# Patient Record
Sex: MALE | Race: WHITE | NOT HISPANIC OR LATINO | Employment: OTHER | URBAN - METROPOLITAN AREA
[De-identification: names, ages, dates, MRNs, and addresses within clinical notes are randomized per-mention and may not be internally consistent; named-entity substitution may affect disease eponyms.]

---

## 2017-01-20 ENCOUNTER — TRANSCRIBE ORDERS (OUTPATIENT)
Dept: SLEEP CENTER | Facility: CLINIC | Age: 27
End: 2017-01-20

## 2017-01-20 DIAGNOSIS — G47.33 OSA (OBSTRUCTIVE SLEEP APNEA): Primary | ICD-10-CM

## 2017-01-25 ENCOUNTER — ALLSCRIPTS OFFICE VISIT (OUTPATIENT)
Dept: OTHER | Facility: OTHER | Age: 27
End: 2017-01-25

## 2017-01-25 ENCOUNTER — TRANSCRIBE ORDERS (OUTPATIENT)
Dept: LAB | Facility: CLINIC | Age: 27
End: 2017-01-25

## 2017-01-25 ENCOUNTER — APPOINTMENT (OUTPATIENT)
Dept: LAB | Facility: CLINIC | Age: 27
End: 2017-01-25
Payer: MEDICARE

## 2017-01-25 DIAGNOSIS — E03.9 HYPOTHYROIDISM: ICD-10-CM

## 2017-01-25 DIAGNOSIS — E78.5 HYPERLIPIDEMIA: ICD-10-CM

## 2017-01-25 DIAGNOSIS — R73.09 OTHER ABNORMAL GLUCOSE: ICD-10-CM

## 2017-01-25 DIAGNOSIS — R32 URINARY INCONTINENCE: ICD-10-CM

## 2017-01-25 LAB
ALBUMIN SERPL BCP-MCNC: 4 G/DL (ref 3.5–5)
ALP SERPL-CCNC: 66 U/L (ref 46–116)
ALT SERPL W P-5'-P-CCNC: 48 U/L (ref 12–78)
ANION GAP SERPL CALCULATED.3IONS-SCNC: 7 MMOL/L (ref 4–13)
AST SERPL W P-5'-P-CCNC: 29 U/L (ref 5–45)
BILIRUB SERPL-MCNC: 0.4 MG/DL (ref 0.2–1)
BILIRUB UR QL STRIP: NEGATIVE
BUN SERPL-MCNC: 11 MG/DL (ref 5–25)
CALCIUM SERPL-MCNC: 9.3 MG/DL (ref 8.3–10.1)
CHLORIDE SERPL-SCNC: 104 MMOL/L (ref 100–108)
CHOLEST SERPL-MCNC: 156 MG/DL (ref 50–200)
CLARITY UR: CLEAR
CO2 SERPL-SCNC: 30 MMOL/L (ref 21–32)
COLOR UR: YELLOW
CREAT SERPL-MCNC: 0.96 MG/DL (ref 0.6–1.3)
EST. AVERAGE GLUCOSE BLD GHB EST-MCNC: 128 MG/DL
GFR SERPL CREATININE-BSD FRML MDRD: >60 ML/MIN/1.73SQ M
GLUCOSE SERPL-MCNC: 109 MG/DL (ref 65–140)
GLUCOSE UR STRIP-MCNC: NEGATIVE MG/DL
HBA1C MFR BLD: 6.1 % (ref 4.2–6.3)
HDLC SERPL-MCNC: 31 MG/DL (ref 40–60)
HGB UR QL STRIP.AUTO: NEGATIVE
KETONES UR STRIP-MCNC: NEGATIVE MG/DL
LEUKOCYTE ESTERASE UR QL STRIP: NEGATIVE
NITRITE UR QL STRIP: NEGATIVE
PH UR STRIP.AUTO: 6 [PH] (ref 4.5–8)
POTASSIUM SERPL-SCNC: 4.1 MMOL/L (ref 3.5–5.3)
PROT SERPL-MCNC: 7.4 G/DL (ref 6.4–8.2)
PROT UR STRIP-MCNC: NEGATIVE MG/DL
SODIUM SERPL-SCNC: 141 MMOL/L (ref 136–145)
SP GR UR STRIP.AUTO: 1.02 (ref 1–1.03)
T4 FREE SERPL-MCNC: 0.96 NG/DL (ref 0.76–1.46)
TRIGL SERPL-MCNC: 452 MG/DL
TSH SERPL DL<=0.05 MIU/L-ACNC: 4.08 UIU/ML (ref 0.36–3.74)
UROBILINOGEN UR QL STRIP.AUTO: 0.2 E.U./DL

## 2017-01-25 PROCEDURE — 80053 COMPREHEN METABOLIC PANEL: CPT

## 2017-01-25 PROCEDURE — 80061 LIPID PANEL: CPT

## 2017-01-25 PROCEDURE — 83036 HEMOGLOBIN GLYCOSYLATED A1C: CPT

## 2017-01-25 PROCEDURE — 36415 COLL VENOUS BLD VENIPUNCTURE: CPT

## 2017-01-25 PROCEDURE — 81003 URINALYSIS AUTO W/O SCOPE: CPT

## 2017-01-25 PROCEDURE — 84443 ASSAY THYROID STIM HORMONE: CPT

## 2017-01-25 PROCEDURE — 84439 ASSAY OF FREE THYROXINE: CPT

## 2017-01-27 ENCOUNTER — HOSPITAL ENCOUNTER (OUTPATIENT)
Dept: SLEEP CENTER | Facility: CLINIC | Age: 27
Discharge: HOME | End: 2017-01-27
Payer: MEDICARE

## 2017-01-27 DIAGNOSIS — G47.33 OSA (OBSTRUCTIVE SLEEP APNEA): ICD-10-CM

## 2017-01-27 PROCEDURE — 95810 POLYSOM 6/> YRS 4/> PARAM: CPT

## 2017-02-22 ENCOUNTER — ALLSCRIPTS OFFICE VISIT (OUTPATIENT)
Dept: OTHER | Facility: OTHER | Age: 27
End: 2017-02-22

## 2017-03-01 ENCOUNTER — GENERIC CONVERSION - ENCOUNTER (OUTPATIENT)
Dept: OTHER | Facility: OTHER | Age: 27
End: 2017-03-01

## 2017-03-01 ENCOUNTER — APPOINTMENT (OUTPATIENT)
Dept: LAB | Facility: CLINIC | Age: 27
End: 2017-03-01
Payer: MEDICARE

## 2017-03-01 DIAGNOSIS — E03.9 HYPOTHYROIDISM: ICD-10-CM

## 2017-03-01 LAB
T4 FREE SERPL-MCNC: 0.93 NG/DL (ref 0.76–1.46)
TSH SERPL DL<=0.05 MIU/L-ACNC: 5.5 UIU/ML (ref 0.36–3.74)

## 2017-03-01 PROCEDURE — 36415 COLL VENOUS BLD VENIPUNCTURE: CPT

## 2017-03-01 PROCEDURE — 84439 ASSAY OF FREE THYROXINE: CPT

## 2017-03-01 PROCEDURE — 84443 ASSAY THYROID STIM HORMONE: CPT

## 2017-03-23 ENCOUNTER — HOSPITAL ENCOUNTER (OUTPATIENT)
Dept: SLEEP CENTER | Facility: CLINIC | Age: 27
Discharge: HOME | End: 2017-03-23
Payer: MEDICARE

## 2017-04-12 DIAGNOSIS — M54.9 DORSALGIA: ICD-10-CM

## 2017-04-12 DIAGNOSIS — M25.559 PAIN IN HIP: ICD-10-CM

## 2017-04-13 ENCOUNTER — TRANSCRIBE ORDERS (OUTPATIENT)
Dept: ADMINISTRATIVE | Facility: HOSPITAL | Age: 27
End: 2017-04-13

## 2017-04-13 DIAGNOSIS — R06.81 APNEA: Primary | ICD-10-CM

## 2017-04-17 ENCOUNTER — HOSPITAL ENCOUNTER (OUTPATIENT)
Dept: RADIOLOGY | Facility: HOSPITAL | Age: 27
Discharge: HOME | End: 2017-04-17
Payer: MEDICARE

## 2017-04-17 ENCOUNTER — GENERIC CONVERSION - ENCOUNTER (OUTPATIENT)
Dept: OTHER | Facility: OTHER | Age: 27
End: 2017-04-17

## 2017-04-17 ENCOUNTER — APPOINTMENT (OUTPATIENT)
Dept: LAB | Facility: CLINIC | Age: 27
End: 2017-04-17
Payer: MEDICARE

## 2017-04-17 DIAGNOSIS — N39.44 NOCTURNAL ENURESIS: ICD-10-CM

## 2017-04-17 DIAGNOSIS — M54.9 DORSALGIA: ICD-10-CM

## 2017-04-17 DIAGNOSIS — M25.559 PAIN IN HIP: ICD-10-CM

## 2017-04-17 DIAGNOSIS — M76.31 ILIOTIBIAL BAND SYNDROME OF RIGHT SIDE: ICD-10-CM

## 2017-04-17 DIAGNOSIS — E03.9 HYPOTHYROIDISM: ICD-10-CM

## 2017-04-17 DIAGNOSIS — M62.838 OTHER MUSCLE SPASM: ICD-10-CM

## 2017-04-17 LAB
T4 FREE SERPL-MCNC: 1.04 NG/DL (ref 0.76–1.46)
TSH SERPL DL<=0.05 MIU/L-ACNC: 2.6 UIU/ML (ref 0.36–3.74)

## 2017-04-17 PROCEDURE — 84439 ASSAY OF FREE THYROXINE: CPT

## 2017-04-17 PROCEDURE — 84443 ASSAY THYROID STIM HORMONE: CPT

## 2017-04-17 PROCEDURE — 72110 X-RAY EXAM L-2 SPINE 4/>VWS: CPT

## 2017-04-17 PROCEDURE — 73521 X-RAY EXAM HIPS BI 2 VIEWS: CPT

## 2017-04-17 PROCEDURE — 36415 COLL VENOUS BLD VENIPUNCTURE: CPT

## 2017-04-19 ENCOUNTER — GENERIC CONVERSION - ENCOUNTER (OUTPATIENT)
Dept: OTHER | Facility: OTHER | Age: 27
End: 2017-04-19

## 2017-05-04 ENCOUNTER — APPOINTMENT (OUTPATIENT)
Dept: PHYSICAL THERAPY | Facility: REHABILITATION | Age: 27
End: 2017-05-04
Payer: MEDICARE

## 2017-05-04 DIAGNOSIS — M54.9 DORSALGIA: ICD-10-CM

## 2017-05-04 DIAGNOSIS — M62.838 OTHER MUSCLE SPASM: ICD-10-CM

## 2017-05-04 DIAGNOSIS — M76.31 ILIOTIBIAL BAND SYNDROME OF RIGHT SIDE: ICD-10-CM

## 2017-05-04 DIAGNOSIS — M25.559 PAIN IN HIP: ICD-10-CM

## 2017-05-04 PROCEDURE — G8979 MOBILITY GOAL STATUS: HCPCS

## 2017-05-04 PROCEDURE — G8978 MOBILITY CURRENT STATUS: HCPCS

## 2017-05-04 PROCEDURE — 97162 PT EVAL MOD COMPLEX 30 MIN: CPT

## 2017-05-05 ENCOUNTER — GENERIC CONVERSION - ENCOUNTER (OUTPATIENT)
Dept: OTHER | Facility: OTHER | Age: 27
End: 2017-05-05

## 2017-05-08 ENCOUNTER — GENERIC CONVERSION - ENCOUNTER (OUTPATIENT)
Dept: OTHER | Facility: OTHER | Age: 27
End: 2017-05-08

## 2017-05-08 ENCOUNTER — TRANSCRIBE ORDERS (OUTPATIENT)
Dept: LAB | Facility: CLINIC | Age: 27
End: 2017-05-08

## 2017-05-08 ENCOUNTER — APPOINTMENT (OUTPATIENT)
Dept: LAB | Facility: CLINIC | Age: 27
End: 2017-05-08
Payer: MEDICARE

## 2017-05-08 DIAGNOSIS — N39.44 NOCTURNAL ENURESIS: ICD-10-CM

## 2017-05-08 LAB
BILIRUB UR QL STRIP: NEGATIVE
CLARITY UR: CLEAR
COLOR UR: YELLOW
GLUCOSE UR STRIP-MCNC: NEGATIVE MG/DL
HGB UR QL STRIP.AUTO: NEGATIVE
KETONES UR STRIP-MCNC: NEGATIVE MG/DL
LEUKOCYTE ESTERASE UR QL STRIP: NEGATIVE
NITRITE UR QL STRIP: NEGATIVE
PH UR STRIP.AUTO: 7 [PH] (ref 4.5–8)
PROT UR STRIP-MCNC: NEGATIVE MG/DL
SP GR UR STRIP.AUTO: 1.01 (ref 1–1.03)
UROBILINOGEN UR QL STRIP.AUTO: 0.2 E.U./DL

## 2017-05-08 PROCEDURE — 81003 URINALYSIS AUTO W/O SCOPE: CPT

## 2017-05-17 ENCOUNTER — HOSPITAL ENCOUNTER (EMERGENCY)
Facility: HOSPITAL | Age: 27
Discharge: HOME | End: 2017-05-17
Attending: EMERGENCY MEDICINE | Admitting: EMERGENCY MEDICINE
Payer: MEDICARE

## 2017-05-17 ENCOUNTER — TRANSCRIBE ORDERS (OUTPATIENT)
Dept: ADMINISTRATIVE | Facility: HOSPITAL | Age: 27
End: 2017-05-17

## 2017-05-17 ENCOUNTER — APPOINTMENT (EMERGENCY)
Dept: CT IMAGING | Facility: HOSPITAL | Age: 27
End: 2017-05-17
Payer: MEDICARE

## 2017-05-17 VITALS
OXYGEN SATURATION: 97 % | RESPIRATION RATE: 16 BRPM | TEMPERATURE: 98.9 F | BODY MASS INDEX: 38.36 KG/M2 | WEIGHT: 315 LBS | SYSTOLIC BLOOD PRESSURE: 157 MMHG | DIASTOLIC BLOOD PRESSURE: 84 MMHG | HEIGHT: 76 IN | HEART RATE: 94 BPM

## 2017-05-17 DIAGNOSIS — N20.0 KIDNEY STONES: Primary | ICD-10-CM

## 2017-05-17 DIAGNOSIS — M54.50 ACUTE BILATERAL LOW BACK PAIN WITHOUT SCIATICA: ICD-10-CM

## 2017-05-17 DIAGNOSIS — M51.26 HERNIATED INTERVERTEBRAL DISC OF LUMBAR SPINE: Primary | ICD-10-CM

## 2017-05-17 LAB
ANION GAP SERPL CALCULATED.3IONS-SCNC: 8 MMOL/L (ref 4–13)
BASOPHILS # BLD AUTO: 0.08 THOUSANDS/ΜL (ref 0–0.1)
BASOPHILS NFR BLD AUTO: 2 % (ref 0–1)
BILIRUB UR QL STRIP: NEGATIVE
BUN SERPL-MCNC: 10 MG/DL (ref 5–25)
CALCIUM SERPL-MCNC: 9.2 MG/DL (ref 8.3–10.1)
CHLORIDE SERPL-SCNC: 105 MMOL/L (ref 100–108)
CLARITY UR: CLEAR
CO2 SERPL-SCNC: 26 MMOL/L (ref 21–32)
COLOR UR: YELLOW
CREAT SERPL-MCNC: 0.87 MG/DL (ref 0.6–1.3)
EOSINOPHIL # BLD AUTO: 0.18 THOUSAND/ΜL (ref 0–0.61)
EOSINOPHIL NFR BLD AUTO: 4 % (ref 0–6)
ERYTHROCYTE [DISTWIDTH] IN BLOOD BY AUTOMATED COUNT: 13.4 % (ref 11.6–15.1)
GFR SERPL CREATININE-BSD FRML MDRD: >60 ML/MIN/1.73SQ M
GLUCOSE SERPL-MCNC: 126 MG/DL (ref 65–140)
GLUCOSE UR STRIP-MCNC: NEGATIVE MG/DL
HCT VFR BLD AUTO: 40.2 % (ref 36.5–49.3)
HGB BLD-MCNC: 13.6 G/DL (ref 12–17)
HGB UR QL STRIP.AUTO: NEGATIVE
KETONES UR STRIP-MCNC: NEGATIVE MG/DL
LEUKOCYTE ESTERASE UR QL STRIP: NEGATIVE
LYMPHOCYTES # BLD AUTO: 1.82 THOUSANDS/ΜL (ref 0.6–4.47)
LYMPHOCYTES NFR BLD AUTO: 41 % (ref 14–44)
MCH RBC QN AUTO: 26.5 PG (ref 26.8–34.3)
MCHC RBC AUTO-ENTMCNC: 33.8 G/DL (ref 31.4–37.4)
MCV RBC AUTO: 78 FL (ref 82–98)
MONOCYTES # BLD AUTO: 0.4 THOUSAND/ΜL (ref 0.17–1.22)
MONOCYTES NFR BLD AUTO: 9 % (ref 4–12)
NEUTROPHILS # BLD AUTO: 1.95 THOUSANDS/ΜL (ref 1.85–7.62)
NEUTS SEG NFR BLD AUTO: 44 % (ref 43–75)
NITRITE UR QL STRIP: NEGATIVE
PH UR STRIP.AUTO: 6.5 [PH] (ref 4.5–8)
PLATELET # BLD AUTO: 303 THOUSANDS/UL (ref 149–390)
PMV BLD AUTO: 9.8 FL (ref 8.9–12.7)
POTASSIUM SERPL-SCNC: 3.5 MMOL/L (ref 3.5–5.3)
PROT UR STRIP-MCNC: NEGATIVE MG/DL
RBC # BLD AUTO: 5.14 MILLION/UL (ref 3.88–5.62)
SODIUM SERPL-SCNC: 139 MMOL/L (ref 136–145)
SP GR UR STRIP.AUTO: 1.01 (ref 1–1.03)
UROBILINOGEN UR QL STRIP.AUTO: 0.2 E.U./DL
WBC # BLD AUTO: 4.43 THOUSAND/UL (ref 4.31–10.16)

## 2017-05-17 PROCEDURE — 99284 EMERGENCY DEPT VISIT MOD MDM: CPT

## 2017-05-17 PROCEDURE — 96361 HYDRATE IV INFUSION ADD-ON: CPT

## 2017-05-17 PROCEDURE — 36415 COLL VENOUS BLD VENIPUNCTURE: CPT | Performed by: EMERGENCY MEDICINE

## 2017-05-17 PROCEDURE — 96375 TX/PRO/DX INJ NEW DRUG ADDON: CPT

## 2017-05-17 PROCEDURE — 96374 THER/PROPH/DIAG INJ IV PUSH: CPT

## 2017-05-17 PROCEDURE — 80048 BASIC METABOLIC PNL TOTAL CA: CPT | Performed by: EMERGENCY MEDICINE

## 2017-05-17 PROCEDURE — 85025 COMPLETE CBC W/AUTO DIFF WBC: CPT | Performed by: EMERGENCY MEDICINE

## 2017-05-17 PROCEDURE — 81003 URINALYSIS AUTO W/O SCOPE: CPT | Performed by: EMERGENCY MEDICINE

## 2017-05-17 PROCEDURE — 74176 CT ABD & PELVIS W/O CONTRAST: CPT

## 2017-05-17 RX ORDER — FENOFIBRATE 145 MG/1
145 TABLET, COATED ORAL DAILY
COMMUNITY
End: 2017-10-13 | Stop reason: ALTCHOICE

## 2017-05-17 RX ORDER — DIPHENOXYLATE HYDROCHLORIDE AND ATROPINE SULFATE 2.5; .025 MG/1; MG/1
1 TABLET ORAL DAILY
COMMUNITY
End: 2017-10-13 | Stop reason: ALTCHOICE

## 2017-05-17 RX ORDER — BENZTROPINE MESYLATE 0.5 MG/1
1 TABLET ORAL 2 TIMES DAILY
COMMUNITY
End: 2017-10-13 | Stop reason: ALTCHOICE

## 2017-05-17 RX ORDER — METHYLPREDNISOLONE 4 MG/1
TABLET ORAL
Qty: 21 TABLET | Refills: 0 | Status: SHIPPED | OUTPATIENT
Start: 2017-05-17 | End: 2017-10-13 | Stop reason: ALTCHOICE

## 2017-05-17 RX ORDER — DIVALPROEX SODIUM 500 MG/1
500 TABLET, DELAYED RELEASE ORAL EVERY 12 HOURS SCHEDULED
COMMUNITY
End: 2018-02-06 | Stop reason: SDUPTHER

## 2017-05-17 RX ORDER — LEVOTHYROXINE SODIUM 0.12 MG/1
25 TABLET ORAL DAILY
COMMUNITY
End: 2017-11-30 | Stop reason: HOSPADM

## 2017-05-17 RX ORDER — OXYCODONE HYDROCHLORIDE AND ACETAMINOPHEN 5; 325 MG/1; MG/1
1 TABLET ORAL EVERY 6 HOURS PRN
Qty: 20 TABLET | Refills: 0 | Status: SHIPPED | OUTPATIENT
Start: 2017-05-17 | End: 2017-05-22

## 2017-05-17 RX ORDER — CLONAZEPAM 0.5 MG/1
1 TABLET ORAL 2 TIMES DAILY
COMMUNITY
End: 2017-10-13 | Stop reason: DRUGHIGH

## 2017-05-17 RX ORDER — DOCUSATE SODIUM 100 MG/1
100 CAPSULE, LIQUID FILLED ORAL DAILY
COMMUNITY
End: 2017-10-13 | Stop reason: ALTCHOICE

## 2017-05-17 RX ORDER — DIAZEPAM 5 MG/ML
5 INJECTION, SOLUTION INTRAMUSCULAR; INTRAVENOUS ONCE
Status: COMPLETED | OUTPATIENT
Start: 2017-05-17 | End: 2017-05-17

## 2017-05-17 RX ORDER — ACETAMINOPHEN 500 MG
325 TABLET ORAL EVERY 6 HOURS PRN
COMMUNITY

## 2017-05-17 RX ORDER — RISPERIDONE 4 MG/1
6 TABLET, ORALLY DISINTEGRATING ORAL 2 TIMES DAILY
COMMUNITY
End: 2017-10-13 | Stop reason: DRUGHIGH

## 2017-05-17 RX ORDER — MORPHINE SULFATE 4 MG/ML
4 INJECTION, SOLUTION INTRAMUSCULAR; INTRAVENOUS ONCE
Status: COMPLETED | OUTPATIENT
Start: 2017-05-17 | End: 2017-05-17

## 2017-05-17 RX ADMIN — MORPHINE SULFATE 4 MG: 4 INJECTION, SOLUTION INTRAMUSCULAR; INTRAVENOUS at 15:19

## 2017-05-17 RX ADMIN — SODIUM CHLORIDE 1000 ML: 0.9 INJECTION, SOLUTION INTRAVENOUS at 15:01

## 2017-05-17 RX ADMIN — DIAZEPAM 5 MG: 5 INJECTION, SOLUTION INTRAMUSCULAR; INTRAVENOUS at 15:14

## 2017-05-18 ENCOUNTER — HOSPITAL ENCOUNTER (OUTPATIENT)
Dept: ULTRASOUND IMAGING | Facility: HOSPITAL | Age: 27
Discharge: HOME | End: 2017-05-18
Payer: MEDICARE

## 2017-05-26 ENCOUNTER — GENERIC CONVERSION - ENCOUNTER (OUTPATIENT)
Dept: OTHER | Facility: OTHER | Age: 27
End: 2017-05-26

## 2017-06-05 DIAGNOSIS — E78.1 PURE HYPERGLYCERIDEMIA: ICD-10-CM

## 2017-06-05 DIAGNOSIS — E03.9 HYPOTHYROIDISM: ICD-10-CM

## 2017-06-05 DIAGNOSIS — R73.09 OTHER ABNORMAL GLUCOSE: ICD-10-CM

## 2017-06-21 ENCOUNTER — APPOINTMENT (OUTPATIENT)
Dept: LAB | Facility: CLINIC | Age: 27
End: 2017-06-21
Payer: MEDICARE

## 2017-06-21 ENCOUNTER — TRANSCRIBE ORDERS (OUTPATIENT)
Dept: LAB | Facility: CLINIC | Age: 27
End: 2017-06-21

## 2017-06-21 ENCOUNTER — TRANSCRIBE ORDERS (OUTPATIENT)
Dept: ADMINISTRATIVE | Facility: HOSPITAL | Age: 27
End: 2017-06-21

## 2017-06-21 DIAGNOSIS — E78.1 PURE HYPERGLYCERIDEMIA: ICD-10-CM

## 2017-06-21 DIAGNOSIS — Z79.899 ENCOUNTER FOR LONG-TERM (CURRENT) USE OF OTHER MEDICATIONS: Primary | ICD-10-CM

## 2017-06-21 DIAGNOSIS — E03.9 HYPOTHYROIDISM: ICD-10-CM

## 2017-06-21 DIAGNOSIS — Z79.899 ENCOUNTER FOR LONG-TERM (CURRENT) USE OF OTHER MEDICATIONS: ICD-10-CM

## 2017-06-21 DIAGNOSIS — R73.09 OTHER ABNORMAL GLUCOSE: ICD-10-CM

## 2017-06-21 DIAGNOSIS — G47.33 OBSTRUCTIVE SLEEP APNEA (ADULT) (PEDIATRIC): Primary | ICD-10-CM

## 2017-06-21 LAB
ALBUMIN SERPL BCP-MCNC: 4 G/DL (ref 3.5–5)
ALP SERPL-CCNC: 56 U/L (ref 46–116)
ALT SERPL W P-5'-P-CCNC: 40 U/L (ref 12–78)
ANION GAP SERPL CALCULATED.3IONS-SCNC: 10 MMOL/L (ref 4–13)
AST SERPL W P-5'-P-CCNC: 21 U/L (ref 5–45)
BASOPHILS # BLD AUTO: 0.08 THOUSANDS/ΜL (ref 0–0.1)
BASOPHILS NFR BLD AUTO: 2 % (ref 0–1)
BILIRUB DIRECT SERPL-MCNC: 0.08 MG/DL (ref 0–0.2)
BILIRUB SERPL-MCNC: 0.3 MG/DL (ref 0.2–1)
BUN SERPL-MCNC: 7 MG/DL (ref 5–25)
CALCIUM SERPL-MCNC: 9.6 MG/DL (ref 8.3–10.1)
CHLORIDE SERPL-SCNC: 104 MMOL/L (ref 100–108)
CHOLEST SERPL-MCNC: 184 MG/DL (ref 50–200)
CO2 SERPL-SCNC: 25 MMOL/L (ref 21–32)
CREAT SERPL-MCNC: 0.82 MG/DL (ref 0.6–1.3)
EOSINOPHIL # BLD AUTO: 0.12 THOUSAND/ΜL (ref 0–0.61)
EOSINOPHIL NFR BLD AUTO: 3 % (ref 0–6)
ERYTHROCYTE [DISTWIDTH] IN BLOOD BY AUTOMATED COUNT: 13.4 % (ref 11.6–15.1)
EST. AVERAGE GLUCOSE BLD GHB EST-MCNC: 123 MG/DL
GFR SERPL CREATININE-BSD FRML MDRD: >60 ML/MIN/1.73SQ M
GLUCOSE P FAST SERPL-MCNC: 91 MG/DL (ref 65–99)
HBA1C MFR BLD: 5.9 % (ref 4.2–6.3)
HCT VFR BLD AUTO: 41.9 % (ref 36.5–49.3)
HDLC SERPL-MCNC: 27 MG/DL (ref 40–60)
HGB BLD-MCNC: 14.1 G/DL (ref 12–17)
LDLC SERPL CALC-MCNC: 114 MG/DL (ref 0–100)
LYMPHOCYTES # BLD AUTO: 2.16 THOUSANDS/ΜL (ref 0.6–4.47)
LYMPHOCYTES NFR BLD AUTO: 44 % (ref 14–44)
MCH RBC QN AUTO: 26.1 PG (ref 26.8–34.3)
MCHC RBC AUTO-ENTMCNC: 33.7 G/DL (ref 31.4–37.4)
MCV RBC AUTO: 77 FL (ref 82–98)
MONOCYTES # BLD AUTO: 0.42 THOUSAND/ΜL (ref 0.17–1.22)
MONOCYTES NFR BLD AUTO: 9 % (ref 4–12)
NEUTROPHILS # BLD AUTO: 1.97 THOUSANDS/ΜL (ref 1.85–7.62)
NEUTS SEG NFR BLD AUTO: 42 % (ref 43–75)
PLATELET # BLD AUTO: 335 THOUSANDS/UL (ref 149–390)
PMV BLD AUTO: 10.4 FL (ref 8.9–12.7)
POTASSIUM SERPL-SCNC: 4 MMOL/L (ref 3.5–5.3)
PROT SERPL-MCNC: 7.5 G/DL (ref 6.4–8.2)
RBC # BLD AUTO: 5.41 MILLION/UL (ref 3.88–5.62)
SODIUM SERPL-SCNC: 139 MMOL/L (ref 136–145)
TRIGL SERPL-MCNC: 213 MG/DL
VALPROATE SERPL-MCNC: 57 UG/ML (ref 50–100)
WBC # BLD AUTO: 4.75 THOUSAND/UL (ref 4.31–10.16)

## 2017-06-21 PROCEDURE — 80061 LIPID PANEL: CPT

## 2017-06-21 PROCEDURE — 80053 COMPREHEN METABOLIC PANEL: CPT

## 2017-06-21 PROCEDURE — 36415 COLL VENOUS BLD VENIPUNCTURE: CPT

## 2017-06-21 PROCEDURE — 85025 COMPLETE CBC W/AUTO DIFF WBC: CPT

## 2017-06-21 PROCEDURE — 82248 BILIRUBIN DIRECT: CPT

## 2017-06-21 PROCEDURE — 83036 HEMOGLOBIN GLYCOSYLATED A1C: CPT

## 2017-06-21 PROCEDURE — 80164 ASSAY DIPROPYLACETIC ACD TOT: CPT

## 2017-06-22 ENCOUNTER — GENERIC CONVERSION - ENCOUNTER (OUTPATIENT)
Dept: OTHER | Facility: OTHER | Age: 27
End: 2017-06-22

## 2017-06-29 ENCOUNTER — ALLSCRIPTS OFFICE VISIT (OUTPATIENT)
Dept: OTHER | Facility: OTHER | Age: 27
End: 2017-06-29

## 2017-07-27 ENCOUNTER — HOSPITAL ENCOUNTER (OUTPATIENT)
Dept: SLEEP CENTER | Facility: CLINIC | Age: 27
Discharge: HOME | End: 2017-07-27
Payer: MEDICARE

## 2017-08-09 ENCOUNTER — ALLSCRIPTS OFFICE VISIT (OUTPATIENT)
Dept: OTHER | Facility: OTHER | Age: 27
End: 2017-08-09

## 2017-08-12 ENCOUNTER — GENERIC CONVERSION - ENCOUNTER (OUTPATIENT)
Dept: OTHER | Facility: OTHER | Age: 27
End: 2017-08-12

## 2017-08-12 ENCOUNTER — APPOINTMENT (EMERGENCY)
Dept: CT IMAGING | Facility: HOSPITAL | Age: 27
End: 2017-08-12
Payer: MEDICARE

## 2017-08-12 ENCOUNTER — HOSPITAL ENCOUNTER (EMERGENCY)
Facility: HOSPITAL | Age: 27
Discharge: HOME | End: 2017-08-12
Attending: EMERGENCY MEDICINE | Admitting: EMERGENCY MEDICINE
Payer: MEDICARE

## 2017-08-12 VITALS
SYSTOLIC BLOOD PRESSURE: 131 MMHG | WEIGHT: 315 LBS | OXYGEN SATURATION: 98 % | DIASTOLIC BLOOD PRESSURE: 86 MMHG | HEART RATE: 87 BPM | BODY MASS INDEX: 41.33 KG/M2 | RESPIRATION RATE: 20 BRPM | TEMPERATURE: 98 F

## 2017-08-12 DIAGNOSIS — M54.9 BACK PAIN: Primary | ICD-10-CM

## 2017-08-12 DIAGNOSIS — M51.26 LUMBAR DISC HERNIATION: ICD-10-CM

## 2017-08-12 PROCEDURE — 96374 THER/PROPH/DIAG INJ IV PUSH: CPT

## 2017-08-12 PROCEDURE — 99284 EMERGENCY DEPT VISIT MOD MDM: CPT

## 2017-08-12 PROCEDURE — 96375 TX/PRO/DX INJ NEW DRUG ADDON: CPT

## 2017-08-12 PROCEDURE — 72131 CT LUMBAR SPINE W/O DYE: CPT

## 2017-08-12 RX ORDER — DIVALPROEX SODIUM 500 MG/1
1000 TABLET, DELAYED RELEASE ORAL DAILY
COMMUNITY

## 2017-08-12 RX ORDER — METHYLPREDNISOLONE SODIUM SUCCINATE 125 MG/2ML
125 INJECTION, POWDER, LYOPHILIZED, FOR SOLUTION INTRAMUSCULAR; INTRAVENOUS ONCE
Status: COMPLETED | OUTPATIENT
Start: 2017-08-12 | End: 2017-08-12

## 2017-08-12 RX ORDER — DIAZEPAM 5 MG/ML
2.5 INJECTION, SOLUTION INTRAMUSCULAR; INTRAVENOUS ONCE
Status: COMPLETED | OUTPATIENT
Start: 2017-08-12 | End: 2017-08-12

## 2017-08-12 RX ORDER — PREDNISONE 20 MG/1
40 TABLET ORAL DAILY
Qty: 10 TABLET | Refills: 0 | Status: SHIPPED | OUTPATIENT
Start: 2017-08-12 | End: 2017-08-17

## 2017-08-12 RX ORDER — GABAPENTIN 100 MG/1
400 CAPSULE ORAL 2 TIMES DAILY
COMMUNITY
End: 2017-10-13 | Stop reason: ALTCHOICE

## 2017-08-12 RX ORDER — MORPHINE SULFATE 4 MG/ML
4 INJECTION, SOLUTION INTRAMUSCULAR; INTRAVENOUS ONCE
Status: COMPLETED | OUTPATIENT
Start: 2017-08-12 | End: 2017-08-12

## 2017-08-12 RX ORDER — OXYCODONE HYDROCHLORIDE AND ACETAMINOPHEN 5; 325 MG/1; MG/1
1 TABLET ORAL EVERY 4 HOURS PRN
COMMUNITY
End: 2017-10-13 | Stop reason: ALTCHOICE

## 2017-08-12 RX ADMIN — DIAZEPAM 2.5 MG: 5 INJECTION, SOLUTION INTRAMUSCULAR; INTRAVENOUS at 02:47

## 2017-08-12 RX ADMIN — MORPHINE SULFATE 4 MG: 4 INJECTION, SOLUTION INTRAMUSCULAR; INTRAVENOUS at 02:51

## 2017-08-12 RX ADMIN — METHYLPREDNISOLONE SODIUM SUCCINATE 125 MG: 125 INJECTION, POWDER, FOR SOLUTION INTRAMUSCULAR; INTRAVENOUS at 02:40

## 2017-08-20 ENCOUNTER — HOSPITAL ENCOUNTER (EMERGENCY)
Facility: HOSPITAL | Age: 27
Discharge: HOME | End: 2017-08-20
Attending: EMERGENCY MEDICINE | Admitting: EMERGENCY MEDICINE
Payer: MEDICARE

## 2017-08-20 ENCOUNTER — APPOINTMENT (EMERGENCY)
Dept: RADIOLOGY | Facility: HOSPITAL | Age: 27
End: 2017-08-20
Payer: MEDICARE

## 2017-08-20 VITALS
HEART RATE: 108 BPM | TEMPERATURE: 98.7 F | OXYGEN SATURATION: 95 % | SYSTOLIC BLOOD PRESSURE: 152 MMHG | WEIGHT: 315 LBS | BODY MASS INDEX: 40.63 KG/M2 | RESPIRATION RATE: 18 BRPM | DIASTOLIC BLOOD PRESSURE: 93 MMHG

## 2017-08-20 DIAGNOSIS — S93.401A SPRAIN OF RIGHT ANKLE, UNSPECIFIED LIGAMENT, INITIAL ENCOUNTER: ICD-10-CM

## 2017-08-20 DIAGNOSIS — S93.601A RIGHT FOOT SPRAIN, INITIAL ENCOUNTER: Primary | ICD-10-CM

## 2017-08-20 PROCEDURE — 73610 X-RAY EXAM OF ANKLE: CPT

## 2017-08-20 PROCEDURE — 99283 EMERGENCY DEPT VISIT LOW MDM: CPT

## 2017-08-20 PROCEDURE — 73630 X-RAY EXAM OF FOOT: CPT

## 2017-08-22 ENCOUNTER — APPOINTMENT (OUTPATIENT)
Dept: PHYSICAL THERAPY | Facility: REHABILITATION | Age: 27
End: 2017-08-22
Payer: MEDICARE

## 2017-08-22 DIAGNOSIS — M51.9 THORACIC, THORACOLUMBAR AND LUMBOSACRAL INTERVERTEBRAL DISC DISORDER: ICD-10-CM

## 2017-08-22 DIAGNOSIS — M51.26 OTHER INTERVERTEBRAL DISC DISPLACEMENT, LUMBAR REGION: ICD-10-CM

## 2017-08-22 DIAGNOSIS — M62.838 OTHER MUSCLE SPASM: ICD-10-CM

## 2017-08-28 ENCOUNTER — APPOINTMENT (OUTPATIENT)
Dept: PHYSICAL THERAPY | Facility: REHABILITATION | Age: 27
End: 2017-08-28
Payer: MEDICARE

## 2017-08-28 PROCEDURE — G8978 MOBILITY CURRENT STATUS: HCPCS

## 2017-08-28 PROCEDURE — 97162 PT EVAL MOD COMPLEX 30 MIN: CPT

## 2017-08-28 PROCEDURE — G8979 MOBILITY GOAL STATUS: HCPCS

## 2017-08-29 ENCOUNTER — GENERIC CONVERSION - ENCOUNTER (OUTPATIENT)
Dept: OTHER | Facility: OTHER | Age: 27
End: 2017-08-29

## 2017-09-06 ENCOUNTER — APPOINTMENT (OUTPATIENT)
Dept: PHYSICAL THERAPY | Facility: REHABILITATION | Age: 27
End: 2017-09-06
Payer: MEDICARE

## 2017-09-06 PROCEDURE — G8979 MOBILITY GOAL STATUS: HCPCS | Performed by: PHYSICAL THERAPIST

## 2017-09-06 PROCEDURE — 97110 THERAPEUTIC EXERCISES: CPT

## 2017-09-06 PROCEDURE — G8978 MOBILITY CURRENT STATUS: HCPCS | Performed by: PHYSICAL THERAPIST

## 2017-09-06 PROCEDURE — 97140 MANUAL THERAPY 1/> REGIONS: CPT

## 2017-09-11 ENCOUNTER — TRANSCRIBE ORDERS (OUTPATIENT)
Dept: ADMINISTRATIVE | Facility: HOSPITAL | Age: 27
End: 2017-09-11

## 2017-09-11 DIAGNOSIS — M54.9 DORSALGIA: Primary | ICD-10-CM

## 2017-09-14 ENCOUNTER — APPOINTMENT (OUTPATIENT)
Dept: PHYSICAL THERAPY | Facility: REHABILITATION | Age: 27
End: 2017-09-14
Payer: MEDICARE

## 2017-09-19 ENCOUNTER — APPOINTMENT (OUTPATIENT)
Dept: PHYSICAL THERAPY | Facility: REHABILITATION | Age: 27
End: 2017-09-19
Payer: MEDICARE

## 2017-09-19 ENCOUNTER — HOSPITAL ENCOUNTER (OUTPATIENT)
Dept: MRI IMAGING | Facility: HOSPITAL | Age: 27
Discharge: HOME | End: 2017-09-19
Attending: NEUROLOGICAL SURGERY
Payer: MEDICARE

## 2017-09-19 DIAGNOSIS — M54.9 DORSALGIA: ICD-10-CM

## 2017-09-19 PROCEDURE — 72148 MRI LUMBAR SPINE W/O DYE: CPT

## 2017-09-20 ENCOUNTER — GENERIC CONVERSION - ENCOUNTER (OUTPATIENT)
Dept: OTHER | Facility: OTHER | Age: 27
End: 2017-09-20

## 2017-09-21 ENCOUNTER — APPOINTMENT (OUTPATIENT)
Dept: PHYSICAL THERAPY | Facility: REHABILITATION | Age: 27
End: 2017-09-21
Payer: MEDICARE

## 2017-09-25 ENCOUNTER — ALLSCRIPTS OFFICE VISIT (OUTPATIENT)
Dept: OTHER | Facility: OTHER | Age: 27
End: 2017-09-25

## 2017-09-26 ENCOUNTER — APPOINTMENT (OUTPATIENT)
Dept: PHYSICAL THERAPY | Facility: REHABILITATION | Age: 27
End: 2017-09-26
Payer: MEDICARE

## 2017-09-28 ENCOUNTER — APPOINTMENT (OUTPATIENT)
Dept: PHYSICAL THERAPY | Facility: REHABILITATION | Age: 27
End: 2017-09-28
Payer: MEDICARE

## 2017-10-02 ENCOUNTER — GENERIC CONVERSION - ENCOUNTER (OUTPATIENT)
Dept: OTHER | Facility: OTHER | Age: 27
End: 2017-10-02

## 2017-10-02 DIAGNOSIS — M48.061 SPINAL STENOSIS OF LUMBAR REGION WITHOUT NEUROGENIC CLAUDICATION: ICD-10-CM

## 2017-10-02 DIAGNOSIS — R73.03 PREDIABETES: ICD-10-CM

## 2017-10-02 DIAGNOSIS — Z00.00 ENCOUNTER FOR GENERAL ADULT MEDICAL EXAMINATION WITHOUT ABNORMAL FINDINGS: ICD-10-CM

## 2017-10-02 DIAGNOSIS — R32 URINARY INCONTINENCE: ICD-10-CM

## 2017-10-02 DIAGNOSIS — M54.9 DORSALGIA: ICD-10-CM

## 2017-10-02 DIAGNOSIS — M51.9 THORACIC, THORACOLUMBAR AND LUMBOSACRAL INTERVERTEBRAL DISC DISORDER: ICD-10-CM

## 2017-10-04 ENCOUNTER — HOSPITAL ENCOUNTER (OUTPATIENT)
Dept: SLEEP CENTER | Facility: CLINIC | Age: 27
Discharge: HOME | End: 2017-10-04
Payer: MEDICARE

## 2017-10-04 DIAGNOSIS — G47.33 OBSTRUCTIVE SLEEP APNEA: ICD-10-CM

## 2017-10-05 ENCOUNTER — HOSPITAL ENCOUNTER (OUTPATIENT)
Dept: RADIOLOGY | Facility: HOSPITAL | Age: 27
Discharge: HOME | End: 2017-10-05
Attending: NEUROLOGICAL SURGERY
Payer: MEDICARE

## 2017-10-05 ENCOUNTER — APPOINTMENT (OUTPATIENT)
Dept: LAB | Facility: CLINIC | Age: 27
End: 2017-10-05
Payer: MEDICARE

## 2017-10-05 ENCOUNTER — TRANSCRIBE ORDERS (OUTPATIENT)
Dept: LAB | Facility: CLINIC | Age: 27
End: 2017-10-05

## 2017-10-05 ENCOUNTER — LAB REQUISITION (OUTPATIENT)
Dept: LAB | Facility: HOSPITAL | Age: 27
End: 2017-10-05
Payer: MEDICARE

## 2017-10-05 DIAGNOSIS — M48.061 SPINAL STENOSIS OF LUMBAR REGION WITHOUT NEUROGENIC CLAUDICATION: ICD-10-CM

## 2017-10-05 DIAGNOSIS — M54.9 DORSALGIA: ICD-10-CM

## 2017-10-05 DIAGNOSIS — N39.44 NOCTURNAL AND DIURNAL ENURESIS: ICD-10-CM

## 2017-10-05 DIAGNOSIS — M51.9 INTERVERTEBRAL DISC DISORDER: ICD-10-CM

## 2017-10-05 DIAGNOSIS — M51.9 THORACIC, THORACOLUMBAR AND LUMBOSACRAL INTERVERTEBRAL DISC DISORDER: ICD-10-CM

## 2017-10-05 DIAGNOSIS — M48.061 SPINAL STENOSIS, LUMBAR REGION, WITHOUT NEUROGENIC CLAUDICATION: ICD-10-CM

## 2017-10-05 DIAGNOSIS — R32 URINARY INCONTINENCE: ICD-10-CM

## 2017-10-05 DIAGNOSIS — M51.9 INTERVERTEBRAL DISC DISORDER: Primary | ICD-10-CM

## 2017-10-05 LAB
ABO GROUP BLD: NORMAL
ALBUMIN SERPL BCP-MCNC: 3.7 G/DL (ref 3.5–5)
ALP SERPL-CCNC: 63 U/L (ref 46–116)
ALT SERPL W P-5'-P-CCNC: 58 U/L (ref 12–78)
ANION GAP SERPL CALCULATED.3IONS-SCNC: 12 MMOL/L (ref 4–13)
APTT PPP: 31 SECONDS (ref 23–35)
AST SERPL W P-5'-P-CCNC: 24 U/L (ref 5–45)
BASOPHILS # BLD AUTO: 0.07 THOUSANDS/ΜL (ref 0–0.1)
BASOPHILS NFR BLD AUTO: 2 % (ref 0–1)
BILIRUB SERPL-MCNC: 0.2 MG/DL (ref 0.2–1)
BILIRUB UR QL STRIP: NEGATIVE
BLD GP AB SCN SERPL QL: NEGATIVE
BUN SERPL-MCNC: 12 MG/DL (ref 5–25)
CALCIUM SERPL-MCNC: 9.3 MG/DL (ref 8.3–10.1)
CHLORIDE SERPL-SCNC: 103 MMOL/L (ref 100–108)
CLARITY UR: CLEAR
CO2 SERPL-SCNC: 25 MMOL/L (ref 21–32)
COLOR UR: YELLOW
CREAT SERPL-MCNC: 0.88 MG/DL (ref 0.6–1.3)
EOSINOPHIL # BLD AUTO: 0.11 THOUSAND/ΜL (ref 0–0.61)
EOSINOPHIL NFR BLD AUTO: 2 % (ref 0–6)
ERYTHROCYTE [DISTWIDTH] IN BLOOD BY AUTOMATED COUNT: 13.3 % (ref 11.6–15.1)
GFR SERPL CREATININE-BSD FRML MDRD: 119 ML/MIN/1.73SQ M
GLUCOSE SERPL-MCNC: 140 MG/DL (ref 65–140)
GLUCOSE UR STRIP-MCNC: NEGATIVE MG/DL
HCT VFR BLD AUTO: 42.4 % (ref 36.5–49.3)
HGB BLD-MCNC: 14 G/DL (ref 12–17)
HGB UR QL STRIP.AUTO: NEGATIVE
INR PPP: 1.02 (ref 0.86–1.16)
KETONES UR STRIP-MCNC: NEGATIVE MG/DL
LEUKOCYTE ESTERASE UR QL STRIP: NEGATIVE
LYMPHOCYTES # BLD AUTO: 2.03 THOUSANDS/ΜL (ref 0.6–4.47)
LYMPHOCYTES NFR BLD AUTO: 43 % (ref 14–44)
MCH RBC QN AUTO: 26 PG (ref 26.8–34.3)
MCHC RBC AUTO-ENTMCNC: 33 G/DL (ref 31.4–37.4)
MCV RBC AUTO: 79 FL (ref 82–98)
MONOCYTES # BLD AUTO: 0.41 THOUSAND/ΜL (ref 0.17–1.22)
MONOCYTES NFR BLD AUTO: 9 % (ref 4–12)
NEUTROPHILS # BLD AUTO: 2.11 THOUSANDS/ΜL (ref 1.85–7.62)
NEUTS SEG NFR BLD AUTO: 44 % (ref 43–75)
NITRITE UR QL STRIP: NEGATIVE
PH UR STRIP.AUTO: 6 [PH] (ref 4.5–8)
PLATELET # BLD AUTO: 312 THOUSANDS/UL (ref 149–390)
PMV BLD AUTO: 10.2 FL (ref 8.9–12.7)
POTASSIUM SERPL-SCNC: 3.8 MMOL/L (ref 3.5–5.3)
PROT SERPL-MCNC: 7.2 G/DL (ref 6.4–8.2)
PROT UR STRIP-MCNC: NEGATIVE MG/DL
PROTHROMBIN TIME: 13.7 SECONDS (ref 12.1–14.4)
RBC # BLD AUTO: 5.38 MILLION/UL (ref 3.88–5.62)
RH BLD: POSITIVE
SODIUM SERPL-SCNC: 140 MMOL/L (ref 136–145)
SP GR UR STRIP.AUTO: 1.02 (ref 1–1.03)
SPECIMEN EXPIRATION DATE: NORMAL
UROBILINOGEN UR QL STRIP.AUTO: 0.2 E.U./DL
WBC # BLD AUTO: 4.73 THOUSAND/UL (ref 4.31–10.16)

## 2017-10-05 PROCEDURE — 86850 RBC ANTIBODY SCREEN: CPT | Performed by: NEUROLOGICAL SURGERY

## 2017-10-05 PROCEDURE — 72120 X-RAY BEND ONLY L-S SPINE: CPT

## 2017-10-05 PROCEDURE — 86900 BLOOD TYPING SEROLOGIC ABO: CPT | Performed by: NEUROLOGICAL SURGERY

## 2017-10-05 PROCEDURE — 86901 BLOOD TYPING SEROLOGIC RH(D): CPT | Performed by: NEUROLOGICAL SURGERY

## 2017-10-05 PROCEDURE — 36415 COLL VENOUS BLD VENIPUNCTURE: CPT

## 2017-10-05 PROCEDURE — 85610 PROTHROMBIN TIME: CPT

## 2017-10-05 PROCEDURE — 80053 COMPREHEN METABOLIC PANEL: CPT

## 2017-10-05 PROCEDURE — 81003 URINALYSIS AUTO W/O SCOPE: CPT

## 2017-10-05 PROCEDURE — 85025 COMPLETE CBC W/AUTO DIFF WBC: CPT

## 2017-10-05 PROCEDURE — 85730 THROMBOPLASTIN TIME PARTIAL: CPT

## 2017-10-06 ENCOUNTER — GENERIC CONVERSION - ENCOUNTER (OUTPATIENT)
Dept: OTHER | Facility: OTHER | Age: 27
End: 2017-10-06

## 2017-10-12 ENCOUNTER — APPOINTMENT (OUTPATIENT)
Dept: LAB | Facility: CLINIC | Age: 27
End: 2017-10-12
Payer: MEDICARE

## 2017-10-12 DIAGNOSIS — Z00.00 ENCOUNTER FOR GENERAL ADULT MEDICAL EXAMINATION WITHOUT ABNORMAL FINDINGS: ICD-10-CM

## 2017-10-12 DIAGNOSIS — R73.03 PREDIABETES: ICD-10-CM

## 2017-10-12 LAB
EST. AVERAGE GLUCOSE BLD GHB EST-MCNC: 114 MG/DL
HBA1C MFR BLD: 5.6 % (ref 4.2–6.3)

## 2017-10-12 PROCEDURE — 83036 HEMOGLOBIN GLYCOSYLATED A1C: CPT

## 2017-10-12 PROCEDURE — 36415 COLL VENOUS BLD VENIPUNCTURE: CPT

## 2017-10-13 ENCOUNTER — GENERIC CONVERSION - ENCOUNTER (OUTPATIENT)
Dept: OTHER | Facility: OTHER | Age: 27
End: 2017-10-13

## 2017-10-13 RX ORDER — CLONAZEPAM 1 MG/1
0.5 TABLET ORAL 2 TIMES DAILY
COMMUNITY

## 2017-10-13 RX ORDER — BENZTROPINE MESYLATE 1 MG/1
1 TABLET ORAL 2 TIMES DAILY
COMMUNITY
End: 2020-03-26 | Stop reason: ALTCHOICE

## 2017-10-13 RX ORDER — RISPERIDONE 4 MG/1
4 TABLET, FILM COATED ORAL 2 TIMES DAILY
COMMUNITY

## 2017-10-13 RX ORDER — FENOFIBRATE 160 MG/1
160 TABLET ORAL DAILY
COMMUNITY
End: 2018-06-14 | Stop reason: SDUPTHER

## 2017-10-13 RX ORDER — RISPERIDONE 2 MG/1
2 TABLET, FILM COATED ORAL 2 TIMES DAILY
COMMUNITY

## 2017-10-13 NOTE — PRE-PROCEDURE INSTRUCTIONS
Pre-Surgery Instructions:   Medication Instructions    acetaminophen (TYLENOL) 500 mg tablet Instructed patient per Anesthesia Guidelines   benztropine (COGENTIN) 1 mg tablet Patient was instructed per "E-Preop"    clonazePAM (KlonoPIN) 1 mg tablet Patient was instructed per "E-Preop"    divalproex sodium (DEPAKOTE) 500 mg EC tablet Patient was instructed per "E-Preop"    divalproex sodium (DEPAKOTE) 500 mg EC tablet Patient was instructed per "E-Preop"    fenofibrate (TRIGLIDE) 160 MG tablet Patient was instructed per "E-Preop"    levothyroxine 125 mcg tablet Patient was instructed per "E-Preop"    risperiDONE (RisperDAL) 2 mg tablet Patient was instructed per "E-Preop"    risperiDONE (RisperDAL) 4 mg tablet Patient was instructed per "E-Preop"           Medication Instruction (Neuroleptic Medication)    Please continue to take this medication on your normal schedule  If this is an oral medication and you take in the morning, you may do so with a sip of water  RisperiDONE 2 MG Oral Tablet, RisperiDONE 4 MG Oral Tablet, risperiDONE (RisperDAL)            Medication Instruction (Benzodiazepine)    Please continue the following medications, if needed, up to and including the day of surgery (with a sip of water)  ClonazePAM 1 MG Oral Tablet          Medication Instruction (Levodopa/Carbidopa - Parkinson Medications)    Please continue to take this medication on your normal schedule  If this is an oral medication and you take in the morning, you may do so with a sip of water  Benztropine Mesylate 1 MG Oral Tablet          Medication Instruction (Neurological Medication)    Please continue to take this medication on your normal schedule  If this is an oral medication and you take in the morning, you may do so with a sip of water          Divalproex Sodium 500 MG Oral Tablet Delayed Release, divalproex sodium (Depakote)          Medication Instruction (Cholesterol Medication)    Please continue the following medications up to the evening before surgery, but do not take it on the day of surgery  Fenofibrate 160 MG Oral Tablet          NPO Instructions    Please do not eat or drink anything prior to your surgery as follows: For AM Surgery times = stop at midnight the night before  For PM Surgery times = Midnight to 8AM clear liquids only (Jello, broth, 7up, Sprite, apple juice, black coffee, black tea, Gatorade)  If you are supposed to take any of your medications, do so with a sip of water  Failure to follow these instructions can lead to an increased risk of lung complications and may result in a delay or cancellation of your procedure  If you have any questions, contact your institution for further instructions  Medical Procedure Risk        Sleep Apnea    Please notify surgeon and anesthesiologist if you have sleep apnea, severe snoring, or daytime somnolence  For those sleep apnea patients with continuous positive airway pressure (CPAP or BiPAP) machines, please bring your machine to the hospital on the day of surgery  COLIN (obstructive sleep apnea), Sleep apnea        Medication Instruction (Thyroxine - Synthroid)    Please continue to take this medication on your normal schedule  If this is an oral medication and you take in the morning, you may do so with a sip of water           Levothyroxine Sodium 25 MCG Oral Tablet, levothyroxine (Levothroid)      Accept All Complete All   Last signed: Never    Request Signature   Add New Task Show Removed Tasks

## 2017-10-16 ENCOUNTER — ANESTHESIA EVENT (OUTPATIENT)
Dept: PERIOP | Facility: HOSPITAL | Age: 27
End: 2017-10-16
Payer: MEDICARE

## 2017-10-16 ENCOUNTER — GENERIC CONVERSION - ENCOUNTER (OUTPATIENT)
Dept: OTHER | Facility: OTHER | Age: 27
End: 2017-10-16

## 2017-10-17 ENCOUNTER — ANESTHESIA (OUTPATIENT)
Dept: PERIOP | Facility: HOSPITAL | Age: 27
End: 2017-10-17
Payer: MEDICARE

## 2017-10-17 ENCOUNTER — HOSPITAL ENCOUNTER (OUTPATIENT)
Facility: HOSPITAL | Age: 27
Setting detail: OUTPATIENT SURGERY
Discharge: HOME | End: 2017-10-17
Attending: NEUROLOGICAL SURGERY | Admitting: NEUROLOGICAL SURGERY
Payer: MEDICARE

## 2017-10-17 ENCOUNTER — APPOINTMENT (OUTPATIENT)
Dept: RADIOLOGY | Facility: HOSPITAL | Age: 27
End: 2017-10-17
Payer: MEDICARE

## 2017-10-17 VITALS
SYSTOLIC BLOOD PRESSURE: 127 MMHG | RESPIRATION RATE: 18 BRPM | DIASTOLIC BLOOD PRESSURE: 78 MMHG | HEIGHT: 76 IN | HEART RATE: 109 BPM | OXYGEN SATURATION: 94 % | BODY MASS INDEX: 38.36 KG/M2 | TEMPERATURE: 99.2 F | WEIGHT: 315 LBS

## 2017-10-17 PROCEDURE — 72020 X-RAY EXAM OF SPINE 1 VIEW: CPT

## 2017-10-17 RX ORDER — LIDOCAINE HYDROCHLORIDE AND EPINEPHRINE 10; 10 MG/ML; UG/ML
INJECTION, SOLUTION INFILTRATION; PERINEURAL AS NEEDED
Status: DISCONTINUED | OUTPATIENT
Start: 2017-10-17 | End: 2017-10-17 | Stop reason: HOSPADM

## 2017-10-17 RX ORDER — ONDANSETRON 2 MG/ML
INJECTION INTRAMUSCULAR; INTRAVENOUS AS NEEDED
Status: DISCONTINUED | OUTPATIENT
Start: 2017-10-17 | End: 2017-10-17 | Stop reason: SURG

## 2017-10-17 RX ORDER — METHOCARBAMOL 500 MG/1
500 TABLET, FILM COATED ORAL EVERY 6 HOURS PRN
Qty: 30 TABLET | Refills: 0 | Status: SHIPPED | OUTPATIENT
Start: 2017-10-17 | End: 2018-03-09 | Stop reason: SDUPTHER

## 2017-10-17 RX ORDER — METOCLOPRAMIDE HYDROCHLORIDE 5 MG/ML
10 INJECTION INTRAMUSCULAR; INTRAVENOUS ONCE AS NEEDED
Status: DISCONTINUED | OUTPATIENT
Start: 2017-10-17 | End: 2017-10-17 | Stop reason: HOSPADM

## 2017-10-17 RX ORDER — TRAMADOL HYDROCHLORIDE 50 MG/1
50 TABLET ORAL EVERY 6 HOURS PRN
Qty: 30 TABLET | Refills: 0 | Status: SHIPPED | OUTPATIENT
Start: 2017-10-17 | End: 2017-10-17

## 2017-10-17 RX ORDER — SUCCINYLCHOLINE CHLORIDE 20 MG/ML
INJECTION INTRAMUSCULAR; INTRAVENOUS AS NEEDED
Status: DISCONTINUED | OUTPATIENT
Start: 2017-10-17 | End: 2017-10-17 | Stop reason: SURG

## 2017-10-17 RX ORDER — FENTANYL CITRATE 50 UG/ML
INJECTION, SOLUTION INTRAMUSCULAR; INTRAVENOUS AS NEEDED
Status: DISCONTINUED | OUTPATIENT
Start: 2017-10-17 | End: 2017-10-17 | Stop reason: SURG

## 2017-10-17 RX ORDER — ROCURONIUM BROMIDE 10 MG/ML
INJECTION, SOLUTION INTRAVENOUS AS NEEDED
Status: DISCONTINUED | OUTPATIENT
Start: 2017-10-17 | End: 2017-10-17 | Stop reason: SURG

## 2017-10-17 RX ORDER — CHLORHEXIDINE GLUCONATE 0.12 MG/ML
15 RINSE ORAL ONCE
Status: COMPLETED | OUTPATIENT
Start: 2017-10-17 | End: 2017-10-17

## 2017-10-17 RX ORDER — ALBUMIN, HUMAN INJ 5% 5 %
SOLUTION INTRAVENOUS CONTINUOUS PRN
Status: DISCONTINUED | OUTPATIENT
Start: 2017-10-17 | End: 2017-10-17 | Stop reason: SURG

## 2017-10-17 RX ORDER — BUPIVACAINE HYDROCHLORIDE 5 MG/ML
INJECTION, SOLUTION EPIDURAL; INTRACAUDAL AS NEEDED
Status: DISCONTINUED | OUTPATIENT
Start: 2017-10-17 | End: 2017-10-17 | Stop reason: HOSPADM

## 2017-10-17 RX ORDER — ONDANSETRON 2 MG/ML
4 INJECTION INTRAMUSCULAR; INTRAVENOUS ONCE AS NEEDED
Status: DISCONTINUED | OUTPATIENT
Start: 2017-10-17 | End: 2017-10-17 | Stop reason: HOSPADM

## 2017-10-17 RX ORDER — METHYLPREDNISOLONE ACETATE 40 MG/ML
INJECTION, SUSPENSION INTRA-ARTICULAR; INTRALESIONAL; INTRAMUSCULAR; SOFT TISSUE AS NEEDED
Status: DISCONTINUED | OUTPATIENT
Start: 2017-10-17 | End: 2017-10-17 | Stop reason: HOSPADM

## 2017-10-17 RX ORDER — METOPROLOL TARTRATE 5 MG/5ML
INJECTION INTRAVENOUS AS NEEDED
Status: DISCONTINUED | OUTPATIENT
Start: 2017-10-17 | End: 2017-10-17 | Stop reason: SURG

## 2017-10-17 RX ORDER — SODIUM CHLORIDE, SODIUM LACTATE, POTASSIUM CHLORIDE, CALCIUM CHLORIDE 600; 310; 30; 20 MG/100ML; MG/100ML; MG/100ML; MG/100ML
125 INJECTION, SOLUTION INTRAVENOUS CONTINUOUS
Status: DISCONTINUED | OUTPATIENT
Start: 2017-10-17 | End: 2017-10-17 | Stop reason: HOSPADM

## 2017-10-17 RX ORDER — METHOCARBAMOL 500 MG/1
500 TABLET, FILM COATED ORAL EVERY 6 HOURS PRN
Qty: 30 TABLET | Refills: 0 | Status: SHIPPED | OUTPATIENT
Start: 2017-10-17 | End: 2017-10-17

## 2017-10-17 RX ORDER — PROPOFOL 10 MG/ML
INJECTION, EMULSION INTRAVENOUS AS NEEDED
Status: DISCONTINUED | OUTPATIENT
Start: 2017-10-17 | End: 2017-10-17 | Stop reason: SURG

## 2017-10-17 RX ORDER — MEPERIDINE HYDROCHLORIDE 25 MG/ML
12.5 INJECTION INTRAMUSCULAR; INTRAVENOUS; SUBCUTANEOUS
Status: DISCONTINUED | OUTPATIENT
Start: 2017-10-17 | End: 2017-10-17 | Stop reason: HOSPADM

## 2017-10-17 RX ORDER — TRAMADOL HYDROCHLORIDE 50 MG/1
50 TABLET ORAL EVERY 6 HOURS PRN
Qty: 30 TABLET | Refills: 0 | Status: SHIPPED | OUTPATIENT
Start: 2017-10-17 | End: 2017-10-27

## 2017-10-17 RX ORDER — FENTANYL CITRATE/PF 50 MCG/ML
25 SYRINGE (ML) INJECTION AS NEEDED
Status: DISCONTINUED | OUTPATIENT
Start: 2017-10-17 | End: 2017-10-17 | Stop reason: HOSPADM

## 2017-10-17 RX ADMIN — METOPROLOL TARTRATE 1 MG: 1 INJECTION, SOLUTION INTRAVENOUS at 09:00

## 2017-10-17 RX ADMIN — SODIUM CHLORIDE, SODIUM LACTATE, POTASSIUM CHLORIDE, AND CALCIUM CHLORIDE: .6; .31; .03; .02 INJECTION, SOLUTION INTRAVENOUS at 08:54

## 2017-10-17 RX ADMIN — DEXAMETHASONE SODIUM PHOSPHATE 10 MG: 10 INJECTION INTRAMUSCULAR; INTRAVENOUS at 08:39

## 2017-10-17 RX ADMIN — FENTANYL CITRATE 100 MCG: 50 INJECTION, SOLUTION INTRAMUSCULAR; INTRAVENOUS at 08:05

## 2017-10-17 RX ADMIN — CHLORHEXIDINE GLUCONATE 15 ML: 1.2 RINSE ORAL at 07:15

## 2017-10-17 RX ADMIN — METOPROLOL TARTRATE 1 MG: 1 INJECTION, SOLUTION INTRAVENOUS at 09:08

## 2017-10-17 RX ADMIN — METOPROLOL TARTRATE 1 MG: 1 INJECTION, SOLUTION INTRAVENOUS at 09:17

## 2017-10-17 RX ADMIN — HYDROMORPHONE HYDROCHLORIDE 1 MG: 1 INJECTION, SOLUTION INTRAMUSCULAR; INTRAVENOUS; SUBCUTANEOUS at 08:35

## 2017-10-17 RX ADMIN — HYDROMORPHONE HYDROCHLORIDE 1 MG: 1 INJECTION, SOLUTION INTRAMUSCULAR; INTRAVENOUS; SUBCUTANEOUS at 08:20

## 2017-10-17 RX ADMIN — ONDANSETRON 4 MG: 2 INJECTION INTRAMUSCULAR; INTRAVENOUS at 10:30

## 2017-10-17 RX ADMIN — ROCURONIUM BROMIDE 20 MG: 10 INJECTION, SOLUTION INTRAVENOUS at 09:15

## 2017-10-17 RX ADMIN — ROCURONIUM BROMIDE 30 MG: 10 INJECTION, SOLUTION INTRAVENOUS at 07:58

## 2017-10-17 RX ADMIN — ROCURONIUM BROMIDE 30 MG: 10 INJECTION, SOLUTION INTRAVENOUS at 08:20

## 2017-10-17 RX ADMIN — ALBUMIN HUMAN: 0.05 INJECTION, SOLUTION INTRAVENOUS at 08:30

## 2017-10-17 RX ADMIN — ROCURONIUM BROMIDE 20 MG: 10 INJECTION, SOLUTION INTRAVENOUS at 08:35

## 2017-10-17 RX ADMIN — PROPOFOL 200 MG: 10 INJECTION, EMULSION INTRAVENOUS at 07:54

## 2017-10-17 RX ADMIN — SODIUM CHLORIDE, SODIUM LACTATE, POTASSIUM CHLORIDE, AND CALCIUM CHLORIDE: .6; .31; .03; .02 INJECTION, SOLUTION INTRAVENOUS at 07:00

## 2017-10-17 RX ADMIN — VANCOMYCIN HYDROCHLORIDE 1500 MG: 10 INJECTION, POWDER, LYOPHILIZED, FOR SOLUTION INTRAVENOUS at 08:10

## 2017-10-17 RX ADMIN — SUCCINYLCHOLINE CHLORIDE 100 MG: 20 INJECTION, SOLUTION INTRAMUSCULAR; INTRAVENOUS at 07:54

## 2017-10-17 RX ADMIN — ALBUMIN HUMAN: 0.05 INJECTION, SOLUTION INTRAVENOUS at 08:43

## 2017-10-17 NOTE — OP NOTE
OPERATIVE REPORT  PATIENT NAME: Haleigh Cao    :  1990  MRN: [id number]  Pt Location: [facility] OR ROOM 09    SURGERY DATE: 10/17/2017    Surgeon(s) and Role:     * Olga Gamble MD - Assisting     * Corinne Hunt MD - Primary    Preop Diagnosis:  Intervertebral disc disorder with radiculopathy of lumbar region [M51 16]    Post-Op Diagnosis Codes:     * Intervertebral disc disorder with radiculopathy of lumbar region [M51 16]    Procedure:  1  Left L3/4 hemilaminotomy, medial facetectomy, and discectomy  2  Left L3/4 foraminotomy  3  Right L4/5 Left L3/4 hemilaminotomy, medial facetectomy, and discectomy  4  Right L3/4 foraminotomy  5  Intraoperative epidural steroid injection  6  Use of intraoperative fluoroscopy    Specimen(s):  * No specimens in log *    Estimated Blood Loss:   100 mL    Drains:   None    Anesthesia Type:   General    Operative Indications:  Intervertebral disc disorder with radiculopathy of lumbar region [M51 16]  This is a 43-year-old gentleman with a history of autism and ADHD who presented with bilateral lumbar radiculopathy and a right foot drop  This had been going on since August   Unfortunately both his leg symptoms and urinary symptoms have been increasing for this reason he was referred for evaluation  Radiographically his symptoms correlated with a large left-sided L3-4 disc and a large right-sided L4-5 disc  As such, we discussed the risks and benefits of an open left L3-4 and right L4-5 diskectomy  He understood the risks and included bleeding, infection, stroke, CSF leak, paralysis, nonresolution of back pain, risk of reherniation, and death  They elected to proceed  Operative Findings:  Large left L3-4 disc fragment causing severe neural compression  Large right L4-5 disc causing severe neural compression  Complications:   None    Procedure and Technique:  After obtaining informed consent the patient was brought into the operating room    General endotracheal anesthesia was induced  He was then flipped prone onto a William table  All his pressure points were padded adequately  He received 1 g of preoperative vancomycin  His back was prepped and draped in the usual sterile fashion  A surgical time-out was performed  Using a 18 gauge spinal needle and fluoroscopy the L3/4 and L4/5 disc levels were localized  A 10 blade was then used to make an approximately 10 cm incision  Monopolar cautery was then used to dissect in a subperiosteal fashion revealing the L3,4 and 5 lamina and thus the L3-4 and L4-5 interspaces  A Penfield 4 was placed at the interspaces and fluoroscopy was used to confirm our levels  Following this the Midas-Niko high-speed drill was used to create a partial left hemilaminotomy of L3, and a medial facetectomy of left  L3-4, and left L3-4 foraminotomy  Next using a combination of Kerrison laminotomy and medial facetectomy was extended  Once the ligamentum flavum was identified angled curette was used to dissect this away from the nerve root  This was resected with Kerrison rongeurs  Subsequent to this a Te[employer] 4 was used to dissect the nerve medially  He was significantly displaced in disc was readily visible  Bipolar cautery was then used to bipolar epidural veins  A small annulotomy was performed and a large disc fragment was removed  Using combination of pituitaprabhu and Joan curette subsequent fragments were removed  A Penfield 4 was placed in the disc space to confirm the level once again  It was removed  A Kingston dissector was freely able to pass in the foramen, ventral to the dura, and dorsal to the nerve root  The space was well decompressed  In a similar fashion we proceeded with a right hand side L4-5 diskectomy  The Midas Niko drill was then used to perform L4 sheldon laminotomy, medial L4-5 facetectomy and L4-5 foraminotomy  Once again using an angled curette the ligamentum most dissected    A combination of Kerrison rongeur was used to resect this  Once again a was clear that the nerve root was significantly displaced  Using bipolar cautery epidural veins were bipolar  The nerve was then retracted medially  A 15 blade was used to incise the annulus  Using combination of pituitaries a large disc fragment was removed  An Joan curette and up-angled pituitaries were used to remove further disc fragments  The disc space was irrigated to ensure no other free fragments  Once this was done a ball-tipped nerve hook was used to ensure no further compression of the descending nerve root in the foramen, and that there was no residual disc ventral to the dura  After we were satisfied with our diskectomies the wound was copiously irrigated with 3 L antibiotic irrigation  Hemostasis was achieved  Next, 40 mg of Depo-Medrol was placed along the left descending L4 nerve root and the right descending L5 nerve root  Once hemostasis was ensured the fascia was closed with interrupted 0 Vicryl  The deep dermal layer was closed using inverted 2 Vicryl  The subcutaneous layer was closed using a 3 0 Vlock   The skin was closed with Vistacryl  A sterile dressing was then applied  The patient was then awoken from his general anesthesia care and found to be in his baseline neurologic condition  There were 2 interrupted and correct counts  A surgical sign-out was performed  The patient was then transferred to PACU and up to his floor in stable neurologic condition  Due to the complicated nature of the case and lack of qualified resident Dr Heide Palacios  was then assistance of the procedure  Dr Heide Palacios was present and assisted for the exposure, laminotomies, foraminotomies,  diskectomies and closure      Patient Disposition:  PACU     SIGNATURE: Jennie Ely MD  DATE: October 17, 2017  TIME: 3:50 PM

## 2017-10-17 NOTE — INTERIM OP NOTE
LEFT L3/4 AND RIGHT L4/5 MICRODISCECTOMIES, HEMILAMINECTOMIES; POSSIBLE EPIDURAL STEROID INJECTIONS  Postoperative Note  PATIENT NAME: Tigist Thao  : 1990  MRN: [id number]  [facility] RO[facility] 09    Surgery Date: 10/17/2017    Preop Diagnosis:  Intervertebral disc disorder with radiculopathy of lumbar region [M51 16]    Post-Op Diagnosis Codes:     * Intervertebral disc disorder with radiculopathy of lumbar region [M51 16]    Procedure(s) (LRB):  LEFT L3/4 AND RIGHT L4/5 MICRODISCECTOMIES, HEMILAMINECTOMIES; POSSIBLE EPIDURAL STEROID INJECTIONS (Bilateral)    Surgeon(s) and Role:     * Rosi Thomson MD - Primary     * Marysol Chambers MD - Assisting    Specimens:  * No specimens in log *    Estimated Blood Loss:   100 mL    Anesthesia Type:   General     Findings:    left L3/4 and right l4/5 large discs  Complications:   None    SIGNATURE: Rosi Thomson MD   DATE: 2017   TIME: 11:08 AM

## 2017-10-17 NOTE — DISCHARGE INSTRUCTIONS
Laminectomy/Discectomy Discharge Instructions   Please keep incision clean and dry  Avoid applying creams, lotion or antiseptic to incision area   If you have steri-strips you may remove them after 14 days if they do not fall off   Check the wound daily  If the incision becomes red, swollen, tender, warm, or has increased drainage please notify MD immediately   May shower 3 days after surgery, but do not soak in a tub and no swimming   Pat incision dry after showering   No bending or heavy lifting greater than 10lbs   No prolonged sitting greater than 30 minutes, but may walk as tolerated   Please take pain medications to relieve incision pain, and muscle relaxants to prevent spasms as directed by MD or Extender  See Med  Rec  completed at the time of discharge   No driving for 2 weeks or longer if taking narcotics or muscle relaxers   OK to be passenger in car for short distances   If taking Coumadin, Aspirin, or Plavix, you may resume these medications when cleared by Neurosurgery   To avoid constipation while on narcotics increase your water and fiber intake   May use over the counter stool softeners such as colace and senna   Limit steps to 2-3 times a day   Continue use of Incentive spirometer   Return for your follow up appointment in 2 weeks for an incision check and staple/suture removal as scheduled  Follow-up 6 weeks after surgery as scheduled  **Please notify MD if you experience a fever 101  F, chills or have increased pain, numbness, or weakness in your legs**

## 2017-10-17 NOTE — ANESTHESIA POSTPROCEDURE EVALUATION
Post-Op Assessment Note      CV Status:  Stable    Mental Status:  Lethargic    Hydration Status:  Euvolemic    PONV Controlled:  Controlled    Airway Patency:  Patent    Post Op Vitals Reviewed: Yes          Staff: CRNA           BP   134/68   Temp   97 8   Pulse  107   Resp   20   SpO2   96/O2 Mask 6L

## 2017-10-17 NOTE — ANESTHESIA PREPROCEDURE EVALUATION
Review of Systems/Medical History  Patient summary reviewed  Chart reviewed      Cardiovascular  Hyperlipidemia,    Pulmonary  Negative pulmonary ROS ,        GI/Hepatic    No GERD ,        Negative  ROS        Endo/Other  History of thyroid disease ,      GYN       Hematology  Negative hematology ROS      Musculoskeletal  Obesity  morbid obesity,        Neurology  Negative neurology ROS      Psychology   Psychiatric history, Anxiety, Depression , bipolar disorder,            Physical Exam    Airway    Mallampati score: III  TM Distance: >3 FB  Neck ROM: full     Dental   No notable dental hx     Cardiovascular  Cardiovascular exam normal    Pulmonary  Pulmonary exam normal     Other Findings        Anesthesia Plan  ASA Score- 3       Anesthesia Type- general with ASA Monitors  Additional Monitors:   Airway Plan: ETT  Induction- intravenous  Informed Consent- Anesthetic plan and risks discussed with patient

## 2017-10-19 ENCOUNTER — GENERIC CONVERSION - ENCOUNTER (OUTPATIENT)
Dept: OTHER | Facility: OTHER | Age: 27
End: 2017-10-19

## 2017-10-24 ENCOUNTER — GENERIC CONVERSION - ENCOUNTER (OUTPATIENT)
Dept: OTHER | Facility: OTHER | Age: 27
End: 2017-10-24

## 2017-10-26 ENCOUNTER — GENERIC CONVERSION - ENCOUNTER (OUTPATIENT)
Dept: OTHER | Facility: OTHER | Age: 27
End: 2017-10-26

## 2017-10-27 NOTE — CONSULTS
Assessment  Assessed    1  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)   2  Lumbar stenosis (724 02) (M48 06)    Plan  Herniated lumbar intervertebral disc, Lumbar stenosis    · MethylPREDNISolone 4 MG Oral Tablet Therapy Pack (Medrol); medrol dose alexey  to  be used as directed on packet   Rx By: Sandra Banuelos; Dispense: 0 Days ; #:1 Tablet Therapy Pack; Refill: 0;For: Herniated lumbar intervertebral disc, Lumbar stenosis; CHANTEL = N; Verified Transmission to [facility] #[id number] Last Updated By: System, SureScripts; 9/25/2017 2:44:39 PM  Unlinked    · Levothyroxine Sodium 25 MCG Oral Tablet   CHANTEL = N; Administered: 1/1/0001 12:00:00 AM; Last Updated By: Lucretia Strickland; 9/25/2017 2:11:52 PM    Discussion/Summary    The patient?s history/physical exam is consistent with pain and right foot weakness emanating from the large herniated disc at L4-5  In addition, the patient has been experiencing worsening daytime fecal/bowel incontinence so will be referred back to Dr Anai Blank  the meantime to help decrease spinal inflammation and swelling, I will prescribe a Medrol Dosepak  Patient and his mother were educated on the most common side effects of prednisone, and verbalized understanding  Patient's mother stated that the patient had positive results from the use of prednisone in the past without side effects  Patient will call the office with any adverse medication side effects, or sooner with worsening symptoms  Patient will follow up on an as-needed basis, or sooner with a worsening of symptoms  The patient has the current Goals: Follow up with Dr Anai Blank for surgical consultation  The patent has the current Barriers: None  Patient is unable to Self-Care: Care for by his mother  Possible side effects of new medications were reviewed with the patient/guardian today  The treatment plan was reviewed with the patient/guardian   The patient/guardian understands and agrees with the treatment plan   The patient and patient's family was counseled regarding diagnostic results,-- instructions for management,-- risk factor reductions,-- prognosis,-- patient and family education,-- impressions,-- risks and benefits of treatment options-- and-- importance of compliance with treatment  Chief Complaint  Chief Complaints    1  Back Pain  Back Pain      History of Present Illness  Patient is a 52-year-old male presents for initial consultation for low back pain and leg pain  Patient is autistic and is accompanied by his mother  Patient symptoms and present for the last 5 months  Patient contributes his symptoms to 2 injuries 1 when he hurt his back and fell lifting a dog, and another time when he was dropped by EMT during an ambulance trip in August  Pain is moderate and nearly constant in nature occurring in a typical pattern  He describes his pain as burning, cramping, shooting, numbness, sharp, dull aching, cutting, and pins and needles, pressure-like, throbbing  Patient reports that he has weakness in his lower extremities  Patient states that the pain starts in his back and radiates into his bilateral hips, and bilateral thighs and down the anterior aspect of his bilateral calves  Patient reports a decrease in pain with relaxation and bowel movements  He reports no change in symptoms with sitting he reports increase in symptoms with standing, bending, walking, exercise, coughing sneezing  Patient is currently using a wheelchair and crutches at times for ambulation  Patient reports complete weakness of his right foot, and mild weakness in right leg  Patient reports a history of nocturia, however is now experiencing more incontinence during the daytime hours, since his accident  Patient reports no relief of symptoms with use of physical therapy and a TENS unit  He reports moderate relief of symptoms with use of heat/ice treatment    reports he is taking Tylenol 500 mg as needed about 3 to 5 times a week  Patient reports that this medication provides him a moderate relief of symptoms  Referring physician is  dr Brenden Godoy presents with complaints of gradual onset of constant episodes of moderate bilateral lower back pain, described as sharp, dull, aching, burning and throbbing, radiating to the bilateral buttock, bilateral thigh, bilateral lower leg and bilateral foot  On a scale of 1 to 10, the patient rates the pain as 5  Review of Systems    ROS reviewed  Active Problems  Problems    1  ADHD (attention deficit hyperactivity disorder), combined type (314 01) (F90 2)   2  Allergy (995 3) (T78 40XA)   3  Autism (299 00) (F84 0)   4  Bipolar mood disorder (296 80) (F31 9)   5  Bowel incontinence (787 60) (R15 9)   6  Claustrophobia (300 29) (F40 240)   7  Constipation (564 00) (K59 00)   8  Depression (311) (F32 9)   9  Encounter for therapeutic drug monitoring (V58 83) (Z51 81)   10  External hemorrhoids (455 3) (K64 4)   11  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)   12  Hip pain (719 45) (M25 559)   13  Hyperlipidemia (272 4) (E78 5)   14  Hypertriglyceridemia (272 1) (E78 1)   15  Hypothyroidism (244 9) (E03 9)   16  Iliotibial band syndrome of right side (728 89) (M76 31)   17  Lumbar disc disease (722 93) (M51 9)   18  Muscle spasm (728 85) (M62 838)   19  Nocturnal enuresis (788 36) (N39 44)   20  Obesity (278 00) (E66 9)   21  COLIN (obstructive sleep apnea) (327 23) (G47 33)   22  Prediabetes (790 29) (R73 03)   23  Psychiatric disorder (300 9) (F99)   24  Right-sided back pain (724 5) (M54 9)   25  Skin lesion (709 9) (L98 9)   26  Sleep apnea (780 57) (G47 30)   27  Sleep disturbances (780 50) (G47 9)   28  Snoring (786 09) (R06 83)   29  Urinary incontinence (788 30) (R32)   30  Wears glasses (V49 89) (Z97 3)    Past Medical History  Problems    1  History of Blood in stool (578 1) (K92 1)   2   History of Costochondritis (733 6) (M94 0)   3  History of being hospitalized (V13 9) (Z92 89)   4  History of hyperlipidemia (V12 29) (Z86 39)   5  History of thyroid disease (V12 29) (Z86 39)   6  History of viral gastroenteritis (V12 09) (Z86 19)   7  History of Urinary problem (V47 4) (R39 89)   8  History of [place] teeth removed (525 50,525 10) (N84 022)    The active problems and past medical history were reviewed and updated today  Surgical History  Problems    1  History of Colonoscopy (Fiberoptic)   2  History of Oral Surgery Tooth Extraction    The surgical history was reviewed and updated today  Family History  Mother    1  Denied: Family history of Alcoholism and drug addiction in family   2  Denied: Family history of Anxiety and depression   3  Denied: Family history of Cancer, colon   4  Denied: Family history of Crohn's disease   5  Family history of hypertension (V17 49) (Z82 49)   6  Denied: Family history of liver disease  Father    9  Denied: Family history of Alcoholism and drug addiction in family   6  Denied: Family history of Anxiety and depression   9  Denied: Family history of Cancer, colon   10  Family history of cardiac disorder (V17 49) (Z82 49)   11  Denied: Family history of Crohn's disease   12  Denied: Family history of liver disease   15  Family history of substance abuse (V17 0) (Z81 4)  Child    15  Denied: Family history of Alcoholism and drug addiction in family   13  Denied: Family history of Anxiety and depression  Sibling    12  Denied: Family history of Alcoholism and drug addiction in family   16  Denied: Family history of Anxiety and depression  Grandmother    25  Family history of colonic polyps (V18 51) (Z83 71)  Maternal Grandmother    23  Family history of cardiac disorder (V17 49) (Z82 49)  Paternal Grandmother    21  Family history of    24  Family history of cerebrovascular accident (CVA) (V17 1) (Z82 3)  Grandfather    25  Family history of colonic polyps (V18 51) (Z83 71)   23  Family history of malignant neoplasm (V16 9) (Z80 9)  Paternal Grandfather    25  Family history of cardiac disorder (V17 49) (Z82 49)    The family history was reviewed and updated today  Social History  Problems    · Disabled   · Drinks coffee   · High school or GED   · Never a smoker   · No alcohol use   · Single  The social history was reviewed and updated today  The social history was reviewed and is unchanged  Current Meds   1  Atorvastatin Calcium 20 MG Oral Tablet; take 1 tablet by mouth every day; Therapy: 80DAW8559 to (Bishop Black)  Requested for: 27ZZB7554; Last   LI:60BJJ5207 Ordered   2  Benztropine Mesylate 1 MG Oral Tablet; Take 1 tablet twice daily as needed; Therapy: 26Gqm5645 to (Evaluate:01Jan2017)  Requested for: 09Mad8378; Last   Rx:88Jkp3010 Ordered   3  ClonazePAM 1 MG Oral Tablet; Take 1 tablet twice daily; Last Rx:29Qkl8538 Ordered   4  Divalproex Sodium 500 MG Oral Tablet Delayed Release; Take 1 tablet 4 times daily; Therapy: (Jody Macario to Recorded   5  EPINEPHrine 0 3 MG/0 3ML Injection Solution Auto-injector; INJECT 0 3ML   INTRAMUSCULARLY AS DIRECTED; Therapy: 13Fuv0267 to (Last Rx:52Ukp6148)  Requested for: 74Ivq3617 Ordered   6  Fenofibrate 160 MG Oral Tablet; take 1 tablet by mouth every day; Therapy: 65ORT3120 to (Last Rx:87Dvi0850)  Requested for: 31HVI4828 Ordered   7  Hydrocortisone 1 % Rectal Cream; USE AS DIRECTED; Therapy: 34VSI2045 to (Last Rx:00Nvp2712)  Requested for: 59Bad5169 Ordered   8  Hydrocortisone Acetate 25 MG Rectal Suppository; INSERT 1 SUPPOSITORY RECTALLY   AT BEDTIME AS NEEDED; Therapy: 79EIH1632 to (Evaluate:84Zex4666)  Requested for: 76XPC2863; Last   Rx:30Nov2016 Ordered   9  Levothyroxine Sodium 25 MCG Oral Tablet; TAKE 1 TABLET BY MOUTH MONDAY THRU   FRIDAY AND 2 TABLETS ON SATURDAY AND SUNDAY; Therapy: 26VFW1632 to (Evaluate:34Gub0835)  Requested for: 53EHB6847; Last   Rx:19Jun2017 Ordered   10   RisperiDONE 2 MG Oral Tablet; TAKE 1 TABLET TWICE DAILY; Therapy: 38Fsp7641 to (Evaluate:10Sep2016)  Requested for: 11Aug2016; Last    Rx:11Aug2016 Ordered   11  RisperiDONE 4 MG Oral Tablet; TAKE 1 TABLET TWICE DAILY; Therapy: 27EGC0749 to (Evaluate:59Aft0539)  Requested for: 12Oct2016; Last    Rx:11Aug2016; Status: ACTIVE - Renewal Voided Ordered    The medication list was reviewed and updated today  Allergies  Medication    1  CIPRO   2  Cipro TABS   3  Erythromycin Derivatives   4  Erythromycin TABS   5  Motrin TABS   6  Motrin TABS   7  Penicillins    Vitals  Vital Signs    Recorded: 20NDZ5736 02:03PM   Temperature 97 8 F   Heart Rate 82   Systolic 715   Diastolic 76   Weight 781 lb    BMI Calculated 40 5   BSA Calculated 2 69   Pain Scale 5     Physical Exam    Constitutional   General appearance: Abnormal   morbidly obese  Eyes   Sclera: anicteric   HEENT   Hearing grossly intact  Pulmonary   Respiratory effort: Even and unlabored  Skin   Skin and subcutaneous tissue: Normal without rashes or lesions, well hydrated  Psychiatric   Mood and affect: Mood and affect appropriate  Musculoskeletal   Gait and station: Abnormal  -- (Patient uses a wheelchair and Crutches for ambulation  Patient has difficulty walking due to weakness in right foot and has to propel his right foot forward to walk  )   Lumbar/Sacral Spine examination demonstrates Lumbosacral Spine:   Appearance: Normal  Spinal alignment exhibits normal lordosis  Tenderness: None  Lumbosacral Spine Sensory: intact to light touch and pinprick in the lower extremities  ROM Lumbosacral Spine: Full  Extension was painful  Left lateral flexion was painful  Right lateral flexion was painful  ROM Hips: Full   Right Foot Plantar Flexion: 0/5   Left Foot Plantar Flexion: 5/5  Right Foot Dorsiflexion: 0/5   Left Foot Dorsiflexion: 5/5  Right Ankle Inversion: 0/5   Left Ankle Inversion: 5/5  Right Ankle Eversion: 0/5     Left Ankle Eversion: 5/5  Right EHL: 0/5   Left EHL: 5/5  Right FHL: 0/5   Left FHL: 5/5  Right Knee Flexion: 4/5  Left Knee Flexion: 5/5  Right Knee Extension: 4/5  Left Knee Extension: 5/5  Hip strength was normal bilaterally  Special Tests: positive Straight Leg Raise on right-- and-- positive Straight Leg Raise on left, but-- negative Ronak's Maneuver on right-- and-- negative Ronak's Maneuver on lef  Results/Data    Results    I personally reviewed the films/images in the office today  MRI:  MRI LUMBAR SPINE WITHOUT CONTRAST    INDICATION: Chronic low back pain, numbness in both legs    COMPARISON: CT 8/12/2017    TECHNIQUE: Sagittal T1, sagittal T2, sagittal inversion recovery, axial T1 and axial T2, coronal T2     IMAGE QUALITY: Diagnostic    FINDINGS:    ALIGNMENT: Normal alignment of the lumbar spine  No compression fracture  No spondylolysis or spondylolisthesis  No scoliosis  MARROW SIGNAL: Normal marrow signal is identified within the visualized bony structures  No discrete marrow lesion  DISTAL CORD AND CONUS: Normal size and signal within the distal cord and conus  The conus ends at the L1 level  PARASPINAL SOFT TISSUES: Paraspinal soft tissues are unremarkable  Urinary bladder mildly distended  SACRUM: Normal signal within the sacrum  No evidence of insufficiency or stress fracture  LOWER THORACIC DISC SPACES: Normal disc height and signal  No disc herniation, canal stenosis or foraminal narrowing  LUMBAR DISC SPACES:    L1-L2: Normal     L2-L3: Normal     L3-L4: Large left paramedian/posterolateral extrusion  Sac is displaced and deformed to the right, less than 3 mm width  Correlate for signs and symptoms of spinal stenosis, and left L4 radiculitis  L4-L5: Broad-based central extrusion to the right  AP dimension sac reduced to 4 mm  Correlate for signs and symptoms of spinal stenosis and right L5 radiculitis      L5-S1: Normal       IMPRESSION:    Significant extrusions at the L3-4 and L4-5 levels  Correlate for signs and symptoms of spinal stenosis, left L4 and right L5 radiculitis  CT demonstrates calcification of the upper margin of the right L4-L5 disc      Workstation performed: [id number]    Signed by:      Attending Note    Scribe Attestation I, JAYRO Hutton am acting as a scribe in the presence of the attending physician while the attending physician examines the patient  Physician Attestation:   Delon Puga MD personally performed the services described in this documentation as scribed in my presence, and it is both accurate and complete  Future Appointments    Date/Time Provider Specialty Site   10/05/2017 02:40 PM YARI Cotto  Orthopedic Surgery [facility] [address]   10/11/2017 03:45 PM YARI Samaniego   Neurosurgery [facility]   12/12/2017 04:30 PM Lucita Lopez MD Internal Medicine [facility] INTERNAL MED     Signatures   Electronically signed by : Ryland Delgado; Sep 25 2017  6:52PM EST                       (Co-author)    Electronically signed by : Edwardo Zuñiga MD; Sep 25 2017  8:36PM EST                       (Author)

## 2017-10-29 ENCOUNTER — GENERIC CONVERSION - ENCOUNTER (OUTPATIENT)
Dept: OTHER | Facility: OTHER | Age: 27
End: 2017-10-29

## 2017-10-30 ENCOUNTER — GENERIC CONVERSION - ENCOUNTER (OUTPATIENT)
Dept: OTHER | Facility: OTHER | Age: 27
End: 2017-10-30

## 2017-10-31 ENCOUNTER — HOSPITAL ENCOUNTER (INPATIENT)
Facility: HOSPITAL | Age: 27
LOS: 8 days | Discharge: HOME HEALTH | DRG: 856 | End: 2017-11-08
Attending: INTERNAL MEDICINE | Admitting: INTERNAL MEDICINE
Payer: MEDICARE

## 2017-10-31 ENCOUNTER — ALLSCRIPTS OFFICE VISIT (OUTPATIENT)
Dept: OTHER | Facility: OTHER | Age: 27
End: 2017-10-31

## 2017-10-31 DIAGNOSIS — T81.30XA WOUND DEHISCENCE: Primary | ICD-10-CM

## 2017-10-31 DIAGNOSIS — M51.26 HERNIATION OF LUMBAR INTERVERTEBRAL DISC: Chronic | ICD-10-CM

## 2017-10-31 DIAGNOSIS — T14.8XXA WOUND INFECTION: ICD-10-CM

## 2017-10-31 DIAGNOSIS — A41.9 SEPSIS (HCC): ICD-10-CM

## 2017-10-31 DIAGNOSIS — L08.9 WOUND INFECTION: ICD-10-CM

## 2017-10-31 PROBLEM — E03.9 HYPOTHYROID: Chronic | Status: ACTIVE | Noted: 2017-10-31

## 2017-10-31 PROBLEM — F31.9 BIPOLAR DISORDER (HCC): Status: ACTIVE | Noted: 2017-10-31

## 2017-10-31 PROBLEM — F84.0 AUTISM: Chronic | Status: ACTIVE | Noted: 2017-10-31

## 2017-10-31 LAB
ALBUMIN SERPL BCP-MCNC: 3.2 G/DL (ref 3.5–5)
ALP SERPL-CCNC: 34 U/L (ref 46–116)
ALT SERPL W P-5'-P-CCNC: 42 U/L (ref 12–78)
ANION GAP SERPL CALCULATED.3IONS-SCNC: 10 MMOL/L (ref 4–13)
AST SERPL W P-5'-P-CCNC: 53 U/L (ref 5–45)
BASOPHILS # BLD AUTO: 0.02 THOUSANDS/ΜL (ref 0–0.1)
BASOPHILS NFR BLD AUTO: 0 % (ref 0–1)
BILIRUB SERPL-MCNC: 0.62 MG/DL (ref 0.2–1)
BUN SERPL-MCNC: 13 MG/DL (ref 5–25)
CALCIUM SERPL-MCNC: 9.4 MG/DL (ref 8.3–10.1)
CHLORIDE SERPL-SCNC: 100 MMOL/L (ref 100–108)
CO2 SERPL-SCNC: 22 MMOL/L (ref 21–32)
CREAT SERPL-MCNC: 1.21 MG/DL (ref 0.6–1.3)
EOSINOPHIL # BLD AUTO: 0 THOUSAND/ΜL (ref 0–0.61)
EOSINOPHIL NFR BLD AUTO: 0 % (ref 0–6)
ERYTHROCYTE [DISTWIDTH] IN BLOOD BY AUTOMATED COUNT: 13.3 % (ref 11.6–15.1)
GFR SERPL CREATININE-BSD FRML MDRD: 83 ML/MIN/1.73SQ M
GLUCOSE SERPL-MCNC: 117 MG/DL (ref 65–140)
HCT VFR BLD AUTO: 36.7 % (ref 36.5–49.3)
HGB BLD-MCNC: 12.6 G/DL (ref 12–17)
LACTATE SERPL-SCNC: 1.7 MMOL/L (ref 0.5–2)
LYMPHOCYTES # BLD AUTO: 0.58 THOUSANDS/ΜL (ref 0.6–4.47)
LYMPHOCYTES NFR BLD AUTO: 8 % (ref 14–44)
MCH RBC QN AUTO: 26.9 PG (ref 26.8–34.3)
MCHC RBC AUTO-ENTMCNC: 34.3 G/DL (ref 31.4–37.4)
MCV RBC AUTO: 78 FL (ref 82–98)
MONOCYTES # BLD AUTO: 0.4 THOUSAND/ΜL (ref 0.17–1.22)
MONOCYTES NFR BLD AUTO: 5 % (ref 4–12)
NEUTROPHILS # BLD AUTO: 6.68 THOUSANDS/ΜL (ref 1.85–7.62)
NEUTS SEG NFR BLD AUTO: 87 % (ref 43–75)
NRBC BLD AUTO-RTO: 0 /100 WBCS
PLATELET # BLD AUTO: 247 THOUSANDS/UL (ref 149–390)
PMV BLD AUTO: 9.3 FL (ref 8.9–12.7)
POTASSIUM SERPL-SCNC: 3.7 MMOL/L (ref 3.5–5.3)
PROT SERPL-MCNC: 7.6 G/DL (ref 6.4–8.2)
RBC # BLD AUTO: 4.68 MILLION/UL (ref 3.88–5.62)
SODIUM SERPL-SCNC: 132 MMOL/L (ref 136–145)
WBC # BLD AUTO: 7.73 THOUSAND/UL (ref 4.31–10.16)

## 2017-10-31 PROCEDURE — 85025 COMPLETE CBC W/AUTO DIFF WBC: CPT | Performed by: INTERNAL MEDICINE

## 2017-10-31 PROCEDURE — 87070 CULTURE OTHR SPECIMN AEROBIC: CPT | Performed by: INTERNAL MEDICINE

## 2017-10-31 PROCEDURE — 87205 SMEAR GRAM STAIN: CPT | Performed by: INTERNAL MEDICINE

## 2017-10-31 PROCEDURE — 80053 COMPREHEN METABOLIC PANEL: CPT | Performed by: INTERNAL MEDICINE

## 2017-10-31 PROCEDURE — 87040 BLOOD CULTURE FOR BACTERIA: CPT | Performed by: INTERNAL MEDICINE

## 2017-10-31 PROCEDURE — 93005 ELECTROCARDIOGRAM TRACING: CPT | Performed by: INTERNAL MEDICINE

## 2017-10-31 PROCEDURE — 87077 CULTURE AEROBIC IDENTIFY: CPT | Performed by: INTERNAL MEDICINE

## 2017-10-31 PROCEDURE — 83605 ASSAY OF LACTIC ACID: CPT | Performed by: INTERNAL MEDICINE

## 2017-10-31 PROCEDURE — 87186 SC STD MICRODIL/AGAR DIL: CPT | Performed by: INTERNAL MEDICINE

## 2017-10-31 RX ORDER — SENNOSIDES 8.6 MG
1 TABLET ORAL
Status: DISCONTINUED | OUTPATIENT
Start: 2017-10-31 | End: 2017-11-08 | Stop reason: HOSPADM

## 2017-10-31 RX ORDER — LEVOTHYROXINE SODIUM 0.12 MG/1
125 TABLET ORAL
Status: DISCONTINUED | OUTPATIENT
Start: 2017-10-31 | End: 2017-11-08 | Stop reason: HOSPADM

## 2017-10-31 RX ORDER — ACETAMINOPHEN 650 MG/1
325 SUPPOSITORY RECTAL ONCE
Status: DISCONTINUED | OUTPATIENT
Start: 2017-10-31 | End: 2017-10-31

## 2017-10-31 RX ORDER — METHOCARBAMOL 500 MG/1
500 TABLET, FILM COATED ORAL EVERY 6 HOURS PRN
Status: DISCONTINUED | OUTPATIENT
Start: 2017-10-31 | End: 2017-11-06

## 2017-10-31 RX ORDER — DIVALPROEX SODIUM 500 MG/1
1000 TABLET, DELAYED RELEASE ORAL DAILY
Status: DISCONTINUED | OUTPATIENT
Start: 2017-11-01 | End: 2017-11-01

## 2017-10-31 RX ORDER — ACETAMINOPHEN 325 MG/1
975 TABLET ORAL EVERY 6 HOURS PRN
Status: DISCONTINUED | OUTPATIENT
Start: 2017-10-31 | End: 2017-11-06

## 2017-10-31 RX ORDER — RISPERIDONE 1 MG/1
2 TABLET, FILM COATED ORAL 2 TIMES DAILY
Status: DISCONTINUED | OUTPATIENT
Start: 2017-10-31 | End: 2017-11-08 | Stop reason: HOSPADM

## 2017-10-31 RX ORDER — FENOFIBRATE 145 MG/1
145 TABLET, COATED ORAL DAILY
Status: DISCONTINUED | OUTPATIENT
Start: 2017-11-01 | End: 2017-11-08 | Stop reason: HOSPADM

## 2017-10-31 RX ORDER — OXYCODONE HYDROCHLORIDE 5 MG/1
5 TABLET ORAL EVERY 4 HOURS PRN
Status: DISCONTINUED | OUTPATIENT
Start: 2017-10-31 | End: 2017-11-01

## 2017-10-31 RX ORDER — ONDANSETRON 2 MG/ML
4 INJECTION INTRAMUSCULAR; INTRAVENOUS EVERY 6 HOURS PRN
Status: DISCONTINUED | OUTPATIENT
Start: 2017-10-31 | End: 2017-11-08 | Stop reason: HOSPADM

## 2017-10-31 RX ORDER — FENOFIBRATE 48 MG/1
160 TABLET, COATED ORAL DAILY
Status: DISCONTINUED | OUTPATIENT
Start: 2017-11-01 | End: 2017-10-31 | Stop reason: CLARIF

## 2017-10-31 RX ORDER — ACETAMINOPHEN 325 MG/1
650 TABLET ORAL EVERY 6 HOURS PRN
Status: DISCONTINUED | OUTPATIENT
Start: 2017-10-31 | End: 2017-10-31

## 2017-10-31 RX ORDER — SODIUM CHLORIDE 9 MG/ML
125 INJECTION, SOLUTION INTRAVENOUS CONTINUOUS
Status: DISCONTINUED | OUTPATIENT
Start: 2017-10-31 | End: 2017-11-01

## 2017-10-31 RX ORDER — ACETAMINOPHEN 650 MG/1
325 SUPPOSITORY RECTAL EVERY 4 HOURS PRN
Status: DISCONTINUED | OUTPATIENT
Start: 2017-10-31 | End: 2017-10-31

## 2017-10-31 RX ORDER — RISPERIDONE 1 MG/1
4 TABLET, FILM COATED ORAL 2 TIMES DAILY
Status: DISCONTINUED | OUTPATIENT
Start: 2017-10-31 | End: 2017-11-08 | Stop reason: HOSPADM

## 2017-10-31 RX ORDER — CLONAZEPAM 0.5 MG/1
0.5 TABLET ORAL 2 TIMES DAILY
Status: DISCONTINUED | OUTPATIENT
Start: 2017-10-31 | End: 2017-11-08 | Stop reason: HOSPADM

## 2017-10-31 RX ORDER — DIVALPROEX SODIUM 500 MG/1
500 TABLET, DELAYED RELEASE ORAL EVERY 12 HOURS SCHEDULED
Status: DISCONTINUED | OUTPATIENT
Start: 2017-10-31 | End: 2017-11-08 | Stop reason: HOSPADM

## 2017-10-31 RX ORDER — BENZTROPINE MESYLATE 1 MG/1
1 TABLET ORAL 2 TIMES DAILY
Status: DISCONTINUED | OUTPATIENT
Start: 2017-10-31 | End: 2017-11-08 | Stop reason: HOSPADM

## 2017-10-31 RX ORDER — ACETAMINOPHEN 325 MG/1
325 TABLET ORAL ONCE
Status: COMPLETED | OUTPATIENT
Start: 2017-10-31 | End: 2017-10-31

## 2017-10-31 RX ADMIN — CLONAZEPAM 0.5 MG: 0.5 TABLET ORAL at 19:49

## 2017-10-31 RX ADMIN — BENZTROPINE MESYLATE 1 MG: 1 TABLET ORAL at 22:38

## 2017-10-31 RX ADMIN — SODIUM CHLORIDE 125 ML/HR: 0.9 INJECTION, SOLUTION INTRAVENOUS at 23:12

## 2017-10-31 RX ADMIN — ACETAMINOPHEN 650 MG: 325 TABLET, FILM COATED ORAL at 19:49

## 2017-10-31 RX ADMIN — SODIUM CHLORIDE 2000 ML: 0.9 INJECTION, SOLUTION INTRAVENOUS at 20:57

## 2017-10-31 RX ADMIN — VANCOMYCIN HYDROCHLORIDE 2000 MG: 1 INJECTION, POWDER, LYOPHILIZED, FOR SOLUTION INTRAVENOUS at 23:49

## 2017-10-31 RX ADMIN — ACETAMINOPHEN 325 MG: 325 TABLET, FILM COATED ORAL at 22:38

## 2017-10-31 RX ADMIN — CEFEPIME 2000 MG: 2 INJECTION, POWDER, FOR SOLUTION INTRAMUSCULAR; INTRAVENOUS at 22:41

## 2017-10-31 RX ADMIN — RISPERIDONE 2 MG: 1 TABLET ORAL at 22:37

## 2017-10-31 RX ADMIN — DIVALPROEX SODIUM 500 MG: 500 TABLET, DELAYED RELEASE ORAL at 22:05

## 2017-10-31 RX ADMIN — SODIUM CHLORIDE 1000 ML: 0.9 INJECTION, SOLUTION INTRAVENOUS at 19:51

## 2017-10-31 RX ADMIN — LEVOTHYROXINE SODIUM 125 MCG: 125 TABLET ORAL at 22:05

## 2017-10-31 RX ADMIN — RISPERIDONE 4 MG: 1 TABLET ORAL at 22:37

## 2017-10-31 NOTE — H&P
History and Physical - [facility] Internal Medicine    Patient Information: Dalila Amaya 23 y o  male MRN: [id number]  Unit/Bed#: CW3 344-01 Encounter: [id number]  Admitting Physician: Nichole Mixon MD  PCP: Hunter Quesada MD  Date of Admission:  10/31/17  [facility] Problem List:     Principal Problem:    Sepsis [facility])  Active Problems:    Wound dehiscence    Wound infection    Herniation of lumbar intervertebral disc    Hypothyroid    Bipolar disorder ([facility] [address] )    Autism      Plans:      1  Sepsis, present on admission with high-grade fever and tachycardia, likely due to wound infection with wound dehiscence on previous neurosurgery/spine surgery the patient had 10/17 with herniation of the lumbar intervertebral disc  (patient underwent a left L3/4 hemilaminectomy, medial facetectomy and diskectomy, left L3 /4 foraminotomy, right L4/5  Left 3/4  Gamal laminotomy, medial facetectomy and diskectomy; right L3/4 foraminotomy with intraoperative epidural steroid injection and intraoperative fluoroscopy done 10/17/2017): Admit the patient to our medical-surgical telemetry floor  Aggressive IV fluids at this point  Check lactic acid levels  Check CBC with differential count  Check CMP  As per discussion with the neurosurgeon who sent the patient here,  We will get infectious disease doctor on board  Neurosurgery will be on board  Patient was also  referred by the neurosurgeon to Dr Susan Lovett, general surgeon, for co-management of the wound dehiscence and seroma drained in the office of the neurosurgeon  In the outpatient, patient was prescribed clindamycin  For now, I will have the patient empiric broad-spectrum antibiotics with IV cefepime and IV vancomycin  For blood cultures  For wound cultures  Pain control  Case discussed with the neurosurgeon, for now, we will hold off any pharmacologic DVT prophylaxis as patient may have surgery tomorrow    Thus patient will be NPO post midnight tonight  2  Hypothyroidism:  Continue Synthroid  Check TSH  3  Bipolar disorder and history of autism: Continue psychiatric medications  VTE Prophylaxis:   Hold off on pharmacologic DVT prophylaxis  As patient may need to have your surgery tomorrow   / sequential compression device   Code Status: Level 1 - Full Code  POLST: POLST form is not discussed and not completed at this time  Anticipated Length of Stay:  Patient will be admitted on an Inpatient basis with an anticipated length of stay of    Greater than 2 midnights  Justification for Hospital Stay:  Due to the above findings and plans  Total Time for Visit, including Counseling / Coordination of Care: 1 hour  Greater than 50% of this total time spent on direct patient counseling and coordination of care  Chief Complaint:      fever and wound drainage  History of Present Illness:    Myles Chacon is a 23 y o  male who presents with fever and wound drainage to [facility] as a direct admission  Patient was actually sent here by his neurosurgeon, Dr Kirk York for further evaluation management patient's wound dehiscence and drainage and with fever  Last October 17, 2017, patient underwent your surgery for his intervertebral herniated disc (for the exact surgery/ procedures done, please refer to my diagnosis above)  A few days ago, patient's mother told me that patient's back wound opened up a little  According to her, there was some blood and whitish drainage that came out at that time  She told me that she inform the neurosurgeon about this  According to the patient's mother, the wound opening got bigger and still having some drainage coming out  Yesterday, patient started having fever and chills    According to the mother, patient's temperature ranges from 99 5 to 103 5° F  Yesterday, patient was seen by his neurosurgeon and patient was prescribed with clindamycin, cyclobenzaprine, and patient was referred to [address] [place] [address] for call management of the inferior wound dehiscence and drainage compatible with seroma  Patient was also referred to the general surgeon, Dr Escobar Hill  Patient's condition continued with drainage from the wound and with fever and chills  Aside from this, patient had episodes of nausea and an episode of vomiting earlier today  Patient has been having poor appetite lately  Patient's mother also told me that patient to 2 tablets of the cyclobenzaprine instead of just 1 tablet earlier today  Patient also took 2 tablets of the clindamycin instead of the prescribed 1 tablet  Other than the above symptoms, patient denies any other symptoms  Review of Systems:    Review of Systems       Ten point review systems done and they were negative except for the ones I mentioned in my HPI  Patient does not have any headaches, dizziness, cough or colds, chest pains, shortness of breath, abdominal pains, bowel movement problems or urinary problems  Past Medical and Surgical History:     Past Medical History:   Diagnosis Date    Autism     Bipolar 1 disorder ([address] )     Disease of thyroid gland     Hyperlipidemia     Lumbar disc disease     Psychiatric disorder     Compulsive Disorder    Sleep apnea     Urinary incontinence        Past Surgical History:   Procedure Laterality Date    COLONOSCOPY      MA LAMNOTMY INCL W/DCMPRSN NRV ROOT 1 INTRSPC LUMBR Bilateral 10/17/2017    Procedure: LEFT L3/4 AND RIGHT L4/5 MICRODISCECTOMIES, HEMILAMINECTOMIES; POSSIBLE EPIDURAL STEROID INJECTIONS;  Surgeon: Rosi Thomson MD;  Location: [facility];  Service: Neurosurgery    WISDOM TOOTH EXTRACTION         Meds/Allergies:    all medications and allergies reviewed    Allergies:    Allergies   Allergen Reactions    Allegra [Fexofenadine]     Aspirin     Ciprofloxacin     Erythromycin     Motrin [Ibuprofen]     Penicillins     Pentothal [Thiopental]     Sulfa Antibiotics      History:     Marital Status: Single      History   Alcohol Use No     History   Smoking Status    Never Smoker   Smokeless Tobacco    Never Used     History   Drug Use No       Family History:    History reviewed  No pertinent family history  Physical Exam:     Vitals:   Blood Pressure: 133/60 (10/31/17 1810)  Pulse: (!) 143 (10/31/17 1810)  Temperature: (!) 103 °F (39 4 °C) (10/31/17 1810)  Temp Source: Oral (10/31/17 1810)  Respirations: 22 (10/31/17 1810)  Height: 6' 4" (193 cm) (10/31/17 1805)  Weight - Scale: (!) 147 kg (324 lb) (10/31/17 1805)  SpO2: 96 % (10/31/17 1810)    Physical Exam   Constitutional: He is oriented to person, place, and time  No distress  HENT:   Head: Normocephalic and atraumatic  Mouth/Throat: No oropharyngeal exudate  Dry oral mucosa, dry tongue  Eyes: Conjunctivae and EOM are normal  Pupils are equal, round, and reactive to light  Right eye exhibits no discharge  Left eye exhibits no discharge  No scleral icterus  Neck: No JVD present  No tracheal deviation present  Cardiovascular: Regular rhythm  Exam reveals no gallop and no friction rub  No murmur heard  Tachycardic  Pulmonary/Chest: Effort normal and breath sounds normal  No stridor  No respiratory distress  He has no wheezes  He has no rales  Abdominal: Soft  Bowel sounds are normal  He exhibits no distension  There is no tenderness  There is no rebound and no guarding  Musculoskeletal: He exhibits no edema, tenderness or deformity  Neurological: He is alert and oriented to person, place, and time  No cranial nerve deficit  Skin: Skin is warm and dry  No rash noted  He is not diaphoretic  No erythema  No pallor  Positive for wound dehiscence at the inferior portion of the previous surgical incision  No surrounding significant erythema or warmth  The previous gauze that was placed was soaked with serosanguineous fluid  Positive for tenderness on palpation of the wound  Psychiatric: He has a normal mood and affect  His behavior is normal  Thought content normal    Vitals reviewed  Lab Results:   No laboratory exams yet done at this point              Invalid input(s): LABALBU        Imaging:   No  imaging studies yet done at this point  EKG, Pathology, and Other Studies Reviewed on Admission:   ·   EKG: Sinus tachycardia at 147 per minute  Allscripts Records Reviewed: Yes     ** Please Note: Dragon 360 Dictation voice to text software may have been used in the creation of this document   **

## 2017-11-01 ENCOUNTER — ANESTHESIA EVENT (INPATIENT)
Dept: PERIOP | Facility: HOSPITAL | Age: 27
DRG: 856 | End: 2017-11-01
Payer: MEDICARE

## 2017-11-01 ENCOUNTER — ANESTHESIA (INPATIENT)
Dept: PERIOP | Facility: HOSPITAL | Age: 27
DRG: 856 | End: 2017-11-01
Payer: MEDICARE

## 2017-11-01 LAB
ABO GROUP BLD: NORMAL
ANION GAP SERPL CALCULATED.3IONS-SCNC: 10 MMOL/L (ref 4–13)
APTT PPP: 44 SECONDS (ref 23–35)
ATRIAL RATE: 147 BPM
BLD GP AB SCN SERPL QL: NEGATIVE
BUN SERPL-MCNC: 10 MG/DL (ref 5–25)
CALCIUM SERPL-MCNC: 8.4 MG/DL (ref 8.3–10.1)
CHLORIDE SERPL-SCNC: 105 MMOL/L (ref 100–108)
CO2 SERPL-SCNC: 22 MMOL/L (ref 21–32)
CREAT SERPL-MCNC: 0.79 MG/DL (ref 0.6–1.3)
ERYTHROCYTE [DISTWIDTH] IN BLOOD BY AUTOMATED COUNT: 13.6 % (ref 11.6–15.1)
GFR SERPL CREATININE-BSD FRML MDRD: 125 ML/MIN/1.73SQ M
GLUCOSE SERPL-MCNC: 100 MG/DL (ref 65–140)
HCT VFR BLD AUTO: 32.6 % (ref 36.5–49.3)
HGB BLD-MCNC: 11.1 G/DL (ref 12–17)
INR PPP: 1.3 (ref 0.86–1.16)
MCH RBC QN AUTO: 26.7 PG (ref 26.8–34.3)
MCHC RBC AUTO-ENTMCNC: 34 G/DL (ref 31.4–37.4)
MCV RBC AUTO: 79 FL (ref 82–98)
P AXIS: 52 DEGREES
PLATELET # BLD AUTO: 231 THOUSANDS/UL (ref 149–390)
PMV BLD AUTO: 9.6 FL (ref 8.9–12.7)
POTASSIUM SERPL-SCNC: 3 MMOL/L (ref 3.5–5.3)
PR INTERVAL: 154 MS
PROTHROMBIN TIME: 16.3 SECONDS (ref 12.1–14.4)
QRS AXIS: 47 DEGREES
QRSD INTERVAL: 86 MS
QT INTERVAL: 246 MS
QTC INTERVAL: 384 MS
RBC # BLD AUTO: 4.15 MILLION/UL (ref 3.88–5.62)
RH BLD: POSITIVE
SODIUM SERPL-SCNC: 137 MMOL/L (ref 136–145)
SPECIMEN EXPIRATION DATE: NORMAL
T WAVE AXIS: 30 DEGREES
TSH SERPL DL<=0.05 MIU/L-ACNC: 1.51 UIU/ML (ref 0.36–3.74)
VENTRICULAR RATE: 147 BPM
WBC # BLD AUTO: 5.25 THOUSAND/UL (ref 4.31–10.16)

## 2017-11-01 PROCEDURE — 87116 MYCOBACTERIA CULTURE: CPT | Performed by: NEUROLOGICAL SURGERY

## 2017-11-01 PROCEDURE — 87206 SMEAR FLUORESCENT/ACID STAI: CPT | Performed by: NEUROLOGICAL SURGERY

## 2017-11-01 PROCEDURE — 85730 THROMBOPLASTIN TIME PARTIAL: CPT | Performed by: PHYSICIAN ASSISTANT

## 2017-11-01 PROCEDURE — 80048 BASIC METABOLIC PNL TOTAL CA: CPT | Performed by: INTERNAL MEDICINE

## 2017-11-01 PROCEDURE — 87205 SMEAR GRAM STAIN: CPT | Performed by: NEUROLOGICAL SURGERY

## 2017-11-01 PROCEDURE — 84443 ASSAY THYROID STIM HORMONE: CPT | Performed by: INTERNAL MEDICINE

## 2017-11-01 PROCEDURE — 0JB70ZZ EXCISION OF BACK SUBCUTANEOUS TISSUE AND FASCIA, OPEN APPROACH: ICD-10-PCS | Performed by: NEUROLOGICAL SURGERY

## 2017-11-01 PROCEDURE — 86900 BLOOD TYPING SEROLOGIC ABO: CPT | Performed by: PHYSICIAN ASSISTANT

## 2017-11-01 PROCEDURE — 87070 CULTURE OTHR SPECIMN AEROBIC: CPT | Performed by: NEUROLOGICAL SURGERY

## 2017-11-01 PROCEDURE — 87102 FUNGUS ISOLATION CULTURE: CPT | Performed by: NEUROLOGICAL SURGERY

## 2017-11-01 PROCEDURE — 0JQ70ZZ REPAIR BACK SUBCUTANEOUS TISSUE AND FASCIA, OPEN APPROACH: ICD-10-PCS | Performed by: NEUROLOGICAL SURGERY

## 2017-11-01 PROCEDURE — 86850 RBC ANTIBODY SCREEN: CPT | Performed by: PHYSICIAN ASSISTANT

## 2017-11-01 PROCEDURE — 86901 BLOOD TYPING SEROLOGIC RH(D): CPT | Performed by: PHYSICIAN ASSISTANT

## 2017-11-01 PROCEDURE — 85610 PROTHROMBIN TIME: CPT | Performed by: PHYSICIAN ASSISTANT

## 2017-11-01 PROCEDURE — 87186 SC STD MICRODIL/AGAR DIL: CPT | Performed by: NEUROLOGICAL SURGERY

## 2017-11-01 PROCEDURE — 85027 COMPLETE CBC AUTOMATED: CPT | Performed by: INTERNAL MEDICINE

## 2017-11-01 PROCEDURE — 87176 TISSUE HOMOGENIZATION CULTR: CPT | Performed by: NEUROLOGICAL SURGERY

## 2017-11-01 PROCEDURE — 87075 CULTR BACTERIA EXCEPT BLOOD: CPT | Performed by: NEUROLOGICAL SURGERY

## 2017-11-01 PROCEDURE — 2W15X6Z COMPRESSION OF BACK USING PRESSURE DRESSING: ICD-10-PCS | Performed by: NEUROLOGICAL SURGERY

## 2017-11-01 PROCEDURE — 87077 CULTURE AEROBIC IDENTIFY: CPT | Performed by: NEUROLOGICAL SURGERY

## 2017-11-01 RX ORDER — POTASSIUM CHLORIDE 14.9 MG/ML
INJECTION INTRAVENOUS CONTINUOUS PRN
Status: DISCONTINUED | OUTPATIENT
Start: 2017-11-01 | End: 2017-11-01 | Stop reason: SURG

## 2017-11-01 RX ORDER — SUCCINYLCHOLINE CHLORIDE 20 MG/ML
INJECTION INTRAMUSCULAR; INTRAVENOUS AS NEEDED
Status: DISCONTINUED | OUTPATIENT
Start: 2017-11-01 | End: 2017-11-01 | Stop reason: SURG

## 2017-11-01 RX ORDER — OXYCODONE HYDROCHLORIDE 5 MG/1
5 TABLET ORAL EVERY 6 HOURS PRN
Status: DISCONTINUED | OUTPATIENT
Start: 2017-11-01 | End: 2017-11-08 | Stop reason: HOSPADM

## 2017-11-01 RX ORDER — FENTANYL CITRATE/PF 50 MCG/ML
25 SYRINGE (ML) INJECTION
Status: DISCONTINUED | OUTPATIENT
Start: 2017-11-01 | End: 2017-11-01 | Stop reason: HOSPADM

## 2017-11-01 RX ORDER — PROPOFOL 10 MG/ML
INJECTION, EMULSION INTRAVENOUS AS NEEDED
Status: DISCONTINUED | OUTPATIENT
Start: 2017-11-01 | End: 2017-11-01 | Stop reason: SURG

## 2017-11-01 RX ORDER — MORPHINE SULFATE 4 MG/ML
4 INJECTION, SOLUTION INTRAMUSCULAR; INTRAVENOUS EVERY 4 HOURS PRN
Status: DISCONTINUED | OUTPATIENT
Start: 2017-11-01 | End: 2017-11-08 | Stop reason: HOSPADM

## 2017-11-01 RX ORDER — SODIUM CHLORIDE, SODIUM LACTATE, POTASSIUM CHLORIDE, CALCIUM CHLORIDE 600; 310; 30; 20 MG/100ML; MG/100ML; MG/100ML; MG/100ML
125 INJECTION, SOLUTION INTRAVENOUS CONTINUOUS
Status: DISCONTINUED | OUTPATIENT
Start: 2017-11-01 | End: 2017-11-05

## 2017-11-01 RX ORDER — ALBUMIN, HUMAN INJ 5% 5 %
SOLUTION INTRAVENOUS CONTINUOUS PRN
Status: DISCONTINUED | OUTPATIENT
Start: 2017-11-01 | End: 2017-11-01 | Stop reason: SURG

## 2017-11-01 RX ORDER — OXYCODONE HYDROCHLORIDE 10 MG/1
10 TABLET ORAL EVERY 6 HOURS PRN
Status: DISCONTINUED | OUTPATIENT
Start: 2017-11-01 | End: 2017-11-08 | Stop reason: HOSPADM

## 2017-11-01 RX ORDER — SODIUM CHLORIDE 9 MG/ML
125 INJECTION, SOLUTION INTRAVENOUS CONTINUOUS
Status: DISCONTINUED | OUTPATIENT
Start: 2017-11-01 | End: 2017-11-02

## 2017-11-01 RX ORDER — ONDANSETRON 2 MG/ML
4 INJECTION INTRAMUSCULAR; INTRAVENOUS ONCE AS NEEDED
Status: DISCONTINUED | OUTPATIENT
Start: 2017-11-01 | End: 2017-11-01 | Stop reason: HOSPADM

## 2017-11-01 RX ORDER — DIVALPROEX SODIUM 500 MG/1
1000 TABLET, DELAYED RELEASE ORAL
Status: DISCONTINUED | OUTPATIENT
Start: 2017-11-02 | End: 2017-11-08 | Stop reason: HOSPADM

## 2017-11-01 RX ORDER — ONDANSETRON 2 MG/ML
INJECTION INTRAMUSCULAR; INTRAVENOUS AS NEEDED
Status: DISCONTINUED | OUTPATIENT
Start: 2017-11-01 | End: 2017-11-01 | Stop reason: SURG

## 2017-11-01 RX ORDER — ROCURONIUM BROMIDE 10 MG/ML
INJECTION, SOLUTION INTRAVENOUS AS NEEDED
Status: DISCONTINUED | OUTPATIENT
Start: 2017-11-01 | End: 2017-11-01 | Stop reason: SURG

## 2017-11-01 RX ORDER — SODIUM CHLORIDE, SODIUM LACTATE, POTASSIUM CHLORIDE, CALCIUM CHLORIDE 600; 310; 30; 20 MG/100ML; MG/100ML; MG/100ML; MG/100ML
INJECTION, SOLUTION INTRAVENOUS CONTINUOUS PRN
Status: DISCONTINUED | OUTPATIENT
Start: 2017-11-01 | End: 2017-11-01 | Stop reason: SURG

## 2017-11-01 RX ORDER — LIDOCAINE HYDROCHLORIDE 10 MG/ML
INJECTION, SOLUTION INFILTRATION; PERINEURAL AS NEEDED
Status: DISCONTINUED | OUTPATIENT
Start: 2017-11-01 | End: 2017-11-01 | Stop reason: SURG

## 2017-11-01 RX ORDER — METOCLOPRAMIDE HYDROCHLORIDE 5 MG/ML
INJECTION INTRAMUSCULAR; INTRAVENOUS AS NEEDED
Status: DISCONTINUED | OUTPATIENT
Start: 2017-11-01 | End: 2017-11-01 | Stop reason: SURG

## 2017-11-01 RX ORDER — HYDROMORPHONE HYDROCHLORIDE 2 MG/ML
INJECTION, SOLUTION INTRAMUSCULAR; INTRAVENOUS; SUBCUTANEOUS AS NEEDED
Status: DISCONTINUED | OUTPATIENT
Start: 2017-11-01 | End: 2017-11-01 | Stop reason: SURG

## 2017-11-01 RX ORDER — FENTANYL CITRATE 50 UG/ML
INJECTION, SOLUTION INTRAMUSCULAR; INTRAVENOUS AS NEEDED
Status: DISCONTINUED | OUTPATIENT
Start: 2017-11-01 | End: 2017-11-01 | Stop reason: SURG

## 2017-11-01 RX ADMIN — RISPERIDONE 4 MG: 1 TABLET ORAL at 19:24

## 2017-11-01 RX ADMIN — BENZTROPINE MESYLATE 1 MG: 1 TABLET ORAL at 19:21

## 2017-11-01 RX ADMIN — SODIUM CHLORIDE 125 ML/HR: 0.9 INJECTION, SOLUTION INTRAVENOUS at 14:30

## 2017-11-01 RX ADMIN — SODIUM CHLORIDE, SODIUM LACTATE, POTASSIUM CHLORIDE, AND CALCIUM CHLORIDE: .6; .31; .03; .02 INJECTION, SOLUTION INTRAVENOUS at 12:07

## 2017-11-01 RX ADMIN — HYDROMORPHONE HYDROCHLORIDE 1 MG: 2 INJECTION, SOLUTION INTRAMUSCULAR; INTRAVENOUS; SUBCUTANEOUS at 12:49

## 2017-11-01 RX ADMIN — ONDANSETRON 4 MG: 2 INJECTION INTRAMUSCULAR; INTRAVENOUS at 12:00

## 2017-11-01 RX ADMIN — METHOCARBAMOL 500 MG: 500 TABLET ORAL at 08:45

## 2017-11-01 RX ADMIN — SODIUM CHLORIDE 125 ML/HR: 0.9 INJECTION, SOLUTION INTRAVENOUS at 06:51

## 2017-11-01 RX ADMIN — PROPOFOL 50 MG: 10 INJECTION, EMULSION INTRAVENOUS at 12:51

## 2017-11-01 RX ADMIN — CLONAZEPAM 0.5 MG: 0.5 TABLET ORAL at 08:45

## 2017-11-01 RX ADMIN — FENOFIBRATE 145 MG: 145 TABLET ORAL at 08:47

## 2017-11-01 RX ADMIN — CEFEPIME 2000 MG: 2 INJECTION, POWDER, FOR SOLUTION INTRAMUSCULAR; INTRAVENOUS at 10:12

## 2017-11-01 RX ADMIN — SUCCINYLCHOLINE CHLORIDE 200 MG: 20 INJECTION, SOLUTION INTRAMUSCULAR; INTRAVENOUS at 11:28

## 2017-11-01 RX ADMIN — DIVALPROEX SODIUM 500 MG: 500 TABLET, DELAYED RELEASE ORAL at 21:06

## 2017-11-01 RX ADMIN — OXYCODONE HYDROCHLORIDE 10 MG: 10 TABLET ORAL at 15:54

## 2017-11-01 RX ADMIN — FENTANYL CITRATE 100 MCG: 50 INJECTION, SOLUTION INTRAMUSCULAR; INTRAVENOUS at 11:43

## 2017-11-01 RX ADMIN — ROCURONIUM BROMIDE 5 MG: 10 INJECTION, SOLUTION INTRAVENOUS at 11:28

## 2017-11-01 RX ADMIN — RISPERIDONE 4 MG: 1 TABLET ORAL at 10:18

## 2017-11-01 RX ADMIN — METOCLOPRAMIDE 10 MG: 5 INJECTION, SOLUTION INTRAMUSCULAR; INTRAVENOUS at 12:00

## 2017-11-01 RX ADMIN — LIDOCAINE HYDROCHLORIDE 100 MG: 10 INJECTION, SOLUTION INFILTRATION; PERINEURAL at 11:28

## 2017-11-01 RX ADMIN — FENTANYL CITRATE 100 MCG: 50 INJECTION, SOLUTION INTRAMUSCULAR; INTRAVENOUS at 12:19

## 2017-11-01 RX ADMIN — RISPERIDONE 2 MG: 1 TABLET ORAL at 10:18

## 2017-11-01 RX ADMIN — VANCOMYCIN HYDROCHLORIDE 2000 MG: 1 INJECTION, POWDER, LYOPHILIZED, FOR SOLUTION INTRAVENOUS at 11:35

## 2017-11-01 RX ADMIN — POTASSIUM CHLORIDE: 200 INJECTION, SOLUTION INTRAVENOUS at 11:35

## 2017-11-01 RX ADMIN — PROPOFOL 200 MG: 10 INJECTION, EMULSION INTRAVENOUS at 11:28

## 2017-11-01 RX ADMIN — ACETAMINOPHEN 975 MG: 325 TABLET, FILM COATED ORAL at 23:39

## 2017-11-01 RX ADMIN — CEFEPIME 2000 MG: 2 INJECTION, POWDER, FOR SOLUTION INTRAMUSCULAR; INTRAVENOUS at 20:57

## 2017-11-01 RX ADMIN — LEVOTHYROXINE SODIUM 125 MCG: 125 TABLET ORAL at 21:06

## 2017-11-01 RX ADMIN — ACETAMINOPHEN 975 MG: 325 TABLET, FILM COATED ORAL at 03:24

## 2017-11-01 RX ADMIN — BENZTROPINE MESYLATE 1 MG: 1 TABLET ORAL at 08:46

## 2017-11-01 RX ADMIN — CLONAZEPAM 0.5 MG: 0.5 TABLET ORAL at 19:21

## 2017-11-01 RX ADMIN — Medication 3000 MG: at 12:15

## 2017-11-01 RX ADMIN — DIVALPROEX SODIUM 500 MG: 500 TABLET, DELAYED RELEASE ORAL at 10:19

## 2017-11-01 RX ADMIN — SODIUM CHLORIDE: 0.9 INJECTION, SOLUTION INTRAVENOUS at 12:28

## 2017-11-01 RX ADMIN — RISPERIDONE 2 MG: 1 TABLET ORAL at 19:23

## 2017-11-01 RX ADMIN — ALBUMIN HUMAN: 0.05 INJECTION, SOLUTION INTRAVENOUS at 12:23

## 2017-11-01 RX ADMIN — ALBUMIN HUMAN: 0.05 INJECTION, SOLUTION INTRAVENOUS at 12:01

## 2017-11-01 NOTE — OP NOTE
OPERATIVE REPORT  PATIENT NAME: Tigist Thao    :  1990  MRN: [id number]  Pt Location: [facility] OR ROOM 09    SURGERY DATE: 2017    Surgeon(s) and Role:     * Rosi Thomson MD - Primary     * Cyn Gloria MD - Observing     * Davis Jimenez MD - Assisting    Preop Diagnosis:  Wound dehiscence [T81 30XA]    Post-Op Diagnosis Codes:     * Wound dehiscence [T81 30XA]    Procedure(s) (LRB):  INCISION AND DRAINAGE (I&D) SPINE (N/A)    Specimen(s):  ID Type Source Tests Collected by Time Destination   A : Lumbar Spine Tissue Soft Tissue, Other ANAEROBIC CULTURE AND GRAM STAIN, FUNGAL CULTURE, STAT GRAM STAIN, CULTURE, TISSUE AND GRAM STAIN, AFB CULTURE WITH STAIN Rosi Thosmon MD 2017 1212    B : Lumbar Wound Deep Tissue Wound ANAEROBIC CULTURE AND GRAM STAIN, FUNGAL CULTURE, STAT GRAM STAIN, CULTURE, TISSUE AND GRAM STAIN, AFB CULTURE WITH STAIN Rosi Thomson MD 2017 1214    C : Lumbar Wound Superficial Tissue Wound ANAEROBIC CULTURE AND GRAM STAIN, FUNGAL CULTURE, STAT GRAM STAIN, CULTURE, TISSUE AND GRAM STAIN, AFB CULTURE WITH STAIN Rosi Thomson MD 2017 1215        Estimated Blood Loss:   100 mL    Drains:  Negative Pressure Wound Therapy (V A C ) Back Lower (Active)   Black foam- # applied 2 2017 12:58 PM   Cycle Continuous 2017 12:58 PM   Target Pressure (mmHg) 125 2017 12:58 PM   Canister Changed Yes 2017 12:58 PM   Dressing Status Clean;Dry; Intact; New drainage 2017 12:58 PM   Number of days: 0       Anesthesia Type:   General    Operative Indications:  Wound dehiscence [T81 30XA]  This is a 58-year-old gentleman with a history of autism an ADHD who presented with progressive lower extremity weakness, pain, right foot drop, and bowel and bladder incontinence was found have a large herniated 3 4 in 4 5 discs  He was brought to the operating room on 10/17 for open diskectomies  Immediately postoperatively his leg pain was significantly improved    He insisted on being discharged on postoperative day 0  He was recovering well at home however began to have a super were facial wound dehiscence the subsequently began draining  He was seen in the office and we elected for observation with follow-up and wound clinic as it appeared to be mostly seroma  Unfortunately over the 24 hours he developed significant fevers  For this reason he was admitted to the hospital, culture, and start her on empiric broad-spectrum antibiotics  Due to the drainage of the wound we discussed wound exploration, cultures and drainage  He and his mom understood the risks and benefits including bleeding, infection, need for further surgery, possible wound VAC therapy, and death  They elected to proceed  Operative Findings:  Wound opened, without gross pus  Fascia was dehisced as well  Complications:   None    Procedure and Technique:  After obtaining written informed consent patient was brought to the operating room  General endotracheal anesthesia was induced  He had recently a received his vancomycin and cefepime  He was flipped prone onto the [employer] table  All his pressure points were padded  His wound was prepped and draped in the usual sterile fashion  A surgical time-out was performed  Using the Metzenbaum scissors his prior incision was opened  There was a large amount of seroma and bloody collection  10 cc of this was aspirated and sent for culture  Next superficial and deep wound swabs were obtained from above and below the fascia  Self retaining retractor was placed  It was clear that the fascia was opened  The wound was then debrided using a combination of sharp and blunt dissection  Once all necrotic tissue was removed in their bleeding edges throughout hemostasis was achieved  The lamina and spinous processes were felt and had good strong bone quality  The wound was then irrigated with 3 liters of antibiotic irrigation      Due to the size of the wound and large defect I did not believe that it would be able to close primarily  However, most of the fascia appeared relatively healthy  Muscle and fascia were closed in 2 layers using 0 Vicryl  Next the help of General surgery a wound VAC was placed in the cavity  This was then placed to suction  The patient was then woken from general anesthesia and transferred to the PACU  He will go back to his floor bed  We will wait for final cultures  General surgery plans on wound VAC changes on Friday and Monday       I was present for the entire procedure    Patient Disposition:  PACU     SIGNATURE: Jose Vera MD  DATE: November 1, 2017  TIME: 1:47 PM

## 2017-11-01 NOTE — CONSULTS
Consultation - General Surgery   Job Gary Bradley 23 y o  male MRN: [id number]  Unit/Bed#: [facility]3 344-01 Encounter: [id number]    Assessment/Plan     Assessment:  24 yo M with serous drainage and wound dehiscence from an L3-L5 microdiskectomy on 10/17 with neurosurgery  Plan:  OR tomorrow w/ Neurosurgery for washout  General Surgery requested to be available for possible vac application in the OR & subsequent management  Continue antibiotics  PRN pain control    History of Present Illness     HPI:  Opal Garcia is a 23 y o  male with history of autism and developmental delay who presents with post op wound  He underwent L3-4 and L4-5 open micro diskectomy on 10/17 for cauda equina syndrome  He has been recovering well at home w/ improvement of neurologic symptoms however presented to clinic yesterday with drainage from the most inferior aspect of his wound  In clinic the wound was probed and a large amount of serous fluid was drained  He was given a 1-wk course of clindamycin and referred to wound clinic with Dr Fátima Marion, who saw him today  From this appointment he was instructed to f/u on Friday w/ Dr Elana Rogel unless fevers developed   Once the patient returned home he had a fever to 103, so he came to the hospital      Inpatient consult to Acute Care Surgery  Date/Time: 10/31/2017 8:16 PM  Performed by: Lenore Davenport  Authorized by: Alan Hernandez           Review of Systems  As 12 point review of systems was completed and is negative unless noted otherwise in HPI  Historical Information   Past Medical History:   Diagnosis Date    Autism     Bipolar 1 disorder ([address] )     Disease of thyroid gland     Hyperlipidemia     Lumbar disc disease     Psychiatric disorder     Compulsive Disorder    Sleep apnea     Urinary incontinence      Past Surgical History:   Procedure Laterality Date    COLONOSCOPY      [place] LAMNOTMY INCL W/DCMPRSN NRV ROOT 1 INTRSPC LUMBR Bilateral 10/17/2017    Procedure: LEFT L3/4 AND RIGHT L4/5 MICRODISCECTOMIES, HEMILAMINECTOMIES; POSSIBLE EPIDURAL STEROID INJECTIONS;  Surgeon: Nitish Booth MD;  Location: [facility];  Service: Neurosurgery    WISDOM TOOTH EXTRACTION       Social History   History   Alcohol Use No     History   Drug Use No     History   Smoking Status    Never Smoker   Smokeless Tobacco    Never Used     Family History: non-contributory    Meds/Allergies   all current active meds have been reviewed and current meds:   Current Facility-Administered Medications   Medication Dose Route Frequency    acetaminophen (TYLENOL) tablet 650 mg  650 mg Oral Q6H PRN    benztropine (COGENTIN) tablet 1 mg  1 mg Oral BID    cefepime (MAXIPIME) 2,000 mg in dextrose 5 % 50 mL IVPB  2,000 mg Intravenous Q12H    clonazePAM (KlonoPIN) tablet 0 5 mg  0 5 mg Oral BID    [START ON 11/1/2017] divalproex sodium (DEPAKOTE) EC tablet 1,000 mg  1,000 mg Oral Daily    divalproex sodium (DEPAKOTE) EC tablet 500 mg  500 mg Oral Q12H [address]    [START ON 11/1/2017] fenofibrate (TRICOR) tablet 145 mg  145 mg Oral Daily    influenza inactivated quadrivalent vaccine (FLULAVAL) IM injection 0 5 mL  0 5 mL Intramuscular Prior to discharge    levothyroxine tablet 125 mcg  125 mcg Oral HS    methocarbamol (ROBAXIN) tablet 500 mg  500 mg Oral Q6H PRN    ondansetron (ZOFRAN) injection 4 mg  4 mg Intravenous Q6H PRN    oxyCODONE (ROXICODONE) IR tablet 5 mg  5 mg Oral Q4H PRN    risperiDONE (RisperDAL) tablet 2 mg  2 mg Oral BID    risperiDONE (RisperDAL) tablet 4 mg  4 mg Oral BID    senna (SENOKOT) tablet 8 6 mg  1 tablet Oral HS PRN    sodium chloride 0 9 % bolus 1,000 mL  1,000 mL Intravenous Once    sodium chloride 0 9 % bolus 2,000 mL  2,000 mL Intravenous Once    sodium chloride 0 9 % infusion  125 mL/hr Intravenous Continuous    vancomycin (VANCOCIN) 2,000 mg in sodium chloride 0 9 % 500 mL IVPB  2,000 mg Intravenous Q12H     Allergies   Allergen Reactions    Allegra [Fexofenadine]     Aspirin     Ciprofloxacin     Erythromycin     Motrin [Ibuprofen]     Penicillins     Pentothal [Thiopental]     Sulfa Antibiotics        Objective   First Vitals:   Blood Pressure: 133/60 (10/31/17 1810)  Pulse: (!) 143 (10/31/17 1810)  Temperature: (!) 103 °F (39 4 °C) (10/31/17 1810)  Temp Source: Oral (10/31/17 1810)  Respirations: 22 (10/31/17 1810)  Height: 6' 4" (193 cm) (10/31/17 1805)  Weight - Scale: (!) 147 kg (324 lb) (10/31/17 1805)  SpO2: 96 % (10/31/17 1810)    Current Vitals:   Blood Pressure: 133/60 (10/31/17 1810)  Pulse: (!) 143 (10/31/17 1810)  Temperature: (!) 103 °F (39 4 °C) (10/31/17 1810)  Temp Source: Oral (10/31/17 1810)  Respirations: 22 (10/31/17 1810)  Height: 6' 4" (193 cm) (10/31/17 1805)  Weight - Scale: (!) 147 kg (324 lb) (10/31/17 1805)  SpO2: 96 % (10/31/17 1810)    No intake or output data in the 24 hours ending 10/31/17 2016    Invasive Devices     Peripheral Intravenous Line            Peripheral IV 10/31/17 Right Arm less than 1 day                Physical Exam    Gen: A&O, NAD  Cardio: RRR  Lungs: CTAB  Abd: Obese, soft nontender  Back: 1 cm wound dehiscence at the most inferior part of lumbar incision  Mild amount of serosanginous drainage on dressing  Minimal surrounding erythema, no foul smell  No visible exposed bone but wound was not probed   Remainder of incision is healing well  (photo below)  Ext: Warm, dry  Vasc: Intact          Lab Results:   CBC:   Lab Results   Component Value Date    WBC 7 73 10/31/2017    HGB 12 6 10/31/2017    HCT 36 7 10/31/2017    MCV 78 (L) 10/31/2017     10/31/2017    MCH 26 9 10/31/2017    MCHC 34 3 10/31/2017    RDW 13 3 10/31/2017    MPV 9 3 10/31/2017    NRBC 0 10/31/2017   , CMP:   Lab Results   Component Value Date     (L) 10/31/2017    K 3 7 10/31/2017     10/31/2017    CO2 22 10/31/2017    ANIONGAP 10 10/31/2017    BUN 13 10/31/2017    CREATININE 1 21 10/31/2017    GLUCOSE 117 10/31/2017 CALCIUM 9 4 10/31/2017    AST 53 (H) 10/31/2017    ALT 42 10/31/2017    ALKPHOS 34 (L) 10/31/2017    PROT 7 6 10/31/2017    ALBUMIN 3 2 (L) 10/31/2017    BILITOT 0 62 10/31/2017    EGFR 83 10/31/2017     Imaging: I have personally reviewed pertinent reports  EKG, Pathology, and Other Studies: I have personally reviewed pertinent reports  Counseling / Coordination of Care  Total floor / unit time spent today 30 minutes  Greater than 50% of total time was spent with the patient and / or family counseling and / or coordination of care

## 2017-11-01 NOTE — PROGRESS NOTES
Consultation - Infectious Disease   Lisa Bradley 23 y o  male MRN: [id number]  Unit/Bed#: [facility] 919-01 Encounter: [id number]      Assessment/Plan     Assessment: This is a 23year old male sp recent L3-L5 microdiskectomy on 10/17 with neurosurgery who presented with wound dehiscence and seromatatous changes with drainage  Plan:  Sepsis likely 2/2 wound infection - Now improving   -Temp wnl now but still tachycardiac   -Continue Vancomycin day#2, can likely DC Cefepime   -Trend WBC , vitals, and UOP  -Follow cultures, send surgical cultures   -Not suspicious for osteomyelitis at this time, can check ESR and CRP if persistently febrile after the wound washout   -Pt is going for wound washout with possible wound vac placement this afternoon  History of Present Illness   Physician Requesting Consult: Neil Castillo MD  Reason for Consult / Principal Problem: Wound dehiscence from an L3-L5 microdiskectomy on 10/17   Hx and PE limited by: Autism     HPI: Jose Antonio Mosqueda is a 23y o  year old male with past medical history significant for morbid obesity, autism, and ADHD who recently underwent L3-L4 and L4-L5 open micro diskectomy due to cauda equina syndrome and right foot drop on 10/17/17 with initial good recovery  Pt has autism and history is limited by this but spoke with mom; however, per patient initially he did well after the surgery however for last few days he has been having a lot of back pain which has been preventing him from even getting out of bed  Pt reports that for past few days his pain has been unbearable and he has had fever and chills  Pt's mom noticed that the inferior margin of his wound was open with some drainage and erythema  Pts mom contacted neurosurgery and was seen by Dr Carlos Alberto Thomas and was sent wound care  At wound care he was found to have temp of 102F at which time he was directly admitted to B  Pt has not received any antibiotics at home       On admission vitals: T 103, P 143, R 22, /60, and SpO2 96%  Initial CBC with normal WBC but with elevated neutrophils, normal lactic acid  T max 103, now 98 8  Blood, urine, and wound cultures are pending  Pt is sp 3L NS  Antibiotics: Cefepime x 2, Vanco x 1     Consults    Review of Systems   Unable to perform ROS: Other (ROS is limited because of pts mentation )   Constitutional: Positive for chills, fatigue and fever  Gastrointestinal: Positive for abdominal pain and constipation  Musculoskeletal: Positive for back pain  Skin: Positive for color change          Drainage        Historical Information   Past Medical History:   Diagnosis Date    Autism     Bipolar 1 disorder ([facility] [address]     Disease of thyroid gland     Hyperlipidemia     Lumbar disc disease     Psychiatric disorder     Compulsive Disorder    Sleep apnea     Urinary incontinence      Past Surgical History:   Procedure Laterality Date    COLONOSCOPY      [place] LAMNOTMY INCL W/DCMPRSN NRV ROOT 1 INTRSPC LUMBR Bilateral 10/17/2017    Procedure: LEFT L3/4 AND RIGHT L4/5 MICRODISCECTOMIES, HEMILAMINECTOMIES; POSSIBLE EPIDURAL STEROID INJECTIONS;  Surgeon: Delon Lopes MD;  Location[facility];  Service: Neurosurgery    WISDOM TOOTH EXTRACTION       Social History   History   Alcohol Use No     History   Drug Use No     History   Smoking Status    Never Smoker   Smokeless Tobacco    Never Used     Family History: non-contributory    Meds/Allergies   all current active meds have been reviewed    Allergies   Allergen Reactions    Allegra [Fexofenadine]     Aspirin     Ciprofloxacin     Erythromycin     Motrin [Ibuprofen]     Penicillins     Pentothal [Thiopental]     Sulfa Antibiotics        Objective       Intake/Output Summary (Last 24 hours) at 11/01/17 0938  Last data filed at 11/01/17 0842   Gross per 24 hour   Intake          1302 08 ml   Output                0 ml   Net          1302 08 ml       Invasive Devices:   Peripheral IV 10/31/17 Right Arm (Active)   Site Assessment Clean;Dry; Intact 11/1/2017  8:57 AM   Dressing Type Transparent 11/1/2017  8:57 AM   Line Status Infusing 11/1/2017  8:57 AM   Dressing Status Clean;Dry; Intact 11/1/2017  8:57 AM   Dressing Change Due 11/04/17 11/1/2017  8:57 AM   Reason Not Rotated Not due 10/31/2017 11:00 PM       Physical Exam   Constitutional: He is oriented to person, place, and time  He appears well-developed and well-nourished  No distress  HENT:   Head: Normocephalic and atraumatic  Eyes: Conjunctivae are normal  Right eye exhibits no discharge  Left eye exhibits no discharge  Neck: Neck supple  No JVD present  Cardiovascular: Normal rate and regular rhythm  No murmur heard  Pulmonary/Chest: Breath sounds normal  No respiratory distress  He has no wheezes  Abdominal: Soft  He exhibits no distension  There is no tenderness  There is no rebound and no guarding  Musculoskeletal: He exhibits no edema or tenderness  Able to move B/L LE   Neurological: He is alert and oriented to person, place, and time  Skin: He is not diaphoretic  There is erythema (edge of the wound is mildly erythematous; inferior portion of the wound is slightly open  )  I did not appreciate any active drainage but some drainage is present on the gauze    Psychiatric:   Flat affect        Lab Results:   CBC:   Lab Results   Component Value Date    WBC 5 25 11/01/2017    RBC 4 15 11/01/2017     CMP:   Lab Results   Component Value Date     11/01/2017     11/01/2017    CO2 22 11/01/2017    ANIONGAP 10 11/01/2017    BUN 10 11/01/2017    CREATININE 0 79 11/01/2017    GLUCOSE 100 11/01/2017    CALCIUM 8 4 11/01/2017    AST 53 (H) 10/31/2017    ALT 42 10/31/2017    ALKPHOS 34 (L) 10/31/2017    PROT 7 6 10/31/2017    ALBUMIN 3 2 (L) 10/31/2017    BILITOT 0 62 10/31/2017    EGFR 125 11/01/2017     Imaging Studies: I have personally reviewed pertinent reports      EKG, Pathology, and Other Studies: I have personally reviewed pertinent reports

## 2017-11-01 NOTE — CONSULTS
Consultation - Infectious Disease   Mic Bradley 23 y o  male MRN: [id number]  Unit/Bed#: [facility] 919-01 Encounter: [id number]      Assessment/Plan     Assessment: This is a 23year old male sp recent L3-L5 microdiskectomy on 10/17 with neurosurgery who presented with wound dehiscence and seromatatous changes with drainage  Plan:  Sepsis likely 2/2 wound infection - Now improving   -Temp wnl now but still tachycardiac   -Continue Vancomycin day#2, can likely DC Cefepime   -Trend WBC , vitals, and UOP  -Follow cultures, send surgical cultures   -Not suspicious for osteomyelitis at this time, can check ESR and CRP if persistently febrile after the wound washout   -Pt is going for wound washout with possible wound vac placement this afternoon  History of Present Illness   Physician Requesting Consult: Kiet Asencio MD  Reason for Consult / Principal Problem: Wound dehiscence from an L3-L5 microdiskectomy on 10/17   Hx and PE limited by: Autism     HPI: Luis Enrique Aburto is a 23y o  year old male with past medical history significant for morbid obesity, autism, and ADHD who recently underwent L3-L4 and L4-L5 open micro diskectomy due to cauda equina syndrome and right foot drop on 10/17/17 with initial good recovery  Pt has autism and history is limited by this but spoke with mom; however, per patient initially he did well after the surgery however for last few days he has been having a lot of back pain which has been preventing him from even getting out of bed  Pt reports that for past few days his pain has been unbearable and he has had fever and chills  Pt's mom noticed that the inferior margin of his wound was open with some drainage and erythema  Pts mom contacted neurosurgery and was seen by Dr Noam Nunes and was sent wound care  At wound care he was found to have temp of 102F at which time he was directly admitted to B  Pt has not received any antibiotics at home       On admission vitals: T 103, P 143, R 22, /60, and SpO2 96%  Initial CBC with normal WBC but with elevated neutrophils, normal lactic acid  T max 103, now 98 8  Blood, urine, and wound cultures are pending  Pt is sp 3L NS  Antibiotics: Cefepime x 2, Vanco x 1     Consults    Review of Systems   Unable to perform ROS: Other (ROS is limited because of pts mentation )   Constitutional: Positive for chills, fatigue and fever  Gastrointestinal: Positive for abdominal pain and constipation  Musculoskeletal: Positive for back pain  Skin: Positive for color change          Drainage        Historical Information   Past Medical History:   Diagnosis Date    Autism     Bipolar 1 disorder ([facility] [address]     Disease of thyroid gland     Hyperlipidemia     Lumbar disc disease     Psychiatric disorder     Compulsive Disorder    Sleep apnea     Urinary incontinence      Past Surgical History:   Procedure Laterality Date    COLONOSCOPY      [place] LAMNOTMY INCL W/DCMPRSN NRV ROOT 1 INTRSPC LUMBR Bilateral 10/17/2017    Procedure: LEFT L3/4 AND RIGHT L4/5 MICRODISCECTOMIES, HEMILAMINECTOMIES; POSSIBLE EPIDURAL STEROID INJECTIONS;  Surgeon: Sigifredo Noe MD;  Location: [facility];  Service: Neurosurgery    WISDOM TOOTH EXTRACTION       Social History   History   Alcohol Use No     History   Drug Use No     History   Smoking Status    Never Smoker   Smokeless Tobacco    Never Used     Family History: non-contributory    Meds/Allergies   all current active meds have been reviewed    Allergies   Allergen Reactions    Allegra [Fexofenadine]     Aspirin     Ciprofloxacin     Erythromycin     Motrin [Ibuprofen]     Penicillins     Pentothal [Thiopental]     Sulfa Antibiotics        Objective       Intake/Output Summary (Last 24 hours) at 11/01/17 0938  Last data filed at 11/01/17 0842   Gross per 24 hour   Intake          1302 08 ml   Output                0 ml   Net          1302 08 ml       Invasive Devices:   Peripheral IV 10/31/17 Right Arm (Active)   Site Assessment Clean;Dry; Intact 11/1/2017  8:57 AM   Dressing Type Transparent 11/1/2017  8:57 AM   Line Status Infusing 11/1/2017  8:57 AM   Dressing Status Clean;Dry; Intact 11/1/2017  8:57 AM   Dressing Change Due 11/04/17 11/1/2017  8:57 AM   Reason Not Rotated Not due 10/31/2017 11:00 PM       Physical Exam   Constitutional: He is oriented to person, place, and time  He appears well-developed and well-nourished  No distress  HENT:   Head: Normocephalic and atraumatic  Eyes: Conjunctivae are normal  Right eye exhibits no discharge  Left eye exhibits no discharge  Neck: Neck supple  No JVD present  Cardiovascular: Normal rate and regular rhythm  No murmur heard  Pulmonary/Chest: Breath sounds normal  No respiratory distress  He has no wheezes  Abdominal: Soft  He exhibits no distension  There is no tenderness  There is no rebound and no guarding  Musculoskeletal: He exhibits no edema or tenderness  Able to move B/L LE   Neurological: He is alert and oriented to person, place, and time  Skin: He is not diaphoretic  There is erythema (edge of the wound is mildly erythematous; inferior portion of the wound is slightly open  )  I did not appreciate any active drainage but some drainage is present on the gauze    Psychiatric:   Flat affect        Lab Results:   CBC:   Lab Results   Component Value Date    WBC 5 25 11/01/2017    RBC 4 15 11/01/2017     CMP:   Lab Results   Component Value Date     11/01/2017     11/01/2017    CO2 22 11/01/2017    ANIONGAP 10 11/01/2017    BUN 10 11/01/2017    CREATININE 0 79 11/01/2017    GLUCOSE 100 11/01/2017    CALCIUM 8 4 11/01/2017    AST 53 (H) 10/31/2017    ALT 42 10/31/2017    ALKPHOS 34 (L) 10/31/2017    PROT 7 6 10/31/2017    ALBUMIN 3 2 (L) 10/31/2017    BILITOT 0 62 10/31/2017    EGFR 125 11/01/2017     Imaging Studies: I have personally reviewed pertinent reports      EKG, Pathology, and Other Studies: I have personally reviewed pertinent reports

## 2017-11-01 NOTE — PROGRESS NOTES
[facility] Internal Medicine Progress Note  Patient: Denice Peon 23 y o  male   MRN: [id number]  PCP: Cal Bartlett MD  Unit/Bed#: [facility] 575-35 Encounter: [id number]  Date Of Visit: 11/01/17    Assessment:    Principal Problem:    Sepsis ([facility] [address] )  Active Problems:    Wound dehiscence    Wound infection    Herniation of lumbar intervertebral disc    Hypothyroid    Bipolar disorder ([facility] [address] )    Autism      Plan:    · Gram neg bacteremia & sepsis:  · Shows some improvement, afebrile now, still tachycardic  · [facility] 10/31 - g neg rods, wound culture from 10/31 - proteus; f/u final culture & sensitivities; f/u wound cultures from intraop  Repeat BC tomorrow am  · Send urine cultures, monitor abdo pain  · Cont IVF @ 125/hr  · Appreciate ID input - DC vanco, cont cefepime D-2  · If wound cultures do not match BC then consider CT abdo pelvis  · Post-op spinal wound dehiscence with possible infection:  · S/p wound wash out by neuroSx & Gen Sx today, vac placement  · F/u cultures  · Repeat Vac change Fri & Mon  · Pain control - meds modified  · DVT PX from thelma  · S/p L3-4, L4-5 open microdiscectomy on 10/17:  · For cauda equina syndrome & right foot drop  · Hypothyroidism: cont levo  · Bipolar & autism:  · Cont home risperidol & depakoate      VTE Pharmacologic Prophylaxis:   Pharmacologic: Enoxaparin (Lovenox)  Mechanical VTE Prophylaxis in Place: Yes    Patient Centered Rounds: I have performed bedside rounds with nursing staff today  Discussions with Specialists or Other Care Team Provider:     Education and Discussions with Family / Patient: patient, parents    Time Spent for Care: 45 minutes  More than 50% of total time spent on counseling and coordination of care as described above      Current Length of Stay: 1 day(s)    Current Patient Status: Inpatient   Certification Statement: The patient will continue to require additional inpatient hospital stay due to bacteremia    Discharge Plan / Estimated Discharge Date: based on course    Code Status: Level 1 - Full Code      Subjective:   patient seen post op, c/o severe back pain, no CP/SOB, c/o lower abdo pain    Objective:     Vitals:   Temp (24hrs), Av 6 °F (38 1 °C), Min:98 8 °F (37 1 °C), Max:103 °F (39 4 °C)    HR:  [111-143] 123  Resp:  [16-27] 20  BP: (118-143)/(60-93) 142/71  SpO2:  [93 %-98 %] 95 %  Body mass index is 39 44 kg/m²  Input and Output Summary (last 24 hours): Intake/Output Summary (Last 24 hours) at 17 1546  Last data filed at 17 1430   Gross per 24 hour   Intake          5302 08 ml   Output              100 ml   Net          5202 08 ml       Physical Exam:     Physical Exam   Constitutional: He appears well-developed and well-nourished  HENT:   Head: Normocephalic and atraumatic  Eyes: Conjunctivae are normal  No scleral icterus  Cardiovascular: Normal rate, regular rhythm and normal heart sounds  Exam reveals no gallop and no friction rub  No murmur heard  Pulmonary/Chest: Effort normal and breath sounds normal  No respiratory distress  He has no wheezes  Abdominal: Soft  He exhibits no distension  There is no tenderness  Musculoskeletal: He exhibits no edema  Neurological: He is alert           Additional Data:     Labs:      Results from last 7 days  Lab Units 17  0537 10/31/17  193   WBC Thousand/uL 5 25 7 73   HEMOGLOBIN g/dL 11 1* 12 6   HEMATOCRIT % 32 6* 36 7   PLATELETS Thousands/uL 231 247   NEUTROS PCT %  --  87*   LYMPHS PCT %  --  8*   MONOS PCT %  --  5   EOS PCT %  --  0       Results from last 7 days  Lab Units 17  0537 10/31/17  193   SODIUM mmol/L 137 132*   POTASSIUM mmol/L 3 0* 3 7   CHLORIDE mmol/L 105 100   CO2 mmol/L 22 22   BUN mg/dL 10 13   CREATININE mg/dL 0 79 1 21   CALCIUM mg/dL 8 4 9 4   TOTAL PROTEIN g/dL  --  7 6   BILIRUBIN TOTAL mg/dL  --  0 62   ALK PHOS U/L  --  34*   ALT U/L  --  42   AST U/L  --  53*   GLUCOSE RANDOM mg/dL 100 117       Results from last 7 days  Lab Units 11/01/17  0537   INR  1 30*       * I Have Reviewed All Lab Data Listed Above  * Additional Pertinent Lab Tests Reviewed: All Labs Within Last 24 Hours Reviewed    Imaging:    Imaging Reports Reviewed Today Include:   Imaging Personally Reviewed by Myself Includes:      Recent Cultures (last 7 days):       Results from last 7 days  Lab Units 11/01/17  1215 10/31/17  1936 10/31/17  1909 10/31/17  1906   GRAM STAIN RESULT  No Polys or Bacteria seen Gram negative rods No Polys or Bacteria seen Gram negative rods   WOUND CULTURE   --   --  1+ Growth of Proteus species*  --        Last 24 Hours Medication List:     benztropine 1 mg Oral BID   cefepime 2,000 mg Intravenous Q12H   clonazePAM 0 5 mg Oral BID   [START ON 11/2/2017] divalproex sodium 1,000 mg Oral Daily With Lunch   divalproex sodium 500 mg Oral Q12H [address]   [START ON 11/2/2017] enoxaparin 40 mg Subcutaneous Q24H LENO   fenofibrate 145 mg Oral Daily   levothyroxine 125 mcg Oral HS   risperiDONE 2 mg Oral BID   risperiDONE 4 mg Oral BID        Today, Patient Was Seen By: Joel Stein MD    ** Please Note: This note has been constructed using a voice recognition system   **

## 2017-11-01 NOTE — PROGRESS NOTES
Progress Note - General Surgery   Susan Bradley 23 y o  male MRN: [id number]  Unit/Bed#: [facility] 919-01 Encounter: [id number]    Assessment:  22 yo M with serous drainage and wound dehiscence from an L3-L5 microdiskectomy on 10/17     Plan:  OR today w/ Neurosurgery for washout  Gen surg to assist w/ possible vac placement intraop  Continue antibiotics- Vanco, Cefepime  F/U blood cultures, wound cultures  PRN pain control  Tylenol prn fever    Subjective/Objective   Chief Complaint: None    Subjective: No complaints other than fever again this AM  Minimal pain to back  Objective:     Blood pressure 133/80, pulse (!) 134, temperature (!) 103 °F (39 4 °C), temperature source Oral, resp  rate 20, height 6' 4" (1 93 m), weight (!) 147 kg (324 lb), SpO2 97 %  ,Body mass index is 39 44 kg/m²      No intake or output data in the 24 hours ending 11/01/17 0607    Invasive Devices     Peripheral Intravenous Line            Peripheral IV 10/31/17 Right Arm less than 1 day                Physical Exam:   Gen: A&O, NAD  Cardio: RRR  Lungs: CTAB  Abd: Soft, non distended, non tender  Back: Dressing in place, serosang drainage      Lab, Imaging and other studies:  CBC:   Lab Results   Component Value Date    WBC 7 73 10/31/2017    HGB 12 6 10/31/2017    HCT 36 7 10/31/2017    MCV 78 (L) 10/31/2017     10/31/2017    MCH 26 9 10/31/2017    MCHC 34 3 10/31/2017    RDW 13 3 10/31/2017    MPV 9 3 10/31/2017    NRBC 0 10/31/2017   , CMP:   Lab Results   Component Value Date     (L) 10/31/2017    K 3 7 10/31/2017     10/31/2017    CO2 22 10/31/2017    ANIONGAP 10 10/31/2017    BUN 13 10/31/2017    CREATININE 1 21 10/31/2017    GLUCOSE 117 10/31/2017    CALCIUM 9 4 10/31/2017    AST 53 (H) 10/31/2017    ALT 42 10/31/2017    ALKPHOS 34 (L) 10/31/2017    PROT 7 6 10/31/2017    ALBUMIN 3 2 (L) 10/31/2017    BILITOT 0 62 10/31/2017    EGFR 83 10/31/2017     VTE Pharmacologic Prophylaxis: Reason for no pharmacologic prophylaxis per primary  VTE Mechanical Prophylaxis: sequential compression device

## 2017-11-01 NOTE — ANESTHESIA PREPROCEDURE EVALUATION
Review of Systems/Medical History  Patient summary reviewed  Chart reviewed      Cardiovascular  Exercise tolerance: poor,  Hyperlipidemia,    Pulmonary  Sleep apnea , ,        GI/Hepatic            Endo/Other  History of thyroid disease , hypothyroidism,      GYN       Hematology   Musculoskeletal       Neurology   Psychology   Psychiatric history,        History of Autism and Bipolar Disorder    Physical Exam    Airway    Mallampati score: III  TM Distance: <3 FB  Neck ROM: full     Dental       Cardiovascular      Pulmonary      Other Findings        Anesthesia Plan  ASA Score- 4       Anesthesia Type- general with ASA Monitors  Additional Monitors:   Airway Plan: ETT  Induction- intravenous  Informed Consent- Anesthetic plan and risks discussed with patient

## 2017-11-01 NOTE — ANESTHESIA POSTPROCEDURE EVALUATION
Post-Op Assessment Note      CV Status:  Stable    Mental Status:  Awake and somnolent    Hydration Status:  Euvolemic    PONV Controlled:  Controlled    Airway Patency:  Patent    Post Op Vitals Reviewed: Yes          Staff: CRNA           /69 (11/01/17 1344)    Temp 99 9 °F (37 7 °C) (11/01/17 1344)    Pulse (!) 128 (11/01/17 1344)   Resp (!) 28 (11/01/17 1344)    SpO2 99 % (11/01/17 1344)

## 2017-11-01 NOTE — CONSULTS
Assessment  1  Delayed surgical wound healing, initial encounter (169 18) (T81 89XA)    Plan  Herniation of lumbar intervertebral disc with radiculopathy    · 1 Florentino Roth MD, Klever  (General Surgery) Co-Management  superficial wound  dehisence and seroma drained in office  please eval and treat  Status: Active  Requested  for: [id number]   Ordered; For: Herniation of lumbar intervertebral disc with radiculopathy; Ordered By: Chris Henry Performed:  Due: [id number]; Last Updated By: Salvador Vivas; 10/31/2017 11:30:10 AM  Care Summary provided  : Yes    Discussion/Summary  Discussion Summary:   25-year-old male with nonhealing surgical wound of his back  I drained a lot of seroma  We will see him Monday in the wound center  He did have a temp 101 9 here  I told mom to discuss this with the nurse surgery office  I also let neurosurgery note that he had a temp  Counseling Documentation With Imm: The patient, patient's family was counseled regarding instructions for management  Goals and Barriers: The patient has the current Goals: Recover from surgery  The patent has the current Barriers: Postop seroma  Patient's Capacity to Self-Care: Patient is able to Self-Care  Patient Education: Educational resources provided: N/a  Medication SE Review and Pt Understands Tx: Possible side effects of new medications were reviewed with the patient/guardian today  The treatment plan was reviewed with the patient/guardian  The patient/guardian understands and agrees with the treatment plan      Chief Complaint  Chief Complaint Free Text Note Form: Consult for Low Back Wound      History of Present Illness  HPI: 25-year-old male presents for evaluation of wound lower back status post neurosurgery  I was called by neuro surgery yesterday to see if we could see this gentleman sooner than next week  He was seen today  noted to have a lot of drainage at the wound site      Active Problems  1   ADHD (attention deficit hyperactivity disorder), combined type (314 01) (F90 2)   2  Allergy (995 3) (T78 40XA)   3  Autism (299 00) (F84 0)   4  Bipolar mood disorder (296 80) (F31 9)   5  Bowel incontinence (787 60) (R15 9)   6  Claustrophobia (300 29) (F40 240)   7  Constipation (564 00) (K59 00)   8  Depression (311) (F32 9)   9  Encounter for therapeutic drug monitoring (V58 83) (Z51 81)   10  External hemorrhoids (455 3) (K64 4)   11  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)   12  Hip pain (719 45) (M25 559)   13  Hyperlipidemia (272 4) (E78 5)   14  Hypertriglyceridemia (272 1) (E78 1)   15  Hypothyroidism (244 9) (E03 9)   16  Iliotibial band syndrome of right side (728 89) (M76 31)   17  Lumbar disc disease (722 93) (M51 9)   18  Lumbar stenosis (724 02) (M48 061)   19  Muscle spasm (728 85) (M62 838)   20  Nocturnal enuresis (788 36) (N39 44)   21  Obesity (278 00) (E66 9)   22  COLIN (obstructive sleep apnea) (327 23) (G47 33)   23  Prediabetes (790 29) (R73 03)   24  Psychiatric disorder (300 9) (F99)   25  Right foot drop (736 79) (M21 371)   26  Right-sided back pain (724 5) (M54 9)   27  Skin lesion (709 9) (L98 9)   28  Sleep apnea (780 57) (G47 30)   29  Sleep disturbances (780 50) (G47 9)   30  Snoring (786 09) (R06 83)   31  Urinary incontinence (788 30) (R32)   32  Wears glasses (V49 89) (Z97 3)    Past Medical History  1  History of Blood in stool (578 1) (K92 1)   2  History of Costochondritis (733 6) (M94 0)   3  History of being hospitalized (V13 9) (Z92 89)   4  History of hyperlipidemia (V12 29) (Z86 39)   5  History of thyroid disease (V12 29) (Z86 39)   6  History of viral gastroenteritis (V12 09) (Z86 19)   7  History of Urinary problem (V47 4) (R39 89)   8  History of Williamson teeth removed (525 50,525 10) (L47 339)    Surgical History  1  History of Colonoscopy (Fiberoptic)   2  History of Oral Surgery Tooth Extraction    Family History  Mother    1   Denied: Family history of Alcoholism and drug addiction in family   2  Denied: Family history of Anxiety and depression   3  Denied: Family history of Cancer, colon   4  Denied: Family history of Crohn's disease   5  Family history of hypertension (V17 49) (Z82 49)   6  Denied: Family history of liver disease  Father    9  Denied: Family history of Alcoholism and drug addiction in family   6  Denied: Family history of Anxiety and depression   9  Denied: Family history of Cancer, colon   10  Family history of cardiac disorder (V17 49) (Z82 49)   11  Denied: Family history of Crohn's disease   12  Denied: Family history of liver disease   15  Family history of substance abuse (V17 0) (Z81 4)  Child    15  Denied: Family history of Alcoholism and drug addiction in family   13  Denied: Family history of Anxiety and depression  Sibling    12  Denied: Family history of Alcoholism and drug addiction in family   16  Denied: Family history of Anxiety and depression  Grandmother    25  Family history of colonic polyps (V18 51) (Z83 71)  Maternal Grandmother    23  Family history of cardiac disorder (V17 49) (Z82 49)  Paternal Grandmother    21  Family history of    24  Family history of cerebrovascular accident (CVA) (V17 1) (Z82 3)  Grandfather    25  Family history of colonic polyps (V18 51) (Z83 71)   23  Family history of malignant neoplasm (V16 9) (Z80 9)  Paternal Grandfather    25  Family history of cardiac disorder (V17 49) (Z80 55)    Social History   · Disabled   · Drinks coffee   · High school or GED   · Never a smoker   · No alcohol use   · Single    Current Meds   1  Benztropine Mesylate 1 MG Oral Tablet; Take 1 tablet twice daily as needed; Therapy: 60Xpd6990 to (Evaluate:2017)  Requested for: 35Mfv7810; Last   Rx:2016 Ordered   2  Clindamycin HCl - 300 MG Oral Capsule; TAKE 1 CAPSULE EVERY 6 HOURS DAILY; Therapy: 93KRJ7673 to (Evaluate:2017); Last Rx:2017 Ordered   3  ClonazePAM 1 MG Oral Tablet;  Take 1 tablet twice daily; Last Rx:35Hgg0217 Ordered   4  Cyclobenzaprine HCl - 10 MG Oral Tablet; TAKE 1 TABLET 3 TIMES DAILY AS NEEDED; Therapy: 77IAI4109 to (Evaluate:09Nov2017); Last Rx:30Oct2017 Ordered   5  Divalproex Sodium 500 MG Oral Tablet Delayed Release; Take 1 tablet 4 times daily; Therapy: [phone] to Recorded   6  EPINEPHrine 0 3 MG/0 3ML Injection Solution Auto-injector; INJECT 0 3ML   INTRAMUSCULARLY AS DIRECTED; Therapy: 12Apr2016 to (Last Rx:12Apr2016)  Requested for: 12Apr2016 Ordered   7  Fenofibrate 160 MG Oral Tablet; take 1 tablet by mouth every day; Therapy: 33VMX4669 to (Last Rx:29Sep2016)  Requested for: 76KUT0863 Ordered   8  Levothyroxine Sodium 25 MCG Oral Tablet; TAKE 1 TABLET BY MOUTH MONDAY THRU   FRIDAY AND 2 TABLETS ON SATURDAY AND SUNDAY; Therapy: 77GQT2820 to (Evaluate:58Etz1564)  Requested for: 54ERX8743; Last   Rx:19Jun2017 Ordered   9  Multiple Vitamin TABS; TAKE 1 TABLET TAKEN OCCASIONAL; Therapy: [phone]) to Recorded   10  RisperiDONE 2 MG Oral Tablet; TAKE 1 TABLET TWICE DAILY; Therapy: 68Vxb8904 to (Evaluate:10Sep2016)  Requested for: 11Aug2016; Last    Rx:11Aug2016 Ordered   11  RisperiDONE 4 MG Oral Tablet; TAKE 1 TABLET TWICE DAILY; Therapy: 98WVL4356 to (Evaluate:01Ize5502)  Requested for: 12Oct2016; Last    Rx:11Aug2016; Status: ACTIVE - Renewal Voided Ordered    Allergies  1  Pentothal   2  Sulfa Drugs   3  Allegra   4  aspirin   5  CIPRO   6  Cipro TABS   7  Erythromycin Derivatives   8  Erythromycin TABS   9  Motrin TABS   10  Motrin TABS   11  Penicillins    Vitals  Vital Signs    Recorded: 20EMC5117 12:30PM   Temperature 101 9 F, Tympanic   Systolic 385, RUE   Diastolic 60, RUE   Height Unobtainable Yes   Weight Unobtainable Yes     Physical Exam    Skin   Skin and subcutaneous tissue: Abnormal  -- Midline lower back healing wound except for the lower portion which is open about 1 x 0 8 cm   Probing it with a Q-tip released a very large amount of old seroma  Future Appointments    Date/Time Provider Specialty Site   01/16/2018 02:00 PM Cecilia Hauser MD Neurology  [address] [facility]  [facility]elfo   12/12/2017 04:30 PM Shara Billingsley MD Internal Medicine [facility] INTERNAL MED   11/29/2017 01:00 PM YARI Grayson   Neurosurgery [facility] NEUROSURGICAL [facility]   11/03/2017 10:30 AM NeuroSX, Nursetwo Schedule  [facility]     Signatures   Electronically signed by : Beck Haas MD; Oct 31 2017  1:41PM EST                       (Author)

## 2017-11-01 NOTE — CONSULTS
CC: Incisional drainage and fever  HPI: This is a 23 yoM with significant history of morbid obesity, autism, and ADHD who underwent an L3-4 and L4-5 open micro diskectomy due to cauda equina syndrome and a right drop foot  Postoperatively his pain had significantly improved, however over the last few days the inferior portion of his wound is open  I saw him in clinic yesterday due to concern for drainage along the wound  At that juncture he had a low-grade temperature of 99  He was referred to wound clinic where he was seen today and found to be febrile to 102  As such I discussed with him and his mom about admission for washout  Per the mom and sheldon he has had an episode of emesis  He has had fever not resolved with Tylenol  He continues to have drainage  He still complains of back pain  His review of systems is positive for fever, back pain, chills, emesis    Past Medical History:   Diagnosis Date    Autism     Bipolar 1 disorder ([address] )     Disease of thyroid gland     Hyperlipidemia     Lumbar disc disease     Psychiatric disorder     Compulsive Disorder    Sleep apnea     Urinary incontinence      Past Surgical History:   Procedure Laterality Date    COLONOSCOPY      MO LAMNOTMY INCL W/DCMPRSN NRV ROOT 1 INTRSPC LUMBR Bilateral 10/17/2017    Procedure: LEFT L3/4 AND RIGHT L4/5 MICRODISCECTOMIES, HEMILAMINECTOMIES; POSSIBLE EPIDURAL STEROID INJECTIONS;  Surgeon: Hortencia Agee MD;  [facility]: [facility];  Service: Neurosurgery    WISDOM TOOTH EXTRACTION       Allergies   Allergen Reactions    Allegra [Fexofenadine]     Aspirin     Ciprofloxacin     Erythromycin     Motrin [Ibuprofen]     Penicillins     Pentothal [Thiopental]     Sulfa Antibiotics      No current facility-administered medications on file prior to encounter        Current Outpatient Prescriptions on File Prior to Encounter   Medication Sig Dispense Refill    acetaminophen (TYLENOL) 500 mg tablet Take 500 mg by mouth every 6 (six) hours as needed for mild pain      benztropine (COGENTIN) 1 mg tablet Take 1 mg by mouth 2 (two) times a day      clonazePAM (KlonoPIN) 1 mg tablet Take 0 5 mg by mouth 2 (two) times a day      divalproex sodium (DEPAKOTE) 500 mg EC tablet Take 500 mg by mouth every 12 (twelve) hours        divalproex sodium (DEPAKOTE) 500 mg EC tablet Take 1,000 mg by mouth daily Mid day       fenofibrate (TRIGLIDE) 160 MG tablet Take 160 mg by mouth daily      levothyroxine 125 mcg tablet Take 125 mcg by mouth daily at bedtime      methocarbamol (ROBAXIN) 500 mg tablet Take 1 tablet by mouth every 6 (six) hours as needed for muscle spasms 30 tablet 0    risperiDONE (RisperDAL) 2 mg tablet Take 2 mg by mouth 2 (two) times a day      risperiDONE (RisperDAL) 4 mg tablet Take 4 mg by mouth 2 (two) times a day       Pe:  Temp:  [103 °F (39 4 °C)] 103 °F (39 4 °C)  HR:  [143] 143  Resp:  [22] 22  BP: (133)/(60) 133/60  He is awake alert and oriented  He is subdued  He appears ill  He is febrile  He answers questions appropriately  He follows commands in his upper and lower extremities  He has a stable right foot drop  His wound is open at the inferior pole and has drainage      A/P postop wound dehiscence with seromatous drainage and fever  - Meets SIRs criteria, agree with abx  - NPO after 12 and washout tomorrow am    - Should he become hemodynamically unstable plan for OR sooner  - Discussed case with Acute Care Surgery for possible vac placement

## 2017-11-01 NOTE — PROGRESS NOTES
Was paged by nursing regarding Risperdal dosing  Patient has Risperdal 2mg BID ordered as well as 4mg BID  This was discussed with nursing and the pharmacist  Per review of the PCP note from 3/7/16, patient's current meds listed as Risperdal 2mg BID and 4mg BID  The summary recommended to decrease Risperdal to 4mg in the Am and continue 6mg at night  The following PCP note (9/26/17) reports "sees psychiatry, no medication change" and current meds still listed as Risperdal 2mg BID and Risperdal 4mg BID  Most recent (6/29/17) PCP note medication list also reveals Risperdal 2mg BID and 4mg BID  Patient received 6mg (2+4) last evening and this AM per review of meds history  Discussed with pharmacist - continue dose as ordered  Unclear who patient's psychiatrist is at this time   Please address in AM

## 2017-11-01 NOTE — PROGRESS NOTES
Patient has 2 orders of Risperidone to be given at the same time, SLIM notified, advised to administer both orders and SLIM will readdress in the morning  Will continue to monitor

## 2017-11-01 NOTE — CASE MANAGEMENT
Initial Clinical Review    Admission: Date/Time/Statement: 10/31/17 @ 1825    Orders Placed This Encounter   Procedures    Inpatient Admission     Standing Status:   Standing     Number of Occurrences:   1     Order Specific Question:   Admitting Physician     Answer:   Evelin Bar [[id number]]     Order Specific Question:   Level of Care     Answer:   Med Surg [[id number]]     Order Specific Question:   Estimated length of stay     Answer:   More than 2 Midnights     Order Specific Question:   Certification     Answer:   I certify that inpatient services are medically necessary for this patient for a duration of greater than two midnights  See H&P and MD Progress Notes for additional information about the patient's course of treatment  Date/Time/Mode of Arrival: 10/31 Direct Admission    Chief Complaint:  fever and wound drainage    History of Illness: 23 y o  male who presents with fever and wound drainage to [facility] as a direct admission  Patient was actually sent here by his neurosurgeon, Dr Dandre Dowd for further evaluation management  Patient's wound dehiscence and drainage and with fever  Last October 17, 2017, patient underwent surgery for his intervertebral herniated disc  A few days ago, patient's mother told me that patient's back wound opened up a little  According to her, there was some blood and whitish drainage that came out at that time  She told me that she inform the neurosurgeon about this  According to the patient's mother, the wound opening got bigger and still having some drainage coming out  Yesterday, patient started having fever and chills    According to the mother, patient's temperature ranges from 99 5 to 103 5° F  Yesterday, patient was seen by his neurosurgeon and patient was prescribed with clindamycin, cyclobenzaprine, and patient was referred to [address] [place] [address] for call management of the inferior wound dehiscence and drainage compatible with seroma  Patient was also referred to the general surgeon, Dr Jodi Acosta  Patient's condition continued with drainage from the wound and with fever and chills  Aside from this, patient had episodes of nausea and an episode of vomiting earlier today  Patient has been having poor appetite lately  Patient's mother also told me that patient to 2 tablets of the cyclobenzaprine instead of just 1 tablet earlier today  Patient also took 2 tablets of the clindamycin instead of the prescribed 1 tablet  Vital Signs:   Vitals   Temperature Pulse Respirations Blood Pressure SpO2   10/31/17 1810 10/31/17 1810 10/31/17 1810 10/31/17 1810 10/31/17 1810   (!) 103 °F (39 4 °C) (!) 143 22 133/60 96 %      Temp Source Heart Rate Source Patient Position - Orthostatic VS BP Location FiO2 (%)   10/31/17 1810 10/31/17 1810 10/31/17 1810 10/31/17 1810 --   Oral Monitor Lying Left arm       Pain Score       11/01/17 1031       7        Wt Readings from Last 1 Encounters:   10/31/17 (!) 147 kg (324 lb)     Vital Signs (abnormal):   Date/Time  Temp  Pulse  Resp  BP  SpO2  O2 Device  Patient Position - Orthostatic VS   11/01/17 0648  98 8 °F (37 1 °C)   111  20  134/93  97 %  None (Room air)  Lying   11/01/17 0300   103 °F (39 4 °C)   134  20  133/80  97 %  None (Room air)  Lying   Temp: Nurse informed at 11/01/17 0300   10/31/17 2300  100 2 °F (37 9 °C)   120  20  136/72  97 %  None (Room air)  Lying   10/31/17 2057   102 4 °F (39 1 °C)   139  22  141/64  97 %  None (Room air)  Lying     Abnormal Labs: Wound culture and Gram stain [[id number]] (Abnormal) Collected: 10/31/17 1909   Lab Status: Preliminary result Specimen: Wound from Back Updated: 11/01/17 1136    Wound Culture 1+ Growth of Proteus species (A)    Gram Stain Result No Polys or Bacteria seen      Updated: 10/31/17 2015     Sodium 132 (L) 136 - 145 mmol/L     Diagnostic Test Results: No order    Past Medical/Surgical History:    Active Ambulatory Problems Diagnosis Date Noted    No Active Ambulatory Problems     Resolved Ambulatory Problems     Diagnosis Date Noted    No Resolved Ambulatory Problems     Past Medical History:   Diagnosis Date    Autism     Bipolar 1 disorder ([facility] )     Disease of thyroid gland     Hyperlipidemia     Lumbar disc disease     Psychiatric disorder     Sleep apnea     Urinary incontinence        Admitting Diagnosis: Post-operative complication [Y13  9XXA]    Age/Sex: 23 y o  male    Assessment/Plan:   Hospital Problem List:      Principal Problem:    Sepsis ([facility] )  Active Problems:    Wound dehiscence    Wound infection    Herniation of lumbar intervertebral disc    Hypothyroid    [facility]    Autism      Plans:       1  Sepsis, present on admission with high-grade fever and tachycardia, likely due to wound infection with wound dehiscence on previous neurosurgery/spine surgery the patient had 10/17 with herniation of the lumbar intervertebral disc  (patient underwent a left L3/4 hemilaminectomy, medial facetectomy and diskectomy, left L3 /4 foraminotomy, right L4/5  Left 3/4  Gamal laminotomy, medial facetectomy and diskectomy; right L3/4 foraminotomy with intraoperative epidural steroid injection and intraoperative fluoroscopy done 10/17/2017): Admit the patient to our medical-surgical telemetry floor  Aggressive IV fluids at this point  Check lactic acid levels  Check CBC with differential count  Check CMP  As per discussion with the neurosurgeon who sent the patient here,  We will get infectious disease doctor on board  Neurosurgery will be on board  Patient was also  referred by the neurosurgeon to Dr Kevin Story, general surgeon, for co-management of the wound dehiscence and seroma drained in the office of the neurosurgeon  In the outpatient, patient was prescribed clindamycin  For now, I will have the patient empiric broad-spectrum antibiotics with IV cefepime and IV vancomycin  For blood cultures  For wound cultures  Pain control  Case discussed with the neurosurgeon, for now, we will hold off any pharmacologic DVT prophylaxis as patient may have surgery tomorrow  Thus patient will be NPO post midnight tonight        2  Hypothyroidism:  Continue Synthroid  Check TSH        3  Bipolar disorder and history of autism: Continue psychiatric medications      VTE Prophylaxis:   Hold off on pharmacologic DVT prophylaxis  As patient may need to have your surgery tomorrow   / sequential compression device   Code Status: Level 1 - Full Code  POLST: POLST form is not discussed and not completed at this time      Anticipated Length of Stay:  Patient will be admitted on an Inpatient basis with an anticipated length of stay of    Greater than 2 midnights  Justification for Hospital Stay:  Due to the above findings and plans        Admission Orders:  11/1 OR Today - I&D Spine  Tele monitoring  Bld culture x2  Sequential compression device  Neurosurgery cons  Acute Care Surgery cons  Infectious Disease cons    Scheduled Meds:   benztropine 1 mg Oral BID   cefepime 2,000 mg Intravenous Q12H   clonazePAM 0 5 mg Oral BID   divalproex sodium 1,000 mg Oral Daily   divalproex sodium 500 mg Oral Q12H LENO   fenofibrate 145 mg Oral Daily   levothyroxine 125 mcg Oral HS   risperiDONE 2 mg Oral BID   risperiDONE 4 mg Oral BID   vancomycin 2,000 mg Intravenous Q12H     Continuous Infusions:   sodium chloride 125 mL/hr Last Rate: 125 mL/hr (11/01/17 0651)     PRN Meds:

## 2017-11-01 NOTE — SOCIAL WORK
Cm met with patient and patient's mother at bedside to discuss role of Cm  Patient was asleep during conversation, but patient's mother was able to answer questions  Per conversation with patient's mother, Fernando Mccray, patient is generally alert and oriented at home  Madiha reported that she and patient live in a 2 story home with 4-5 steps to enter the home and 14 steps to 2nd floor bedroom  Patient reportedly needs assistance with dressing, bathing and cleaning and ambulation  Per patient's mother the need for assistance with ambulation recently developed  Patient's mother reported that she has crutches at home and he did have a C-pap machine at home, but it did not help him  Mother denied patient having any inpatient PT/OT, substance abuse treatment or [facility]  Patient reportedly was at [facility] in [place] from [phone] for a autism and bi-polar disorder  Patient currently sees a psychiatrist through [facility] in [place]  Patient's preferred pharmacy is [facility] in [place]  Patient's PCP is with [facility] in [place]  CM reviewed d/c planning process including the following: identifying help at home, patient preference for d/c planning needs, Discharge Lounge, [facility] Meds to Bed program, availability of treatment team to discuss questions or concerns patient and/or family may have regarding understanding medications and recognizing signs and symptoms once discharged  CM also encouraged patient to follow up with all recommended appointments after discharge  Patient advised of importance for patient and family to participate in managing patients medical well being

## 2017-11-01 NOTE — INTERIM OP NOTE
INCISION AND DRAINAGE (I&D) SPINE  Postoperative Note  PATIENT NAME: Eduardo Espinoza  : 1990  MRN: [id number]  BE OR ROOM 09    Surgery Date: 2017    Preop Diagnosis:  Wound dehiscence [T81 30XA]    Post-Op Diagnosis Codes:     * Wound dehiscence [T81 30XA]    Procedure(s) (LRB):  INCISION AND DRAINAGE (I&D) SPINE (N/A)    Surgeon(s) and Role:     * Mariely Sprague MD - Primary     * Mariana Boone MD - Gilmer Galarza MD - Assisting    Specimens:  ID Type Source Tests Collected by Time Destination   A : Lumbar Spine Tissue Soft Tissue, Other ANAEROBIC CULTURE AND GRAM STAIN, FUNGAL CULTURE, STAT GRAM STAIN, CULTURE, TISSUE AND GRAM STAIN, AFB CULTURE WITH STAIN Mariely Sprague MD 2017 1212    B : Lumbar Wound Deep Tissue Wound ANAEROBIC CULTURE AND GRAM STAIN, FUNGAL CULTURE, STAT GRAM STAIN, CULTURE, TISSUE AND GRAM STAIN, AFB CULTURE WITH STAIN Mariely Sprague MD 2017 1214    C : Lumbar Wound Superficial Tissue Wound ANAEROBIC CULTURE AND GRAM STAIN, FUNGAL CULTURE, STAT GRAM STAIN, CULTURE, TISSUE AND GRAM STAIN, AFB CULTURE WITH STAIN Mariely Sprague MD 2017 1215        Estimated Blood Loss:   100 mL    Anesthesia Type:   General     Findings:    Seroma, dehisence of fascia  No gross pus     Complications:   None    SIGNATURE: Mariely Sprague MD   DATE: 2017   TIME: 1:43 PM

## 2017-11-01 NOTE — PLAN OF CARE
DISCHARGE PLANNING     Discharge to home or other facility with appropriate resources Progressing        INFECTION - ADULT     Absence or prevention of progression during hospitalization Progressing        Knowledge Deficit     Patient/family/caregiver demonstrates understanding of disease process, treatment plan, medications, and discharge instructions Progressing        PAIN - ADULT     Verbalizes/displays adequate comfort level or baseline comfort level Progressing        Potential for Falls     Patient will remain free of falls Progressing        Prexisting or High Potential for Compromised Skin Integrity     Skin integrity is maintained or improved Progressing        SAFETY ADULT     Patient will remain free of falls Progressing     Maintain or return to baseline ADL function Progressing     Maintain or return mobility status to optimal level Progressing

## 2017-11-02 ENCOUNTER — ANESTHESIA EVENT (INPATIENT)
Dept: PERIOP | Facility: HOSPITAL | Age: 27
DRG: 856 | End: 2017-11-02
Payer: MEDICARE

## 2017-11-02 LAB
ANION GAP SERPL CALCULATED.3IONS-SCNC: 7 MMOL/L (ref 4–13)
BACTERIA WND AEROBE CULT: ABNORMAL
BASOPHILS # BLD AUTO: 0.01 THOUSANDS/ΜL (ref 0–0.1)
BASOPHILS NFR BLD AUTO: 0 % (ref 0–1)
BUN SERPL-MCNC: 6 MG/DL (ref 5–25)
CALCIUM SERPL-MCNC: 8.6 MG/DL (ref 8.3–10.1)
CHLORIDE SERPL-SCNC: 102 MMOL/L (ref 100–108)
CO2 SERPL-SCNC: 27 MMOL/L (ref 21–32)
CREAT SERPL-MCNC: 0.73 MG/DL (ref 0.6–1.3)
EOSINOPHIL # BLD AUTO: 0.01 THOUSAND/ΜL (ref 0–0.61)
EOSINOPHIL NFR BLD AUTO: 0 % (ref 0–6)
ERYTHROCYTE [DISTWIDTH] IN BLOOD BY AUTOMATED COUNT: 13.7 % (ref 11.6–15.1)
GFR SERPL CREATININE-BSD FRML MDRD: 129 ML/MIN/1.73SQ M
GLUCOSE SERPL-MCNC: 121 MG/DL (ref 65–140)
GRAM STN SPEC: ABNORMAL
HCT VFR BLD AUTO: 30.9 % (ref 36.5–49.3)
HGB BLD-MCNC: 10.4 G/DL (ref 12–17)
LYMPHOCYTES # BLD AUTO: 0.91 THOUSANDS/ΜL (ref 0.6–4.47)
LYMPHOCYTES NFR BLD AUTO: 17 % (ref 14–44)
MCH RBC QN AUTO: 26.6 PG (ref 26.8–34.3)
MCHC RBC AUTO-ENTMCNC: 33.7 G/DL (ref 31.4–37.4)
MCV RBC AUTO: 79 FL (ref 82–98)
MONOCYTES # BLD AUTO: 0.75 THOUSAND/ΜL (ref 0.17–1.22)
MONOCYTES NFR BLD AUTO: 14 % (ref 4–12)
NEUTROPHILS # BLD AUTO: 3.52 THOUSANDS/ΜL (ref 1.85–7.62)
NEUTS SEG NFR BLD AUTO: 69 % (ref 43–75)
NRBC BLD AUTO-RTO: 0 /100 WBCS
PLATELET # BLD AUTO: 210 THOUSANDS/UL (ref 149–390)
PMV BLD AUTO: 9.6 FL (ref 8.9–12.7)
POTASSIUM SERPL-SCNC: 3.6 MMOL/L (ref 3.5–5.3)
RBC # BLD AUTO: 3.91 MILLION/UL (ref 3.88–5.62)
SODIUM SERPL-SCNC: 136 MMOL/L (ref 136–145)
WBC # BLD AUTO: 5.23 THOUSAND/UL (ref 4.31–10.16)

## 2017-11-02 PROCEDURE — 87040 BLOOD CULTURE FOR BACTERIA: CPT | Performed by: HOSPITALIST

## 2017-11-02 PROCEDURE — 87086 URINE CULTURE/COLONY COUNT: CPT | Performed by: SURGERY

## 2017-11-02 PROCEDURE — 85025 COMPLETE CBC W/AUTO DIFF WBC: CPT | Performed by: HOSPITALIST

## 2017-11-02 PROCEDURE — 80048 BASIC METABOLIC PNL TOTAL CA: CPT | Performed by: HOSPITALIST

## 2017-11-02 RX ADMIN — CLONAZEPAM 0.5 MG: 0.5 TABLET ORAL at 09:30

## 2017-11-02 RX ADMIN — DIVALPROEX SODIUM 500 MG: 500 TABLET, DELAYED RELEASE ORAL at 09:28

## 2017-11-02 RX ADMIN — OXYCODONE HYDROCHLORIDE 10 MG: 10 TABLET ORAL at 18:01

## 2017-11-02 RX ADMIN — BENZTROPINE MESYLATE 1 MG: 1 TABLET ORAL at 09:31

## 2017-11-02 RX ADMIN — SODIUM CHLORIDE 125 ML/HR: 0.9 INJECTION, SOLUTION INTRAVENOUS at 05:32

## 2017-11-02 RX ADMIN — MORPHINE SULFATE 4 MG: 4 INJECTION, SOLUTION INTRAMUSCULAR; INTRAVENOUS at 19:23

## 2017-11-02 RX ADMIN — BENZTROPINE MESYLATE 1 MG: 1 TABLET ORAL at 17:57

## 2017-11-02 RX ADMIN — METHOCARBAMOL 500 MG: 500 TABLET ORAL at 19:23

## 2017-11-02 RX ADMIN — CLONAZEPAM 0.5 MG: 0.5 TABLET ORAL at 17:58

## 2017-11-02 RX ADMIN — OXYCODONE HYDROCHLORIDE 10 MG: 10 TABLET ORAL at 09:28

## 2017-11-02 RX ADMIN — CEFAZOLIN SODIUM 2000 MG: 2 SOLUTION INTRAVENOUS at 19:23

## 2017-11-02 RX ADMIN — ENOXAPARIN SODIUM 40 MG: 40 INJECTION SUBCUTANEOUS at 09:56

## 2017-11-02 RX ADMIN — LEVOTHYROXINE SODIUM 125 MCG: 125 TABLET ORAL at 22:19

## 2017-11-02 RX ADMIN — ACETAMINOPHEN 975 MG: 325 TABLET, FILM COATED ORAL at 10:31

## 2017-11-02 RX ADMIN — CEFAZOLIN SODIUM 2000 MG: 2 SOLUTION INTRAVENOUS at 13:38

## 2017-11-02 RX ADMIN — RISPERIDONE 2 MG: 1 TABLET ORAL at 09:27

## 2017-11-02 RX ADMIN — ACETAMINOPHEN 975 MG: 325 TABLET, FILM COATED ORAL at 19:23

## 2017-11-02 RX ADMIN — DIVALPROEX SODIUM 500 MG: 500 TABLET, DELAYED RELEASE ORAL at 22:19

## 2017-11-02 RX ADMIN — DIVALPROEX SODIUM 1000 MG: 500 TABLET, DELAYED RELEASE ORAL at 13:34

## 2017-11-02 RX ADMIN — FENOFIBRATE 145 MG: 145 TABLET ORAL at 09:31

## 2017-11-02 RX ADMIN — RISPERIDONE 2 MG: 1 TABLET ORAL at 17:57

## 2017-11-02 RX ADMIN — CEFEPIME 2000 MG: 2 INJECTION, POWDER, FOR SOLUTION INTRAMUSCULAR; INTRAVENOUS at 09:33

## 2017-11-02 RX ADMIN — RISPERIDONE 4 MG: 1 TABLET ORAL at 17:57

## 2017-11-02 RX ADMIN — RISPERIDONE 4 MG: 1 TABLET ORAL at 09:28

## 2017-11-02 NOTE — PROGRESS NOTES
[facility] Internal [facility] Progress Note  Patient: Denice Beckwithn 23 y o  male   MRN: [id number]  PCP: Cal Bartlett MD  Unit/Bed#: [facility] 969-30 Encounter: [id number]  Date Of Visit: 11/02/17    Assessment:    Principal Problem:    Sepsis ([facility] )  Active Problems:    Wound dehiscence    Wound infection    Herniation of lumbar intervertebral disc    Hypothyroid    Bipolar disorder ([facility] )    Autism      Plan:    · Proteus bacteremia & sepsis:  ? Shows some improvement, again had fever today, still tachycardic but better  ? BC 10/31 - proteus, wound culture from 10/31 - proteus, Repeated BC today  ? Send urine cultures, monitor abdo pain  ? DC IVF  ? Appreciate ID input - DCed vanco, cefepime changed to ancef  · Post-op spinal wound dehiscence with possible infection:  ? S/p wound wash out by neuroSx & Gen Sx 11/1, vac placement  ? Repeat Vac change Fri & Mon  ? Pain control - meds modified  ? DVT PX  · S/p L3-4, L4-5 open microdiscectomy on 10/17:  ? For cauda equina syndrome & right foot drop  · Hypothyroidism: cont levo  · Bipolar & autism:  ? Cont home risperidol & depakoate  · H/o COLIN:  ? Did not tolerate CPAP      VTE Pharmacologic Prophylaxis:   Pharmacologic: Enoxaparin (Lovenox)  Mechanical VTE Prophylaxis in Place: Yes    Patient Centered Rounds: I have performed bedside rounds with nursing staff today  Discussions with Specialists or Other Care Team Provider: ID    Education and Discussions with Family / Patient: patient, parents    Time Spent for Care: 45 minutes  More than 50% of total time spent on counseling and coordination of care as described above      Current Length of Stay: 2 day(s)    Current Patient Status: Inpatient   Certification Statement: The patient will continue to require additional inpatient hospital stay due to sepssi    Discharge Plan / Estimated Discharge Date: when ready    Code Status: Level 1 - Full Code      Subjective:   Drops O2 on sleeping, pain 5 to 9/10, no CP/SOB, no abdo pain    Objective:     Vitals:   Temp (24hrs), Av 5 °F (37 5 °C), Min:98 8 °F (37 1 °C), Max:101 1 °F (38 4 °C)    HR:  [118-132] 123  Resp:  [16-27] 18  BP: (118-143)/(65-93) 129/78  SpO2:  [85 %-98 %] 85 %  Body mass index is 39 44 kg/m²  Input and Output Summary (last 24 hours): Intake/Output Summary (Last 24 hours) at 17 1210  Last data filed at 17 0700   Gross per 24 hour   Intake          6274 58 ml   Output             2600 ml   Net          3674 58 ml       Physical Exam:     Physical Exam   Constitutional: He is oriented to person, place, and time  He appears well-developed and well-nourished  HENT:   Head: Normocephalic and atraumatic  Eyes: Conjunctivae are normal  No scleral icterus  Cardiovascular: Normal rate, regular rhythm and normal heart sounds  Exam reveals no gallop and no friction rub  No murmur heard  Pulmonary/Chest: Effort normal and breath sounds normal  No respiratory distress  He has no wheezes  Abdominal: Soft  He exhibits no distension  There is no tenderness  Musculoskeletal: He exhibits no edema  Neurological: He is alert and oriented to person, place, and time  Additional Data:     Labs:      Results from last 7 days  Lab Units 17  0545   WBC Thousand/uL 5 23   HEMOGLOBIN g/dL 10 4*   HEMATOCRIT % 30 9*   PLATELETS Thousands/uL 210   NEUTROS PCT % 69   LYMPHS PCT % 17   MONOS PCT % 14*   EOS PCT % 0       Results from last 7 days  Lab Units 11/02/17  10/31/17  1935   SODIUM mmol/L 136  < > 132*   POTASSIUM mmol/L 3 6  < > 3 7   CHLORIDE mmol/L 102  < > 100   CO2 mmol/L 27  < > 22   BUN mg/dL 6  < > 13   CREATININE mg/dL 0 73  < > 1 21   CALCIUM mg/dL 8 6  < > 9 4   TOTAL PROTEIN g/dL  --   --  7 6   BILIRUBIN TOTAL mg/dL  --   --  0 62   ALK PHOS U/L  --   --  34*   ALT U/L  --   --  42   AST U/L  --   --  53*   GLUCOSE RANDOM mg/dL 121  < > 117   < > = values in this interval not displayed      Results from last 7 days  Lab Units 11/01/17  0537   INR  1 30*       * I Have Reviewed All Lab Data Listed Above  * Additional Pertinent Lab Tests Reviewed: All Labs Within Last 24 Hours Reviewed    Imaging:    Imaging Reports Reviewed Today Include:   Imaging Personally Reviewed by Myself Includes:      Recent Cultures (last 7 days):       Results from last 7 days  Lab Units 11/01/17  1215 11/01/17  1214 11/01/17  1212 10/31/17  1936 10/31/17  1909 10/31/17  1906   BLOOD CULTURE   --   --   --    Proteus mirabilis*  --   --    Sibley Luna STAIN RESULT  No Polys or Bacteria seen Rare Polys  No bacteria seen 1+ Polys  2+ RBC's  No bacteria seen Gram negative rods No Polys or Bacteria seen Gram negative rods   WOUND CULTURE   --   --   --   --  1+ Growth of Proteus mirabilis*  --        Last 24 Hours Medication List:     benztropine 1 mg Oral BID   cefazolin 2,000 mg Intravenous Q8H   clonazePAM 0 5 mg Oral BID   divalproex sodium 1,000 mg Oral Daily With Lunch   divalproex sodium 500 mg Oral Q12H LENO   enoxaparin 40 mg Subcutaneous Q24H LENO   fenofibrate 145 mg Oral Daily   levothyroxine 125 mcg Oral HS   risperiDONE 2 mg Oral BID   risperiDONE 4 mg Oral BID        Today, Patient Was Seen By: Renato Her MD    ** Please Note: This note has been constructed using a voice recognition system   **

## 2017-11-02 NOTE — SOCIAL WORK
Cm reviewed patient during care coordination rounds  Patient is not medically stable for discharge today  Cm will continue to follow for recommendations

## 2017-11-02 NOTE — PROGRESS NOTES
Progress Note - General Surgery   Susan Bradley 23 y o  male MRN: [id number]  Unit/Bed#: [facility] 919-01 Encounter: [id number]    Assessment:  24 yo M w/ lumbar spine wound dehiscence & serous collection, s/p OR drainage & vac placement    - Fever improved, T max 99 7  - Tachycardia continues,     Plan:  OR tomorrow for Vac change  NPO @ mn  Antibiotics per ID- Cefepime  Continue tele monitoring-   F/U cultures- Proteus seen in 1 wound cx  PRN pain control  OOB, ambulation  Wean O2 NC      Subjective/Objective   Chief Complaint: None    Subjective: Patient states he has "the usual amount" of pain  Comfortable  S/P OR yesterday, where he was found to have fascial dehiscence w/ fluid in cavity without gross purulent fluid  Fever curve improved overnight, T max 99 7    Objective:     Blood pressure 124/67, pulse (!) 119, temperature 99 °F (37 2 °C), temperature source Oral, resp  rate 18, height 6' 4" (1 93 m), weight (!) 147 kg (324 lb), SpO2 96 %  ,Body mass index is 39 44 kg/m²        Intake/Output Summary (Last 24 hours) at 11/02/17 2569  Last data filed at 11/02/17 0531   Gross per 24 hour   Intake          6102 08 ml   Output             1850 ml   Net          4252 08 ml       Invasive Devices     Peripheral Intravenous Line            Peripheral IV 10/31/17 Right Arm 1 day    Peripheral IV 11/01/17 Right Antecubital less than 1 day          Drain            Negative Pressure Wound Therapy (V A C ) Back Lower less than 1 day                Physical Exam:   Gen: A&O, NAD  Cardio: RRR  Lungs: CTAB  Abd: Soft, non distended, non tender  Back: Vac in place, serosang drainage, good seal      Lab, Imaging and other studies:  CBC:   Lab Results   Component Value Date    WBC 5 23 11/02/2017    HGB 10 4 (L) 11/02/2017    HCT 30 9 (L) 11/02/2017    MCV 79 (L) 11/02/2017     11/02/2017    MCH 26 6 (L) 11/02/2017    MCHC 33 7 11/02/2017    RDW 13 7 11/02/2017    MPV 9 6 11/02/2017    NRBC 0 11/02/2017   , CMP: No results found for: NA, K, CL, CO2, ANIONGAP, BUN, CREATININE, GLUCOSE, CALCIUM, AST, ALT, ALKPHOS, PROT, ALBUMIN, BILITOT, EGFR  VTE Pharmacologic Prophylaxis: Heparin  VTE Mechanical Prophylaxis: sequential compression device

## 2017-11-02 NOTE — PROGRESS NOTES
Patient seen examined postoperatively  He continues to do well  He is intermittently febrile  Neurologically he is stable  His pain appears better controlled  We will continue to monitor his cultures  We appreciate the help of General surgery for the vac changes

## 2017-11-02 NOTE — PROGRESS NOTES
11/02/17 [address]   Current L[employer] Involvement Patient not active with [employer]   Plan of Care   Comments Met mom in elevator asked for a visited  Pt was asleep  Visited and prayed with mother and step-father

## 2017-11-02 NOTE — PROGRESS NOTES
Progress Note - Neurosurgery   Eduardo Espinoza 23 y o  male MRN: [id number]  Unit/Bed#: [facility] 919-01 Encounter: [id number]    Assessment:  1  POD#1: incision and drainage (I&D) spine  2  Sepsis  3  Wound dehiscence  4  Wound infection  5  Acute blood loss anemia  6  Herniation of lumbar intervertebral disc s/p hemilaminotomy and foraminotmy L3/4, L4/5 (10/17)  7  Hypothyroid  8  Bipolar disorder  9  Autism     Plan:   · Exam: trying to sleep, opens eyes to voice  KHANNA wo focal weakness  LT intact throughout  · ID following:   · Tissue cultures x2 + proteus  · Blood cultures on 10/31 + proteus  · Repeat blood cultures on 11/2 - pending  · Pending urine cultures   · Tmax of 101 1 today at 0652  · WBC 5 23  · Discontinued Cefepime, started on Cefazolin 2g IV q8h  · Continue to follow cultures, VS and WBC  · Gen-surg following:  · Planned wound vac change tomorrow (11/3)  · NPO after midnight  · DVT ppx: SCDs, Lovenox  · Pain control: deferred to primary team  Patient rates pain 8 out of 10 due to previously being moved  · Medical management per primary team  Patient has been tachycardic and hypoxic  Continue with tele monitoring  Consider PE study if continues   · Continue to follow  Call with questions or concerns  Subjective/Objective   Chief Complaint: "why  Is everyone coming in to see me?"    Subjective: patient states he's getting annoyed because everyone is coming in to see him and "I don't know when the hell it will stop"  He states he did not want to be moved by nursing staff seconds prior to me coming into the room, he wants to lay the way he wants because it doesn't hurt his back as much  When patient is offered for assistance to moving into a more comfortable position he declined stating he doesn't want anyone touching him anymore  He denies chest pain, SOB, abdominal pain/N/V +flatus  Mother states she's concerned his abdomen and feet look swollen  Objective: laying in bed, NAD       I/O 10/31 0701 - 11/01 0700 11/01 0701 - 11/02 0700 11/02 0701 - 11/03 0700    P  O  50 1110     I V  (mL/kg) 952 1 (6 5) 4614 6 (31 4)     IV Piggyback 300 550     Total Intake(mL/kg) 1302 1 (8 9) 6274 6 (42 7)     Urine (mL/kg/hr)  2500 (0 7)     Blood  100 (0)     Total Output   2600      Net +1302 1 +3674 6             Unmeasured Urine Occurrence 1 x 1 x           Invasive Devices     Peripheral Intravenous Line            Peripheral IV 10/31/17 Right Arm 1 day    Peripheral IV 11/01/17 Right Antecubital 1 day          Drain            Negative Pressure Wound Therapy (V A C ) Back Lower 1 day                Physical Exam:  Vitals: Blood pressure 129/78, pulse (!) 123, temperature 100 1 °F (37 8 °C), temperature source Oral, resp  rate 18, height 6' 4" (1 93 m), weight (!) 147 kg (324 lb), SpO2 (!) 85 %  ,Body mass index is 39 44 kg/m²  General appearance: alert, appears stated age, cooperative and no distress  Head: Normocephalic, without obvious abnormality, atraumatic  Eyes: EOMI, PERRL  Neck: supple, symmetrical, trachea midline and NT  Back: no kyphosis present, no tenderness to percussion or palpation  Lungs: non labored breathing  Heart: tachycardia, regular rhythm  Abdomen: bowel sounds present in all quadrants  Soft  No tenderness or guarding on palpation  Extremities: distal pedal pulses intact bilaterally  Neurologic:   Mental status: Alert, Speech is clear  Cranial nerves: grossly intact (Cranial nerves II-XII)  Facial symmetry at rest and with expression  Sensory: normal to LT in BUE and BLE  Motor: moving all extremities without focal weakness  Poor effort per patient, refused RUE examination secondary to IV's in arm  LUE: : 5/5, biceps: 4/5, triceps: 5/5  BLE: KF/KE: 5/5, DF/PF: 5/5     Reflexes: 1+ and symmetric  negative vega, negative clonus    Lab Results:    Results from last 7 days  Lab Units 11/02/17  0545 11/01/17  0537 10/31/17  1935   WBC Thousand/uL 5 23 5 25 7 73 HEMOGLOBIN g/dL 10 4* 11 1* 12 6   HEMATOCRIT % 30 9* 32 6* 36 7   PLATELETS Thousands/uL 210 231 247   NEUTROS PCT % 69  --  87*   MONOS PCT % 14*  --  5       Results from last 7 days  Lab Units 11/02/17 11/01/17  0537 10/31/17  1935   SODIUM mmol/L 136 137 132*   POTASSIUM mmol/L 3 6 3 0* 3 7   CHLORIDE mmol/L 102 105 100   CO2 mmol/L 27 22 22   BUN mg/dL 6 10 13   CREATININE mg/dL 0 73 0 79 1 21   CALCIUM mg/dL 8 6 8 4 9 4   TOTAL PROTEIN g/dL  --   --  7 6   BILIRUBIN TOTAL mg/dL  --   --  0 62   ALK PHOS U/L  --   --  34*   ALT U/L  --   --  42   AST U/L  --   --  53*   GLUCOSE RANDOM mg/dL 121 100 117               Results from last 7 days  Lab Units 11/01/17  0537   INR  1 30*   PTT seconds 44*     No results found for: TROPONINT  ABG:No results found for: PHART, IVV2GTH, PO2ART, RSI3ISH, V3DSMMIC, BEART, SOURCE    Imaging Studies: I have personally reviewed pertinent reports  and I have personally reviewed pertinent films in PACS    EKG, Pathology, and Other Studies: I have personally reviewed pertinent reports        VTE  Prophylaxis: Sequential compression device (Venodyne)  and Enoxaparin (Lovenox)

## 2017-11-03 ENCOUNTER — APPOINTMENT (INPATIENT)
Dept: RADIOLOGY | Facility: HOSPITAL | Age: 27
DRG: 856 | End: 2017-11-03
Payer: MEDICARE

## 2017-11-03 ENCOUNTER — ANESTHESIA (INPATIENT)
Dept: PERIOP | Facility: HOSPITAL | Age: 27
DRG: 856 | End: 2017-11-03
Payer: MEDICARE

## 2017-11-03 LAB
ANION GAP SERPL CALCULATED.3IONS-SCNC: 5 MMOL/L (ref 4–13)
BACTERIA TISS AEROBE CULT: ABNORMAL
BACTERIA UR CULT: NORMAL
BASOPHILS # BLD AUTO: 0.02 THOUSANDS/ΜL (ref 0–0.1)
BASOPHILS NFR BLD AUTO: 0 % (ref 0–1)
BUN SERPL-MCNC: 8 MG/DL (ref 5–25)
CALCIUM SERPL-MCNC: 8.9 MG/DL (ref 8.3–10.1)
CHLORIDE SERPL-SCNC: 101 MMOL/L (ref 100–108)
CO2 SERPL-SCNC: 31 MMOL/L (ref 21–32)
CREAT SERPL-MCNC: 0.72 MG/DL (ref 0.6–1.3)
EOSINOPHIL # BLD AUTO: 0.04 THOUSAND/ΜL (ref 0–0.61)
EOSINOPHIL NFR BLD AUTO: 1 % (ref 0–6)
ERYTHROCYTE [DISTWIDTH] IN BLOOD BY AUTOMATED COUNT: 13.6 % (ref 11.6–15.1)
GFR SERPL CREATININE-BSD FRML MDRD: 130 ML/MIN/1.73SQ M
GLUCOSE SERPL-MCNC: 117 MG/DL (ref 65–140)
GRAM STN SPEC: ABNORMAL
HCT VFR BLD AUTO: 30.4 % (ref 36.5–49.3)
HGB BLD-MCNC: 9.9 G/DL (ref 12–17)
LYMPHOCYTES # BLD AUTO: 0.97 THOUSANDS/ΜL (ref 0.6–4.47)
LYMPHOCYTES NFR BLD AUTO: 19 % (ref 14–44)
MCH RBC QN AUTO: 26.1 PG (ref 26.8–34.3)
MCHC RBC AUTO-ENTMCNC: 32.6 G/DL (ref 31.4–37.4)
MCV RBC AUTO: 80 FL (ref 82–98)
MONOCYTES # BLD AUTO: 0.69 THOUSAND/ΜL (ref 0.17–1.22)
MONOCYTES NFR BLD AUTO: 14 % (ref 4–12)
NEUTROPHILS # BLD AUTO: 3.26 THOUSANDS/ΜL (ref 1.85–7.62)
NEUTS SEG NFR BLD AUTO: 66 % (ref 43–75)
NRBC BLD AUTO-RTO: 0 /100 WBCS
PLATELET # BLD AUTO: 214 THOUSANDS/UL (ref 149–390)
PMV BLD AUTO: 9.8 FL (ref 8.9–12.7)
POTASSIUM SERPL-SCNC: 3.5 MMOL/L (ref 3.5–5.3)
RBC # BLD AUTO: 3.8 MILLION/UL (ref 3.88–5.62)
SODIUM SERPL-SCNC: 137 MMOL/L (ref 136–145)
WBC # BLD AUTO: 5 THOUSAND/UL (ref 4.31–10.16)

## 2017-11-03 PROCEDURE — 2W05X6Z CHANGE PRESSURE DRESSING ON BACK: ICD-10-PCS | Performed by: SURGERY

## 2017-11-03 PROCEDURE — 87040 BLOOD CULTURE FOR BACTERIA: CPT | Performed by: INTERNAL MEDICINE

## 2017-11-03 PROCEDURE — 0HQ6XZZ REPAIR BACK SKIN, EXTERNAL APPROACH: ICD-10-PCS | Performed by: SURGERY

## 2017-11-03 PROCEDURE — 85025 COMPLETE CBC W/AUTO DIFF WBC: CPT | Performed by: HOSPITALIST

## 2017-11-03 PROCEDURE — 80048 BASIC METABOLIC PNL TOTAL CA: CPT | Performed by: HOSPITALIST

## 2017-11-03 PROCEDURE — 0HD6XZZ EXTRACTION OF BACK SKIN, EXTERNAL APPROACH: ICD-10-PCS | Performed by: SURGERY

## 2017-11-03 PROCEDURE — 74000 HB X-RAY EXAM OF ABDOMEN (SINGLE ANTEROPOSTERIOR VIEW): CPT

## 2017-11-03 PROCEDURE — 71275 CT ANGIOGRAPHY CHEST: CPT

## 2017-11-03 RX ORDER — LIDOCAINE HYDROCHLORIDE 10 MG/ML
INJECTION, SOLUTION INFILTRATION; PERINEURAL AS NEEDED
Status: DISCONTINUED | OUTPATIENT
Start: 2017-11-03 | End: 2017-11-03 | Stop reason: SURG

## 2017-11-03 RX ORDER — PROPOFOL 10 MG/ML
INJECTION, EMULSION INTRAVENOUS AS NEEDED
Status: DISCONTINUED | OUTPATIENT
Start: 2017-11-03 | End: 2017-11-03 | Stop reason: SURG

## 2017-11-03 RX ORDER — SODIUM CHLORIDE, SODIUM LACTATE, POTASSIUM CHLORIDE, CALCIUM CHLORIDE 600; 310; 30; 20 MG/100ML; MG/100ML; MG/100ML; MG/100ML
100 INJECTION, SOLUTION INTRAVENOUS CONTINUOUS
Status: DISCONTINUED | OUTPATIENT
Start: 2017-11-03 | End: 2017-11-05

## 2017-11-03 RX ORDER — ONDANSETRON 2 MG/ML
4 INJECTION INTRAMUSCULAR; INTRAVENOUS ONCE AS NEEDED
Status: DISCONTINUED | OUTPATIENT
Start: 2017-11-03 | End: 2017-11-03 | Stop reason: HOSPADM

## 2017-11-03 RX ORDER — FENTANYL CITRATE 50 UG/ML
INJECTION, SOLUTION INTRAMUSCULAR; INTRAVENOUS AS NEEDED
Status: DISCONTINUED | OUTPATIENT
Start: 2017-11-03 | End: 2017-11-03 | Stop reason: SURG

## 2017-11-03 RX ORDER — ONDANSETRON 2 MG/ML
INJECTION INTRAMUSCULAR; INTRAVENOUS AS NEEDED
Status: DISCONTINUED | OUTPATIENT
Start: 2017-11-03 | End: 2017-11-03 | Stop reason: SURG

## 2017-11-03 RX ORDER — MAGNESIUM HYDROXIDE 1200 MG/15ML
LIQUID ORAL AS NEEDED
Status: DISCONTINUED | OUTPATIENT
Start: 2017-11-03 | End: 2017-11-03 | Stop reason: HOSPADM

## 2017-11-03 RX ORDER — FENTANYL CITRATE/PF 50 MCG/ML
25 SYRINGE (ML) INJECTION
Status: DISCONTINUED | OUTPATIENT
Start: 2017-11-03 | End: 2017-11-03 | Stop reason: HOSPADM

## 2017-11-03 RX ORDER — DOCUSATE SODIUM 100 MG/1
100 CAPSULE, LIQUID FILLED ORAL 2 TIMES DAILY
Status: DISCONTINUED | OUTPATIENT
Start: 2017-11-03 | End: 2017-11-08 | Stop reason: HOSPADM

## 2017-11-03 RX ORDER — SENNOSIDES 8.6 MG
1 TABLET ORAL
Status: DISCONTINUED | OUTPATIENT
Start: 2017-11-03 | End: 2017-11-08 | Stop reason: HOSPADM

## 2017-11-03 RX ORDER — SUCCINYLCHOLINE CHLORIDE 20 MG/ML
INJECTION INTRAMUSCULAR; INTRAVENOUS AS NEEDED
Status: DISCONTINUED | OUTPATIENT
Start: 2017-11-03 | End: 2017-11-03 | Stop reason: SURG

## 2017-11-03 RX ORDER — SODIUM CHLORIDE, SODIUM LACTATE, POTASSIUM CHLORIDE, CALCIUM CHLORIDE 600; 310; 30; 20 MG/100ML; MG/100ML; MG/100ML; MG/100ML
INJECTION, SOLUTION INTRAVENOUS CONTINUOUS PRN
Status: DISCONTINUED | OUTPATIENT
Start: 2017-11-03 | End: 2017-11-03 | Stop reason: SURG

## 2017-11-03 RX ADMIN — RISPERIDONE 4 MG: 1 TABLET ORAL at 18:12

## 2017-11-03 RX ADMIN — OXYCODONE HYDROCHLORIDE 10 MG: 10 TABLET ORAL at 13:26

## 2017-11-03 RX ADMIN — SODIUM CHLORIDE, SODIUM LACTATE, POTASSIUM CHLORIDE, AND CALCIUM CHLORIDE: .6; .31; .03; .02 INJECTION, SOLUTION INTRAVENOUS at 07:55

## 2017-11-03 RX ADMIN — ONDANSETRON 4 MG: 2 INJECTION INTRAMUSCULAR; INTRAVENOUS at 08:29

## 2017-11-03 RX ADMIN — CLONAZEPAM 0.5 MG: 0.5 TABLET ORAL at 18:14

## 2017-11-03 RX ADMIN — PROPOFOL 200 MG: 10 INJECTION, EMULSION INTRAVENOUS at 08:06

## 2017-11-03 RX ADMIN — OXYCODONE HYDROCHLORIDE 10 MG: 10 TABLET ORAL at 03:10

## 2017-11-03 RX ADMIN — CEFAZOLIN SODIUM 2000 MG: 2 SOLUTION INTRAVENOUS at 12:44

## 2017-11-03 RX ADMIN — BENZTROPINE MESYLATE 1 MG: 1 TABLET ORAL at 18:15

## 2017-11-03 RX ADMIN — DOCUSATE SODIUM 100 MG: 100 CAPSULE, LIQUID FILLED ORAL at 18:14

## 2017-11-03 RX ADMIN — FENTANYL CITRATE 100 MCG: 50 INJECTION, SOLUTION INTRAMUSCULAR; INTRAVENOUS at 08:21

## 2017-11-03 RX ADMIN — METHOCARBAMOL 500 MG: 500 TABLET ORAL at 14:17

## 2017-11-03 RX ADMIN — METHOCARBAMOL 500 MG: 500 TABLET ORAL at 03:10

## 2017-11-03 RX ADMIN — SENNOSIDES 8.6 MG: 8.6 TABLET, FILM COATED ORAL at 21:24

## 2017-11-03 RX ADMIN — LIDOCAINE HYDROCHLORIDE 100 MG: 10 INJECTION, SOLUTION INFILTRATION; PERINEURAL at 08:05

## 2017-11-03 RX ADMIN — FENTANYL CITRATE 25 MCG: 50 INJECTION, SOLUTION INTRAMUSCULAR; INTRAVENOUS at 09:35

## 2017-11-03 RX ADMIN — IOHEXOL 85 ML: 350 INJECTION, SOLUTION INTRAVENOUS at 17:18

## 2017-11-03 RX ADMIN — DIVALPROEX SODIUM 1000 MG: 500 TABLET, DELAYED RELEASE ORAL at 12:44

## 2017-11-03 RX ADMIN — DIVALPROEX SODIUM 500 MG: 500 TABLET, DELAYED RELEASE ORAL at 21:24

## 2017-11-03 RX ADMIN — CEFAZOLIN SODIUM 2000 MG: 2 SOLUTION INTRAVENOUS at 03:15

## 2017-11-03 RX ADMIN — SUCCINYLCHOLINE CHLORIDE 200 MG: 20 INJECTION, SOLUTION INTRAMUSCULAR; INTRAVENOUS at 08:08

## 2017-11-03 RX ADMIN — CEFAZOLIN SODIUM 2000 MG: 2 SOLUTION INTRAVENOUS at 21:24

## 2017-11-03 RX ADMIN — LEVOTHYROXINE SODIUM 125 MCG: 125 TABLET ORAL at 21:24

## 2017-11-03 RX ADMIN — ACETAMINOPHEN 975 MG: 325 TABLET, FILM COATED ORAL at 14:17

## 2017-11-03 RX ADMIN — ACETAMINOPHEN 975 MG: 325 TABLET, FILM COATED ORAL at 03:10

## 2017-11-03 RX ADMIN — RISPERIDONE 2 MG: 1 TABLET ORAL at 18:12

## 2017-11-03 RX ADMIN — FENTANYL CITRATE 25 MCG: 50 INJECTION, SOLUTION INTRAMUSCULAR; INTRAVENOUS at 09:27

## 2017-11-03 RX ADMIN — IODIXANOL 85 ML: 320 INJECTION, SOLUTION INTRAVASCULAR at 17:24

## 2017-11-03 NOTE — ANESTHESIA POSTPROCEDURE EVALUATION
Post-Op Assessment Note      CV Status:  Stable    Mental Status:  Alert and awake    Hydration Status:  Euvolemic and stable    PONV Controlled:  None    Airway Patency:  Patent    Post Op Vitals Reviewed: Yes          Staff: Anesthesiologist           /82 (11/03/17 0857)    Temp 98 3 °F (36 8 °C) (11/03/17 0857)    Pulse (!) 114 (11/03/17 0857)   Resp 16 (11/03/17 0857)    SpO2 98 % (11/03/17 0857)      Pt transported to PACU extubated and on supplemental O2  Stable vital signs  Signout was given to PACU RN

## 2017-11-03 NOTE — PROGRESS NOTES
Progress Note - Neurosurgery   Luis Enrique Aburto 23 y o  male MRN: [id number]  Unit/Bed#: [facility] 919-01 Encounter: [id number]      Assessment:  1  POD#2: incision and drainage (I&D) spine  2  Sepsis  3  Wound dehiscence  4  Wound infection  5  Acute blood loss anemia  6  Herniation of lumbar intervertebral disc s/p hemilaminotomy and foraminotmy L3/4, L4/5 (10/17)  7  Hypothyroid  8  Bipolar disorder  9  Autism     Plan:   · Exam: alert, moving BUE without focal weakness, strength 5/5  LT intact  Patient refused to be moved, unable to look at wound  · ID following: appreciate recommendations  · Tissue cultures x2 + proteus  · Blood cultures on 10/31 + proteus  · Blood cultures on 11/2 - + gram negative rods  · Blood cultures on 11/3 - pending   · UA <1000  · Tmax of 101 3 today at 02:44  · WBC 5 23  · Continue Cefazolin   · Continue to follow cultures, VS and WBC  · Gen-surg following:  · OR today for washout and VAC change  · DVT ppx: SCDs, Lovenox  · Pain control: deferred to primary team  Patient rates pain 8 out of 10 due to previously being moved  · Medical management per primary team  Patient has been tachycardic and hypoxic, requiring nasal cannula  Consider PE study if continues   · Mother states his abdomen appears distended  Consider abdominal series XR  · Encourage IS and taking deep breaths  · Continue to follow  Call with questions or concerns  Subjective/Objective   Chief Complaint: "Why does everyone want me to move, NO it hurts"    Subjective: patient states he will not turn or move because it hurts too much  He admits to having chest pain, he denies SOB or abdominal pain  Objective: laying in bed, NAD  I/O       11/01 0701 - 11/02 0700 11/02 0701 - 11/03 0700 11/03 0701 - 11/04 0700    P  O  1110 1040 300    I V  (mL/kg) 4614 6 (31 4)  550 (3 7)    IV Piggyback 550      Total Intake(mL/kg) 6274 6 (42 7) 1040 (7 1) 850 (5 8)    Urine (mL/kg/hr) 2500 (0 7) 2598 (0 7) 800 (0 8) Drains  430 (0 1)     Blood 100 (0)      Total Output 2600 3028 800    Net +3674 6 -1988 +50           Unmeasured Urine Occurrence 1 x 4 x           Invasive Devices     Peripheral Intravenous Line            Peripheral IV 10/31/17 Right Arm 2 days    Peripheral IV 11/01/17 Right Antecubital 2 days          Drain            Negative Pressure Wound Therapy (V A C ) Back Lower 2 days                Physical Exam:  Vitals: Blood pressure 142/84, pulse (!) 114, temperature 99 °F (37 2 °C), temperature source Oral, resp  rate 18, height 6' 4" (1 93 m), weight (!) 147 kg (324 lb), SpO2 96 %  ,Body mass index is 39 44 kg/m²  General appearance: alert, appears stated age, and no distress  Head: Normocephalic, without obvious abnormality, atraumatic  Eyes: tracks appropriately in room  Neck: supple, symmetrical, trachea midline and NT  Back: no kyphosis present, no tenderness to percussion or palpation  Lungs: nasal cannula in place  Heart: tachycardia  Abdomen: generalized tenderness on palpation  No guarding or rebound tenderness  Neurologic:   Mental status: Alert, Speech is clear  Sensory: normal to LT in BUE and BLE  Motor: Limited examination secondary to poor cooperation  Strength in BUE: biceps/triceps: 5/5, : 5/5  BLE: Will wiggle toes  Left KF: 5/5, KE: 5/5, DF/PF: 5/5, right KF: 2-3/5, KE: 5/5, DF/PF: 4/5  Poor RLE effort due to "it's going to hurt"     Reflexes: negative vega, negative clonus      Lab Results:    Results from last 7 days  Lab Units 11/03/17 0455 11/02/17 0545 11/01/17 0537 10/31/17  1935   WBC Thousand/uL 5 00 5 23 5 25 7 73   HEMOGLOBIN g/dL 9 9* 10 4* 11 1* 12 6   HEMATOCRIT % 30 4* 30 9* 32 6* 36 7   PLATELETS Thousands/uL 214 210 231 247   NEUTROS PCT % 66 69  --  87*   MONOS PCT % 14* 14*  --  5       Results from last 7 days  Lab Units 11/03/17 0455 11/02/17 11/01/17  0537 10/31/17  1935   SODIUM mmol/L 137 136 137 132*   POTASSIUM mmol/L 3 5 3 6 3 0* 3 7 CHLORIDE mmol/L 101 102 105 100   CO2 mmol/L 31 27 22 22   BUN mg/dL 8 6 10 13   CREATININE mg/dL 0 72 0 73 0 79 1 21   CALCIUM mg/dL 8 9 8 6 8 4 9 4   TOTAL PROTEIN g/dL  --   --   --  7 6   BILIRUBIN TOTAL mg/dL  --   --   --  0 62   ALK PHOS U/L  --   --   --  34*   ALT U/L  --   --   --  42   AST U/L  --   --   --  53*   GLUCOSE RANDOM mg/dL 117 121 100 117               Results from last 7 days  Lab Units 11/01/17  0537   INR  1 30*   PTT seconds 44*     No results found for: TROPONINT  ABG:No results found for: PHART, JFN7DSO, PO2ART, WRV3SIK, C4QKQZIF, BEART, SOURCE    Imaging Studies: I have personally reviewed pertinent reports  and I have personally reviewed pertinent films in PACS    EKG, Pathology, and Other Studies: I have personally reviewed pertinent reports        VTE  Prophylaxis: Sequential compression device (Venodyne)  and Enoxaparin (Lovenox)

## 2017-11-03 NOTE — INTERIM OP NOTE
DEBRIDEMENT WOUND [facility] OUT), wound vac placement back  Postoperative Note  PATIENT NAME: Ramez Jensen  : 1990  MRN: [id number]  BE OR ROOM 08    Surgery Date: 11/3/2017    Preop Diagnosis:  Wound infection [T14  8XXA, L08 9]    Post-Op Diagnosis Codes:     * Wound infection [T14  8XXA, L08 9]    Procedure(s) (LRB):  DEBRIDEMENT WOUND ([address] OUT), wound vac placement back (N/A)    Surgeon(s) and Role:     * Marlon Kinney,  - Primary     * Dorian Francois PA-C - Assisting    Specimens:  * No specimens in log *    Estimated Blood Loss:   Minimal    Anesthesia Type:   General     Findings:    Clean lumbar spine wound with pink, healthy granulation tissue  No purulence and/or necrotic tissue    Complications:   None    SIGNATURE: Dada Downing PA-C   DATE: November 3, 2017   TIME: 8:40 AM

## 2017-11-03 NOTE — PROGRESS NOTES
[facility] Internal Medicine Progress Note  Patient: Sofie Rowe 23 y o  male   MRN: [id number]  PCP: Radha Hartman MD  Unit/Bed#: [facility] 040-74 Encounter: [id number]  Date Of Visit: 11/03/17    Assessment:    Principal Problem:    Sepsis ([facility] 75 )  Active Problems:    Wound dehiscence    Wound infection    Herniation of lumbar intervertebral disc    Hypothyroid    Bipolar disorder ([facility] )    Autism      Plan:    · Proteus bacteremia & sepsis:  ? Shows some improvement, again had fever today, still tachycardic  ? BC 10/31 - proteus, wound culture from 10/31 - proteus, Repeat BC 11/2 - one of two growing GNR; repeat BC today  ? Appreciate ID input - cont ancef  · Persistent tachycardia  · Hypoxia:  ? Given tachy + hypoxia with intermittent chest pain - will get CTA PE study  · Post-op spinal wound dehiscence with possible infection:  ? S/p wound wash out by neuroSx & Gen Sx 11/1, vac placement  ? Repeat Vac change & wash today 11/3  ? Pain control - meds modified  ? DVT PX, bowel regimen  · S/p L3-4, L4-5 open microdiscectomy on 10/17:  ? For cauda equina syndrome & right foot drop  · Hypothyroidism: cont levo  · Bipolar & autism:  ? Cont home risperidol & depakoate  · H/o COLIN:  ? Did not tolerate CPAP      VTE Pharmacologic Prophylaxis:   Pharmacologic: Enoxaparin (Lovenox)  Mechanical VTE Prophylaxis in Place: Yes    Patient Centered Rounds: I have performed bedside rounds with nursing staff today  Discussions with Specialists or Other Care Team Provider: ID, neuroSx    Education and Discussions with Family / Patient: parents    Time Spent for Care: 45 minutes  More than 50% of total time spent on counseling and coordination of care as described above      Current Length of Stay: 3 day(s)    Current Patient Status: Inpatient   Certification Statement: The patient will continue to require additional inpatient hospital stay due to sepsis    Discharge Plan / Estimated Discharge Date: based on course    Code Status: Level 1 - Full Code      Subjective:   Has chest pain with deep breath & coughing, SOB when off O2, no abdo pain, no n/v    Objective:     Vitals:   Temp (24hrs), Av 2 °F (37 3 °C), Min:97 8 °F (36 6 °C), Max:101 5 °F (38 6 °C)    HR:  [] 114  Resp:  [] 18  BP: (118-156)/(59-99) 142/84  SpO2:  [79 %-98 %] 96 %  Body mass index is 39 44 kg/m²  Input and Output Summary (last 24 hours): Intake/Output Summary (Last 24 hours) at 17 1433  Last data filed at 17 1058   Gross per 24 hour   Intake             1410 ml   Output             3828 ml   Net            -2418 ml       Physical Exam:     Physical Exam   Constitutional: He is oriented to person, place, and time  He appears well-developed and well-nourished  HENT:   Head: Normocephalic and atraumatic  Eyes: Conjunctivae are normal  No scleral icterus  Cardiovascular: Normal rate, regular rhythm and normal heart sounds  Exam reveals no gallop and no friction rub  No murmur heard  Pulmonary/Chest: Effort normal and breath sounds normal  No respiratory distress  He has no wheezes  Abdominal: Soft  He exhibits no distension  There is no tenderness  Musculoskeletal: He exhibits no edema  Neurological: He is alert and oriented to person, place, and time         Additional Data:     Labs:      Results from last 7 days  Lab Units 17  0455   WBC Thousand/uL 5 00   HEMOGLOBIN g/dL 9 9*   HEMATOCRIT % 30 4*   PLATELETS Thousands/uL 214   NEUTROS PCT % 66   LYMPHS PCT % 19   MONOS PCT % 14*   EOS PCT % 1       Results from last 7 days  Lab Units 17  0455  10/31/17  1935   SODIUM mmol/L 137  < > 132*   POTASSIUM mmol/L 3 5  < > 3 7   CHLORIDE mmol/L 101  < > 100   CO2 mmol/L 31  < > 22   BUN mg/dL 8  < > 13   CREATININE mg/dL 0 72  < > 1 21   CALCIUM mg/dL 8 9  < > 9 4   TOTAL PROTEIN g/dL  --   --  7 6   BILIRUBIN TOTAL mg/dL  --   --  0 62   ALK PHOS U/L  --   --  34*   ALT U/L  --   --  42   AST U/L  -- --  53*   GLUCOSE RANDOM mg/dL 117  < > 117   < > = values in this interval not displayed  Results from last 7 days  Lab Units 11/01/17  0537   INR  1 30*       * I Have Reviewed All Lab Data Listed Above  * Additional Pertinent Lab Tests Reviewed: All Labs Within Last 24 Hours Reviewed    Imaging:    Imaging Reports Reviewed Today Include:   Imaging Personally Reviewed by Myself Includes:      Recent Cultures (last 7 days):       Results from last 7 days  Lab Units 11/03/17  0808 11/02/17  0642 11/01/17  1215 11/01/17  1214 11/01/17  1212 10/31/17  1936 10/31/17  1909 10/31/17  1906   BLOOD CULTURE   --  No Growth at 24 hrs   --   --   --    Proteus mirabilis*  --    Proteus mirabilis*   GRAM STAIN RESULT   --  Gram negative rods No Polys or Bacteria seen Rare Polys  No bacteria seen 1+ Polys  2+ RBC's  No bacteria seen Gram negative rods No Polys or Bacteria seen Gram negative rods   URINE CULTURE  No Growth <1000 cfu/mL  --   --   --   --   --   --   --    WOUND CULTURE   --   --   --   --   --   --  1+ Growth of Proteus mirabilis*  --        Last 24 Hours Medication List:     benztropine 1 mg Oral BID   cefazolin 2,000 mg Intravenous Q8H   clonazePAM 0 5 mg Oral BID   divalproex sodium 1,000 mg Oral Daily With Lunch   divalproex sodium 500 mg Oral Q12H LENO   enoxaparin 40 mg Subcutaneous Q24H LENO   fenofibrate 145 mg Oral Daily   levothyroxine 125 mcg Oral HS   risperiDONE 2 mg Oral BID   risperiDONE 4 mg Oral BID        Today, Patient Was Seen By: Erika Traore MD    ** Please Note: This note has been constructed using a voice recognition system   **

## 2017-11-03 NOTE — PROGRESS NOTES
Progress Note - Infectious Disease   Dionne Bradley 23 y o  male MRN: [id number]  Unit/Bed#: OR POOL Encounter: [id number]    Assessment and plan     Gram negative sepsis likely 2/2 surgical wound infection sp washout day#2, sp wound vac day#2  -Pt remained febrile with T max 101 5, he is still tachycardiac and new hypoxia   -Blood cultures from  2/2 positive for gram negative rods, tissue cultures x 3 showed Proteus, BC 11/ 1/2 positive for gram negative rods, urine is clean   -Will order surveillance cultures   -Antibiotic was changed to Ancef yesterday, continue day# 2   -Will follow cultures and sensitivities, and vitals  -Wound vac replacement per gen surgery today   -Per Dr Sandhya Diaz, fascia was open so patient will need long term antibiotics  COLIN   -Pt has not been able to tolerate cpap     Subjective/Objective     Subjective: Pt was seen and examined this morning  He appeared groggy (right after surgery) and he wants to know when he can go home  He was not wanting to cooperate because he wanted me to promise to let him go home  He reported that his back is sore and hos lower abdomen is sore  HR:  [] 110  Resp:  [] 18  BP: (118-149)/(59-99) 148/99  SpO2:  [79 %-97 %] 96 %  Temp (24hrs), Av 7 °F (37 6 °C), Min:97 8 °F (36 6 °C), Max:101 5 °F (38 6 °C)  Current: Temperature: 99 4 °F (37 4 °C)    Physical Exam   Constitutional: He is oriented to person, place, and time  He appears well-developed and well-nourished  No distress  Groggy from surgery  EXAM is limited pt did not want to sit up or turn around to let me look at his back  Wound vac in place    HENT:   Head: Normocephalic and atraumatic  Eyes: Conjunctivae are normal  Right eye exhibits no discharge  Left eye exhibits no discharge  Neck: Neck supple  No JVD present  Cardiovascular: Normal rate and regular rhythm  No murmur heard  Pulmonary/Chest: Breath sounds normal  No respiratory distress   He has no wheezes  Abdominal: Soft  He exhibits no distension  There is tenderness (lower quadrant )  Musculoskeletal: He exhibits no edema  Was able to move left leg more then right and able to wiggle right leg    Neurological: He is alert and oriented to person, place, and time  No cranial nerve deficit  Skin: No rash noted  He is not diaphoretic  No erythema  Nursing note and vitals reviewed  Invasive Devices     Peripheral Intravenous Line            Peripheral IV 10/31/17 Right Arm 2 days    Peripheral IV 11/01/17 Right Antecubital 1 day          Drain            Negative Pressure Wound Therapy (V A C ) Back Lower 1 day                Lab, Imaging and other studies: I have personally reviewed pertinent reports

## 2017-11-03 NOTE — PROGRESS NOTES
Progress Note - General Surgery   Mic Bradley 23 y o  male MRN: [id number]  Unit/Bed#: [facility] 919-01 Encounter: [id number]    Assessment:  22 yo M with hx of L3-5 sheldon laminectomies with wound infection    - blood cx GNR  - wound cx x 3: Protues    Plan:  Continue vac therapy to sacrum  OR today for washout, vac change- need to ensure adequate source control given recent fever  Continue cefepime and vanco, FU ID recs  PRN pain control      Subjective/Objective   Chief Complaint: none    Subjective: no complaints  Febrile overnight to 101 5, tachycardia continues  No significant pain  Objective:    Blood pressure 148/99, pulse (!) 110, temperature 99 4 °F (37 4 °C), temperature source Oral, resp  rate 18, height 6' 4" (1 93 m), weight (!) 147 kg (324 lb), SpO2 96 %  ,Body mass index is 39 44 kg/m²        Intake/Output Summary (Last 24 hours) at 11/03/17 0730  Last data filed at 11/03/17 0657   Gross per 24 hour   Intake             1040 ml   Output             3028 ml   Net            -1988 ml       Invasive Devices     Peripheral Intravenous Line            Peripheral IV 10/31/17 Right Arm 2 days    Peripheral IV 11/01/17 Right Antecubital 1 day          Drain            Negative Pressure Wound Therapy (V A C ) Back Lower 1 day              Physical Exam:   Gen: A&O, NAD  Cardio: RRR  Lungs: CTAB  Abd: Soft, non distended, non tender  Ext- vac in place to sacrum, serosamgimous saturation    Lab, Imaging and other studies:  CBC:   Lab Results   Component Value Date    WBC 5 00 11/03/2017    HGB 9 9 (L) 11/03/2017    HCT 30 4 (L) 11/03/2017    MCV 80 (L) 11/03/2017     11/03/2017    MCH 26 1 (L) 11/03/2017    MCHC 32 6 11/03/2017    RDW 13 6 11/03/2017    MPV 9 8 11/03/2017    NRBC 0 11/03/2017   , CMP:   Lab Results   Component Value Date     11/03/2017    K 3 5 11/03/2017     11/03/2017    CO2 31 11/03/2017    ANIONGAP 5 11/03/2017    BUN 8 11/03/2017    CREATININE 0 72 11/03/2017 GLUCOSE 117 11/03/2017    CALCIUM 8 9 11/03/2017    EGFR 130 11/03/2017   , Coagulation: No results found for: PT, INR, APTT  VTE Pharmacologic Prophylaxis: Heparin  VTE Mechanical Prophylaxis: sequential compression device

## 2017-11-04 LAB
ANION GAP SERPL CALCULATED.3IONS-SCNC: 6 MMOL/L (ref 4–13)
BACTERIA BLD CULT: ABNORMAL
BACTERIA BLD CULT: ABNORMAL
BACTERIA SPEC ANAEROBE CULT: NORMAL
BASOPHILS # BLD AUTO: 0.02 THOUSANDS/ΜL (ref 0–0.1)
BASOPHILS NFR BLD AUTO: 0 % (ref 0–1)
BUN SERPL-MCNC: 9 MG/DL (ref 5–25)
CALCIUM SERPL-MCNC: 8.9 MG/DL (ref 8.3–10.1)
CHLORIDE SERPL-SCNC: 99 MMOL/L (ref 100–108)
CO2 SERPL-SCNC: 30 MMOL/L (ref 21–32)
CREAT SERPL-MCNC: 0.58 MG/DL (ref 0.6–1.3)
EOSINOPHIL # BLD AUTO: 0.18 THOUSAND/ΜL (ref 0–0.61)
EOSINOPHIL NFR BLD AUTO: 3 % (ref 0–6)
ERYTHROCYTE [DISTWIDTH] IN BLOOD BY AUTOMATED COUNT: 13.9 % (ref 11.6–15.1)
GFR SERPL CREATININE-BSD FRML MDRD: 142 ML/MIN/1.73SQ M
GLUCOSE SERPL-MCNC: 144 MG/DL (ref 65–140)
GRAM STN SPEC: ABNORMAL
GRAM STN SPEC: ABNORMAL
HCT VFR BLD AUTO: 29.2 % (ref 36.5–49.3)
HGB BLD-MCNC: 9.8 G/DL (ref 12–17)
LYMPHOCYTES # BLD AUTO: 1.16 THOUSANDS/ΜL (ref 0.6–4.47)
LYMPHOCYTES NFR BLD AUTO: 21 % (ref 14–44)
MCH RBC QN AUTO: 27.1 PG (ref 26.8–34.3)
MCHC RBC AUTO-ENTMCNC: 33.6 G/DL (ref 31.4–37.4)
MCV RBC AUTO: 81 FL (ref 82–98)
MONOCYTES # BLD AUTO: 0.5 THOUSAND/ΜL (ref 0.17–1.22)
MONOCYTES NFR BLD AUTO: 9 % (ref 4–12)
NEUTROPHILS # BLD AUTO: 3.52 THOUSANDS/ΜL (ref 1.85–7.62)
NEUTS SEG NFR BLD AUTO: 67 % (ref 43–75)
NRBC BLD AUTO-RTO: 0 /100 WBCS
PLATELET # BLD AUTO: 255 THOUSANDS/UL (ref 149–390)
PMV BLD AUTO: 10 FL (ref 8.9–12.7)
POTASSIUM SERPL-SCNC: 3.6 MMOL/L (ref 3.5–5.3)
RBC # BLD AUTO: 3.62 MILLION/UL (ref 3.88–5.62)
SODIUM SERPL-SCNC: 135 MMOL/L (ref 136–145)
WBC # BLD AUTO: 5.42 THOUSAND/UL (ref 4.31–10.16)

## 2017-11-04 PROCEDURE — 80048 BASIC METABOLIC PNL TOTAL CA: CPT | Performed by: HOSPITALIST

## 2017-11-04 PROCEDURE — 85025 COMPLETE CBC W/AUTO DIFF WBC: CPT | Performed by: HOSPITALIST

## 2017-11-04 RX ADMIN — OXYCODONE HYDROCHLORIDE 10 MG: 10 TABLET ORAL at 17:02

## 2017-11-04 RX ADMIN — OXYCODONE HYDROCHLORIDE 10 MG: 10 TABLET ORAL at 10:52

## 2017-11-04 RX ADMIN — LEVOTHYROXINE SODIUM 125 MCG: 125 TABLET ORAL at 20:27

## 2017-11-04 RX ADMIN — METHOCARBAMOL 500 MG: 500 TABLET ORAL at 10:55

## 2017-11-04 RX ADMIN — RISPERIDONE 4 MG: 1 TABLET ORAL at 19:23

## 2017-11-04 RX ADMIN — RISPERIDONE 2 MG: 1 TABLET ORAL at 19:24

## 2017-11-04 RX ADMIN — DIVALPROEX SODIUM 1000 MG: 500 TABLET, DELAYED RELEASE ORAL at 16:54

## 2017-11-04 RX ADMIN — ACETAMINOPHEN 975 MG: 325 TABLET, FILM COATED ORAL at 20:26

## 2017-11-04 RX ADMIN — DOCUSATE SODIUM 100 MG: 100 CAPSULE, LIQUID FILLED ORAL at 08:43

## 2017-11-04 RX ADMIN — BENZTROPINE MESYLATE 1 MG: 1 TABLET ORAL at 19:23

## 2017-11-04 RX ADMIN — CEFAZOLIN SODIUM 2000 MG: 2 SOLUTION INTRAVENOUS at 13:51

## 2017-11-04 RX ADMIN — CEFAZOLIN SODIUM 2000 MG: 2 SOLUTION INTRAVENOUS at 20:27

## 2017-11-04 RX ADMIN — RISPERIDONE 2 MG: 1 TABLET ORAL at 08:44

## 2017-11-04 RX ADMIN — RISPERIDONE 4 MG: 1 TABLET ORAL at 08:43

## 2017-11-04 RX ADMIN — DIVALPROEX SODIUM 500 MG: 500 TABLET, DELAYED RELEASE ORAL at 08:44

## 2017-11-04 RX ADMIN — METHOCARBAMOL 500 MG: 500 TABLET ORAL at 17:03

## 2017-11-04 RX ADMIN — DIVALPROEX SODIUM 500 MG: 500 TABLET, DELAYED RELEASE ORAL at 20:27

## 2017-11-04 RX ADMIN — CEFAZOLIN SODIUM 2000 MG: 2 SOLUTION INTRAVENOUS at 05:06

## 2017-11-04 RX ADMIN — CLONAZEPAM 0.5 MG: 0.5 TABLET ORAL at 08:43

## 2017-11-04 RX ADMIN — CLONAZEPAM 0.5 MG: 0.5 TABLET ORAL at 19:24

## 2017-11-04 RX ADMIN — FENOFIBRATE 145 MG: 145 TABLET ORAL at 08:45

## 2017-11-04 RX ADMIN — SENNOSIDES 8.6 MG: 8.6 TABLET, FILM COATED ORAL at 20:27

## 2017-11-04 RX ADMIN — ENOXAPARIN SODIUM 40 MG: 40 INJECTION SUBCUTANEOUS at 08:44

## 2017-11-04 NOTE — PROGRESS NOTES
Progress Note - Infectious Disease   Annelise Rowe 23 y o  male MRN: [id number]  Unit/Bed#: [facility] 919-01 Encounter: [id number]      Impression/Plan: 1    Proteus sepsis-appears secondary to surgical wound infection   No other clear source appreciated   The patient remains hemodynamically stable but is quite tachycardic   Thus far he is tolerating the antibiotics without difficulty  The Proteus is quite sensitive  Follow-up blood cultures remain positive  He continues to have intermittent fever   -continue cefazolin  -follow up repeat blood cultures  -supportive care  -monitor CBC with diff and creatinine  -discussed in detail with the patient     2   Lumbar wound infection-The patient is status post I and D and washout   No gross pus was found   However, the wound cultures growing Proteus  Appears to be deep infection based upon the operative findings  Patient has undergone a 2nd incision and drainage  -antibiotics as above  -serial exams  -close neurosurgical follow-up  -VAC dressing and local care  -patient will go back to the OR Monday  -plan 6 weeks of antibiotics which will likely need to be intravenous based upon the patient's allergies and sensitivities     3   Persistent fever-likely all secondary to 1   2   Consideration for the possibility of another undrained source  He remains hemodynamically stable   -antibiotics as above  -monitor temperature  -if fever would persist over the next 24-48 hours, recommend CT of the abdomen and pelvis  -follow up repeat blood cultures    4   Liver function test abnormalities-relatively mild   Possibly all secondary to sepsis   Possibly medication effect   -monitor liver function tests  -no additional ID workup for now     5   Autism and bipolar disorder    Antibiotics:  Cefazolin 3  Antibiotics 5  Postop day 3  Subjective:  Patient still having intermittent fever; no nausea, vomiting, diarrhea; no cough, shortness of breath; no increase pain   No new symptoms  He is upset that people keep waking him up    Objective:  Vitals:  HR:  [] 101  Resp:  [15-22] 18  BP: (120-156)/(62-96) 131/79  SpO2:  [92 %-99 %] 99 %  Temp (24hrs), Av 1 °F (37 3 °C), Min:98 3 °F (36 8 °C), Max:101 4 °F (38 6 °C)  Current: Temperature: 98 8 °F (37 1 °C)    Physical Exam:   General Appearance:  Alert, nontoxic, no acute distress  He is agitated   Throat: Oropharynx moist without lesions  Lungs:   Clear to auscultation anteriorly; respirations unlabored   Heart:  Tachycardic; no murmur, rub or gallop   Abdomen:   Soft, non-tender, non-distended, positive bowel sounds  Back:   Incision dressed with a dry dressing in place  Extremities: No clubbing, cyanosis or edema   Skin: No new rashes or lesions  No draining wounds noted  Labs, Imaging, & Other studies:   All pertinent labs and imaging studies were personally reviewed    Results from last 7 days  Lab Units 17  0509 17  0455 17  0545   WBC Thousand/uL 5 42 5 00 5 23   HEMOGLOBIN g/dL 9 8* 9 9* 10 4*   PLATELETS Thousands/uL 255 214 210       Results from last 7 days  Lab Units 17  0509 17  0455 11/02/17  10/31/17  1935   SODIUM mmol/L 135* 137 136  < > 132*   POTASSIUM mmol/L 3 6 3 5 3 6  < > 3 7   CHLORIDE mmol/L 99* 101 102  < > 100   CO2 mmol/L 30 31 27  < > 22   ANION GAP mmol/L 6 5 7  < > 10   BUN mg/dL 9 8 6  < > 13   CREATININE mg/dL 0 58* 0 72 0 73  < > 1 21   EGFR ml/min/1 73sq m 142 130 129  < > 83   GLUCOSE RANDOM mg/dL 144* 117 121  < > 117   CALCIUM mg/dL 8 9 8 9 8 6  < > 9 4   AST U/L  --   --   --   --  53*   ALT U/L  --   --   --   --  42   ALK PHOS U/L  --   --   --   --  34*   TOTAL PROTEIN g/dL  --   --   --   --  7 6   ALBUMIN g/dL  --   --   --   --  3 2*   BILIRUBIN TOTAL mg/dL  --   --   --   --  0 62   < > = values in this interval not displayed      Results from last 7 days  Lab Units 17  0808 17  9593 17  1215 17  1214 17  1212 10/31/17  1936 10/31/17  1909 10/31/17  1906   BLOOD CULTURE   --  No Growth at 24 hrs   --   --   --    Proteus mirabilis*  --    Proteus mirabilis*   GRAM STAIN RESULT   --  Gram negative rods No Polys or Bacteria seen Rare Polys  No bacteria seen 1+ Polys  2+ RBC's  No bacteria seen Gram negative rods No Polys or Bacteria seen Gram negative rods   URINE CULTURE  No Growth <1000 cfu/mL  --   --   --   --   --   --   --    WOUND CULTURE   --   --   --   --   --   --  1+ Growth of Proteus mirabilis*  --

## 2017-11-04 NOTE — PROGRESS NOTES
Progress Note - General Surgery   Susie Bradley 23 y o  male MRN: [id number]  Unit/Bed#: [facility] 919-01 Encounter: [id number]    Assessment:  22 yo M with hx of L3-5 sheldon laminectomies with wound infection    - blood cx GNR  - wound cx x 3: Protues    Plan:  Continue VAC; OR for debridement, vac change, poss closure Monday  Abx per ID  PRN pain control      Subjective/Objective   Chief Complaint: none    Subjective: fever to  101 4; otherwise denies complaint    Objective:    Blood pressure 131/79, pulse 103, temperature 98 7 °F (37 1 °C), temperature source Oral, resp  rate 18, height 6' 4" (1 93 m), weight (!) 147 kg (324 lb), SpO2 99 %  ,Body mass index is 39 44 kg/m²        Intake/Output Summary (Last 24 hours) at 11/04/17 0649  Last data filed at 11/04/17 0648   Gross per 24 hour   Intake             1860 ml   Output             3111 ml   Net            -1251 ml       Invasive Devices     Peripheral Intravenous Line            Peripheral IV 10/31/17 Right Arm 3 days          Drain            Negative Pressure Wound Therapy (V A C ) Back Lower 2 days    External Urinary Catheter Small less than 1 day              Physical Exam:   Gen: A&O, NAD  Cardio: RRR  Lungs: CTAB  Abd: Soft, non distended, non tender  Ext- vac in place to sacrum, serosamgimous saturation    Lab, Imaging and other studies:  CBC:   Lab Results   Component Value Date    WBC 5 42 11/04/2017    HGB 9 8 (L) 11/04/2017    HCT 29 2 (L) 11/04/2017    MCV 81 (L) 11/04/2017     11/04/2017    MCH 27 1 11/04/2017    MCHC 33 6 11/04/2017    RDW 13 9 11/04/2017    MPV 10 0 11/04/2017    NRBC 0 11/04/2017   , CMP:   Lab Results   Component Value Date     (L) 11/04/2017    K 3 6 11/04/2017    CL 99 (L) 11/04/2017    CO2 30 11/04/2017    ANIONGAP 6 11/04/2017    BUN 9 11/04/2017    CREATININE 0 58 (L) 11/04/2017    GLUCOSE 144 (H) 11/04/2017    CALCIUM 8 9 11/04/2017    EGFR 142 11/04/2017   , Coagulation: No results found for: PT, INR, APTT  VTE Pharmacologic Prophylaxis: Heparin  VTE Mechanical Prophylaxis: sequential compression device

## 2017-11-04 NOTE — PROGRESS NOTES
[facility] Internal [facility] Progress Note  Patient: Shira Bhatt 23 y o  male   MRN: [id number]  PCP: Mann Ruggiero MD  Unit/Bed#: [facility] 179-52 Encounter: [id number]  Date Of Visit: 11/04/17    Assessment:    Principal Problem:    Sepsis ([facility] 75 )  Active Problems:    Wound dehiscence    Wound infection    Herniation of lumbar intervertebral disc    Hypothyroid    Bipolar disorder ([facility] )    Autism      Plan:    · Proteus bacteremia & sepsis:  ? Shows some improvement, had fever twice yesterday, tachycardia mild better  ? BC 10/31 - proteus, wound culture from 10/31 - proteus, Repeat BC 11/2 - one of two growing proteus; repeat [facility] 11/3 pending  ? Appreciate ID input - cont ancef  ? If any more fevers would consider CT abdo pelvis for any other source  · Persistent tachycardia  · Hypoxia:  ? Given tachy + hypoxia - CTA PE was done which was not complete study due to inadequate dye but no thrombus in main artery  ? Continue monitoring HR & O2  · Post-op spinal wound dehiscence with possible infection:  ? S/p wound wash out by neuroSx & Gen Sx 11/1, vac placement  ? Repeat Vac change & wash 11/3  ? Repeat OR on monday  ? Pain control - meds modified  ? DVT PX, bowel regimen  · S/p L3-4, L4-5 open microdiscectomy on 10/17:  ? For cauda equina syndrome & right foot drop  · Hypothyroidism: cont levo  · Bipolar & autism:  ? Cont home risperidol & depakoate  · H/o COLIN:  ? Did not tolerate CPAP      VTE Pharmacologic Prophylaxis:   Pharmacologic: Enoxaparin (Lovenox)  Mechanical VTE Prophylaxis in Place: Yes    Patient Centered Rounds: I have performed bedside rounds with nursing staff today  Discussions with Specialists or Other Care Team Provider:     Education and Discussions with Family / Patient: patient, mother    Time Spent for Care: 45 minutes  More than 50% of total time spent on counseling and coordination of care as described above      Current Length of Stay: 4 day(s)    Current Patient Status: Inpatient   Certification Statement: The patient will continue to require additional inpatient hospital stay due to OR, f/u blood cultures    Discharge Plan / Estimated Discharge Date: in next 3 days    Code Status: Level 1 - Full Code      Subjective:   No CP or SOB, no abdo pain, no nausea    Objective:     Vitals:   Temp (24hrs), Av 2 °F (37 3 °C), Min:98 3 °F (36 8 °C), Max:101 4 °F (38 6 °C)    HR:  [] 106  Resp:  [18] 18  BP: (120-138)/(62-87) 138/87  SpO2:  [92 %-99 %] 99 %  Body mass index is 39 44 kg/m²  Input and Output Summary (last 24 hours): Intake/Output Summary (Last 24 hours) at 17 1231  Last data filed at 17 1003   Gross per 24 hour   Intake             1130 ml   Output             2501 ml   Net            -1371 ml       Physical Exam:     Physical Exam   Constitutional: He is oriented to person, place, and time  He appears well-developed and well-nourished  HENT:   Head: Normocephalic and atraumatic  Eyes: Conjunctivae are normal  No scleral icterus  Cardiovascular: Normal rate, regular rhythm and normal heart sounds  Exam reveals no gallop and no friction rub  No murmur heard  Pulmonary/Chest: Effort normal and breath sounds normal  No respiratory distress  He has no wheezes  Abdominal: Soft  He exhibits no distension  There is no tenderness  Musculoskeletal: He exhibits no edema  Neurological: He is alert and oriented to person, place, and time           Additional Data:     Labs:      Results from last 7 days  Lab Units 17  0509   WBC Thousand/uL 5 42   HEMOGLOBIN g/dL 9 8*   HEMATOCRIT % 29 2*   PLATELETS Thousands/uL 255   NEUTROS PCT % 67   LYMPHS PCT % 21   MONOS PCT % 9   EOS PCT % 3       Results from last 7 days  Lab Units 17  0509  10/31/17  1935   SODIUM mmol/L 135*  < > 132*   POTASSIUM mmol/L 3 6  < > 3 7   CHLORIDE mmol/L 99*  < > 100   CO2 mmol/L 30  < > 22   BUN mg/dL 9  < > 13   CREATININE mg/dL 0 58*  < > 1 21   CALCIUM mg/dL 8 9  < > 9 4   TOTAL PROTEIN g/dL  --   --  7 6   BILIRUBIN TOTAL mg/dL  --   --  0 62   ALK PHOS U/L  --   --  34*   ALT U/L  --   --  42   AST U/L  --   --  53*   GLUCOSE RANDOM mg/dL 144*  < > 117   < > = values in this interval not displayed  Results from last 7 days  Lab Units 11/01/17  0537   INR  1 30*       * I Have Reviewed All Lab Data Listed Above  * Additional Pertinent Lab Tests Reviewed: All Labs Within Last 24 Hours Reviewed    Imaging:    Imaging Reports Reviewed Today Include:   Imaging Personally Reviewed by Myself Includes:      Recent Cultures (last 7 days):       Results from last 7 days  Lab Units 11/03/17  0927 11/03/17  0919 11/03/17  0808 11/02/17  0642 11/01/17  1215 11/01/17  1214 11/01/17  1212 10/31/17  1936 10/31/17  1909 10/31/17  1906   BLOOD CULTURE  No Growth at 24 hrs  No Growth at 24 hrs   --  No Growth at 48 hrs  Proteus mirabilis*  --   --   --    Proteus mirabilis*  --    Proteus mirabilis*   GRAM STAIN RESULT   --   --   --  Gram negative rods No Polys or Bacteria seen Rare Polys  No bacteria seen 1+ Polys  2+ RBC's  No bacteria seen Gram negative rods No Polys or Bacteria seen Gram negative rods   URINE CULTURE   --   --  No Growth <1000 cfu/mL  --   --   --   --   --   --   --    WOUND CULTURE   --   --   --   --   --   --   --   --  1+ Growth of Proteus mirabilis*  --        Last 24 Hours Medication List:     benztropine 1 mg Oral BID   cefazolin 2,000 mg Intravenous Q8H   clonazePAM 0 5 mg Oral BID   divalproex sodium 1,000 mg Oral Daily With Lunch   divalproex sodium 500 mg Oral Q12H LENO   docusate sodium 100 mg Oral BID   enoxaparin 40 mg Subcutaneous Q24H LENO   fenofibrate 145 mg Oral Daily   levothyroxine 125 mcg Oral HS   risperiDONE 2 mg Oral BID   risperiDONE 4 mg Oral BID   senna 1 tablet Oral HS        Today, Patient Was Seen By: Stephen Palmer MD    ** Please Note: This note has been constructed using a voice recognition system   **

## 2017-11-04 NOTE — PROGRESS NOTES
Progress Note - Neurosurgery   Sofie Rowe 23 y o  male MRN: [id number]  Unit/Bed#: [facility] 919-01 Encounter: [id number]    Assessment:  1  POD#3 incision and drainage (I&D) spine  2  Sepsis  3  Wound dehiscence  4  Proteus wound infection  5  Acute blood loss anemia  6  Herniation of lumbar intervertebral disc s/p hemilaminotomy and foraminotmy L3/4, L4/5 (10/17)  7  Hypothyroid  8  Bipolar disorder  9  Autism     Plan:  - cefazolin IV  - no fever spikes in over 24 hours  - pain control  - Monday vac change in OR  - mild ileus on abdominal x-ray      Subjective/Objective     Complains of back pain      Invasive Devices     Peripheral Intravenous Line            Peripheral IV 10/31/17 Right Arm 3 days          Drain            Negative Pressure Wound Therapy (V A C ) Back Lower 2 days    External Urinary Catheter Small less than 1 day                Physical Exam:     Vitals: Blood pressure 131/79, pulse 101, temperature 98 8 °F (37 1 °C), temperature source Oral, resp  rate 18, height 6' 4" (1 93 m), weight (!) 147 kg (324 lb), SpO2 99 %  ,Body mass index is 39 44 kg/m²  Will not cooperate for exam  Moves all extremities  Sleeping with the phone to his ear   Heart regular  Lungs clear  Abdomen not tender  Won't roll for back exam      Lab Results: I have personally reviewed pertinent results        Imaging Studies: I have personally reviewed pertinent films in PACS    VTE Pharmacologic Prophylaxis: Enoxaparin (Lovenox)    VTE Mechanical Prophylaxis: sequential compression device

## 2017-11-05 ENCOUNTER — ANESTHESIA EVENT (INPATIENT)
Dept: PERIOP | Facility: HOSPITAL | Age: 27
DRG: 856 | End: 2017-11-05
Payer: MEDICARE

## 2017-11-05 LAB
BACTERIA BLD CULT: ABNORMAL
GRAM STN SPEC: ABNORMAL

## 2017-11-05 RX ORDER — SODIUM CHLORIDE 9 MG/ML
125 INJECTION, SOLUTION INTRAVENOUS CONTINUOUS
Status: DISCONTINUED | OUTPATIENT
Start: 2017-11-05 | End: 2017-11-06

## 2017-11-05 RX ADMIN — OXYCODONE HYDROCHLORIDE 10 MG: 10 TABLET ORAL at 20:40

## 2017-11-05 RX ADMIN — CEFAZOLIN SODIUM 2000 MG: 2 SOLUTION INTRAVENOUS at 12:14

## 2017-11-05 RX ADMIN — CLONAZEPAM 0.5 MG: 0.5 TABLET ORAL at 18:16

## 2017-11-05 RX ADMIN — DIVALPROEX SODIUM 500 MG: 500 TABLET, DELAYED RELEASE ORAL at 20:40

## 2017-11-05 RX ADMIN — MORPHINE SULFATE 4 MG: 4 INJECTION, SOLUTION INTRAMUSCULAR; INTRAVENOUS at 09:48

## 2017-11-05 RX ADMIN — CLONAZEPAM 0.5 MG: 0.5 TABLET ORAL at 09:13

## 2017-11-05 RX ADMIN — SENNOSIDES 8.6 MG: 8.6 TABLET, FILM COATED ORAL at 20:41

## 2017-11-05 RX ADMIN — METHOCARBAMOL 500 MG: 500 TABLET ORAL at 20:40

## 2017-11-05 RX ADMIN — DOCUSATE SODIUM 100 MG: 100 CAPSULE, LIQUID FILLED ORAL at 09:13

## 2017-11-05 RX ADMIN — OXYCODONE HYDROCHLORIDE 10 MG: 10 TABLET ORAL at 09:12

## 2017-11-05 RX ADMIN — METHOCARBAMOL 500 MG: 500 TABLET ORAL at 02:06

## 2017-11-05 RX ADMIN — CEFAZOLIN SODIUM 2000 MG: 2 SOLUTION INTRAVENOUS at 20:41

## 2017-11-05 RX ADMIN — DIVALPROEX SODIUM 1000 MG: 500 TABLET, DELAYED RELEASE ORAL at 12:13

## 2017-11-05 RX ADMIN — METHOCARBAMOL 500 MG: 500 TABLET ORAL at 15:13

## 2017-11-05 RX ADMIN — BENZTROPINE MESYLATE 1 MG: 1 TABLET ORAL at 18:17

## 2017-11-05 RX ADMIN — CEFAZOLIN SODIUM 2000 MG: 2 SOLUTION INTRAVENOUS at 05:05

## 2017-11-05 RX ADMIN — DIVALPROEX SODIUM 500 MG: 500 TABLET, DELAYED RELEASE ORAL at 09:13

## 2017-11-05 RX ADMIN — RISPERIDONE 4 MG: 1 TABLET ORAL at 09:11

## 2017-11-05 RX ADMIN — ACETAMINOPHEN 975 MG: 325 TABLET, FILM COATED ORAL at 18:24

## 2017-11-05 RX ADMIN — RISPERIDONE 2 MG: 1 TABLET ORAL at 18:17

## 2017-11-05 RX ADMIN — OXYCODONE HYDROCHLORIDE 10 MG: 10 TABLET ORAL at 02:07

## 2017-11-05 RX ADMIN — RISPERIDONE 2 MG: 1 TABLET ORAL at 09:10

## 2017-11-05 RX ADMIN — LEVOTHYROXINE SODIUM 125 MCG: 125 TABLET ORAL at 20:41

## 2017-11-05 RX ADMIN — BENZTROPINE MESYLATE 1 MG: 1 TABLET ORAL at 09:14

## 2017-11-05 RX ADMIN — FENOFIBRATE 145 MG: 145 TABLET ORAL at 09:15

## 2017-11-05 RX ADMIN — METHOCARBAMOL 500 MG: 500 TABLET ORAL at 09:13

## 2017-11-05 RX ADMIN — RISPERIDONE 4 MG: 1 TABLET ORAL at 18:16

## 2017-11-05 RX ADMIN — OXYCODONE HYDROCHLORIDE 10 MG: 10 TABLET ORAL at 15:13

## 2017-11-05 NOTE — PROGRESS NOTES
Progress Note - Neurosurgery   Chris Willard 23 y o  male MRN: [id number]  Unit/Bed#: [facility] 919-01 Encounter: [id number]    Assessment:  1  POD#4 incision and drainage (I&D) spine  2  Sepsis  3  Wound dehiscence  4  Proteus wound infection  5  Acute blood loss anemia  6  Herniation of lumbar intervertebral disc s/p hemilaminotomy and foraminotmy L3/4, L4/5 (10/17)  7  Hypothyroid  8  Bipolar disorder  9  Autism       Plan:  - NPO after midnight  - vac change and debridment Monday possible closure over a drain  - pain control  - no fever spikes  - I updated his mother by phone    Subjective/Objective     No specific complaints today  Feels much better      Invasive Devices     Peripheral Intravenous Line            Peripheral IV 11/04/17 Left Hand less than 1 day          Drain            Negative Pressure Wound Therapy (V A C ) Back Lower 3 days    External Urinary Catheter Small 1 day                Physical Exam:     Vitals: Blood pressure 143/68, pulse 82, temperature 97 8 °F (36 6 °C), temperature source Oral, resp  rate 18, height 6' 4" (1 93 m), weight (!) 147 kg (324 lb), SpO2 96 %  ,Body mass index is 39 44 kg/m²  Awake alert   Moves all extremities  Back wound dry with VAC in place  strength intact limited by pain  Lungs clear  Heart regular  Abdomen soft    Lab Results: I have personally reviewed pertinent results  Imaging Studies: I have personally reviewed pertinent reports          VTE Pharmacologic Prophylaxis: Enoxaparin (Lovenox)    VTE Mechanical Prophylaxis: sequential compression device

## 2017-11-05 NOTE — PROGRESS NOTES
[facility] Internal Medicine Progress Note  Patient: Steve Esqueda 23 y o  male   MRN: [id number]  PCP: Analia Rivas MD  Unit/Bed#: [facility] 002-61 Encounter: [id number]  Date Of Visit: 11/05/17    Assessment:    Principal Problem:    Sepsis ([facility] 75 )  Active Problems:    Wound dehiscence    Wound infection    Herniation of lumbar intervertebral disc    Hypothyroid    Bipolar disorder ([facility] 75 )    Autism      Plan:    · Proteus bacteremia & sepsis:  ? Shows some improvement, had fever twice yesterday, tachycardia mild better  ? BC 10/31 - proteus, wound culture from 10/31 - proteus, Repeat BC 11/2 - one of two growing proteus; repeat [facility] 11/3 neg 48 hrs  ? Appreciate ID input - cont IV ancef for total 6 weeks, PICC consent obtained from mother  ? If any more fevers would consider CT abdo pelvis for any other source  · Persistent tachycardia: resolved  · Hypoxia: resolved  CTA PE was done which was not complete study due to inadequate dye but no thrombus in main artery  · Post-op spinal wound dehiscence with possible infection:  ? S/p wound wash out by neuroSx & Gen Sx 11/1, vac placement  ? Repeat Vac change & wash 11/3  ? Repeat OR on Monday with possible closure  ? Pain control - meds modified  ? DVT PX, bowel regimen  · S/p L3-4, L4-5 open microdiscectomy on 10/17:  ? For cauda equina syndrome & right foot drop  · Hypothyroidism: cont levo  · Bipolar & autism:  ? Cont home risperidol & depakoate  · H/o COLIN:  ? Did not tolerate CPAP    PT OT Eval    VTE Pharmacologic Prophylaxis:   Pharmacologic: Enoxaparin (Lovenox)  Mechanical VTE Prophylaxis in Place: Yes    Patient Centered Rounds: I have performed bedside rounds with nursing staff today  Discussions with Specialists or Other Care Team Provider:     Education and Discussions with Family / Patient: patient, parents    Time Spent for Care: 45 minutes  More than 50% of total time spent on counseling and coordination of care as described above      Current Length of Stay: 5 day(s)    Current Patient Status: Inpatient   Certification Statement: The patient will continue to require additional inpatient hospital stay due to OR tomorrow    Discharge Plan / Estimated Discharge Date: over next 2-3 days    Code Status: Level 1 - Full Code      Subjective:   Feels better, pain is better, no CP or SOB, no abdo pain/n/v    Objective:     Vitals:   Temp (24hrs), Av 6 °F (37 °C), Min:97 8 °F (36 6 °C), Max:99 5 °F (37 5 °C)    HR:  [] 103  Resp:  [18] 18  BP: (131-149)/(67-91) 149/68  SpO2:  [90 %-100 %] 96 %  Body mass index is 39 44 kg/m²  Input and Output Summary (last 24 hours): Intake/Output Summary (Last 24 hours) at 17 1327  Last data filed at 17 1011   Gross per 24 hour   Intake             1610 ml   Output             2900 ml   Net            -1290 ml       Physical Exam:     Physical Exam   Constitutional: He is oriented to person, place, and time  He appears well-developed and well-nourished  HENT:   Head: Normocephalic and atraumatic  Eyes: Conjunctivae are normal  No scleral icterus  Cardiovascular: Normal rate, regular rhythm and normal heart sounds  Exam reveals no gallop and no friction rub  No murmur heard  Pulmonary/Chest: Effort normal and breath sounds normal  No respiratory distress  He has no wheezes  He has no rales  Abdominal: Soft  He exhibits no distension  There is no tenderness  Musculoskeletal: He exhibits no edema  Neurological: He is alert and oriented to person, place, and time         Additional Data:     Labs:      Results from last 7 days  Lab Units 17  0509   WBC Thousand/uL 5 42   HEMOGLOBIN g/dL 9 8*   HEMATOCRIT % 29 2*   PLATELETS Thousands/uL 255   NEUTROS PCT % 67   LYMPHS PCT % 21   MONOS PCT % 9   EOS PCT % 3       Results from last 7 days  Lab Units 17  0509  10/31/17  1935   SODIUM mmol/L 135*  < > 132*   POTASSIUM mmol/L 3 6  < > 3 7   CHLORIDE mmol/L 99*  < > 100   CO2 mmol/L 30  < > 22   BUN mg/dL 9  < > 13   CREATININE mg/dL 0 58*  < > 1 21   CALCIUM mg/dL 8 9  < > 9 4   TOTAL PROTEIN g/dL  --   --  7 6   BILIRUBIN TOTAL mg/dL  --   --  0 62   ALK PHOS U/L  --   --  34*   ALT U/L  --   --  42   AST U/L  --   --  53*   GLUCOSE RANDOM mg/dL 144*  < > 117   < > = values in this interval not displayed  Results from last 7 days  Lab Units 11/01/17  0537   INR  1 30*       * I Have Reviewed All Lab Data Listed Above  * Additional Pertinent Lab Tests Reviewed: All Labs Within Last 24 Hours Reviewed    Imaging:    Imaging Reports Reviewed Today Include:   Imaging Personally Reviewed by Myself Includes:      Recent Cultures (last 7 days):       Results from last 7 days  Lab Units 11/03/17  0927 11/03/17  0919 11/03/17  0808 11/02/17  0642 11/01/17  1215 11/01/17  1214 11/01/17  1212 10/31/17  1936 10/31/17  1909 10/31/17  1906   BLOOD CULTURE  No Growth at 48 hrs  No Growth at 48 hrs    --    Proteus mirabilis*  No Growth at 72 hrs   --   --   --    Proteus mirabilis*  --    Proteus mirabilis*   GRAM STAIN RESULT   --   --   --  Gram negative rods No Polys or Bacteria seen Rare Polys  No bacteria seen 1+ Polys  2+ RBC's  No bacteria seen Gram negative rods No Polys or Bacteria seen Gram negative rods   URINE CULTURE   --   --  No Growth <1000 cfu/mL  --   --   --   --   --   --   --    WOUND CULTURE   --   --   --   --   --   --   --   --  1+ Growth of Proteus mirabilis*  --        Last 24 Hours Medication List:     benztropine 1 mg Oral BID   cefazolin 2,000 mg Intravenous Q8H   clonazePAM 0 5 mg Oral BID   divalproex sodium 1,000 mg Oral Daily With Lunch   divalproex sodium 500 mg Oral Q12H LENO   docusate sodium 100 mg Oral BID   enoxaparin 40 mg Subcutaneous Q24H LENO   fenofibrate 145 mg Oral Daily   levothyroxine 125 mcg Oral HS   risperiDONE 2 mg Oral BID   risperiDONE 4 mg Oral BID   senna 1 tablet Oral HS        Today, Patient Was Seen By: Herson Lorenzo MD    ** Please Note: This note has been constructed using a voice recognition system   **

## 2017-11-05 NOTE — PROGRESS NOTES
Progress Note - General Surgery   Ramez Daily 23 y o  male MRN: [id number]  Unit/Bed#: [facility] 919-01 Encounter: [id number]    Assessment:  22 yo M with hx of L3-5 sheldon laminectomies with wound infectionblood cx GNR wound cx x 3: Protues    Plan:  Regular diet  IVF  OR Monday for debridement, vac change, poss closure Monday  Ancef per ID    Subjective/Objective   Chief Complaint: none    Subjective: ROB  Wouldn't wake up to participate in exam, refused to let me see VAC site  Objective:    Blood pressure 142/91, pulse 97, temperature 98 9 °F (37 2 °C), temperature source Oral, resp  rate 18, height 6' 4" (1 93 m), weight (!) 147 kg (324 lb), SpO2 96 %  ,Body mass index is 39 44 kg/m²  Intake/Output Summary (Last 24 hours) at 11/05/17 0601  Last data filed at 11/05/17 0505   Gross per 24 hour   Intake             2250 ml   Output             3550 ml   Net            -1300 ml       Invasive Devices     Peripheral Intravenous Line            Peripheral IV 11/04/17 Left Hand less than 1 day          Drain            Negative Pressure Wound Therapy (V A C ) Back Lower 3 days    External Urinary Catheter Small 1 day              Physical Exam: NAD  Sleepy    AAOx3  RRR  Normal respiratory effort  Soft, NT, ND, obese  VAC with 250 serosang output      Lab, Imaging and other studies:  CBC:   No results found for: WBC, HGB, HCT, MCV, PLT, ADJUSTEDWBC, MCH, MCHC, RDW, MPV, NRBC, CMP:   No results found for: NA, K, CL, CO2, ANIONGAP, BUN, CREATININE, GLUCOSE, CALCIUM, AST, ALT, ALKPHOS, PROT, ALBUMIN, BILITOT, EGFR, Coagulation: No results found for: PT, INR, APTT  VTE Pharmacologic Prophylaxis: Heparin  VTE Mechanical Prophylaxis: sequential compression device

## 2017-11-05 NOTE — PROGRESS NOTES
Progress Note - Infectious Disease   Eduardo Espinoza 23 y o  male MRN: [id number]  Unit/Bed#: [facility] 919-01 Encounter: [id number]      Impression/Plan: 1    Proteus sepsis-appears secondary to surgical wound infection   No other clear source appreciated   The patient remains hemodynamically stable but is quite tachycardic   Thus far he is tolerating the antibiotics without difficulty  The Proteus is quite sensitive   Follow-up blood cultures remain positive  His fever seems to be resolving   -continue cefazolin  -follow up repeat blood cultures  -supportive care  -monitor CBC with diff and creatinine  -discussed in detail with the patient     2   Lumbar wound infection-The patient is status post I and D and washout   No gross pus was found   However, the wound cultures growing Proteus   Appears to be deep infection based upon the operative findings  Gina Phillips has undergone a 2nd incision and drainage  -antibiotics as above  -serial exams  -close neurosurgical follow-up  -VAC dressing and local care  -patient will go back to the OR tomorrow for another washout and possible closure  -plan intravenous antibiotics through 12/13/2017 to complete 6 weeks of treatment  -okay to place PICC line from infectious disease standpoint     3   Persistent fever-likely all secondary to 1   2   Consideration for the possibility of another undrained source  He remains hemodynamically stable  The fever now seems to be resolving   -antibiotics as above  -monitor temperature  -if fever would recur, recommend CT of the abdomen and pelvis  -follow up repeat blood cultures     4   Liver function test abnormalities-relatively mild   Possibly all secondary to sepsis   Possibly medication effect  No labs today  -recheck liver function test tomorrow  -no additional ID workup for now  -additional workup is needed     5   Autism and bipolar disorder    Antibiotics:  Cefazolin 4  Antibiotics 6  Postop day 4      Subjective:  Patient has no fever, chills, sweats; no nausea, vomiting, diarrhea; no cough, shortness of breath; no increase pain  No new symptoms  He seems in better spirits and is less agitated  Objective:  Vitals:  HR:  [] 82  Resp:  [18] 18  BP: (131-143)/(67-91) 143/68  SpO2:  [90 %-100 %] 96 %  Temp (24hrs), Av 6 °F (37 °C), Min:97 8 °F (36 6 °C), Max:99 5 °F (37 5 °C)  Current: Temperature: 97 8 °F (36 6 °C)    Physical Exam:   General Appearance:  Alert, nontoxic, no acute distress  Throat: Oropharynx moist without lesions  Lungs:   Clear to auscultation anteriorly; respirations unlabored   Heart:  RRR; no murmur, rub or gallop   Back:   VAC dressing in place   Abdomen:   Soft, non-tender, non-distended, positive bowel sounds  Extremities: No clubbing, cyanosis or edema   Skin: No new rashes or lesions  No draining wounds noted  Labs, Imaging, & Other studies:   All pertinent labs and imaging studies were personally reviewed    Results from last 7 days  Lab Units 17  0509 17  0455 17  0545   WBC Thousand/uL 5 42 5 00 5 23   HEMOGLOBIN g/dL 9 8* 9 9* 10 4*   PLATELETS Thousands/uL 255 214 210       Results from last 7 days  Lab Units 17  0509 17  0455 11/02/17  10/31/17  1935   SODIUM mmol/L 135* 137 136  < > 132*   POTASSIUM mmol/L 3 6 3 5 3 6  < > 3 7   CHLORIDE mmol/L 99* 101 102  < > 100   CO2 mmol/L 30 31 27  < > 22   ANION GAP mmol/L 6 5 7  < > 10   BUN mg/dL 9 8 6  < > 13   CREATININE mg/dL 0 58* 0 72 0 73  < > 1 21   EGFR ml/min/1 73sq m 142 130 129  < > 83   GLUCOSE RANDOM mg/dL 144* 117 121  < > 117   CALCIUM mg/dL 8 9 8 9 8 6  < > 9 4   AST U/L  --   --   --   --  53*   ALT U/L  --   --   --   --  42   ALK PHOS U/L  --   --   --   --  34*   TOTAL PROTEIN g/dL  --   --   --   --  7 6   ALBUMIN g/dL  --   --   --   --  3 2*   BILIRUBIN TOTAL mg/dL  --   --   --   --  0 62   < > = values in this interval not displayed      Results from last 7 days  Lab Units 11/03/17  0927 11/03/17  0919 11/03/17  0808 11/02/17  0642 11/01/17  1215 11/01/17  1214 11/01/17  1212 10/31/17  1936 10/31/17  1909 10/31/17  1906   BLOOD CULTURE  No Growth at 24 hrs  No Growth at 24 hrs   --  No Growth at 48 hrs    Proteus mirabilis*  --   --   --    Proteus mirabilis*  --    Proteus mirabilis*   GRAM STAIN RESULT   --   --   --  Gram negative rods No Polys or Bacteria seen Rare Polys  No bacteria seen 1+ Polys  2+ RBC's  No bacteria seen Gram negative rods No Polys or Bacteria seen Gram negative rods   URINE CULTURE   --   --  No Growth <1000 cfu/mL  --   --   --   --   --   --   --    WOUND CULTURE   --   --   --   --   --   --   --   --  1+ Growth of Proteus mirabilis*  --

## 2017-11-06 ENCOUNTER — ANESTHESIA (INPATIENT)
Dept: PERIOP | Facility: HOSPITAL | Age: 27
DRG: 856 | End: 2017-11-06
Payer: MEDICARE

## 2017-11-06 ENCOUNTER — GENERIC CONVERSION - ENCOUNTER (OUTPATIENT)
Dept: OTHER | Facility: OTHER | Age: 27
End: 2017-11-06

## 2017-11-06 LAB
ALBUMIN SERPL BCP-MCNC: 2.5 G/DL (ref 3.5–5)
ALP SERPL-CCNC: 46 U/L (ref 46–116)
ALT SERPL W P-5'-P-CCNC: 47 U/L (ref 12–78)
ANION GAP SERPL CALCULATED.3IONS-SCNC: 9 MMOL/L (ref 4–13)
AST SERPL W P-5'-P-CCNC: 29 U/L (ref 5–45)
BASOPHILS # BLD MANUAL: 0.1 THOUSAND/UL (ref 0–0.1)
BASOPHILS NFR MAR MANUAL: 1 % (ref 0–1)
BILIRUB SERPL-MCNC: 0.32 MG/DL (ref 0.2–1)
BUN SERPL-MCNC: 14 MG/DL (ref 5–25)
CALCIUM SERPL-MCNC: 9.3 MG/DL (ref 8.3–10.1)
CHLORIDE SERPL-SCNC: 101 MMOL/L (ref 100–108)
CO2 SERPL-SCNC: 28 MMOL/L (ref 21–32)
CREAT SERPL-MCNC: 0.73 MG/DL (ref 0.6–1.3)
EOSINOPHIL # BLD MANUAL: 0.19 THOUSAND/UL (ref 0–0.4)
EOSINOPHIL NFR BLD MANUAL: 2 % (ref 0–6)
ERYTHROCYTE [DISTWIDTH] IN BLOOD BY AUTOMATED COUNT: 13.7 % (ref 11.6–15.1)
GFR SERPL CREATININE-BSD FRML MDRD: 129 ML/MIN/1.73SQ M
GLUCOSE SERPL-MCNC: 93 MG/DL (ref 65–140)
HCT VFR BLD AUTO: 32.5 % (ref 36.5–49.3)
HGB BLD-MCNC: 10.6 G/DL (ref 12–17)
LYMPHOCYTES # BLD AUTO: 1.82 THOUSAND/UL (ref 0.6–4.47)
LYMPHOCYTES # BLD AUTO: 19 % (ref 14–44)
MCH RBC QN AUTO: 26.2 PG (ref 26.8–34.3)
MCHC RBC AUTO-ENTMCNC: 32.6 G/DL (ref 31.4–37.4)
MCV RBC AUTO: 80 FL (ref 82–98)
MONOCYTES # BLD AUTO: 0.19 THOUSAND/UL (ref 0–1.22)
MONOCYTES NFR BLD: 2 % (ref 4–12)
MYELOCYTES NFR BLD MANUAL: 2 % (ref 0–1)
NEUTROPHILS # BLD MANUAL: 7.01 THOUSAND/UL (ref 1.85–7.62)
NEUTS SEG NFR BLD AUTO: 73 % (ref 43–75)
NRBC BLD AUTO-RTO: 0 /100 WBCS
PLATELET # BLD AUTO: 361 THOUSANDS/UL (ref 149–390)
PLATELET BLD QL SMEAR: ADEQUATE
PMV BLD AUTO: 9.9 FL (ref 8.9–12.7)
POIKILOCYTOSIS BLD QL SMEAR: PRESENT
POTASSIUM SERPL-SCNC: 3.9 MMOL/L (ref 3.5–5.3)
PREALB SERPL-MCNC: 21.1 MG/DL (ref 18–40)
PROT SERPL-MCNC: 7 G/DL (ref 6.4–8.2)
RBC # BLD AUTO: 4.05 MILLION/UL (ref 3.88–5.62)
RBC MORPH BLD: PRESENT
SODIUM SERPL-SCNC: 138 MMOL/L (ref 136–145)
TOXIC GRANULES BLD QL SMEAR: PRESENT
VARIANT LYMPHS # BLD AUTO: 1 %
WBC # BLD AUTO: 9.6 THOUSAND/UL (ref 4.31–10.16)

## 2017-11-06 PROCEDURE — 02HV33Z INSERTION OF INFUSION DEVICE INTO SUPERIOR VENA CAVA, PERCUTANEOUS APPROACH: ICD-10-PCS | Performed by: SURGERY

## 2017-11-06 PROCEDURE — 36569 INSJ PICC 5 YR+ W/O IMAGING: CPT

## 2017-11-06 PROCEDURE — 84134 ASSAY OF PREALBUMIN: CPT | Performed by: SURGERY

## 2017-11-06 PROCEDURE — 85027 COMPLETE CBC AUTOMATED: CPT | Performed by: SURGERY

## 2017-11-06 PROCEDURE — C1751 CATH, INF, PER/CENT/MIDLINE: HCPCS

## 2017-11-06 PROCEDURE — 85007 BL SMEAR W/DIFF WBC COUNT: CPT | Performed by: SURGERY

## 2017-11-06 PROCEDURE — 80053 COMPREHEN METABOLIC PANEL: CPT | Performed by: INTERNAL MEDICINE

## 2017-11-06 RX ORDER — FENTANYL CITRATE 50 UG/ML
INJECTION, SOLUTION INTRAMUSCULAR; INTRAVENOUS AS NEEDED
Status: DISCONTINUED | OUTPATIENT
Start: 2017-11-06 | End: 2017-11-06 | Stop reason: SURG

## 2017-11-06 RX ORDER — FENTANYL CITRATE/PF 50 MCG/ML
25 SYRINGE (ML) INJECTION
Status: DISCONTINUED | OUTPATIENT
Start: 2017-11-06 | End: 2017-11-06 | Stop reason: HOSPADM

## 2017-11-06 RX ORDER — ACETAMINOPHEN 325 MG/1
975 TABLET ORAL EVERY 8 HOURS SCHEDULED
Status: DISCONTINUED | OUTPATIENT
Start: 2017-11-06 | End: 2017-11-08 | Stop reason: HOSPADM

## 2017-11-06 RX ORDER — SODIUM CHLORIDE, SODIUM LACTATE, POTASSIUM CHLORIDE, CALCIUM CHLORIDE 600; 310; 30; 20 MG/100ML; MG/100ML; MG/100ML; MG/100ML
INJECTION, SOLUTION INTRAVENOUS CONTINUOUS PRN
Status: DISCONTINUED | OUTPATIENT
Start: 2017-11-06 | End: 2017-11-06 | Stop reason: SURG

## 2017-11-06 RX ORDER — METHOCARBAMOL 750 MG/1
750 TABLET, FILM COATED ORAL EVERY 6 HOURS SCHEDULED
Status: DISCONTINUED | OUTPATIENT
Start: 2017-11-06 | End: 2017-11-08 | Stop reason: HOSPADM

## 2017-11-06 RX ORDER — ONDANSETRON 2 MG/ML
4 INJECTION INTRAMUSCULAR; INTRAVENOUS ONCE AS NEEDED
Status: DISCONTINUED | OUTPATIENT
Start: 2017-11-06 | End: 2017-11-06 | Stop reason: HOSPADM

## 2017-11-06 RX ORDER — ONDANSETRON 2 MG/ML
INJECTION INTRAMUSCULAR; INTRAVENOUS AS NEEDED
Status: DISCONTINUED | OUTPATIENT
Start: 2017-11-06 | End: 2017-11-06 | Stop reason: SURG

## 2017-11-06 RX ORDER — PROPOFOL 10 MG/ML
INJECTION, EMULSION INTRAVENOUS AS NEEDED
Status: DISCONTINUED | OUTPATIENT
Start: 2017-11-06 | End: 2017-11-06 | Stop reason: SURG

## 2017-11-06 RX ORDER — METHOCARBAMOL 750 MG/1
750 TABLET, FILM COATED ORAL EVERY 6 HOURS SCHEDULED
Status: DISCONTINUED | OUTPATIENT
Start: 2017-11-06 | End: 2017-11-06

## 2017-11-06 RX ORDER — SUCCINYLCHOLINE CHLORIDE 20 MG/ML
INJECTION INTRAMUSCULAR; INTRAVENOUS AS NEEDED
Status: DISCONTINUED | OUTPATIENT
Start: 2017-11-06 | End: 2017-11-06 | Stop reason: SURG

## 2017-11-06 RX ORDER — LIDOCAINE HYDROCHLORIDE 10 MG/ML
INJECTION, SOLUTION INFILTRATION; PERINEURAL AS NEEDED
Status: DISCONTINUED | OUTPATIENT
Start: 2017-11-06 | End: 2017-11-06 | Stop reason: SURG

## 2017-11-06 RX ADMIN — RISPERIDONE 2 MG: 1 TABLET ORAL at 10:55

## 2017-11-06 RX ADMIN — OXYCODONE HYDROCHLORIDE 10 MG: 10 TABLET ORAL at 11:31

## 2017-11-06 RX ADMIN — LEVOTHYROXINE SODIUM 125 MCG: 125 TABLET ORAL at 21:37

## 2017-11-06 RX ADMIN — OXYCODONE HYDROCHLORIDE 10 MG: 10 TABLET ORAL at 20:12

## 2017-11-06 RX ADMIN — SODIUM CHLORIDE 125 ML/HR: 0.9 INJECTION, SOLUTION INTRAVENOUS at 00:16

## 2017-11-06 RX ADMIN — BENZTROPINE MESYLATE 1 MG: 1 TABLET ORAL at 10:54

## 2017-11-06 RX ADMIN — METHOCARBAMOL 750 MG: 750 TABLET ORAL at 17:18

## 2017-11-06 RX ADMIN — RISPERIDONE 4 MG: 1 TABLET ORAL at 17:18

## 2017-11-06 RX ADMIN — PROPOFOL 400 MG: 10 INJECTION, EMULSION INTRAVENOUS at 08:25

## 2017-11-06 RX ADMIN — CEFAZOLIN SODIUM 2000 MG: 2 SOLUTION INTRAVENOUS at 20:12

## 2017-11-06 RX ADMIN — METHOCARBAMOL 500 MG: 500 TABLET ORAL at 11:00

## 2017-11-06 RX ADMIN — FENTANYL CITRATE 50 MCG: 50 INJECTION, SOLUTION INTRAMUSCULAR; INTRAVENOUS at 08:25

## 2017-11-06 RX ADMIN — CEFAZOLIN SODIUM 2000 MG: 2 SOLUTION INTRAVENOUS at 13:53

## 2017-11-06 RX ADMIN — ACETAMINOPHEN 975 MG: 325 TABLET, FILM COATED ORAL at 21:37

## 2017-11-06 RX ADMIN — ACETAMINOPHEN 975 MG: 325 TABLET, FILM COATED ORAL at 02:05

## 2017-11-06 RX ADMIN — METHOCARBAMOL 750 MG: 750 TABLET ORAL at 21:37

## 2017-11-06 RX ADMIN — CLONAZEPAM 0.5 MG: 0.5 TABLET ORAL at 17:18

## 2017-11-06 RX ADMIN — DOCUSATE SODIUM 100 MG: 100 CAPSULE, LIQUID FILLED ORAL at 17:18

## 2017-11-06 RX ADMIN — CLONAZEPAM 0.5 MG: 0.5 TABLET ORAL at 10:55

## 2017-11-06 RX ADMIN — SODIUM CHLORIDE 125 ML/HR: 0.9 INJECTION, SOLUTION INTRAVENOUS at 10:30

## 2017-11-06 RX ADMIN — FENTANYL CITRATE 50 MCG: 50 INJECTION, SOLUTION INTRAMUSCULAR; INTRAVENOUS at 08:49

## 2017-11-06 RX ADMIN — DIVALPROEX SODIUM 500 MG: 500 TABLET, DELAYED RELEASE ORAL at 10:56

## 2017-11-06 RX ADMIN — FENOFIBRATE 145 MG: 145 TABLET ORAL at 10:54

## 2017-11-06 RX ADMIN — SUCCINYLCHOLINE CHLORIDE 200 MG: 20 INJECTION, SOLUTION INTRAMUSCULAR; INTRAVENOUS at 08:25

## 2017-11-06 RX ADMIN — MORPHINE SULFATE 4 MG: 4 INJECTION, SOLUTION INTRAMUSCULAR; INTRAVENOUS at 06:12

## 2017-11-06 RX ADMIN — RISPERIDONE 2 MG: 1 TABLET ORAL at 17:18

## 2017-11-06 RX ADMIN — FENTANYL CITRATE 25 MCG: 50 INJECTION INTRAMUSCULAR; INTRAVENOUS at 10:15

## 2017-11-06 RX ADMIN — ONDANSETRON 4 MG: 2 INJECTION INTRAMUSCULAR; INTRAVENOUS at 08:55

## 2017-11-06 RX ADMIN — BENZTROPINE MESYLATE 1 MG: 1 TABLET ORAL at 17:19

## 2017-11-06 RX ADMIN — RISPERIDONE 4 MG: 1 TABLET ORAL at 10:55

## 2017-11-06 RX ADMIN — FENTANYL CITRATE 25 MCG: 50 INJECTION INTRAMUSCULAR; INTRAVENOUS at 10:00

## 2017-11-06 RX ADMIN — OXYCODONE HYDROCHLORIDE 10 MG: 10 TABLET ORAL at 04:41

## 2017-11-06 RX ADMIN — MORPHINE SULFATE 4 MG: 4 INJECTION, SOLUTION INTRAMUSCULAR; INTRAVENOUS at 13:52

## 2017-11-06 RX ADMIN — CEFAZOLIN SODIUM 2000 MG: 2 SOLUTION INTRAVENOUS at 04:38

## 2017-11-06 RX ADMIN — DIVALPROEX SODIUM 1000 MG: 500 TABLET, DELAYED RELEASE ORAL at 13:53

## 2017-11-06 RX ADMIN — METHOCARBAMOL 500 MG: 500 TABLET ORAL at 02:05

## 2017-11-06 RX ADMIN — LIDOCAINE HYDROCHLORIDE 50 MG: 10 INJECTION, SOLUTION INFILTRATION; PERINEURAL at 08:25

## 2017-11-06 RX ADMIN — DIVALPROEX SODIUM 500 MG: 500 TABLET, DELAYED RELEASE ORAL at 20:11

## 2017-11-06 RX ADMIN — SODIUM CHLORIDE, SODIUM LACTATE, POTASSIUM CHLORIDE, AND CALCIUM CHLORIDE: .6; .31; .03; .02 INJECTION, SOLUTION INTRAVENOUS at 08:20

## 2017-11-06 NOTE — PROCEDURES
Insert PICC line  Date/Time: 11/6/2017 12:54 PM  Performed by: Jessica Frye by: Acacia Hauser     Patient location:  Bedside  Other Assisting Provider: Yes (comment) (Mary hood)    Consent:     Consent obtained:  Written    Consent given by:  Patient    Procedural risks discussed: by md     Alternatives discussed: by md   Universal protocol:     Procedure explained and questions answered to patient or proxy's satisfaction: yes      Relevant documents present and verified: yes      Test results available and properly labeled: yes      Imaging studies available: yes      Required blood products, implants, devices, and special equipment available: yes      Site/side marked: yes      Immediately prior to procedure, a time out was called: yes      Patient identity confirmed:  Verbally with patient and arm band  Pre-procedure details:     Hand hygiene: Hand hygiene performed prior to insertion      Sterile barrier technique: All elements of maximal sterile technique followed      Skin preparation:  2% chlorhexidine    Skin preparation agent: Skin preparation agent completely dried prior to procedure    Indications:     PICC line indications: long term antibiotics    Anesthesia (see MAR for exact dosages):      Anesthesia method:  Local infiltration    Local anesthetic:  Lidocaine 1% w/o epi  Procedure details:     Location:  Basilic    Vessel type: vein      Laterality:  Right    Approach: percutaneous technique used      Patient position:  Flat    Procedural supplies:  Double lumen    Catheter size:  5 Fr    Landmarks identified: yes      Ultrasound guidance: yes      Sterile ultrasound techniques: Sterile gel and sterile probe covers were used      Number of attempts:  1    Successful placement: yes      Vessel of catheter tip end:  Sherlock 3CG confirmed    Total catheter length (cm):  48    Catheter out on skin (cm):  0    Max flow rate:  999    Arm circumference:  41  Post-procedure details:     Post-procedure:  Dressing applied and securement device placed    Assessment:  Blood return through all ports    Post-procedure complications: none      Patient tolerance of procedure:   Tolerated well, no immediate complications    Observer name:  Pts mother was in room for entire procedure

## 2017-11-06 NOTE — OP NOTE
OPERATIVE REPORT  PATIENT NAME: Ramez Daily    :  1990  MRN: [id number]  Pt Location:  OR ROOM 05    SURGERY DATE: 2017    Surgeon(s) and Role:     * SEBASTIEN Perrin O - Primary  ISSAC Arrington, Assist  No qualified resident available to assist       Preop Diagnosis:  Wound infection [T14  8XXA, L08 9]    Post-Op Diagnosis Codes:     * Wound infection [T14  8XXA, L08 9]    Procedure(s) (LRB):  DEBRIDEMENT WOUND ([address] OUT), wound vac back (N/A)    Specimen(s):  * No specimens in log *    Estimated Blood Loss:   Minimal    Drains:    Anesthesia Type:   General    Operative Indications:  Wound infection [T14  8XXA, L08 9]      Operative Findings:  Wound infection of the back    Complications:   None    Procedure and Technique:  Patient identified in preoperative holding and brought the operating suite and placed under general anesthesia by the anesthesia department  He was then placed in the prone position  Two pieces of black sponge was then removed  One piece was from the wound and the 2nd was a bridge  His entire back was then prepped and draped in usual sterile fashion  At this time all members of the team   A time-out was performed  Everyone was in agreement with the plan  The wound was inspected  It was clean  There is no evidence of any collections or ischemia  The wound was then copiously irrigated  The wound measured approximately 8 cm in length 4 cm in width and 5 cm in depth  One piece of black sponge was cut to size and placed in the wound  A 2nd was used as a bridge  The sponge was covered with the supplied dressings and a track pad was connected    There was a good seal   The patient tolerated procedure well with no apparent complications and went to recovery room in satisfactory condition    Patient Disposition:  PACU  SIGNATURE: Marlon Kinney DO  DATE: 2017  TIME: 8:18 AM

## 2017-11-06 NOTE — ANESTHESIA POSTPROCEDURE EVALUATION
Post-Op Assessment Note      CV Status:  Stable    Mental Status:  Alert and awake    Hydration Status:  Euvolemic    PONV Controlled:  Controlled    Airway Patency:  Patent    Post Op Vitals Reviewed: Yes          Staff: CRNA, Anesthesiologist           BP   163/85   Temp   98 2   Pulse  100   Resp   12   SpO2   98

## 2017-11-06 NOTE — INTERIM OP NOTE
DEBRIDEMENT WOUND [facility] OUT),CLOSURE OF WOUMD  Postoperative Note  PATIENT NAME: Daron Mendes  : 1990  MRN: [id number]  BE OR ROOM 05    Surgery Date: 2017    Preop Diagnosis:  Wound infection [T14  8XXA, L08 9]    Post-Op Diagnosis Codes:     * Wound infection [T14  8XXA, L08 9]    Procedure(s) (LRB):  DEBRIDEMENT WOUND ([address] OUT),CLOSURE OF WOUMD (N/A)    Surgeon(s) and Role:     * Honora Goldberg, PA-C - Deb Tate MD - Primary    Specimens:  * No specimens in log *    Estimated Blood Loss:   Minimal    Anesthesia Type:   General     Findings:    Clean, healthy wound base with beefy, red granulation tissue  No odor or purulent  No necrotic or nonviable tissue    Complications:   None    SIGNATURE: Honora Goldberg, PA-C   DATE: 2017   TIME: 11:10 AM

## 2017-11-06 NOTE — OP NOTE
healthy  There was no evidence of purulent drainage, foul odor, ischemia, or necrotic/nonviable tissue  The wound was then copiously irrigated  The wound measured 8 cm in length, 4 cm in width, and 5 cm in depth  A 15 [place] Salvador drain was placed at the wound base and brought out on the right lateral aspect of his back through a separate incision made with a #15 Blade  The wound was then closed in multiple layers  The deep tissue was closed with 0 Vicryl interrupted sutures in 2 different layers  Finally, the skin was closed with 2-0 nylon interrupted sutures  The incision was dressed with a silver Mepilex dressing  A drain sponge was placed around the [place] drain  Tegaderm was were then used to cover all the dressings  The patient tolerated the procedure well with no apparent complications, and he went to the recovery room in satisfactory condition         Patient Disposition:  PACU     SIGNATURE: Jimbo Nichols PA-C  DATE: November 6, 2017  TIME: 11:11 AM

## 2017-11-06 NOTE — PROGRESS NOTES
[address] Internal Medicine Progress Note  Patient: Annelise Rowe 23 y o  male   MRN: [id number]  PCP: Julissa Thao MD  Unit/Bed#: [facility] 992-67 Encounter: [id number]  Date Of Visit: 11/06/17    Assessment:    Principal Problem:    Sepsis ([facility] )  Active Problems:    Wound dehiscence    Wound infection    Herniation of lumbar intervertebral disc    Hypothyroid    [facility] disorder ([facility] )    Autism      Plan:    · Proteus bacteremia & sepsis:  ? Shows some improvement, had fever twice yesterday, tachycardia mild better  ? BC 10/31 - proteus, wound culture from 10/31 - proteus, Repeat BC 11/2 - one of two growing proteus; repeat [facility] 11/3 neg 72 hrs  ? Appreciate ID input - cont IV ancef for total 6 weeks, PICC consent obtained from mother  ? If any more fevers would consider CT abdo pelvis for any other source  · Persistent tachycardia: resolved  · Hypoxia: resolved  CTA PE was done which was not complete study due to inadequate dye but no thrombus in main artery  · Post-op spinal wound dehiscence with possible infection:  ? S/p wound wash out by neuroSx & Gen Sx 11/1, vac placement  ? Repeat Vac change & wash 11/3  ? Repeat OR today with wash out & closure  ? Pain control - meds modified  ? DVT PX, bowel regimen  · S/p L3-4, L4-5 open microdiscectomy on 10/17:  ? For cauda equina syndrome & right foot drop  · Hypothyroidism: cont levo  · Bipolar & autism:  ? Cont home risperidol & depakoate  · H/o COLIN:  ? Did not tolerate CPAP    PT eval to determine needs    VTE Pharmacologic Prophylaxis:   Pharmacologic: Enoxaparin (Lovenox)  Mechanical VTE Prophylaxis in Place: Yes    Patient Centered Rounds: I have performed bedside rounds with nursing staff today  Discussions with Specialists or Other Care Team Provider:     Education and Discussions with Family / Patient: patient, mom    Time Spent for Care: 45 minutes    More than 50% of total time spent on counseling and coordination of care as described above     Current Length of Stay: 6 day(s)    Current Patient Status: Inpatient   Certification Statement: The patient will continue to require additional inpatient hospital stay due to Monitor Post op, IV abx, work on DC plan    Discharge Plan / Estimated Discharge Date: should be ready soon once pain controlled works with PT    Code Status: Level 1 - Full Code      Subjective:   C/o pain post op, no CP/sob, no n/v    Objective:     Vitals:   Temp (24hrs), Av 1 °F (36 7 °C), Min:97 2 °F (36 2 °C), Max:98 6 °F (37 °C)    HR:  [] 103  Resp:  [14-20] 18  BP: (117-163)/() 134/80  SpO2:  [91 %-99 %] 92 %  Body mass index is 39 44 kg/m²  Input and Output Summary (last 24 hours): Intake/Output Summary (Last 24 hours) at 17 1226  Last data filed at 17 1100   Gross per 24 hour   Intake          2035 83 ml   Output             2575 ml   Net          -539 17 ml       Physical Exam:     Physical Exam   Constitutional: He is oriented to person, place, and time  He appears well-developed and well-nourished  HENT:   Head: Normocephalic and atraumatic  Eyes: Conjunctivae are normal  No scleral icterus  Cardiovascular: Normal rate, regular rhythm and normal heart sounds  Exam reveals no gallop and no friction rub  No murmur heard  Pulmonary/Chest: Effort normal and breath sounds normal  No respiratory distress  He has no wheezes  Abdominal: Soft  He exhibits no distension  There is no tenderness  Musculoskeletal: He exhibits no edema  Neurological: He is alert and oriented to person, place, and time         Additional Data:     Labs:      Results from last 7 days  Lab Units 17  0536 17  0509   WBC Thousand/uL 9 60 5 42   HEMOGLOBIN g/dL 10 6* 9 8*   HEMATOCRIT % 32 5* 29 2*   PLATELETS Thousands/uL 361 255   NEUTROS PCT %  --  67   LYMPHS PCT %  --  21   LYMPHO PCT % 19  --    MONOS PCT %  --  9   MONO PCT MAN % 2*  --    EOS PCT %  --  3   EOSINO PCT MANUAL % 2  -- Results from last 7 days  Lab Units 11/06/17  0536   SODIUM mmol/L 138   POTASSIUM mmol/L 3 9   CHLORIDE mmol/L 101   CO2 mmol/L 28   BUN mg/dL 14   CREATININE mg/dL 0 73   CALCIUM mg/dL 9 3   TOTAL PROTEIN g/dL 7 0   BILIRUBIN TOTAL mg/dL 0 32   ALK PHOS U/L 46   ALT U/L 47   AST U/L 29   GLUCOSE RANDOM mg/dL 93       Results from last 7 days  Lab Units 11/01/17  0537   INR  1 30*       * I Have Reviewed All Lab Data Listed Above  * Additional Pertinent Lab Tests Reviewed: All Labs Within Last 24 Hours Reviewed    Imaging:    Imaging Reports Reviewed Today Include:   Imaging Personally Reviewed by Myself Includes:      Recent Cultures (last 7 days):       Results from last 7 days  Lab Units 11/03/17  0927 11/03/17  0919 11/03/17  0808 11/02/17  0642 11/01/17  1215 11/01/17  1214 11/01/17  1212 10/31/17  1936 10/31/17  1909 10/31/17  1906   BLOOD CULTURE  No Growth at 72 hrs  No Growth at 72 hrs   --  No Growth After 4 Days    Proteus mirabilis*  --   --   --    Proteus mirabilis*  --    Proteus mirabilis*   GRAM STAIN RESULT   --   --   --  Gram negative rods No Polys or Bacteria seen Rare Polys  No bacteria seen 1+ Polys  2+ RBC's  No bacteria seen Gram negative rods No Polys or Bacteria seen Gram negative rods   URINE CULTURE   --   --  No Growth <1000 cfu/mL  --   --   --   --   --   --   --    WOUND CULTURE   --   --   --   --   --   --   --   --  1+ Growth of Proteus mirabilis*  --        Last 24 Hours Medication List:     benztropine 1 mg Oral BID   cefazolin 2,000 mg Intravenous Q8H   clonazePAM 0 5 mg Oral BID   divalproex sodium 1,000 mg Oral Daily With Lunch   divalproex sodium 500 mg Oral Q12H LENO   docusate sodium 100 mg Oral BID   enoxaparin 40 mg Subcutaneous Q24H LENO   fenofibrate 145 mg Oral Daily   levothyroxine 125 mcg Oral HS   risperiDONE 2 mg Oral BID   risperiDONE 4 mg Oral BID   senna 1 tablet Oral HS        Today, Patient Was Seen By: Anna Marie Dumont MD    ** Please Note: This note has been constructed using a voice recognition system   **

## 2017-11-06 NOTE — SOCIAL WORK
Cm reviewed patient during care coordination rounds  Patient is not medically stable for d/c today  Cm met with patient and patient's mother to discuss d/c plan  Cm informed medical team about patient's mother's concern about managing patient's incontinence at home  Per patient's mother she would like to patient home, but was in agreement with Cm checking the cost of IV ATB treatment at home  Cm placed referral for IV ATB pricing  Cm left patient's mother with a list of subacute facilities to review and Cm started [facility] process in case patient's mother is unable to take patient home

## 2017-11-06 NOTE — OCCUPATIONAL THERAPY NOTE
OCCUPATIONAL THERAPY CANCEL NOTE:    ORDERS RECEIVED  CHART REVIEW COMPLETED  PT IN OR THIS AM FOR [address] OUT  WILL CONT TO FOLLOW      Elias Sampson, MOT, OTR/L

## 2017-11-06 NOTE — PLAN OF CARE
Problem: PAIN - ADULT  Goal: Verbalizes/displays adequate comfort level or baseline comfort level  Interventions:  - Encourage patient to monitor pain and request assistance  - Assess pain using appropriate pain scale  - Administer analgesics based on type and severity of pain and evaluate response  - Implement non-pharmacological measures as appropriate and evaluate response  - Consider cultural and social influences on pain and pain management  - Notify physician/advanced practitioner if interventions unsuccessful or patient reports new pain   Outcome: Progressing      Problem: INFECTION - ADULT  Goal: Absence or prevention of progression during hospitalization  INTERVENTIONS:  - Assess and monitor for signs and symptoms of infection  - Monitor lab/diagnostic results  - Monitor all insertion sites, i e  indwelling lines, tubes, and drains  - Monitor endotracheal (as able) and nasal secretions for changes in amount and color  - [place] appropriate cooling/warming therapies per order  - Administer medications as ordered  - Instruct and encourage patient and family to use good hand hygiene technique  - Identify and instruct in appropriate isolation precautions for identified infection/condition   Outcome: Progressing      Problem: SAFETY ADULT  Goal: Patient will remain free of falls  INTERVENTIONS:  - Assess patient frequently for physical needs  -  Identify cognitive and physical deficits and behaviors that affect risk of falls    -  [place] fall precautions as indicated by assessment   - Educate patient/family on patient safety including physical limitations  - Instruct patient to call for assistance with activity based on assessment  - Modify environment to reduce risk of injury  - Consider OT/PT consult to assist with strengthening/mobility    Outcome: Progressing    Goal: Maintain or return to baseline ADL function  INTERVENTIONS:  -  Assess patient's ability to carry out ADLs; assess patient's baseline for ADL function and identify physical deficits which impact ability to perform ADLs (bathing, care of mouth/teeth, toileting, grooming, dressing, etc )  - Assess/evaluate cause of self-care deficits   - Assess range of motion  - Assess patient's mobility; develop plan if impaired  - Assess patient's need for assistive devices and provide as appropriate  - Encourage maximum independence but intervene and supervise when necessary  ¯ Involve family in performance of ADLs  ¯ Assess for home care needs following discharge   ¯ Request OT consult to assist with ADL evaluation and planning for discharge  ¯ Provide patient education as appropriate   Outcome: Progressing    Goal: Maintain or return mobility status to optimal level  INTERVENTIONS:  - Assess patient's baseline mobility status (ambulation, transfers, stairs, etc )    - Identify cognitive and physical deficits and behaviors that affect mobility  - Identify mobility aids required to assist with transfers and/or ambulation (gait belt, sit-to-stand, lift, walker, cane, etc )  - [place] fall precautions as indicated by assessment  - Record patient progress and toleration of activity level on Mobility SBAR; progress patient to next Phase/Stage  - Instruct patient to call for assistance with activity based on assessment  - Request Rehabilitation consult to assist with strengthening/weightbearing, etc    Outcome: Progressing      Problem: DISCHARGE PLANNING  Goal: Discharge to home or other facility with appropriate resources  INTERVENTIONS:  - Identify barriers to discharge w/patient and caregiver  - Arrange for needed discharge resources and transportation as appropriate  - Identify discharge learning needs (meds, wound care, etc )  - Arrange for interpretive services to assist at discharge as needed  - Refer to Case Management Department for coordinating discharge planning if the patient needs post-hospital services based on physician/advanced practitioner order or complex needs related to functional status, cognitive ability, or social support system   Outcome: Progressing      Problem: Knowledge Deficit  Goal: Patient/family/caregiver demonstrates understanding of disease process, treatment plan, medications, and discharge instructions  Complete learning assessment and assess knowledge base  Interventions:  - Provide teaching at level of understanding  - Provide teaching via preferred learning methods   Outcome: Progressing      Problem: Potential for Falls  Goal: Patient will remain free of falls  INTERVENTIONS:  - Assess patient frequently for physical needs  -  Identify cognitive and physical deficits and behaviors that affect risk of falls    -  [place] fall precautions as indicated by assessment   - Educate patient/family on patient safety including physical limitations  - Instruct patient to call for assistance with activity based on assessment  - Modify environment to reduce risk of injury  - Consider OT/PT consult to assist with strengthening/mobility    Outcome: Progressing      Problem: Prexisting or High Potential for Compromised Skin Integrity  Goal: Skin integrity is maintained or improved  INTERVENTIONS:  - Identify patients at risk for skin breakdown  - Assess and monitor skin integrity  - Assess and monitor nutrition and hydration status  - Monitor labs (i e  albumin)  - Assess for incontinence   - Turn and reposition patient  - Assist with mobility/ambulation  - Relieve pressure over bony prominences  - Avoid friction and shearing  - Provide appropriate hygiene as needed including keeping skin clean and dry  - Evaluate need for skin moisturizer/barrier cream  - Collaborate with interdisciplinary team (i e  Nutrition, Rehabilitation, etc )   - Patient/family teaching   Outcome: Progressing      Problem: DISCHARGE PLANNING - CARE MANAGEMENT  Goal: Discharge to post-acute care or home with appropriate resources  INTERVENTIONS:  - Conduct assessment to determine patient/family and health care team treatment goals, and need for post-acute services based on payer coverage, community resources, and patient preferences, and barriers to discharge  - Address psychosocial, clinical, and financial barriers to discharge as identified in assessment in conjunction with the patient/family and health care team  - Arrange appropriate level of post-acute services according to patient's   needs and preference and payer coverage in collaboration with the physician and health care team  - Communicate with and update the patient/family, physician, and health care team regarding progress on the discharge plan  - Arrange appropriate transportation to post-acute venues  -Patient to be evaluated  Patient anticipated for d/c to home      Outcome: Progressing

## 2017-11-06 NOTE — PROGRESS NOTES
Progress Note - Infectious Disease   Monica Severe Devincenzo 23 y o  male MRN: [id number]  Unit/Bed#: OR POOL Encounter: [id number]    Assessment and plan     Gram negative sepsis likely 2/2 deep surgical wound infection sp washout and multiple would vac replacements, likely involving the bone   -Afebrile now   -Blood cultures were positive for Proteus, surveillance cultures from 11/3 are no growth at 48 hours   -Continue Ancef, ok for PICC line, anticipate 6 weeks of antibiotics IV   -Wound vac replacement/ closure per gen surgery this morning       Subjective/Objective     Subjective: Pt was seen and examined this morning after his surgery  He reports that he would like to spend some time with his mother  He reported pain in his back and belly and did not want to cooperate for physical exam      HR:  [] 98  Resp:  [18-20] 19  BP: (127-149)/(67-74) 127/67  SpO2:  [91 %-99 %] 99 %  Temp (24hrs), Av 4 °F (36 9 °C), Min:98 °F (36 7 °C), Max:98 6 °F (37 °C)  Current: Temperature:  (did not get pt vitals, went down to OR)    Physical Exam   Constitutional: He is oriented to person, place, and time  He appears well-developed and well-nourished  No distress  HENT:   Head: Normocephalic and atraumatic  Eyes: Conjunctivae are normal  Right eye exhibits no discharge  Left eye exhibits no discharge  Neck: Neck supple  No JVD present  Cardiovascular: Normal rate and regular rhythm  Pulmonary/Chest:   Pt did not want to take a deep breath   Abdominal: Soft  He exhibits no distension  There is tenderness  There is no rebound and no guarding  Musculoskeletal: He exhibits no edema  Pt was not cooperative for ROM testing in extremities  Neurological: He is alert and oriented to person, place, and time  No cranial nerve deficit  Skin: Skin is warm and dry  No rash noted  He is not diaphoretic  No erythema         Invasive Devices     Peripheral Intravenous Line            Peripheral IV 17 Left Hand 1 day          Drain            Negative Pressure Wound Therapy (V A C ) Back Lower 4 days    External Urinary Catheter Small 2 days                Lab, Imaging and other studies: I have personally reviewed pertinent reports

## 2017-11-06 NOTE — PROGRESS NOTES
Progress Note - Neurosurgery   Starla Lindsey 23 y o  male MRN: [id number]  Unit/Bed#: [facility] 919-01 Encounter: [id number]  Assessment:  1  POD#5: incision and drainage (I&D) spine  2  Proteus sepsis  3  Wound dehiscence  4  Wound infection  5  Herniation of lumbar intervertebral disc s/p hemilaminotomy and foraminotmy L3/4, L4/5 (10/17)  6  Hypothyroid  7  Bipolar disorder  8  Autism     Plan:  22 yo M s/p washout of lumbar incision following hemilaminotomy and foraminotomy on 10/17  · Exam: limited secondary to poor participation  Baseline right foot drop and sensory changes  · ID following: appreciate recommendations  · Tissue cultures x2 + proteus  · Blood cultures on 10/31 + proteus (ancef started)  · Blood cultures on 11/2 - + proteus   · Blood cultures on 11/3 - negative to date- continue to follow  · No fevers in past 24 hours  · WBC 9 6 from 5 42  · Continue Cefazolin through tentatively 12/13 (6 weeks) PICC line placed today  · Continue to follow cultures, VS and WBC  · Gen-surg following:  · OR today for definitive closure  Drain in place  · Continue medical management by primary team, SLIM  · DVT ppx: SCDs, Lovenox  · Pain control: deferred to primary team  Patient rates pain 5/10 at rest, 10/10 with movement   · CTA PE study incomplete due to inadequate dye, no  Thrombus in main artery- remains tachycardic, saturating 91-99% on room air in the past 24 hours  · Complaint of generalized abdominal pain with generalized tenderness  Continue bowel regimen  · Encourage IS and taking deep breaths  · Continue to follow  Call with questions or concerns  Subjective/Objective   Chief Complaint: " Let me sleep "    Subjective: ROS limited due to patient's participation  Admits to 5/10 midline back pain at rest, increases to 10/10 with movement  States his abdomen "hurts all over"  Admits to chronic right leg pain and weakness, present since laminotomy/formainotomy procedure   Denies fever/chills  Objective: lying in bed, resting  I/O       11/04 0701 - 11/05 0700 11/05 0701 - 11/06 0700 11/06 0701 - 11/07 0700    P  O  1400 840 0    I V  (mL/kg) 40 (0 3) 585 8 (4) 800 (5 4)    IV Piggyback 50 50     Total Intake(mL/kg) 1490 (10 1) 1475 8 (10) 800 (5 4)    Urine (mL/kg/hr) 2850 (0 8) 2175 (0 6) 400 (0 5)    Drains 250 (0 1) 0 (0) 0 (0)    Stool 0 (0)      Total Output 3100 2175 400    Net -1610 -699 2 +400           Unmeasured Urine Occurrence 1 x      Unmeasured Stool Occurrence 1 x            Invasive Devices     Peripheral Intravenous Line            Peripheral IV 11/04/17 Left Hand 1 day          Drain            External Urinary Catheter Small 2 days    Closed/Suction Drain Midline Back 15 Fr  less than 1 day                Physical Exam:  Vitals: Blood pressure 134/80, pulse 103, temperature 97 9 °F (36 6 °C), temperature source Oral, resp  rate 18, height 6' 4" (1 93 m), weight (!) 147 kg (324 lb), SpO2 92 %  ,Body mass index is 39 44 kg/m²  General appearance: alert, appears stated age, cooperative and no distress  Head: Normocephalic, without obvious abnormality, atraumatic  Eyes: EOMI  Back: posterior SAÚL drain in place from lumbar wound  Lungs: non labored breathing  Heart: tachycardia  Neurologic:   Mental status: Alert, oriented, poor attention span, falls asleep between conversations  Cranial nerves: grossly intact (Cranial nerves II-XII)  Sensory: normal to LT in b/l UE and b/l LE with exception to decrease along anterior right ankle/foot  Motor: refuses to participate in motor exam and "use his back muscles"  5-/5 , 4/5 DF/PF on left, 4/5 DF on right, wiggles toes  Reflexes: 1+ in b/l patellars, no clonus  Extremities: +peyton's sign b/l, b/l calf tenderness to palpation, SCDs in place   Non-erythematous, no swelling      Lab Results:    Results from last 7 days  Lab Units 11/06/17  0536 11/04/17  0509 11/03/17  0455 11/02/17  0545   WBC Thousand/uL 9 60 5 42 5 00 5  23   HEMOGLOBIN g/dL 10 6* 9 8* 9 9* 10 4*   HEMATOCRIT % 32 5* 29 2* 30 4* 30 9*   PLATELETS Thousands/uL 361 255 214 210   NEUTROS PCT %  --  67 66 69   MONOS PCT %  --  9 14* 14*   MONO PCT MAN % 2*  --   --   --        Results from last 7 days  Lab Units 11/06/17  0536 11/04/17  0509 11/03/17  0455  10/31/17  1935   SODIUM mmol/L 138 135* 137  < > 132*   POTASSIUM mmol/L 3 9 3 6 3 5  < > 3 7   CHLORIDE mmol/L 101 99* 101  < > 100   CO2 mmol/L 28 30 31  < > 22   BUN mg/dL 14 9 8  < > 13   CREATININE mg/dL 0 73 0 58* 0 72  < > 1 21   CALCIUM mg/dL 9 3 8 9 8 9  < > 9 4   TOTAL PROTEIN g/dL 7 0  --   --   --  7 6   BILIRUBIN TOTAL mg/dL 0 32  --   --   --  0 62   ALK PHOS U/L 46  --   --   --  34*   ALT U/L 47  --   --   --  42   AST U/L 29  --   --   --  53*   GLUCOSE RANDOM mg/dL 93 144* 117  < > 117   < > = values in this interval not displayed  Results from last 7 days  Lab Units 11/01/17  0537   INR  1 30*   PTT seconds 44*       Imaging Studies: I have personally reviewed pertinent reports  and I have personally reviewed pertinent films in PACS    EKG, Pathology, and Other Studies: I have personally reviewed pertinent reports        VTE Pharmacologic Prophylaxis: Enoxaparin (Lovenox)    VTE Mechanical Prophylaxis: sequential compression device and foot pump applied

## 2017-11-06 NOTE — H&P (VIEW-ONLY)
Consultation - General Surgery   Job Gary Bradley 23 y o  male MRN: [id number]  Unit/Bed#: [facility]3 344-01 Encounter: [id number]    Assessment/Plan     Assessment:  22 yo M with serous drainage and wound dehiscence from an L3-L5 microdiskectomy on 10/17 with neurosurgery  Plan:  OR tomorrow w/ Neurosurgery for washout  General Surgery requested to be available for possible vac application in the OR & subsequent management  Continue antibiotics  PRN pain control    History of Present Illness     HPI:  Opal Garcia is a 23 y o  male with history of autism and developmental delay who presents with post op wound  He underwent L3-4 and L4-5 open micro diskectomy on 10/17 for cauda equina syndrome  He has been recovering well at home w/ improvement of neurologic symptoms however presented to clinic yesterday with drainage from the most inferior aspect of his wound  In clinic the wound was probed and a large amount of serous fluid was drained  He was given a 1-wk course of clindamycin and referred to wound clinic with Dr Alfredo Miller, who saw him today  From this appointment he was instructed to f/u on Friday w/ Dr Elana Rogel unless fevers developed   Once the patient returned home he had a fever to 103, so he came to the hospital      Inpatient consult to Acute Care Surgery  Date/Time: 10/31/2017 8:16 PM  Performed by: Lenore Davenport  Authorized by: Alan Hernandez           Review of Systems  As 12 point review of systems was completed and is negative unless noted otherwise in HPI  Historical Information   Past Medical History:   Diagnosis Date    Autism     Bipolar 1 disorder ([address] )     Disease of thyroid gland     Hyperlipidemia     Lumbar disc disease     Psychiatric disorder     Compulsive Disorder    Sleep apnea     Urinary incontinence      Past Surgical History:   Procedure Laterality Date    COLONOSCOPY      [place] LAMNOTMY INCL W/DCMPRSN NRV ROOT 1 INTRSPC LUMBR Bilateral 10/17/2017    Procedure: LEFT L3/4 AND RIGHT L4/5 MICRODISCECTOMIES, HEMILAMINECTOMIES; POSSIBLE EPIDURAL STEROID INJECTIONS;  Surgeon: Benjie Grossman MD;  Location: [facility];  Service: Neurosurgery    WISDOM TOOTH EXTRACTION       Social History   History   Alcohol Use No     History   Drug Use No     History   Smoking Status    Never Smoker   Smokeless Tobacco    Never Used     Family History: non-contributory    Meds/Allergies   all current active meds have been reviewed and current meds:   Current Facility-Administered Medications   Medication Dose Route Frequency    acetaminophen (TYLENOL) tablet 650 mg  650 mg Oral Q6H PRN    benztropine (COGENTIN) tablet 1 mg  1 mg Oral BID    cefepime (MAXIPIME) 2,000 mg in dextrose 5 % 50 mL IVPB  2,000 mg Intravenous Q12H    clonazePAM (KlonoPIN) tablet 0 5 mg  0 5 mg Oral BID    [START ON 11/1/2017] divalproex sodium (DEPAKOTE) EC tablet 1,000 mg  1,000 mg Oral Daily    divalproex sodium (DEPAKOTE) EC tablet 500 mg  500 mg Oral Q12H [address]    [START ON 11/1/2017] fenofibrate (TRICOR) tablet 145 mg  145 mg Oral Daily    influenza inactivated quadrivalent vaccine (FLULAVAL) IM injection 0 5 mL  0 5 mL Intramuscular Prior to discharge    levothyroxine tablet 125 mcg  125 mcg Oral HS    methocarbamol (ROBAXIN) tablet 500 mg  500 mg Oral Q6H PRN    ondansetron (ZOFRAN) injection 4 mg  4 mg Intravenous Q6H PRN    oxyCODONE (ROXICODONE) IR tablet 5 mg  5 mg Oral Q4H PRN    risperiDONE (RisperDAL) tablet 2 mg  2 mg Oral BID    risperiDONE (RisperDAL) tablet 4 mg  4 mg Oral BID    senna (SENOKOT) tablet 8 6 mg  1 tablet Oral HS PRN    sodium chloride 0 9 % bolus 1,000 mL  1,000 mL Intravenous Once    sodium chloride 0 9 % bolus 2,000 mL  2,000 mL Intravenous Once    sodium chloride 0 9 % infusion  125 mL/hr Intravenous Continuous    vancomycin (VANCOCIN) 2,000 mg in sodium chloride 0 9 % 500 mL IVPB  2,000 mg Intravenous Q12H     Allergies   Allergen Reactions    Allegra [Fexofenadine]     Aspirin     Ciprofloxacin     Erythromycin     Motrin [Ibuprofen]     Penicillins     Pentothal [Thiopental]     Sulfa Antibiotics        Objective   First Vitals:   Blood Pressure: 133/60 (10/31/17 1810)  Pulse: (!) 143 (10/31/17 1810)  Temperature: (!) 103 °F (39 4 °C) (10/31/17 1810)  Temp Source: Oral (10/31/17 1810)  Respirations: 22 (10/31/17 1810)  Height: 6' 4" (193 cm) (10/31/17 1805)  Weight - Scale: (!) 147 kg (324 lb) (10/31/17 1805)  SpO2: 96 % (10/31/17 1810)    Current Vitals:   Blood Pressure: 133/60 (10/31/17 1810)  Pulse: (!) 143 (10/31/17 1810)  Temperature: (!) 103 °F (39 4 °C) (10/31/17 1810)  Temp Source: Oral (10/31/17 1810)  Respirations: 22 (10/31/17 1810)  Height: 6' 4" (193 cm) (10/31/17 1805)  Weight - Scale: (!) 147 kg (324 lb) (10/31/17 1805)  SpO2: 96 % (10/31/17 1810)    No intake or output data in the 24 hours ending 10/31/17 2016    Invasive Devices     Peripheral Intravenous Line            Peripheral IV 10/31/17 Right Arm less than 1 day                Physical Exam    Gen: A&O, NAD  Cardio: RRR  Lungs: CTAB  Abd: Obese, soft nontender  Back: 1 cm wound dehiscence at the most inferior part of lumbar incision  Mild amount of serosanginous drainage on dressing  Minimal surrounding erythema, no foul smell  No visible exposed bone but wound was not probed   Remainder of incision is healing well  (photo below)  Ext: Warm, dry  Vasc: Intact          Lab Results:   CBC:   Lab Results   Component Value Date    WBC 7 73 10/31/2017    HGB 12 6 10/31/2017    HCT 36 7 10/31/2017    MCV 78 (L) 10/31/2017     10/31/2017    MCH 26 9 10/31/2017    MCHC 34 3 10/31/2017    RDW 13 3 10/31/2017    MPV 9 3 10/31/2017    NRBC 0 10/31/2017   , CMP:   Lab Results   Component Value Date     (L) 10/31/2017    K 3 7 10/31/2017     10/31/2017    CO2 22 10/31/2017    ANIONGAP 10 10/31/2017    BUN 13 10/31/2017    CREATININE 1 21 10/31/2017    GLUCOSE 117 10/31/2017 CALCIUM 9 4 10/31/2017    AST 53 (H) 10/31/2017    ALT 42 10/31/2017    ALKPHOS 34 (L) 10/31/2017    PROT 7 6 10/31/2017    ALBUMIN 3 2 (L) 10/31/2017    BILITOT 0 62 10/31/2017    EGFR 83 10/31/2017     Imaging: I have personally reviewed pertinent reports  EKG, Pathology, and Other Studies: I have personally reviewed pertinent reports  Counseling / Coordination of Care  Total floor / unit time spent today 30 minutes  Greater than 50% of total time was spent with the patient and / or family counseling and / or coordination of care

## 2017-11-06 NOTE — PROGRESS NOTES
Progress Note - General Surgery   [facility] MRN: [id number]  Unit/Bed#: [facility] 919-01 Encounter: [id number]    Assessment:  24 yo M with hx of L3-5 [place] laminectomies with wound infection    - Wound & blood cx: Proteus  - No fever spikes    Plan:  IV Ancef per ID  Repeat blood cx 11/3 negative- will follow  OR today for Vac change, possible closure  NPO for procedure  PRN pain control      Subjective/Objective   Chief Complaint: None    Subjective: No complaints this AM  Off NC O2  Wound vac in place  Objective:     Blood pressure 127/67, pulse 98, temperature 98 6 °F (37 °C), temperature source Oral, resp  rate 19, height 6' 4" (1 93 m), weight (!) 147 kg (324 lb), SpO2 99 %  ,Body mass index is 39 44 kg/m²        Intake/Output Summary (Last 24 hours) at 11/06/17 0610  Last data filed at 11/06/17 0438   Gross per 24 hour   Intake          1675 83 ml   Output             1975 ml   Net          -299 17 ml       Invasive Devices     Peripheral Intravenous Line            Peripheral IV 11/04/17 Left Hand 1 day          Drain            Negative Pressure Wound Therapy (V A C ) Back Lower 4 days    External Urinary Catheter Small 2 days            Physical Exam:   Gen: A&O, NAD  Cardio: RRR  Lungs: CTAB  Back: Wound vac in place, good seal no leak, serosanginous output      Lab, Imaging and other studies:CBC: No results found for: WBC, HGB, HCT, MCV, PLT, ADJUSTEDWBC, MCH, MCHC, RDW, MPV, NRBC, CMP: No results found for: NA, K, CL, CO2, ANIONGAP, BUN, CREATININE, GLUCOSE, CALCIUM, AST, ALT, ALKPHOS, PROT, ALBUMIN, BILITOT, EGFR  VTE Pharmacologic Prophylaxis: Enoxaparin (Lovenox)  VTE Mechanical Prophylaxis: sequential compression device

## 2017-11-06 NOTE — ANESTHESIA PREPROCEDURE EVALUATION
Review of Systems/Medical History  Patient summary reviewed  Chart reviewed  No history of anesthetic complications     Cardiovascular  Exercise tolerance: good,  Hyperlipidemia,    Pulmonary  Sleep apnea , ,        GI/Hepatic            Endo/Other  History of thyroid disease , hypothyroidism,      GYN       Hematology   Musculoskeletal       Neurology   Psychology   Psychiatric history, Depression , bipolar disorder,            Physical Exam    Airway    Mallampati score: III  TM Distance: >3 FB  Neck ROM: full     Dental   No notable dental hx     Cardiovascular      Pulmonary      Other Findings        Anesthesia Plan  ASA Score- 3       Anesthesia Type- general with ASA Monitors  Additional Monitors:   Airway Plan: ETT  Induction- intravenous  Informed Consent- Anesthetic plan and risks discussed with patient and mother  I personally reviewed this patient with the CRNA  Discussed and agreed on the Anesthesia Plan with the CRNA  Sherre Soulier

## 2017-11-06 NOTE — PROGRESS NOTES
Acute Care Surgery   Post-Op Check Progress Note   Opal Garcia 23 y o  male MRN: [id number]  Unit/Bed#: [facility] 919-01 Encounter: [id number]    Assessment and Plan:    31-year-old male with a lumbar spine wound status post washout and closure of lumbar spine wound over SAÚL drain    - Diet as tolerated  - PT and OT evaluation and treatment as indicated  - Continue SAÚL drain to bulb suction    - Continue oral analgesia; adjustments made as follows:  Robaxin increased to 750 mg and to be scheduled every 6 hours, and Tylenol should be 975 mg scheduled every 8 hours  The adjustments in his pain medication regimen were discussed with a SLIM attending    - Encouraged incentive spirometry use  Subjective/Objective     Subjective:  Patient is feeling okay, but complains of pain in his back  He describes the pain as muscle spasms that shoots down his legs whenever he moves  Objective:     Blood pressure 117/61, pulse (!) 107, temperature 98 6 °F (37 °C), resp  rate 20, height 6' 4" (1 93 m), weight (!) 147 kg (324 lb), SpO2 93 %  ,Body mass index is 39 44 kg/m²  Intake/Output Summary (Last 24 hours) at 11/06/17 1555  Last data filed at 11/06/17 1100   Gross per 24 hour   Intake          1795 83 ml   Output             1775 ml   Net            20 83 ml       Invasive Devices     Peripherally Inserted Central Catheter Line            PICC Line 59/49/55 Right Basilic less than 1 day          Peripheral Intravenous Line            Peripheral IV 11/04/17 Left Hand 2 days          Drain            External Urinary Catheter Small 2 days    Closed/Suction Drain Midline Back 15 Fr  less than 1 day                Physical Exam:    GENERAL APPEARANCE: Patient in no acute distress  HEENT: NCAT; PERRL, EOMs intact; Mucous membranes moist  ABD: NABS; soft; non-distended; non-tender  BACK:  SAÚL drain with minimal bloody output; the VAC dressing is clean and intact   Patient has moderate tenderness and pain with movement  EXT: +2 pulses bilaterally upper & lower extremities; no clubbing/cyanosis/edema  NEURO: GCS 15; no focal neurologic deficits; neurovascularly intact  SKIN: Warm, dry and well perfused; no rash; no jaundice                  Richard Harris PA-C  11/6/2017 3:56 PM

## 2017-11-07 ENCOUNTER — APPOINTMENT (INPATIENT)
Dept: RADIOLOGY | Facility: HOSPITAL | Age: 27
DRG: 856 | End: 2017-11-07
Payer: MEDICARE

## 2017-11-07 LAB
ALBUMIN SERPL BCP-MCNC: 2.6 G/DL (ref 3.5–5)
ALP SERPL-CCNC: 39 U/L (ref 46–116)
ALT SERPL W P-5'-P-CCNC: 38 U/L (ref 12–78)
ANION GAP SERPL CALCULATED.3IONS-SCNC: 8 MMOL/L (ref 4–13)
ANISOCYTOSIS BLD QL SMEAR: PRESENT
AST SERPL W P-5'-P-CCNC: 22 U/L (ref 5–45)
BACTERIA BLD CULT: NORMAL
BASOPHILS # BLD MANUAL: 0 THOUSAND/UL (ref 0–0.1)
BASOPHILS NFR MAR MANUAL: 0 % (ref 0–1)
BILIRUB SERPL-MCNC: 0.35 MG/DL (ref 0.2–1)
BUN SERPL-MCNC: 12 MG/DL (ref 5–25)
CALCIUM SERPL-MCNC: 9.2 MG/DL (ref 8.3–10.1)
CHLORIDE SERPL-SCNC: 100 MMOL/L (ref 100–108)
CO2 SERPL-SCNC: 29 MMOL/L (ref 21–32)
CREAT SERPL-MCNC: 0.58 MG/DL (ref 0.6–1.3)
EOSINOPHIL # BLD MANUAL: 0.19 THOUSAND/UL (ref 0–0.4)
EOSINOPHIL NFR BLD MANUAL: 2 % (ref 0–6)
ERYTHROCYTE [DISTWIDTH] IN BLOOD BY AUTOMATED COUNT: 13.7 % (ref 11.6–15.1)
GFR SERPL CREATININE-BSD FRML MDRD: 142 ML/MIN/1.73SQ M
GLUCOSE SERPL-MCNC: 94 MG/DL (ref 65–140)
HCT VFR BLD AUTO: 31.2 % (ref 36.5–49.3)
HGB BLD-MCNC: 10.4 G/DL (ref 12–17)
LIPASE SERPL-CCNC: 672 U/L (ref 73–393)
LYMPHOCYTES # BLD AUTO: 1.35 THOUSAND/UL (ref 0.6–4.47)
LYMPHOCYTES # BLD AUTO: 14 % (ref 14–44)
MCH RBC QN AUTO: 26.6 PG (ref 26.8–34.3)
MCHC RBC AUTO-ENTMCNC: 33.3 G/DL (ref 31.4–37.4)
MCV RBC AUTO: 80 FL (ref 82–98)
METAMYELOCYTES NFR BLD MANUAL: 2 % (ref 0–1)
MONOCYTES # BLD AUTO: 0.19 THOUSAND/UL (ref 0–1.22)
MONOCYTES NFR BLD: 2 % (ref 4–12)
MYELOCYTES NFR BLD MANUAL: 1 % (ref 0–1)
NEUTROPHILS # BLD MANUAL: 7.24 THOUSAND/UL (ref 1.85–7.62)
NEUTS BAND NFR BLD MANUAL: 3 % (ref 0–8)
NEUTS SEG NFR BLD AUTO: 72 % (ref 43–75)
NRBC BLD AUTO-RTO: 0 /100 WBCS
PLATELET # BLD AUTO: 384 THOUSANDS/UL (ref 149–390)
PLATELET BLD QL SMEAR: ADEQUATE
PMV BLD AUTO: 9.3 FL (ref 8.9–12.7)
POLYCHROMASIA BLD QL SMEAR: PRESENT
POTASSIUM SERPL-SCNC: 3.7 MMOL/L (ref 3.5–5.3)
PROT SERPL-MCNC: 7 G/DL (ref 6.4–8.2)
RBC # BLD AUTO: 3.91 MILLION/UL (ref 3.88–5.62)
RBC MORPH BLD: PRESENT
SODIUM SERPL-SCNC: 137 MMOL/L (ref 136–145)
VARIANT LYMPHS # BLD AUTO: 4 %
WBC # BLD AUTO: 9.65 THOUSAND/UL (ref 4.31–10.16)

## 2017-11-07 PROCEDURE — 85007 BL SMEAR W/DIFF WBC COUNT: CPT | Performed by: INTERNAL MEDICINE

## 2017-11-07 PROCEDURE — 80053 COMPREHEN METABOLIC PANEL: CPT | Performed by: INTERNAL MEDICINE

## 2017-11-07 PROCEDURE — G8987 SELF CARE CURRENT STATUS: HCPCS

## 2017-11-07 PROCEDURE — 85027 COMPLETE CBC AUTOMATED: CPT | Performed by: INTERNAL MEDICINE

## 2017-11-07 PROCEDURE — G8978 MOBILITY CURRENT STATUS: HCPCS

## 2017-11-07 PROCEDURE — G8979 MOBILITY GOAL STATUS: HCPCS

## 2017-11-07 PROCEDURE — 97163 PT EVAL HIGH COMPLEX 45 MIN: CPT

## 2017-11-07 PROCEDURE — 83690 ASSAY OF LIPASE: CPT | Performed by: INTERNAL MEDICINE

## 2017-11-07 PROCEDURE — G8988 SELF CARE GOAL STATUS: HCPCS

## 2017-11-07 PROCEDURE — 97167 OT EVAL HIGH COMPLEX 60 MIN: CPT

## 2017-11-07 PROCEDURE — 74177 CT ABD & PELVIS W/CONTRAST: CPT

## 2017-11-07 RX ADMIN — OXYCODONE HYDROCHLORIDE 10 MG: 10 TABLET ORAL at 04:37

## 2017-11-07 RX ADMIN — METHOCARBAMOL 750 MG: 750 TABLET ORAL at 05:15

## 2017-11-07 RX ADMIN — BENZTROPINE MESYLATE 1 MG: 1 TABLET ORAL at 09:39

## 2017-11-07 RX ADMIN — OXYCODONE HYDROCHLORIDE 10 MG: 10 TABLET ORAL at 18:14

## 2017-11-07 RX ADMIN — CEFAZOLIN SODIUM 2000 MG: 2 SOLUTION INTRAVENOUS at 20:59

## 2017-11-07 RX ADMIN — ACETAMINOPHEN 975 MG: 325 TABLET, FILM COATED ORAL at 21:07

## 2017-11-07 RX ADMIN — METHOCARBAMOL 750 MG: 750 TABLET ORAL at 11:58

## 2017-11-07 RX ADMIN — CLONAZEPAM 0.5 MG: 0.5 TABLET ORAL at 18:14

## 2017-11-07 RX ADMIN — METHOCARBAMOL 750 MG: 750 TABLET ORAL at 18:14

## 2017-11-07 RX ADMIN — RISPERIDONE 4 MG: 1 TABLET ORAL at 09:38

## 2017-11-07 RX ADMIN — DOCUSATE SODIUM 100 MG: 100 CAPSULE, LIQUID FILLED ORAL at 09:38

## 2017-11-07 RX ADMIN — IOHEXOL 100 ML: 350 INJECTION, SOLUTION INTRAVENOUS at 16:31

## 2017-11-07 RX ADMIN — CEFAZOLIN SODIUM 2000 MG: 2 SOLUTION INTRAVENOUS at 04:37

## 2017-11-07 RX ADMIN — DIVALPROEX SODIUM 500 MG: 500 TABLET, DELAYED RELEASE ORAL at 09:38

## 2017-11-07 RX ADMIN — FENOFIBRATE 145 MG: 145 TABLET ORAL at 09:39

## 2017-11-07 RX ADMIN — OXYCODONE HYDROCHLORIDE 10 MG: 10 TABLET ORAL at 11:59

## 2017-11-07 RX ADMIN — RISPERIDONE 2 MG: 1 TABLET ORAL at 18:14

## 2017-11-07 RX ADMIN — ACETAMINOPHEN 975 MG: 325 TABLET, FILM COATED ORAL at 05:15

## 2017-11-07 RX ADMIN — DIVALPROEX SODIUM 500 MG: 500 TABLET, DELAYED RELEASE ORAL at 21:07

## 2017-11-07 RX ADMIN — SENNOSIDES 8.6 MG: 8.6 TABLET, FILM COATED ORAL at 21:07

## 2017-11-07 RX ADMIN — DIVALPROEX SODIUM 1000 MG: 500 TABLET, DELAYED RELEASE ORAL at 11:59

## 2017-11-07 RX ADMIN — RISPERIDONE 4 MG: 1 TABLET ORAL at 18:14

## 2017-11-07 RX ADMIN — CLONAZEPAM 0.5 MG: 0.5 TABLET ORAL at 09:38

## 2017-11-07 RX ADMIN — RISPERIDONE 2 MG: 1 TABLET ORAL at 09:39

## 2017-11-07 RX ADMIN — BENZTROPINE MESYLATE 1 MG: 1 TABLET ORAL at 18:14

## 2017-11-07 RX ADMIN — CEFAZOLIN SODIUM 2000 MG: 2 SOLUTION INTRAVENOUS at 11:58

## 2017-11-07 RX ADMIN — ACETAMINOPHEN 975 MG: 325 TABLET, FILM COATED ORAL at 14:51

## 2017-11-07 NOTE — PROGRESS NOTES
Progress Note - Collin Bradley 23 y o  male MRN: [id number]    Unit/Bed#: [facility] 919-01 Encounter: [id number]        * Sepsis [facility])   Assessment & Plan    · Secondary to Proteus  Sensitive to Ancef  · Temp spike last night noted  Will discuss with ID        Wound infection   Assessment & Plan    · Surgical follow-up appreciated  · Wound VAC per surgery        Hypothyroid   Assessment & Plan    · Chronic and stable continue treatment          Pharmacologic: Enoxaparin (Lovenox)  Mechanical VTE Prophylaxis in Place: Yes    Patient Centered Rounds: I have evaluated patient without nursing staff present due to Nurse in another room doing wound care  Case discussed in hallway    Discussions with Specialists or Other Care Team Provider:     Education and Discussions with Family / Patient:  Call placed to patient's mother masses left    Time Spent for Care: 30 minutes  More than 50% of total time spent on counseling and coordination of care as described above  Current Length of Stay: 7 day(s)    Current Patient Status: Inpatient   Certification Statement: The patient will continue to require additional inpatient hospital stay due to PT eval for placement    Discharge Plan / Estimated Discharge Date: possible 24-48 hours      Code Status: Level 1 - Full Code      Subjective:   I am fine    Objective:     Vitals:   Temp (24hrs), Av 1 °F (37 3 °C), Min:97 9 °F (36 6 °C), Max:101 8 °F (38 8 °C)    HR:  [] 91  Resp:  [14-20] 18  BP: (117-154)/() 128/76  SpO2:  [91 %-97 %] 92 %  Body mass index is 39 44 kg/m²  Input and Output Summary (last 24 hours): Intake/Output Summary (Last 24 hours) at 17 0953  Last data filed at 17 0501   Gross per 24 hour   Intake             1060 ml   Output             2820 ml   Net            -1760 ml       Physical Exam:     Physical Exam   Constitutional: No distress  Cardiovascular: Normal rate  Exam reveals no gallop and no friction rub  No murmur heard  Pulmonary/Chest: Effort normal  No respiratory distress  He has no wheezes  He has no rales  Abdominal: Soft  He exhibits no distension  There is no tenderness  There is no rebound and no guarding  Skin: He is not diaphoretic  Additional Data:     Labs:      Results from last 7 days  Lab Units 11/06/17  0536 11/04/17  0509   WBC Thousand/uL 9 60 5 42   HEMOGLOBIN g/dL 10 6* 9 8*   HEMATOCRIT % 32 5* 29 2*   PLATELETS Thousands/uL 361 255   NEUTROS PCT %  --  67   LYMPHS PCT %  --  21   LYMPHO PCT % 19  --    MONOS PCT %  --  9   MONO PCT MAN % 2*  --    EOS PCT %  --  3   EOSINO PCT MANUAL % 2  --        Results from last 7 days  Lab Units 11/06/17  0536   SODIUM mmol/L 138   POTASSIUM mmol/L 3 9   CHLORIDE mmol/L 101   CO2 mmol/L 28   BUN mg/dL 14   CREATININE mg/dL 0 73   CALCIUM mg/dL 9 3   TOTAL PROTEIN g/dL 7 0   BILIRUBIN TOTAL mg/dL 0 32   ALK PHOS U/L 46   ALT U/L 47   AST U/L 29   GLUCOSE RANDOM mg/dL 93       Results from last 7 days  Lab Units 11/01/17  0537   INR  1 30*         Recent Cultures (last 7 days):       Results from last 7 days  Lab Units 11/03/17  0927 11/03/17  0919 11/03/17  0808 11/02/17  0642 11/01/17  1215 11/01/17  1214 11/01/17  1212 10/31/17  1936 10/31/17  1909 10/31/17  1906   BLOOD CULTURE  No Growth at 72 hrs  No Growth at 72 hrs   --  No Growth After 5 Days    Proteus mirabilis*  --   --   --    Proteus mirabilis*  --    Proteus mirabilis*   GRAM STAIN RESULT   --   --   --  Gram negative rods No Polys or Bacteria seen Rare Polys  No bacteria seen 1+ Polys  2+ RBC's  No bacteria seen Gram negative rods No Polys or Bacteria seen Gram negative rods   URINE CULTURE   --   --  No Growth <1000 cfu/mL  --   --   --   --   --   --   --    WOUND CULTURE   --   --   --   --   --   --   --   --  1+ Growth of Proteus mirabilis*  --        Last 24 Hours Medication List:     acetaminophen 975 mg Oral Q8H [address]   benztropine 1 mg Oral BID   cefazolin 2,000 mg Intravenous Q8H   clonazePAM 0 5 mg Oral BID   divalproex sodium 1,000 mg Oral Daily With Lunch   divalproex sodium 500 mg Oral Q12H LENO   docusate sodium 100 mg Oral BID   enoxaparin 40 mg Subcutaneous Q24H LENO   fenofibrate 145 mg Oral Daily   levothyroxine 125 mcg Oral HS   methocarbamol 750 mg Oral Q6H LENO   risperiDONE 2 mg Oral BID   risperiDONE 4 mg Oral BID   senna 1 tablet Oral HS        Today, Patient Was Seen By: Jose Maria Andino MD    ** Please Note: Dragon 360 Dictation voice to text software may have been used in the creation of this document   **

## 2017-11-07 NOTE — PROGRESS NOTES
Progress Note - General Surgery   Soledad Bradley 23 y o  male MRN: [id number]  Unit/Bed#: [facility] 919-01 Encounter: [id number]    Assessment:  24 yo M w/  a lumbar spine wound status post washout and closure of lumbar spine wound over SAÚL drain, POD 1    - Repeat blood cx remain negative  - Fever spike to 101 7 @ 1900 last night    Plan:  PRN pain control w/ Robaxin, scheduled Tylenol  Monitor fever curve  OOB, ambulation, PT/OT  Monitor SAÚL drain output  Continue IV Ancef per ID, needs 6 wks (through 12/13)  F/U CM for dispo planning    Subjective/Objective   Chief Complaint: None    Subjective: No complaints this AM  States he's not in too much pain  Objective:     Blood pressure 130/69, pulse (!) 106, temperature 100 3 °F (37 9 °C), temperature source Oral, resp  rate 20, height 6' 4" (1 93 m), weight (!) 147 kg (324 lb), SpO2 91 %  ,Body mass index is 39 44 kg/m²  Intake/Output Summary (Last 24 hours) at 11/07/17 0520  Last data filed at 11/07/17 0501   Gross per 24 hour   Intake             1760 ml   Output             3110 ml   Net            -1350 ml       Invasive Devices     Peripherally Inserted Central Catheter Line            PICC Line 05/03/27 Right Basilic less than 1 day          Peripheral Intravenous Line            Peripheral IV 11/04/17 Left Hand 2 days          Drain            External Urinary Catheter Small 3 days    Closed/Suction Drain Midline Back 15 Fr  less than 1 day                Physical Exam:   Gen: A&O, NAD  Cardio: RRR  Lungs: CTAB  Back: Meplex dressing to lumbar spine is c/d/i  SAÚL x 1 w/ scant amount of sanginous drainage   10 cc since placement    Lab, Imaging and other studies:  CBC:   Lab Results   Component Value Date    WBC 9 60 11/06/2017    HGB 10 6 (L) 11/06/2017    HCT 32 5 (L) 11/06/2017    MCV 80 (L) 11/06/2017     11/06/2017    MCH 26 2 (L) 11/06/2017    MCHC 32 6 11/06/2017    RDW 13 7 11/06/2017    MPV 9 9 11/06/2017    NRBC 0 11/06/2017   , CMP:   Lab Results   Component Value Date     11/06/2017    K 3 9 11/06/2017     11/06/2017    CO2 28 11/06/2017    ANIONGAP 9 11/06/2017    BUN 14 11/06/2017    CREATININE 0 73 11/06/2017    GLUCOSE 93 11/06/2017    CALCIUM 9 3 11/06/2017    AST 29 11/06/2017    ALT 47 11/06/2017    ALKPHOS 46 11/06/2017    PROT 7 0 11/06/2017    ALBUMIN 2 5 (L) 11/06/2017    BILITOT 0 32 11/06/2017    EGFR 129 11/06/2017     VTE Pharmacologic Prophylaxis: Enoxaparin (Lovenox)  VTE Mechanical Prophylaxis: sequential compression device

## 2017-11-07 NOTE — CASE MANAGEMENT
Continued Stay Review    Date: 11/4    Vital Signs:     Medications:   Scheduled Meds:   acetaminophen 975 mg Oral Q8H [address]   benztropine 1 mg Oral BID   cefazolin 2,000 mg Intravenous Q8H   clonazePAM 0 5 mg Oral BID   divalproex sodium 1,000 mg Oral Daily With Lunch   divalproex sodium 500 mg Oral Q12H LENO   docusate sodium 100 mg Oral BID   enoxaparin 40 mg Subcutaneous Q24H LENO   fenofibrate 145 mg Oral Daily   levothyroxine 125 mcg Oral HS   methocarbamol 750 mg Oral Q6H LENO   risperiDONE 2 mg Oral BID   risperiDONE 4 mg Oral BID   senna 1 tablet Oral HS     Continuous Infusions:    PRN Meds: barium sulfate    influenza vaccine    morphine injection    ondansetron    oxyCODONE    Abnormal Labs/Diagnostic Results:    11/04/17 0509    WBC 4 31 - 10 16 Thousand/uL 5 42    RBC 3 88 - 5 62 Million/uL 3 62     Hemoglobin 12 0 - 17 0 g/dL 9 8     Hematocrit 36 5 - 49 3 % 29 2       Age/Sex: 23 y o  male     Assessment:     Principal Problem:    [facility]eps[facility] ([facility] )  Active Problems:    Wound dehiscence    Wound infection    Herniation of lumbar intervertebral disc    Hypothyroid    Bipolar disorder ([facility] [address] )    Autism      Plan:            Proteus bacteremia & sepsis:  ? Shows some improvement, had fever twice yesterday, tachycardia mild better  ? BC 10/31 - proteus, wound culture from 10/31 - proteus, Repeat BC 11/2 - one of two growing proteus; repeat [facility] 11/3 pending  ? Appreciate ID input - cont ancef  ? If any more fevers would consider CT abdo pelvis for any other source  · Persistent tachycardia  · Hypoxia:  ? Given tachy + hypoxia - CTA PE was done which was not complete study due to inadequate dye but no thrombus in main artery  ? Continue monitoring HR & O2  · Post-op spinal wound dehiscence with possible infection:  ? S/p wound wash out by neuroSx & Gen Sx 11/1, vac placement  ? Repeat Vac change & wash 11/3  ? Repeat OR on monday  ? Pain control - meds modified  ?  DVT PX, bowel regimen  · S/p L3-4, L4-5 open microdiscectomy on 10/17:  ? For cauda equina syndrome & right foot drop  · Hypothyroidism: cont levo  · Bipolar & autism:  ? Cont home risperidol & depakoate  · H/o COLIN:  ? Did not tolerate CPAP      VTE Pharmacologic Prophylaxis:   Pharmacologic: Enoxaparin (Lovenox)  Mechanical VTE Prophylaxis in Place:  Yes     Current Length of Stay: 4 day(s)     Current Patient Status: Inpatient   Certification Statement: The patient will continue to require additional inpatient hospital stay due to OR, f/u blood cultures    Discharge Plan: Pending

## 2017-11-07 NOTE — PLAN OF CARE
Problem: PHYSICAL THERAPY ADULT  Goal: Performs mobility at highest level of function for planned discharge setting  See evaluation for individualized goals  Treatment/Interventions: Functional transfer training, Endurance training, Patient/family training, Equipment eval/education, Bed mobility, Gait training, Spoke to nursing, OT  Equipment Recommended: Lisa Delgado (mobility progress; use of RW for upright mobility with A)       See flowsheet documentation for full assessment, interventions and recommendations  Prognosis: Good  Problem List: Decreased strength, Decreased endurance, Impaired balance, Decreased mobility, Decreased coordination, Decreased safety awareness, Obesity, Impaired sensation, Decreased skin integrity, Pain  Assessment: Pt is a 22 y/o male admitted to B 2* wound infection,h/o s/p hemilami and foraminotomy L3/4 and L4/5 10/17->s/p debridement wound and closure 11/06/17  Pt [facility] [place] [facility] [place] [facility]no use of DME PTA,pt reports being completely I PTA,(-)drive  Pt reports mother can A pt (physically) upon D/C  Pt currently is not at functional mobility baseline,inc back pain,reports of RLE numbness,ataxic and unsteady gait,new use of DME (RW),IV and medicine management and ongoing medical status  Pt demonstrates maximal to moderate deficits during functional mobility and gait including dec endurance,inc back pain,dec balance,dec safety awareness,self limiting 2* pain and needs maxAX2 BM,modAx2 for transfers and gait with use of RW  Pt needs constant verbal and physical instruction throughout,h/o bipolar and autism  Pt would cont to benefit from skilled inpt PT services to maximize functional independence  Barriers to Discharge: Inaccessible home environment (2 story home,[place])     Recommendation:  (inpt rhb vs home with fam support,needs RW,BSC,HHPT dep on)          See flowsheet documentation for full assessment

## 2017-11-07 NOTE — PROGRESS NOTES
Progress Note - Neurosurgery   Luis Enrique Aburto 23 y o  male MRN: [id number]  Unit/Bed#: [facility] 919-01 Encounter: [id number]    Assessment:  1  POD#6: incision and drainage (I&D) spine  2  Proteus sepsis  3  Wound dehiscence  4  Wound infection  5  Herniation of lumbar intervertebral disc s/p hemilaminotomy and foraminotmy L3/4, L4/5 (10/17)  6  Hypothyroid  7  Bipolar disorder  8  Autism     Plan:  24 yo M s/p washout of lumbar incision following hemilaminotomy and foraminotomy on 10/17  · Exam: limited secondary to poor participation  Baseline right foot drop and sensory changes  · ID following: appreciate recommendations  · Tissue cultures x2 + proteus  · Blood cultures on 10/31 + proteus (ancef started)  · Blood cultures on 11/2 - + proteus   · Blood cultures on 11/3 - negative to date- continue to follow  · Temp spike noted last night  · WBC 9 6 from 5 42  · Continue Cefazolin through tentatively 12/13 (6 weeks) PICC line placed   · Continue to follow cultures, VS and WBC  · Gen-surg following:  · POD1 s/p end of closure of lumbar wound  SAÚL drain remains in place  To be managed by general surgery team   · Continue medical management by primary team, SLIM  · DVT ppx: SCDs, Lovenox  · Pain control: deferred to primary team  Patient rates pain 5/10 at rest, 10/10 with movement- de escalated pain regimen due to excessive somnolence yesterday  · CTA PE study incomplete due to inadequate dye, no  Thrombus in main artery- remains tachycardic, saturating 91-99% on room air in the past 24 hours  Remains tachycardic  · Complaint of generalized abdominal pain with generalized tenderness  Continue bowel regimen  · Encourage IS and taking deep breaths  · Case management following for discharge plan  Consideration of discharge home with family support and home services      Subjective/Objective   Chief Complaint: "  I want to go back to bed     Subjective:  Review of systems limited secondary to patient's participation  Admits to moderate pain at rest but severe pain when he is asked to move  Patient is located in the back and radiates to the right leg  He states that is worse than his baseline  Also has numbness in the right leg proximal to the knee and also in the ankle which is worse than his baseline  Patient is adamant that the right leg is very weak, unable to lift off the for, but is able to stand and transfer  Notes that he has felt cold in the past 24 hours  Objective:  Sitting up in recliner, agitated    I/O       11/05 0701 - 11/06 0700 11/06 0701 - 11/07 0700 11/07 0701 - 11/08 0700    P  O  840 960     I V  (mL/kg) 585 8 (4) 800 (5 4)     IV Piggyback 50      Total Intake(mL/kg) 1475 8 (10) 1760 (12)     Urine (mL/kg/hr) 2175 (0 6) 2800 (0 8)     Drains 0 (0) 20 (0)     Stool       Total Output 2175 2820      Net -699 2 -1060                   Invasive Devices     Peripherally Inserted Central Catheter Line            PICC Line 37/87/80 Right Basilic less than 1 day          Peripheral Intravenous Line            Peripheral IV 11/04/17 Left Hand 2 days          Drain            External Urinary Catheter Small 3 days    Closed/Suction Drain Midline Back 15 Fr  less than 1 day                Physical Exam:  Vitals: Blood pressure 128/76, pulse 91, temperature 98 9 °F (37 2 °C), temperature source Oral, resp  rate 18, height 6' 4" (1 93 m), weight (!) 147 kg (324 lb), SpO2 92 %  ,Body mass index is 39 44 kg/m²  General appearance:  Sitting in recliner, agitated  Head: Normocephalic, without obvious abnormality, atraumatic  Abdomen:  Diffuse tenderness with light touch  Obese, but nondistended  Back:  Dressing clean dry and intact  SAÚL drain with serosanguineous output    Lungs: non labored breathing  Heart: regular heart rate  Neurologic:   Mental status: Alert, oriented, impulsive  Cranial nerves: grossly intact (Cranial nerves II-XII)  Sensory:  Decreased to light touch in right anterior thigh, medial calf, and dorsum of the foot  Decreased to pinprick throughout right anterior thigh and entire right foot  Motor:  Strength testing limited due to patient's reluctance to participate in exam   Wiggles toes on right, 4/5 throughout left lower extremity  Extremities:  Bilateral tenderness to palpation of calves  No erythema or excess swelling appreciated  Coordination:  JPS intact bilateral lower extremities      Lab Results:    Results from last 7 days  Lab Units 11/06/17  0536 11/04/17  0509 11/03/17  0455 11/02/17  0545   WBC Thousand/uL 9 60 5 42 5 00 5 23   HEMOGLOBIN g/dL 10 6* 9 8* 9 9* 10 4*   HEMATOCRIT % 32 5* 29 2* 30 4* 30 9*   PLATELETS Thousands/uL 361 255 214 210   NEUTROS PCT %  --  67 66 69   MONOS PCT %  --  9 14* 14*   MONO PCT MAN % 2*  --   --   --        Results from last 7 days  Lab Units 11/06/17  0536 11/04/17  0509 11/03/17  0455  10/31/17  1935   SODIUM mmol/L 138 135* 137  < > 132*   POTASSIUM mmol/L 3 9 3 6 3 5  < > 3 7   CHLORIDE mmol/L 101 99* 101  < > 100   CO2 mmol/L 28 30 31  < > 22   BUN mg/dL 14 9 8  < > 13   CREATININE mg/dL 0 73 0 58* 0 72  < > 1 21   CALCIUM mg/dL 9 3 8 9 8 9  < > 9 4   TOTAL PROTEIN g/dL 7 0  --   --   --  7 6   BILIRUBIN TOTAL mg/dL 0 32  --   --   --  0 62   ALK PHOS U/L 46  --   --   --  34*   ALT U/L 47  --   --   --  42   AST U/L 29  --   --   --  53*   GLUCOSE RANDOM mg/dL 93 144* 117  < > 117   < > = values in this interval not displayed  Results from last 7 days  Lab Units 11/01/17  0537   INR  1 30*   PTT seconds 44*         Imaging Studies: I have personally reviewed pertinent reports  and I have personally reviewed pertinent films in PACS    EKG, Pathology, and Other Studies: I have personally reviewed pertinent reports        VTE Pharmacologic Prophylaxis: Enoxaparin (Lovenox)    VTE Mechanical Prophylaxis: sequential compression device and foot pump applied

## 2017-11-07 NOTE — RESTORATIVE TECHNICIAN NOTE
Restorative Specialist Mobility Note       Activity: Other (Comment) (6 mins  Returned pt BTB)     Assistive Device: Front wheel walker     Ambulation Response:  Tolerated fairly well     Range of Motion: Active, All extremities

## 2017-11-07 NOTE — PLAN OF CARE
Problem: OCCUPATIONAL THERAPY ADULT  Goal: Performs self-care activities at highest level of function for planned discharge setting  See evaluation for individualized goals  Treatment Interventions: ADL retraining, Functional transfer training, Endurance training, Cognitive reorientation, Patient/family training, Equipment evaluation/education, Compensatory technique education, Energy conservation, Activityengagement          See flowsheet documentation for full assessment, interventions and recommendations  Limitation: Decreased ADL status, Decreased Safe judgement during ADL, Decreased cognition, Decreased endurance, Decreased self-care trans, Decreased high-level ADLs, Mood limitation  Prognosis: Good  Assessment: 27 YO MALE SEEN FOR INITIAL OT EVAL FOLLOWING ADMISSION TO [facility] WITH WOUND INFECTION S/P H/O HEMILAMI/FORAMINOTOMY L3/4 + L4/5 ON 10/17  PT S/P DEBRIDEMENT + CLOSURE ON 11/6  PT WITH PROBLEMS LIST INCLUDING AUTISM AND BIPOLAR DISORDER  PT IS FROM HOME WITH HIS MOTHER WHERE HE REPORTS BEING I WITH ADLS/IADLS AND RELIES ON MOTHER TO ASSIST WITH DRIVING  PT CURRENTLY REQUIRES OVERALL MOD-MAX A FOR ADLS, MAX A FOR BED MOBILITY AND OVERALL MOD A X2 FOR SIT->STAND AND SPT WITH USE OF RW  PT IS LIMITED 2' PAIN, FATIGUE, IMPAIRED BALANCE, LIMITED SAFETY AWARENES/INSIGHT/JUDGEMENT, EASILY AGITATED AND LIMITED ACTIVITY TOLERANCE  FROM AN OT PERSPECTIVE, PT WOULD BENEFIT FROM CONT OT SERVICES IN AN INPT REHAB SETTING PENDING PT PROGRESS  WILL CONT TO FOLLOW TO ADDRESS THE BELOW DESCRIBED GOALS        OT Discharge Recommendation: Short Term Rehab  OT - OK to Discharge: Yes (TO INPT REHAB AT THIS TIME  )

## 2017-11-07 NOTE — SOCIAL WORK
Cm reviewed patient during care coordination rounds  Patient is not medically stable for d/c today  Patient's IV ATB is covered at 100%  Cm will need to identify [address] if patient's mother remains in agreement with discharge plan  At this time, patient's mother continues to want a discharge home despite PT's recommendations  Patient's mother wants to insure patient will be comfortable and confident at any place he will go  According to her, patient only wants to discharge home  Per patient's mother, she will be available tomorrow morning between 9 and 9:30 to witness PT eval  Cm informed PT about patient's mother visit tomorrow morning

## 2017-11-07 NOTE — PLAN OF CARE
Problem: PAIN - ADULT  Goal: Verbalizes/displays adequate comfort level or baseline comfort level  Interventions:  - Encourage patient to monitor pain and request assistance  - Assess pain using appropriate pain scale  - Administer analgesics based on type and severity of pain and evaluate response  - Implement non-pharmacological measures as appropriate and evaluate response  - Consider cultural and social influences on pain and pain management  - Notify physician/advanced practitioner if interventions unsuccessful or patient reports new pain   Outcome: Progressing      Problem: INFECTION - ADULT  Goal: Absence or prevention of progression during hospitalization  INTERVENTIONS:  - Assess and monitor for signs and symptoms of infection  - Monitor lab/diagnostic results  - Monitor all insertion sites, i e  indwelling lines, tubes, and drains  - Monitor endotracheal (as able) and nasal secretions for changes in amount and color  - [place] appropriate cooling/warming therapies per order  - Administer medications as ordered  - Instruct and encourage patient and family to use good hand hygiene technique  - Identify and instruct in appropriate isolation precautions for identified infection/condition   Outcome: Progressing      Problem: SAFETY ADULT  Goal: Patient will remain free of falls  INTERVENTIONS:  - Assess patient frequently for physical needs  -  Identify cognitive and physical deficits and behaviors that affect risk of falls    -  [place] fall precautions as indicated by assessment   - Educate patient/family on patient safety including physical limitations  - Instruct patient to call for assistance with activity based on assessment  - Modify environment to reduce risk of injury  - Consider OT/PT consult to assist with strengthening/mobility    Outcome: Progressing    Goal: Maintain or return to baseline ADL function  INTERVENTIONS:  -  Assess patient's ability to carry out ADLs; assess patient's baseline for ADL function and identify physical deficits which impact ability to perform ADLs (bathing, care of mouth/teeth, toileting, grooming, dressing, etc )  - Assess/evaluate cause of self-care deficits   - Assess range of motion  - Assess patient's mobility; develop plan if impaired  - Assess patient's need for assistive devices and provide as appropriate  - Encourage maximum independence but intervene and supervise when necessary  ¯ Involve family in performance of ADLs  ¯ Assess for home care needs following discharge   ¯ Request OT consult to assist with ADL evaluation and planning for discharge  ¯ Provide patient education as appropriate   Outcome: Progressing    Goal: Maintain or return mobility status to optimal level  INTERVENTIONS:  - Assess patient's baseline mobility status (ambulation, transfers, stairs, etc )    - Identify cognitive and physical deficits and behaviors that affect mobility  - Identify mobility aids required to assist with transfers and/or ambulation (gait belt, sit-to-stand, lift, walker, cane, etc )  - [place] fall precautions as indicated by assessment  - Record patient progress and toleration of activity level on Mobility SBAR; progress patient to next Phase/Stage  - Instruct patient to call for assistance with activity based on assessment  - Request Rehabilitation consult to assist with strengthening/weightbearing, etc    Outcome: Progressing      Problem: DISCHARGE PLANNING  Goal: Discharge to home or other facility with appropriate resources  INTERVENTIONS:  - Identify barriers to discharge w/patient and caregiver  - Arrange for needed discharge resources and transportation as appropriate  - Identify discharge learning needs (meds, wound care, etc )  - Arrange for interpretive services to assist at discharge as needed  - Refer to Case Management Department for coordinating discharge planning if the patient needs post-hospital services based on physician/advanced practitioner order or complex needs related to functional status, cognitive ability, or social support system   Outcome: Progressing      Problem: Knowledge Deficit  Goal: Patient/family/caregiver demonstrates understanding of disease process, treatment plan, medications, and discharge instructions  Complete learning assessment and assess knowledge base  Interventions:  - Provide teaching at level of understanding  - Provide teaching via preferred learning methods   Outcome: Progressing      Problem: Potential for Falls  Goal: Patient will remain free of falls  INTERVENTIONS:  - Assess patient frequently for physical needs  -  Identify cognitive and physical deficits and behaviors that affect risk of falls    -  [place] fall precautions as indicated by assessment   - Educate patient/family on patient safety including physical limitations  - Instruct patient to call for assistance with activity based on assessment  - Modify environment to reduce risk of injury  - Consider OT/PT consult to assist with strengthening/mobility    Outcome: Progressing      Problem: Prexisting or High Potential for Compromised Skin Integrity  Goal: Skin integrity is maintained or improved  INTERVENTIONS:  - Identify patients at risk for skin breakdown  - Assess and monitor skin integrity  - Assess and monitor nutrition and hydration status  - Monitor labs (i e  albumin)  - Assess for incontinence   - Turn and reposition patient  - Assist with mobility/ambulation  - Relieve pressure over bony prominences  - Avoid friction and shearing  - Provide appropriate hygiene as needed including keeping skin clean and dry  - Evaluate need for skin moisturizer/barrier cream  - Collaborate with interdisciplinary team (i e  Nutrition, Rehabilitation, etc )   - Patient/family teaching   Outcome: Progressing      Problem: DISCHARGE PLANNING - CARE MANAGEMENT  Goal: Discharge to post-acute care or home with appropriate resources  INTERVENTIONS:  - Conduct assessment to determine patient/family and health care team treatment goals, and need for post-acute services based on payer coverage, community resources, and patient preferences, and barriers to discharge  - Address psychosocial, clinical, and financial barriers to discharge as identified in assessment in conjunction with the patient/family and health care team  - Arrange appropriate level of post-acute services according to patient's   needs and preference and payer coverage in collaboration with the physician and health care team  - Communicate with and update the patient/family, physician, and health care team regarding progress on the discharge plan  - Arrange appropriate transportation to post-acute venues  -Patient to be evaluated  Patient anticipated for d/c to home      Outcome: Progressing

## 2017-11-07 NOTE — OCCUPATIONAL THERAPY NOTE
[address]  Evaluation     Patient Name: Carlos Enrique Fitzgerald  XBCTV'V Date: 11/7/2017  Problem List  Patient Active Problem List   Diagnosis    [facility]    Wound dehiscence    Wound infection    Herniation of lumbar intervertebral disc    Hypothyroid    [facility]ipolar disorder [facility]    Autism     Past Medical History  Past Medical History:   Diagnosis Date    Autism     Bipolar 1 disorder [facility]     Disease of thyroid gland     Hyperlipidemia     Lumbar disc disease     Psychiatric disorder     Compulsive Disorder    Sleep apnea     Urinary incontinence      Past Surgical History  Past Surgical History:   Procedure Laterality Date    COLONOSCOPY      INCISION AND DRAINAGE POSTERIOR SPINE N/A 11/1/2017    Procedure: INCISION AND DRAINAGE (I&D) SPINE;  Surgeon: Carissa Espinal MD;  [facility];  Service: Neurosurgery    [place] LAMNOTMY INCL W/DCMPRSN NRV ROOT 1 INTRSPC LUMBR Bilateral 10/17/2017    Procedure: LEFT L3/4 AND RIGHT L4/5 MICRODISCECTOMIES, HEMILAMINECTOMIES; POSSIBLE EPIDURAL STEROID INJECTIONS;  Surgeon: Carissa Espinal MD;  [facility];  Service: Neurosurgery    WISDOM TOOTH EXTRACTION      WOUND DEBRIDEMENT N/A 11/3/2017    Procedure: DEBRIDEMENT WOUND [facility] OUT), wound vac placement back;  Surgeon: Richard Samuels DO;  [facility];  Service: General    WOUND DEBRIDEMENT N/A 11/6/2017    Procedure: Merced Ferrell [address];  Surgeon: Tre Mallory MD;  [facility];  Service: General         11/07/17 1139   Note Type   Note type Eval/Treat   Restrictions/Precautions   Weight Bearing Precautions Per Order No   Other Precautions Cognitive; Chair Alarm; Bed Alarm;Agitated;Multiple lines; Fall Risk;Pain  (SAÚL DRAIN)   Pain Assessment   Pain Assessment 0-10   Pain Score 6   Pain Type Acute pain   Pain Location Back   Pain Orientation Mid;Lower   Hospital Pain Intervention(s) Ambulation/increased activity;Repositioned;Distraction; Emotional support   Response to Interventions INCREASED WITH ACTIVITY    Home Living   Type of [address] Two level;Stairs to enter with rails  (3 [address] )   Bathroom Shower/Tub Tub/shower unit   Bathroom Toilet Standard   Bathroom Accessibility Accessible   Additional Comments NO USE OF DME AT BASELINE    Prior Function   Level of [place] Independent with ADLs and functional mobility   Lives With [employer] Help From Family   ADL Assistance Independent   IADLs Independent   Falls in the last 6 months 0   Vocational On disability   Lifestyle   Autonomy PT REPORTS BEING INDEPENDENT WITH ADLS/IADLS PTA  RELIES ON HIS MOTHER TO ASSIST WITH DRIVING  PT IS BELIEVED THAT PT HAS ASSISTANCE WITH HEAVY IADLS  Reciprocal Relationships LIVES WITH MOTHER, WHO PT REPORTS IS ON DISABILITY AND ABLE TO ASSIST AS NEEDED (?)   Service to Others ON DISABILITY    Intrinsic Gratification ENJOYS WATCHING THE Authentic Response    Psychosocial   Psychosocial (WDL) WDL   Subjective   Subjective "THAT WAS HARDER THAN I THOUGHT IT WOULD BE, I CAN'T GO HOME LIKE THAT"   ADL   Eating Assistance 7  Independent   Grooming Assistance 5  Supervision/Setup   UB Bathing Assistance 3  Moderate Assistance   LB Bathing Assistance 2  Maximal Assistance   UB Dressing Assistance 3  Moderate Assistance   LB Dressing Assistance 2  Maximal Assistance   Toileting Assistance  2  Maximal Assistance   Functional Assistance 2  Maximal Assistance   Bed Mobility   Rolling L 2  Maximal assistance   Additional items Assist x 1; Increased time required; Bedrails;Verbal cues;LE management   Transfers   Sit to Stand 3  Moderate assistance   Additional items Assist x 2; Increased time required;Verbal cues   Stand to Sit 3  Moderate assistance   Additional items Assist x 1; Increased time required;Verbal cues   Stand pivot 3  Moderate assistance   Additional items Assist x 2; Increased time required;Verbal cues   Additional Comments WITH USE OF RW WITH VERBAL/VISUAL CUES T/O   Balance   Static Sitting Good   Dynamic Sitting Fair   Static Standing Poor -   Ambulatory Zero   Activity Tolerance   Activity Tolerance Patient limited by fatigue;Patient limited by pain   Nurse Made Aware APPROPRIATE TO SEE PER SHARON SOUSA Assessment   RUE Assessment WFL   LUE Assessment   LUE Assessment WFL   Hand Function   Gross Motor Coordination Functional   Fine Motor Coordination Functional   Cognition   Overall Cognitive Status Impaired   Arousal/Participation Responsive   Attention Difficulty attending to directions   Orientation Level Oriented to person;Oriented to place;Oriented to situation   Memory Within functional limits   Following Commands Follows one step commands with increased time or repetition   Comments PT WITH BASELINE AUTISM AND BIPOLAR DISORDER  PT WITH LIMITED SAFETY AWARENESS/INSIGHT/JUDGEMENT  PT EASILY AGITATED  RECOMMEND ALARM ON PT AT ALL TIMES  Assessment   Limitation Decreased ADL status; Decreased Safe judgement during ADL;Decreased cognition;Decreased endurance;Decreased self-care trans;Decreased high-level ADLs;Mood limitation   Prognosis Good   Assessment 25 YO MALE SEEN FOR INITIAL OT EVAL FOLLOWING ADMISSION TO [facility] WITH WOUND INFECTION S/P H/O HEMILAMI/FORAMINOTOMY L3/4 + L4/5 ON 10/17  PT S/P DEBRIDEMENT + CLOSURE ON 11/6  PT WITH PROBLEMS LIST INCLUDING AUTISM AND BIPOLAR DISORDER  PT IS FROM HOME WITH HIS MOTHER WHERE HE REPORTS BEING I WITH ADLS/IADLS AND RELIES ON MOTHER TO ASSIST WITH DRIVING  PT CURRENTLY REQUIRES OVERALL MOD-MAX A FOR ADLS, MAX A FOR BED MOBILITY AND OVERALL MOD A X2 FOR SIT->STAND AND SPT WITH USE OF RW  PT IS LIMITED 2' PAIN, FATIGUE, IMPAIRED BALANCE, LIMITED SAFETY AWARENES/INSIGHT/JUDGEMENT, EASILY AGITATED AND LIMITED ACTIVITY TOLERANCE  FROM AN OT PERSPECTIVE, PT WOULD BENEFIT FROM CONT OT SERVICES IN AN INPT REHAB SETTING PENDING PT PROGRESS   WILL CONT TO FOLLOW TO ADDRESS THE BELOW DESCRIBED GOALS  Goals   Patient Goals TO GET OOB   LTG Time Frame 7-10   Long Term Goal #1 SEE BELOW    Plan   Treatment Interventions ADL retraining;Functional transfer training; Endurance training;Cognitive reorientation;Patient/family training;Equipment evaluation/education; Compensatory technique education; Energy conservation; Activityengagement   Goal Expiration Date 11/17/17   OT Frequency 3-5x/wk   Recommendation   OT Discharge Recommendation Short Term Rehab   OT - OK to Discharge Yes  (TO INPT REHAB AT THIS TIME  )   Barthel Index   Feeding 10   Bathing 0   Grooming Score 0   Dressing Score 0   Bladder Score 0   Bowels Score 10   Toilet Use Score 5   Transfers (Bed/Chair) Score 5   Mobility (Level Surface) Score 0   Stairs Score 0   Barthel Index Score 30   Modified Dov Scale   Modified [place] Scale 4       OCCUPATIONAL THERAPY GOALS TO BE MET WITHIN 7-10 DAYS:    -Pt will increase bed mobility to MOD I to participate in functional activities with G tolerance and balance  -Pt will improve functional mobility and transfers to MOD I on/off all surfaces w/ G balance/safety including toileting   -Pt will participate in lt grooming task with MOD I after set-up standing at sink ~3-5 minutes with G safety and balance  -Pt will increase independence in all ADLS to MOD I with G balance sitting upright in chair   -Pt will improve activity tolerance to G for 30 min txment sessions w/ G carry over of learned energy conservation techniques   -Pt will improve independence in lt homemaking activities to MOD I without requiring cues for safety   -Pt will demonstrate G carryover of learned safety techniques and proper body mechanics in functional and leisure activities with use of DME   -Pt will complete additional cognitive assessment with 100% attention to task in order to assist with safe d/c plan       Documentation completed by GRACIELA Yanez, OTR/L

## 2017-11-07 NOTE — PHYSICAL THERAPY NOTE
Physical Therapy Evaluation    Patient's Name: Tayo Bradley    Admitting Diagnosis  Post-operative complication [R77  9XXA]    Problem List  Patient Active Problem List   Diagnosis    [facility] [address]    Wound dehiscence    Wound infection    Herniation of lumbar intervertebral disc    Hypothyroid    [facility]    Autism       Past Medical History  Past Medical History:   Diagnosis Date    Autism     [facility]     Disease of thyroid gland     Hyperlipidemia     Lumbar disc disease     Psychiatric disorder     Compulsive Disorder    Sleep apnea     Urinary incontinence        Past Surgical History  Past Surgical History:   Procedure Laterality Date    COLONOSCOPY      INCISION AND DRAINAGE POSTERIOR SPINE N/A 11/1/2017    Procedure: INCISION AND DRAINAGE (I&D) SPINE;  Surgeon: Frank Metcalf MD;  [facility];  Service: Neurosurgery    RI LAMNOTMY INCL W/DCMPRSN NRV ROOT 1 INTRSPC LUMBR Bilateral 10/17/2017    Procedure: LEFT L3/4 AND RIGHT L4/5 MICRODISCECTOMIES, HEMILAMINECTOMIES; POSSIBLE EPIDURAL STEROID INJECTIONS;  Surgeon: Frank Metcalf MD;  [facility];  Service: Neurosurgery    WISDOM TOOTH EXTRACTION      WOUND DEBRIDEMENT N/A 11/3/2017    Procedure: DEBRIDEMENT WOUND ([address]), wound vac placement back;  Surgeon: Cj So DO;  [facility];  Service: General      11/07/17 1140   Note Type   Note type Eval only   Pain Assessment   Pain Assessment 0-10   Pain Score 6   Pain Type Acute pain   Pain Location Back   Pain Orientation Mid;Lower;Bilateral   Hospital Pain Intervention(s) Repositioned; Ambulation/increased activity; Emotional support; Environmental changes;Relaxation technique; Rest  (pursed lip and slow breathing)   Home Living   Type of Home House   Home Layout Two level;Stairs to enter with rails  ([facility],2 story home)   Home Equipment (no use of DME PTA)   Additional Comments pt lives with mother in 2 [place] home,3 [address],no use of DME PTA,pt reports being completely I PTA;likes [employer] team and his phone   Prior Function   Level of [place] Independent with ADLs and functional mobility  (per pt PTA)   Lives With Family  (mother)   Receives Help From Family  (as needed per pt PTA)   ADL Assistance Independent   IADLs Needs assistance   Falls in the last 6 months 0  (pt reports no recent falls)   Vocational On disability  (per pt)   Restrictions/Precautions   Other Precautions Agitated; Chair Alarm;Multiple lines; Fall Risk;Pain   General   Additional Pertinent History s/p hemilami and foraminotomy L3/4 and L4/5 10/17->sepsis and wound infection;11/06/17 s/p debridement wound and closure washout   Family/Caregiver Present No   Cognition   Overall Cognitive Status WFL   Arousal/Participation Cooperative   Orientation Level Oriented X4   Following Commands Follows one step commands with increased time or repetition  (2* pain,h/o autism and bipolar,slow mobility)   RLE Assessment   RLE Assessment (3+/5 grossly throughout)   LLE Assessment   LLE Assessment (3+/5 grossly throughout)   Coordination   Movements are Fluid and Coordinated 0   Coordination and Movement Description ataxic and unsteady,pain,dec BLE step length,R knee instability   Sensation WFL  (pt reports RLE numbness at rest)   Light Touch   RLE Light Touch Impaired  (reports RLE numbness at rest)   LLE Light Touch Grossly intact   Bed Mobility   Rolling L 2  Maximal assistance   Additional items Assist x 1;Bedrails; Increased time required;Verbal cues;LE management  (pt prefers supine position 0 degrees)   Supine to Sit 2  Maximal assistance   Additional items Assist x 2;Bedrails; Increased time required;Verbal cues;LE management   Transfers   Sit to Stand 3  Moderate assistance   Additional items Assist x 2;Bedrails; Increased time required;Verbal cues   Stand to Sit 3  Moderate assistance   Additional items Assist x 1; Armrests; Increased time required;Verbal cues  (for safety,education and control descent)   Stand pivot 3  Moderate assistance   Additional items Assist x 2; Increased time required;Verbal cues  (manuvering of RW and gait sequence)   Ambulation/Elevation   Gait pattern Poor UE support; Improper Weight shift; Antalgic; Wide LAURA;Shuffling; Inconsistent vira; Short stride; Ataxia; Excessively slow; Step to  (R knee instability throughout upright mobility)   Gait Assistance 3  Moderate assist   Additional items Assist x 2;Verbal cues; Tactile cues  (manuvering of RW and gait sequence)   Assistive Device Rolling walker   Distance 5 steps bed->recliner on tile surface with use of Rw in front   Balance   Static Sitting Good  (with chair alarm intact prior to leaving pt;s room)   Dynamic Sitting (zero)   Static Standing Zero   Dynamic Standing (zero)   Ambulatory Zero   Endurance Deficit   Endurance Deficit Yes   Endurance Deficit Description pain,slow mobility,weakness,ataxic and unsteady gait   Activity Tolerance   Activity Tolerance Patient limited by fatigue;Patient limited by pain;Treatment limited secondary to agitation  (fair)   Medical Staff Made Aware OT ([place])   Nurse Made Aware yes Jose Ridley)   Assessment   Prognosis Good   Problem List Decreased strength;Decreased endurance; Impaired balance;Decreased mobility; Decreased coordination;Decreased safety awareness; Obesity; Impaired sensation;Decreased skin integrity;Pain   Assessment Pt is a 24 y/o male admitted to [facility]* wound infection,h/o s/p hemilami and foraminotomy L3/4 and L4/5 10/17->s/p debridement wound and closure 11/06/17  Pt lives with mother in 2 [place] home and [place],no use of DME PTA,pt reports being completely I PTA,(-)drive  Pt reports mother can A pt (physically) upon D/C  Pt currently is not at functional mobility baseline,inc back pain,reports of RLE numbness,ataxic and unsteady gait,new use of DME (RW),IV and medicine management and ongoing medical status   Pt demonstrates maximal to moderate deficits during functional mobility and gait including dec endurance,inc back pain,dec balance,dec safety awareness,self limiting 2* pain and needs maxAX2 BM,modAx2 for transfers and gait with use of RW  Pt needs constant verbal and physical instruction throughout,h/o bipolar and autism  Pt would cont to benefit from skilled inpt PT services to maximize functional independence  Barriers to Discharge Inaccessible home environment  (2 story home,[facility])   Goals   Patient Goals to get OOB   STG Expiration Date 11/17/17   Short Term Goal #1 7-10 days:pt will be able to perform BM and transfers modAx1 consistently to and from various surfaces,ambulate >50 feet with use of RW on various surfaces with chair follow as needed,activity tolerance:45mins/45mins,inc balance 1 grade,I with BLE HEP,prior to D/C (if pt returns home) up and down 3-12 steps with use of rail minAX1->S   Treatment Day 0   Plan   Treatment/Interventions Functional transfer training; Endurance training;Patient/family training;Equipment eval/education; Bed mobility;Gait training;Spoke to nursing;OT   PT Frequency 5x/wk  (1x on the weekend)   Recommendation   Recommendation (inpt rhb vs home with fam support,needs RW,BSC,HHPT dep on)   Equipment Recommended Walker  (mobility progress; use of RW for upright mobility with A)   Barthel Index   Feeding 10   Bathing 0   Grooming Score 0   Dressing Score 0   Bladder Score 0   Bowels Score 10   Toilet Use Score 5   Transfers (Bed/Chair) Score 5   Mobility (Level Surface) Score 0   Stairs Score 0   B[place]hel Index Score 30   Skilled PT recommended while in hospital and upon DC to progress pt toward treatment goals           Elham Shepard, PT

## 2017-11-08 VITALS
WEIGHT: 315 LBS | BODY MASS INDEX: 38.36 KG/M2 | SYSTOLIC BLOOD PRESSURE: 126 MMHG | HEART RATE: 87 BPM | DIASTOLIC BLOOD PRESSURE: 71 MMHG | RESPIRATION RATE: 18 BRPM | HEIGHT: 76 IN | TEMPERATURE: 98.7 F | OXYGEN SATURATION: 95 %

## 2017-11-08 LAB
BACTERIA BLD CULT: NORMAL
BACTERIA BLD CULT: NORMAL

## 2017-11-08 PROCEDURE — 97535 SELF CARE MNGMENT TRAINING: CPT

## 2017-11-08 PROCEDURE — 97530 THERAPEUTIC ACTIVITIES: CPT

## 2017-11-08 PROCEDURE — 97116 GAIT TRAINING THERAPY: CPT

## 2017-11-08 RX ORDER — MORPHINE SULFATE 2 MG/ML
2 INJECTION, SOLUTION INTRAMUSCULAR; INTRAVENOUS ONCE
Status: COMPLETED | OUTPATIENT
Start: 2017-11-08 | End: 2017-11-08

## 2017-11-08 RX ORDER — OXYCODONE HYDROCHLORIDE 10 MG/1
10 TABLET ORAL EVERY 6 HOURS PRN
Qty: 20 TABLET | Refills: 0 | Status: SHIPPED | OUTPATIENT
Start: 2017-11-08 | End: 2017-11-30 | Stop reason: HOSPADM

## 2017-11-08 RX ORDER — DOCUSATE SODIUM 100 MG/1
100 CAPSULE, LIQUID FILLED ORAL 2 TIMES DAILY
Qty: 10 CAPSULE | Refills: 0
Start: 2017-11-08

## 2017-11-08 RX ORDER — SENNOSIDES 8.6 MG
1 TABLET ORAL
Qty: 120 EACH | Refills: 0
Start: 2017-11-08 | End: 2020-03-26 | Stop reason: ALTCHOICE

## 2017-11-08 RX ADMIN — DOCUSATE SODIUM 100 MG: 100 CAPSULE, LIQUID FILLED ORAL at 09:07

## 2017-11-08 RX ADMIN — OXYCODONE HYDROCHLORIDE 10 MG: 10 TABLET ORAL at 09:07

## 2017-11-08 RX ADMIN — BENZTROPINE MESYLATE 1 MG: 1 TABLET ORAL at 09:07

## 2017-11-08 RX ADMIN — OXYCODONE HYDROCHLORIDE 10 MG: 10 TABLET ORAL at 04:06

## 2017-11-08 RX ADMIN — OXYCODONE HYDROCHLORIDE 10 MG: 10 TABLET ORAL at 13:24

## 2017-11-08 RX ADMIN — ACETAMINOPHEN 975 MG: 325 TABLET, FILM COATED ORAL at 05:06

## 2017-11-08 RX ADMIN — METHOCARBAMOL 750 MG: 750 TABLET ORAL at 05:06

## 2017-11-08 RX ADMIN — FENOFIBRATE 145 MG: 145 TABLET ORAL at 09:07

## 2017-11-08 RX ADMIN — MORPHINE SULFATE 2 MG: 2 INJECTION, SOLUTION INTRAMUSCULAR; INTRAVENOUS at 16:05

## 2017-11-08 RX ADMIN — METHOCARBAMOL 750 MG: 750 TABLET ORAL at 11:18

## 2017-11-08 RX ADMIN — CEFAZOLIN SODIUM 2000 MG: 2 SOLUTION INTRAVENOUS at 04:06

## 2017-11-08 RX ADMIN — LEVOTHYROXINE SODIUM 125 MCG: 125 TABLET ORAL at 05:06

## 2017-11-08 RX ADMIN — DIVALPROEX SODIUM 1000 MG: 500 TABLET, DELAYED RELEASE ORAL at 11:17

## 2017-11-08 RX ADMIN — DIVALPROEX SODIUM 500 MG: 500 TABLET, DELAYED RELEASE ORAL at 09:07

## 2017-11-08 RX ADMIN — CEFAZOLIN SODIUM 2000 MG: 2 SOLUTION INTRAVENOUS at 11:18

## 2017-11-08 RX ADMIN — ACETAMINOPHEN 975 MG: 325 TABLET, FILM COATED ORAL at 13:23

## 2017-11-08 RX ADMIN — RISPERIDONE 2 MG: 1 TABLET ORAL at 09:10

## 2017-11-08 RX ADMIN — METHOCARBAMOL 750 MG: 750 TABLET ORAL at 00:26

## 2017-11-08 RX ADMIN — CLONAZEPAM 0.5 MG: 0.5 TABLET ORAL at 09:07

## 2017-11-08 NOTE — OCCUPATIONAL THERAPY NOTE
Occupational Therapy Treatment Note:       11/08/17 1440   Transfers   Sit to Stand 3  Moderate assistance   Additional items Assist x 2   Stand to Sit 3  Moderate assistance   Additional items Assist x 2   Functional Mobility   Functional Mobility 4  Minimal assistance   Additional Comments PT USED RW WITH ASST X 2 AND ENCOURAGEMENT  PER PTS MOTEHR HE NEEDS TO AMBULATE SHORT DISTANCES IN HOME   Activity Tolerance   Activity Tolerance Patient tolerated treatment well   Assessment   Assessment PT PARTICIPATED IN AM OT SESSION  AND PARTICIPATED IN UB AND LB DRESSING, BED MOBILITY, FAMILY EDUCATION AND MOTIVATION TO ATTEMPT DIFFICULT TASKS IE STAIRS AND FUNCTIONAL MOBILITY NECESSARY INORDER TO RETURN HOME WITH FAMILY  PT BECOMES SLIGHTLY AGGITATED HOWEVER WITH INCREASED TIME AND DISCUSSION PT WAS AGREEABLE TO ATTEMPT THERAPY  PT WAS ABLE TO PERFORM SUPINE TO SIT WITH ASST X 1-2 USING LOG ROLL TECHNIQUES  PT PERFORED SIT TO STAND WITH ASST X 2 FROM LOW EOB AND FROM ARM CHAIR  PT WAS ABLE TO ALEE PANTS WITH MAX ASST AND SHIRT WITH MOD ASST  PT OVERALL TOLERATED SESSION  + SWEATING WAS NOTED  PTS STEP FATHER AND MOTHER WERE PRESENT THIS SESSION AND ENGAGED IN FAMILY TRAINNING AND TO PROVIDE ENCOURAGEMENT TO PT   Plan   Treatment Interventions ADL retraining;Functional transfer training   Goal Expiration Date 11/17/17   OT Frequency 3-5x/wk   Recommendation   OT Discharge Recommendation Home with family support  (PENDING FAMILYS ABILITY TO PROVIDE ASST OF 1-2 AS NEEDED )   Equipment Recommended Bedside commode   SUPINE IN BED MAX ASST TO ALEE PANTS, IN STANCE PT REQUIRED MAX ASST TO PULL OVER HIPS  MOTHER REPORTS SHE WILL ASSIST PT  SEATED IN RECLINER MOD ASST TO ALEE OVER HEAD SWEATSHIRT

## 2017-11-08 NOTE — ASSESSMENT & PLAN NOTE
· Secondary to Proteus  Sensitive to Ancef  · Case reviewed Infectious Disease no further temperature spikes CT scan negative    Clinically stable continue treatment

## 2017-11-08 NOTE — PLAN OF CARE
Problem: OCCUPATIONAL THERAPY ADULT  Goal: Performs self-care activities at highest level of function for planned discharge setting  See evaluation for individualized goals  Treatment Interventions: ADL retraining, Functional transfer training, Endurance training, Cognitive reorientation, Patient/family training, Equipment evaluation/education, Compensatory technique education, Energy conservation, Activityengagement          See flowsheet documentation for full assessment, interventions and recommendations  Outcome: Progressing  Limitation: Decreased ADL status, Decreased Safe judgement during ADL, Decreased cognition, Decreased endurance, Decreased self-care trans, Decreased high-level ADLs, Mood limitation  Prognosis: Good  Assessment: PT PARTICIPATED IN AM OT SESSION  AND PARTICIPATED IN UB AND LB DRESSING, BED MOBILITY, FAMILY EDUCATION AND MOTIVATION TO ATTEMPT DIFFICULT TASKS IE STAIRS AND FUNCTIONAL MOBILITY NECESSARY INORDER TO RETURN HOME WITH FAMILY  PT BECOMES SLIGHTLY AGGITATED HOWEVER WITH INCREASED TIME AND DISCUSSION PT WAS AGREEABLE TO ATTEMPT THERAPY  PT WAS ABLE TO PERFORM SUPINE TO SIT WITH ASST X 1-2 USING LOG ROLL TECHNIQUES  PT PERFORED SIT TO STAND WITH ASST X 2 FROM LOW EOB AND FROM ARM CHAIR  PT WAS ABLE TO ALEE PANTS WITH MAX ASST AND SHIRT WITH MOD ASST  PT OVERALL TOLERATED SESSION  + SWEATING WAS NOTED    PTS STEP FATHER AND MOTHER WERE PRESENT THIS SESSION AND ENGAGED IN FAMILY TRAINNING AND TO PROVIDE ENCOURAGEMENT TO PT     OT Discharge Recommendation: Home with family support (PENDING FAMILYS ABILITY TO PROVIDE ASST OF 1-2 AS NEEDED )  OT - OK to Discharge: Yes (TO INPT REHAB AT THIS TIME  )  Afua Soria

## 2017-11-08 NOTE — PLAN OF CARE
Problem: PHYSICAL THERAPY ADULT  Goal: Performs mobility at highest level of function for planned discharge setting  See evaluation for individualized goals  Treatment/Interventions: Functional transfer training, Endurance training, Patient/family training, Equipment eval/education, Bed mobility, Gait training, Spoke to nursing, OT  Equipment Recommended: Dorita Garcia (mobility progress; use of RW for upright mobility with A)       See flowsheet documentation for full assessment, interventions and recommendations  Outcome: Progressing  Prognosis: Fair  Problem List: Decreased strength, Decreased endurance, Impaired balance, Decreased mobility, Decreased coordination, Decreased cognition, Impaired judgement, Decreased safety awareness, Obesity, Decreased skin integrity, Pain  Assessment: After being left in chair by PT and JOHNSON for a few minutes, pt was requesting to get up  Pt mod/Estrella x2 people for sit<>stand  Pt amb Estrella x2 approx  13' with RW  Chair was pushed to stairs and he was able to ascend/descend 3 stairs with Estrella x2 people  Pt's mother and step-father were present during all 3 PT sessions today to observe pt's progress  Pt's mother was very hands-on while step-father preferred to observe  Both parents report they understand that pt requires assist of 2 people for all mobility at this time, and they state they are willing and able to provide this assistance  At this time, I am still recommending IP rehab, however, family insisting on home with family support  Will continue to follow pt while in house  Barriers to Discharge: Inaccessible home environment  Barriers to Discharge Comments: Per mother, pt can remain on 1st level  Pt will still have 3 [place]IB[address] home  Recommendation: Post acute IP rehab (however family/pt want home with family support  Recommend )     PT - OK to Discharge: No    See flowsheet documentation for full assessment

## 2017-11-08 NOTE — PROGRESS NOTES
Rounds done with Dr Ammy Preston of [address] [place] [address]  Pt needs to continue to work with PT/OT in order to determine official d/c plan  Pt medicated and waiting for PT to arrive  Will continue to monitor

## 2017-11-08 NOTE — PROGRESS NOTES
Progress Note - Infectious Disease   Ellen Bradley 23 y o  male MRN: [id number]  Unit/Bed#: [facility] 919-01 Encounter: [id number]    Assessment and plan     Gram negative sepsis likely 2/2 deep surgical wound infection sp washout and multiple would vac replacements, likely involving the bone, sp wound closure   -Continue Ancef for 6 weeks of antibiotics 17  -Pt will need Weekly CBC with Diff and BMP while on the antibiotic   -Continue PICC line   -CT abdomen and pelvis was negative     Antibiotics   Antibiotic day#9  Ancef day#7  Will need till 17    Subjective/Objective     Subjective: Pt was seen and examined this morning  He denied fever, chills, nausea, and vomiting  HE was trying to work with PT to get out of bed but so far unable to do  HR:  [] 87  Resp:  [18-20] 18  BP: (120-128)/(71-88) 126/71  SpO2:  [95 %-99 %] 95 %  Temp (24hrs), Av 6 °F (37 °C), Min:97 9 °F (36 6 °C), Max:99 1 °F (37 3 °C)  Current: Temperature: 98 7 °F (37 1 °C)    Physical Exam   Constitutional: He is oriented to person, place, and time  He appears well-developed and well-nourished  No distress  HENT:   Head: Normocephalic and atraumatic  Eyes: Conjunctivae are normal  Right eye exhibits no discharge  Left eye exhibits no discharge  Neck: Neck supple  No JVD present  Cardiovascular: Normal rate and regular rhythm  No murmur heard  Pulmonary/Chest: Breath sounds normal  No respiratory distress  He has no wheezes  Abdominal: Soft  He exhibits no distension  There is no tenderness  There is no rebound and no guarding  Musculoskeletal: He exhibits no edema  Neurological: He is alert and oriented to person, place, and time  No cranial nerve deficit  Skin: Skin is warm and dry  No rash noted  He is not diaphoretic  No erythema         Invasive Devices     Peripherally Inserted Central Catheter Line            PICC Line 73/71/88 Right Basilic 1 day          Peripheral Intravenous Line Peripheral IV 11/04/17 Left Hand 3 days          Drain            External Urinary Catheter Small 4 days    Closed/Suction Drain Midline Back 15 Fr  2 days                Lab, Imaging and other studies: I have personally reviewed pertinent reports

## 2017-11-08 NOTE — PLAN OF CARE
Problem: PHYSICAL THERAPY ADULT  Goal: Performs mobility at highest level of function for planned discharge setting  See evaluation for individualized goals  Treatment/Interventions: Functional transfer training, Endurance training, Patient/family training, Equipment eval/education, Bed mobility, Gait training, Spoke to nursing, OT  Equipment Recommended: Malick Vallejo (mobility progress; use of RW for upright mobility with A)       See flowsheet documentation for full assessment, interventions and recommendations  Outcome: Not Progressing  Prognosis: Fair  Problem List: Decreased strength, Decreased endurance, Impaired balance, Decreased mobility, Decreased coordination, Decreased safety awareness, Impaired judgement, Decreased cognition, Obesity, Decreased skin integrity, Pain  Assessment: Pt demonstrated difficulty cooperating with PT due to pain  Pt's parents were at bedside and willing to observe PT session as well as participate in family training  Pt was not able to even sit EOB with dependent assist of 2 people  Pt shouted in pain when PT and mother attempted to assist him with transitioning to EOB  Pt lifted BLEs back onto bed and refused to re-attempt  Attempted to scoot pt upward in bed for improved positioning, however, he continued to shout about his pain and refused to be touched by PT  Pt was also upset that PT would not allow him to transfer to floor (states he puts knees on floor when transferring OOB at home)  Pt's mother and father requested PT to re-attempt this afternoon at approx 13:30  Discussed scheduled p m  session with OT services  Barriers to Discharge: Inaccessible home environment (2 story home,[place])     Recommendation: Post acute IP rehab     PT - OK to Discharge: No    See flowsheet documentation for full assessment

## 2017-11-08 NOTE — PHYSICAL THERAPY NOTE
PHYSICAL THERAPY NOTE        Patient Name: Jamie LUCERO Date: 11/8/2017 11/08/17 1406   Pain Assessment   Pain Assessment Dillard-Baker FACES   Dillard-Baker FACES Pain Rating 8   Pain Type Acute pain;Surgical pain   Pain Location Back   Pain Orientation Lower   Hospital Pain Intervention(s) (pt medicated prior to PT)   Restrictions/Precautions   Weight Bearing Precautions Per Order No   Other Precautions Agitated;Cognitive; Fall Risk;Pain  (SAÚL drain)   General   Chart Reviewed Yes   Response to Previous Treatment Patient reporting fatigue but able to participate  Family/Caregiver Present Yes   Cognition   Overall Cognitive Status WFL   Arousal/Participation Responsive   Attention Attends with cues to redirect   Orientation Level Oriented X4   Memory Within functional limits   Following Commands Follows one step commands with increased time or repetition   Subjective   Subjective "I need time to heal! Don't rush me!"   Bed Mobility   Rolling L 5  Supervision  (log roll)   Additional items Assist x 1; Increased time required;Verbal cues; Bedrails   Supine to Sit 3  Moderate assistance   Additional items Assist x 2; Increased time required;Verbal cues   Additional Comments Initially pt stated he only wanted assist from mother  however, he screamed in pain and she was unable to assist pt to EOB  Pt was then assisted by PT and Jayro Mahan x2  Pt sat EOB for several minutes prior to agreeing to sit<>stand  Transfers   Sit to Stand 3  Moderate assistance   Additional items Increased time required;Verbal cues; Assist x 2   Stand to Sit 3  Moderate assistance   Additional items Assist x 2; Increased time required;Verbal cues   Stand pivot 4  Minimal assistance   Additional items Assist x 2; Increased time required;Verbal cues   Additional Comments Pt requires additional time to complete all mobility tasks  Pt states he requires several minutes of rest between movements      Ambulation/Elevation   Gait pattern Improper Weight shift; Forward Flexion;Decreased foot clearance;Shuffling;Excessively slow; Short stride   Gait Assistance 4  Minimal assist   Additional items Assist x 2;Verbal cues; Tactile cues   Assistive Device Rolling walker   Distance 2 ft   Stair Management Assistance Not tested   Balance   Static Sitting Fair -   Static Standing Poor +   Dynamic Standing Poor +   Ambulatory Poor +   Endurance Deficit   Endurance Deficit Yes   Activity Tolerance   Activity Tolerance Patient limited by pain; Patient limited by fatigue;Treatment limited secondary to agitation   Nurse Made Aware RN confirmed pt appropriate for pt tx  Assessment   Prognosis Fair   Problem List Decreased strength;Decreased endurance; Impaired balance;Decreased mobility; Decreased coordination;Decreased cognition; Impaired judgement;Decreased safety awareness; Obesity; Decreased skin integrity;Pain   Assessment Pt seen for second session  ModA x2 to transition to EOB sitting and modA x2 sit<>stand  Pt able to take a few side steps toward bedside chair with Estrella x2 and RW  Pt refused to ambulate and requested a third PT session after time to rest     Barriers to Discharge Inaccessible home environment   Barriers to Discharge Comments Per mother, pt can remain on 1st level  Pt will still have 3 [place] home  Goals   Patient Goals to go home   STG Expiration Date 11/17/17   Plan   Treatment/Interventions Functional transfer training;LE strengthening/ROM; Elevations; Therapeutic exercise; Endurance training;Cognitive reorientation;Patient/family training;Equipment eval/education; Bed mobility;Gait training;Spoke to nursing;OT   Progress Slow progress, decreased activity tolerance   PT Frequency 5x/wk  (1x/weekend)   Recommendation   Recommendation Post acute IP rehab   Equipment Recommended Sandra Winn   PT - OK to Discharge No     Dacia Swain, PT

## 2017-11-08 NOTE — DISCHARGE INSTRUCTIONS
CBC with diff, creatinine weekly while on Ancef    Remove PICC line after last dose of Ancef 12/13/2017                              Laminectomy/Discectomy Discharge Instructions   Please keep incision clean and dry  Avoid applying creams, lotion or antiseptic to incision area   If you have steri-strips you may remove them after 14 days if they do not fall off   Check the wound daily  If the incision becomes red, swollen, tender, warm, or has increased drainage please notify MD immediately   May shower 3 days after surgery, but do not soak in a tub and no swimming   Pat incision dry after showering   No bending or heavy lifting greater than 10lbs   No prolonged sitting greater than 30 minutes, but may walk as tolerated   Please take pain medications to relieve incision pain, and muscle relaxants to prevent spasms as directed by MD or Extender  See Med  Rec  completed at the time of discharge   No driving for 2 weeks or longer if taking narcotics or muscle relaxers   OK to be passenger in car for short distances   If taking Coumadin, Aspirin, or Plavix, you may resume these medications when cleared by Neurosurgery   To avoid constipation while on narcotics increase your water and fiber intake   May use over the counter stool softeners such as colace and senna   Limit steps to 2-3 times a day   Continue use of Incentive spirometer   Return for your follow up appointment in 2 weeks for an incision check and staple/suture removal as scheduled  Follow-up 6 weeks after surgery as scheduled  **Please notify MD if you experience a fever 101  F, chills or have increased pain, numbness, or weakness in your legs**    Acute Care Surgery Discharge Instructions    Please follow-up as instructed  If you do not already have a follow-up appointment, please call the office when you leave to schedule an appointment to be seen in 2-3 weeks for post-operative re-evaluation      Activity:  - No heavy lifting or strenuous physical activity or exercise for 2 weeks  - Walking and normal light activities are encouraged  - Normal daily activities including climbing steps are okay  - No driving until no longer using pain medications and/or until cleared by a physician  Wound Care:  - May shower daily  No tub baths or swimming until cleared by your surgeon   - Wash incision gently with soap and water and pat dry  - Do not apply any creams or ointments unless instructed to do so by your surgeon  - SAÚL Drain care: Please milk drain mornings and nights (and as needed)  Empty as needed and record daily output  Bring output records to follow-up appointment  - May leave current dressing in place through 11/10/2017  Once current dressing removed or falls off, please cover incision with a dry gauze dressing  Medications:    - You may resume all of your regular medications, including blood thinners and aspirin, after going home unless otherwise instructed  Please refer to your discharge medication list for further details  - Please take the pain medications as directed  - You are encouraged to use non-narcotic pain medications first and whenever possible  Reserve the use of narcotic pain medication for moderate to severe pain not controlled by non-narcotic medications   - No driving while taking narcotic pain medications  - You may become constipated, especially if taking pain medications  You may take any over the counter stool softeners or laxatives as needed  Examples: Milk of Magnesia, Colace, Senna  Additional Instructions:  - If you have any questions or concerns after discharge please call the office   - Call office or return to ER if fever greater than 101, chills, persistent nausea/vomiting, worsening/uncontrollable pain, and/or increasing redness or purulent/foul smelling drainage from incision(s)

## 2017-11-08 NOTE — PROGRESS NOTES
Mr Penelope Chirinos is in good spirits today  He understands if he participates with therapies over the next day or 2 he may be able to discharge home on home antibiotics  He is lying comfortably in bed eating  He states his pain is significantly improved  He has full strength of his left lower extremity in all muscle groups  In his right lower extremity he has some mild hip flexion weakness, approximately 4+, his knee extension is 4+, his plantar flexion is 5  He has a chronic footdrop  His sensation is intact  His wound is dressed  He had a recent CT abdomen to look for other infectious sources which is negative  There does not appear to be any signs of osteo diskitis on the CT  This juncture I agree with continuing IV antibiotics for a total of 6 weeks  Disposition will be dependent on his mobility in the ability of his family to care for him while at home  I am concerned should he go home that he will once again be at risk for wound related complications  He will require frequent visiting nursing care

## 2017-11-08 NOTE — SOCIAL WORK
Cm reviewed patient during care coordination rounds  Patient is medically stable for d/c today  Per PT/OT patient can d/c home today with [facility]  services  Patient is very adamant that he leave [facility]  Cm sent referral to [facility] [place] who will be able to see patient tonight at 8pm instead of tomorrow as previously planned  RN CM confirmed with  infusion center that medication can sent to home for 8pm tonight  Cm informed patient, family and medical team about d/c  All parties in agreement with discharge plan  CM reviewed d/c planning process including the following: identifying help at home, patient preference for d/c planning needs, Discharge Lounge, [facility] Meds to Bed program, availability of treatment team to discuss questions or concerns patient and/or family may have regarding understanding medications and recognizing signs and symptoms once discharged  CM also encouraged patient to follow up with all recommended appointments after discharge  Patient advised of importance for patient and family to participate in managing patients medical well being

## 2017-11-08 NOTE — PROGRESS NOTES
Progress Note - Collin Bradley 23 y o  male MRN: [id number]    Unit/Bed#: [facility] 919-01 Encounter: [id number]        * Sepsis [facility])   Assessment & Plan    · Secondary to Proteus  Sensitive to Ancef  · Case reviewed Infectious Disease no further temperature spikes CT scan negative  Clinically stable continue treatment         Wound infection   Assessment & Plan    · Will discuss with surgery possible removal of SAÚL drainage today  · Follow up with PT for discharge planning        Hypothyroid   Assessment & Plan    · Chronic and stable continue treatment        Autism   Assessment & Plan    · Family very involved in care  · Continue treatment  Discharge planning          Pharmacologic: Enoxaparin (Lovenox)  Mechanical VTE Prophylaxis in Place: No    Patient Centered Rounds: I have performed bedside rounds with nursing staff today  Discussions with Specialists or Other Care Team Provider:  Case discussed with Infectious Disease and Neurosurgery    Education and Discussions with Family / Patient:  Family at bedside    Time Spent for Care: 30 minutes  More than 50% of total time spent on counseling and coordination of care as described above  Current Length of Stay: 8 day(s)    Current Patient Status: Inpatient   Certification Statement: Patient requires continued inpatient stay for further evaluation and workup possible discharge in the next 24 hours    Discharge Plan / Estimated Discharge Date: 24 hours      Code Status: Level 1 - Full Code      Subjective:   Osmar Capes go home    Objective:     Vitals:   Temp (24hrs), Av 6 °F (37 °C), Min:97 9 °F (36 6 °C), Max:99 1 °F (37 3 °C)    HR:  [] 87  Resp:  [18-20] 18  BP: (120-128)/(71-88) 126/71  SpO2:  [95 %-99 %] 95 %  Body mass index is 39 44 kg/m²  Input and Output Summary (last 24 hours):        Intake/Output Summary (Last 24 hours) at 17 1055  Last data filed at 17 1039   Gross per 24 hour   Intake             1200 ml Output             2875 ml   Net            -1675 ml       Physical Exam:     Physical Exam   Constitutional: No distress  Cardiovascular: Normal rate  Exam reveals no gallop and no friction rub  No murmur heard  Pulmonary/Chest: Effort normal  No respiratory distress  He has no wheezes  He has no rales  Abdominal: Soft  He exhibits no distension  There is no tenderness  There is no rebound and no guarding  Musculoskeletal: He exhibits no tenderness  Neurological: He is alert  Skin: He is not diaphoretic  Additional Data:     Labs:      Results from last 7 days  Lab Units 11/07/17  1102  11/04/17  0509   WBC Thousand/uL 9 65  < > 5 42   HEMOGLOBIN g/dL 10 4*  < > 9 8*   HEMATOCRIT % 31 2*  < > 29 2*   PLATELETS Thousands/uL 384  < > 255   NEUTROS PCT %  --   --  67   LYMPHS PCT %  --   --  21   LYMPHO PCT % 14  < >  --    MONOS PCT %  --   --  9   MONO PCT MAN % 2*  < >  --    EOS PCT %  --   --  3   EOSINO PCT MANUAL % 2  < >  --    < > = values in this interval not displayed  Results from last 7 days  Lab Units 11/07/17  1102   SODIUM mmol/L 137   POTASSIUM mmol/L 3 7   CHLORIDE mmol/L 100   CO2 mmol/L 29   BUN mg/dL 12   CREATININE mg/dL 0 58*   CALCIUM mg/dL 9 2   TOTAL PROTEIN g/dL 7 0   BILIRUBIN TOTAL mg/dL 0 35   ALK PHOS U/L 39*   ALT U/L 38   AST U/L 22   GLUCOSE RANDOM mg/dL 94             Recent Cultures (last 7 days):       Results from last 7 days  Lab Units 11/03/17  0927 11/03/17  0919 11/03/17  0808 11/02/17  0642 11/01/17  1215 11/01/17  1214 11/01/17  1212   BLOOD CULTURE  No Growth After 4 Days  No Growth After 4 Days  --  No Growth After 5 Days    Proteus mirabilis*  --   --   --    GRAM STAIN RESULT   --   --   --  Gram negative rods No Polys or Bacteria seen Rare Polys  No bacteria seen 1+ Polys  2+ RBC's  No bacteria seen   URINE CULTURE   --   --  No Growth <1000 cfu/mL  --   --   --   --        Last 24 Hours Medication List:     acetaminophen 975 mg Oral Q8H [address]   benztropine 1 mg Oral BID   cefazolin 2,000 mg Intravenous Q8H   clonazePAM 0 5 mg Oral BID   divalproex sodium 1,000 mg Oral Daily With Lunch   divalproex sodium 500 mg Oral Q12H LENO   docusate sodium 100 mg Oral BID   enoxaparin 40 mg Subcutaneous Q24H LENO   fenofibrate 145 mg Oral Daily   levothyroxine 125 mcg Oral HS   methocarbamol 750 mg Oral Q6H [address]   risperiDONE 2 mg Oral BID   risperiDONE 4 mg Oral BID   senna 1 tablet Oral HS        Today, Patient Was Seen By: Jose Torres MD    ** Please Note: Dragon 360 Dictation voice to text software may have been used in the creation of this document   **

## 2017-11-08 NOTE — PHYSICAL THERAPY NOTE
PHYSICAL THERAPY NOTE        Patient Name: Bernice KO'S Date: 11/8/2017 11/08/17 2724   Pain Assessment   Pain Assessment Dillard-Baker FACES   Dillard-Baker FACES Pain Rating 8   Pain Type Acute pain;Surgical pain   Pain Location Back   Pain Orientation Lower   Restrictions/Precautions   Weight Bearing Precautions Per Order No   Other Precautions Agitated;Cognitive; Fall Risk;Pain   General   Chart Reviewed Yes   Response to Previous Treatment Patient reporting fatigue but able to participate  Family/Caregiver Present Yes   Cognition   Overall Cognitive Status WFL   Arousal/Participation Responsive   Attention Attends with cues to redirect   Orientation Level Oriented X4   Memory Within functional limits   Following Commands Follows one step commands with increased time or repetition   Subjective   Subjective "Tell them I can walk! I don't need help!"    Transfers   Sit to Stand 3  Moderate assistance   Additional items Increased time required;Verbal cues; Assist x 1  (occasionally sEtrella x2 for sit->stand)   Stand to Sit 4  Minimal assistance   Additional items Assist x 1; Increased time required;Verbal cues   Additional Comments Pt requires additional time to complete all mobility tasks  Pt states he requires several minutes of rest between movements  Ambulation/Elevation   Gait pattern Improper Weight shift; Forward Flexion;Decreased foot clearance;Shuffling;Excessively slow; Short stride   Gait Assistance 4  Minimal assist  (plus chair follow for safety)   Additional items Assist x 2;Verbal cues; Tactile cues   Assistive Device Rolling walker   Distance 15 ft   Stair Management Assistance 4  Minimal assist   Additional items Assist x 2;Verbal cues; Tactile cues; Increased time required   Stair Management Technique Foreward;Nonreciprocal;Two rails   Number of Stairs 3   Balance   Static Sitting Fair -   Dynamic Sitting Fair -   Static Standing Poor +   Dynamic Standing Poor +   Ambulatory Poor + Endurance Deficit   Endurance Deficit Yes   Activity Tolerance   Activity Tolerance Patient limited by pain; Patient limited by fatigue;Treatment limited secondary to agitation   Nurse Made Aware Discussed progress with RN  Assessment   Prognosis Fair   Problem List Decreased strength;Decreased endurance; Impaired balance;Decreased mobility; Decreased coordination;Decreased cognition; Impaired judgement;Decreased safety awareness; Obesity; Decreased skin integrity;Pain   Assessment After being left in chair by PT and JOHNSON for a few minutes, pt was requesting to get up  Pt mod/Estrella x2 people for sit<>stand  Pt amb Estrella x2 approx  13' with RW  Chair was pushed to stairs and he was able to ascend/descend 3 stairs with Estrella x2 people  Pt's mother and step-father were present during all 3 PT sessions today to observe pt's progress  Pt's mother was very hands-on while step-father preferred to observe  Both parents report they understand that pt requires assist of 2 people for all mobility at this time, and they state they are willing and able to provide this assistance  At this time, I am still recommending IP rehab, however, family insisting on home with family support  Will continue to follow pt while in house  Barriers to Discharge Inaccessible home environment   Goals   Patient Goals to go home   STG Expiration Date 11/17/17   Plan   Treatment/Interventions Functional transfer training;LE strengthening/ROM; Elevations; Therapeutic exercise; Endurance training;Cognitive reorientation;Patient/family training;Equipment eval/education; Bed mobility;Gait training;Spoke to nursing;OT;Family   Progress Progressing toward goals   PT Frequency 5x/wk  (1x/weekend)   Recommendation   Recommendation Post acute IP rehab  (however family/pt want home with family support   Recommend )   Equipment Recommended Augustin Payton, PT

## 2017-11-08 NOTE — CASE MANAGEMENT
Continued Stay Review    Date: 11/8    Vital Signs: /71   Pulse 87   Temp 98 7 °F (37 1 °C) (Oral)   Resp 18   Ht 6' 4" (1 93 m)   Wt (!) 147 kg (324 lb)   SpO2 95%   BMI 39 44 kg/m²     Medications:   Scheduled Meds:   acetaminophen 975 mg Oral Q8H LENO   benztropine 1 mg Oral BID   cefazolin 2,000 mg Intravenous Q8H   clonazePAM 0 5 mg Oral BID   divalproex sodium 1,000 mg Oral Daily With Lunch   divalproex sodium 500 mg Oral Q12H LENO   docusate sodium 100 mg Oral BID   enoxaparin 40 mg Subcutaneous Q24H LENO   fenofibrate 145 mg Oral Daily   levothyroxine 125 mcg Oral HS   methocarbamol 750 mg Oral Q6H LENO   risperiDONE 2 mg Oral BID   risperiDONE 4 mg Oral BID   senna 1 tablet Oral HS     Continuous Infusions:    PRN Meds: barium sulfate    influenza vaccine    morphine injection    ondansetron    oxyCODONE    senna    Abnormal Labs/Diagnostic Results:   No new    Age/Sex: 23 y o  male     Assessment/Plan:   Sepsis ([address] )   Assessment & Plan     · Secondary to Proteus  Sensitive to Ancef  · Case reviewed Infectious Disease no further temperature spikes CT scan negative  Clinically stable continue treatment        Wound infection   Assessment & Plan     · Will discuss with surgery possible removal of SAÚL drainage today  · Follow up with PT for discharge planning       Hypothyroid   Assessment & Plan     · Chronic and stable continue treatment       Autism   Assessment & Plan     · Family very involved in care  · Continue treatment    Discharge planning          Pharmacologic: Enoxaparin (Lovenox)  Mechanical VTE Prophylaxis in Place: No     Current Length of Stay: 8 day(s)     Current Patient Status: Inpatient   Certification Statement: Patient requires continued inpatient stay for further evaluation and workup possible discharge in the next 24 hours    Discharge Plan: Home w Iv Abx vs Rehab

## 2017-11-08 NOTE — PROGRESS NOTES
Progress Note - General Surgery   [facility] 23 [facility]  male MRN: [id number]  Unit/Bed#: [facility] 919-01 Encounter: [id number]    Assessment:  Assessment:  22 yo M w/  a lumbar spine wound status post washout and closure of lumbar spine wound over SAÚL drain    - Afebrile  - Wound appears to be healing well    Plan:  PRN pain control  OOB, ambulation  Continue Ancef through 12/13 per ID  PT/OT- rehab vs home w/ family support  PT to re-eval this AM w/ mom present- patient only wants to go home      Subjective/Objective   Chief Complaint: None    Subjective: States back is feeling well, no complaints  Afebrile, no further fever spikes since 11/6    Objective:     Blood pressure 128/81, pulse 105, temperature 97 9 °F (36 6 °C), temperature source Oral, resp  rate 20, height 6' 4" (1 93 m), weight (!) 147 kg (324 lb), SpO2 95 %  ,Body mass index is 39 44 kg/m²  Intake/Output Summary (Last 24 hours) at 11/08/17 0553  Last data filed at 11/08/17 0359   Gross per 24 hour   Intake             1200 ml   Output             2090 ml   Net             -890 ml       Invasive Devices     Peripherally Inserted Central Catheter Line            PICC Line 50/73/37 Right Basilic 1 day          Peripheral Intravenous Line            Peripheral IV 11/04/17 Left Hand 3 days          Drain            External Urinary Catheter Small 4 days    Closed/Suction Drain Midline Back 15 Fr  1 day              Physical Exam:   Gen: A&O, NAD  Cardio: RRR  Lungs: CTAB  Abd: Soft, non distended, non tender  BacK: Dressing taken down, incision healing well, no erythema or areas of breakdown  No drainage   SAÚL w/ minimal serosang output      Lab, Imaging and other studies:  CBC:   Lab Results   Component Value Date    WBC 9 65 11/07/2017    HGB 10 4 (L) 11/07/2017    HCT 31 2 (L) 11/07/2017    MCV 80 (L) 11/07/2017     11/07/2017    MCH 26 6 (L) 11/07/2017    MCHC 33 3 11/07/2017    RDW 13 7 11/07/2017    MPV 9 3 11/07/2017    NRBC 0 11/07/2017   , CMP:   Lab Results   Component Value Date     11/07/2017    K 3 7 11/07/2017     11/07/2017    CO2 29 11/07/2017    ANIONGAP 8 11/07/2017    BUN 12 11/07/2017    CREATININE 0 58 (L) 11/07/2017    GLUCOSE 94 11/07/2017    CALCIUM 9 2 11/07/2017    AST 22 11/07/2017    ALT 38 11/07/2017    ALKPHOS 39 (L) 11/07/2017    PROT 7 0 11/07/2017    ALBUMIN 2 6 (L) 11/07/2017    BILITOT 0 35 11/07/2017    EGFR 142 11/07/2017     VTE Pharmacologic Prophylaxis: Heparin  VTE Mechanical Prophylaxis: sequential compression device

## 2017-11-08 NOTE — PLAN OF CARE
Problem: PAIN - ADULT  Goal: Verbalizes/displays adequate comfort level or baseline comfort level  Interventions:  - Encourage patient to monitor pain and request assistance  - Assess pain using appropriate pain scale  - Administer analgesics based on type and severity of pain and evaluate response  - Implement non-pharmacological measures as appropriate and evaluate response  - Consider cultural and social influences on pain and pain management  - Notify physician/advanced practitioner if interventions unsuccessful or patient reports new pain   Outcome: Progressing      Problem: INFECTION - ADULT  Goal: Absence or prevention of progression during hospitalization  INTERVENTIONS:  - Assess and monitor for signs and symptoms of infection  - Monitor lab/diagnostic results  - Monitor all insertion sites, i e  indwelling lines, tubes, and drains  - Monitor endotracheal (as able) and nasal secretions for changes in amount and color  - [place] appropriate cooling/warming therapies per order  - Administer medications as ordered  - Instruct and encourage patient and family to use good hand hygiene technique  - Identify and instruct in appropriate isolation precautions for identified infection/condition   Outcome: Progressing      Problem: SAFETY ADULT  Goal: Patient will remain free of falls  INTERVENTIONS:  - Assess patient frequently for physical needs  -  Identify cognitive and physical deficits and behaviors that affect risk of falls    -  [place] fall precautions as indicated by assessment   - Educate patient/family on patient safety including physical limitations  - Instruct patient to call for assistance with activity based on assessment  - Modify environment to reduce risk of injury  - Consider OT/PT consult to assist with strengthening/mobility    Outcome: Progressing    Goal: Maintain or return to baseline ADL function  INTERVENTIONS:  -  Assess patient's ability to carry out ADLs; assess patient's baseline for ADL function and identify physical deficits which impact ability to perform ADLs (bathing, care of mouth/teeth, toileting, grooming, dressing, etc )  - Assess/evaluate cause of self-care deficits   - Assess range of motion  - Assess patient's mobility; develop plan if impaired  - Assess patient's need for assistive devices and provide as appropriate  - Encourage maximum independence but intervene and supervise when necessary  ¯ Involve family in performance of ADLs  ¯ Assess for home care needs following discharge   ¯ Request OT consult to assist with ADL evaluation and planning for discharge  ¯ Provide patient education as appropriate   Outcome: Progressing    Goal: Maintain or return mobility status to optimal level  INTERVENTIONS:  - Assess patient's baseline mobility status (ambulation, transfers, stairs, etc )    - Identify cognitive and physical deficits and behaviors that affect mobility  - Identify mobility aids required to assist with transfers and/or ambulation (gait belt, sit-to-stand, lift, walker, cane, etc )  - [place] fall precautions as indicated by assessment  - Record patient progress and toleration of activity level on Mobility SBAR; progress patient to next Phase/Stage  - Instruct patient to call for assistance with activity based on assessment  - Request Rehabilitation consult to assist with strengthening/weightbearing, etc    Outcome: Progressing      Problem: DISCHARGE PLANNING  Goal: Discharge to home or other facility with appropriate resources  INTERVENTIONS:  - Identify barriers to discharge w/patient and caregiver  - Arrange for needed discharge resources and transportation as appropriate  - Identify discharge learning needs (meds, wound care, etc )  - Arrange for interpretive services to assist at discharge as needed  - Refer to Case Management Department for coordinating discharge planning if the patient needs post-hospital services based on physician/advanced practitioner order or complex needs related to functional status, cognitive ability, or social support system   Outcome: Progressing      Problem: Knowledge Deficit  Goal: Patient/family/caregiver demonstrates understanding of disease process, treatment plan, medications, and discharge instructions  Complete learning assessment and assess knowledge base  Interventions:  - Provide teaching at level of understanding  - Provide teaching via preferred learning methods   Outcome: Progressing      Problem: Potential for Falls  Goal: Patient will remain free of falls  INTERVENTIONS:  - Assess patient frequently for physical needs  -  Identify cognitive and physical deficits and behaviors that affect risk of falls    -  [place] fall precautions as indicated by assessment   - Educate patient/family on patient safety including physical limitations  - Instruct patient to call for assistance with activity based on assessment  - Modify environment to reduce risk of injury  - Consider OT/PT consult to assist with strengthening/mobility    Outcome: Progressing      Problem: Prexisting or High Potential for Compromised Skin Integrity  Goal: Skin integrity is maintained or improved  INTERVENTIONS:  - Identify patients at risk for skin breakdown  - Assess and monitor skin integrity  - Assess and monitor nutrition and hydration status  - Monitor labs (i e  albumin)  - Assess for incontinence   - Turn and reposition patient  - Assist with mobility/ambulation  - Relieve pressure over bony prominences  - Avoid friction and shearing  - Provide appropriate hygiene as needed including keeping skin clean and dry  - Evaluate need for skin moisturizer/barrier cream  - Collaborate with interdisciplinary team (i e  Nutrition, Rehabilitation, etc )   - Patient/family teaching   Outcome: Progressing      Problem: DISCHARGE PLANNING - CARE MANAGEMENT  Goal: Discharge to post-acute care or home with appropriate resources  INTERVENTIONS:  - Conduct assessment to determine patient/family and health care team treatment goals, and need for post-acute services based on payer coverage, community resources, and patient preferences, and barriers to discharge  - Address psychosocial, clinical, and financial barriers to discharge as identified in assessment in conjunction with the patient/family and health care team  - Arrange appropriate level of post-acute services according to patient's   needs and preference and payer coverage in collaboration with the physician and health care team  - Communicate with and update the patient/family, physician, and health care team regarding progress on the discharge plan  - Arrange appropriate transportation to post-acute venues  -Patient to be evaluated  Patient anticipated for d/c to home      Outcome: Progressing

## 2017-11-08 NOTE — ASSESSMENT & PLAN NOTE
· Will discuss with surgery possible removal of SAÚL drainage today    · Follow up with PT for discharge planning

## 2017-11-08 NOTE — DISCHARGE SUMMARY
Discharge Summary - [facility] Internal Medicine    Patient Information: Carmelo Puente 23 y o  male MRN: [id number]  [facility] Encounter: [id number]    Discharging Physician / Practitioner: Monster Vargas MD  PCP: Marciano Terry MD  Admission Date: 10/31/2017  Discharge Date: 11/08/17    Reason for Admission:  Fever and wound drainage    Discharge Diagnoses:     Principal Problem:    Sepsis [facility])  Active Problems:    Wound infection    Hypothyroid    Autism    Wound dehiscence    Herniation of lumbar intervertebral disc    Bipolar disorder ([facility] [address] )  Resolved Problems:    * No resolved hospital problems  *      Consultations During Hospital Stay:  · Infectious Disease  · Neurosurgery  · General Surgery    Procedures Performed:     · Debridement and washout and closure of postoperative wound infection on November 6  · CT abdomen pelvis no acute abnormality within the abdomen pelvis fluid stranding within the subcutaneous tissue of the back the surgical drain site no loculated fluid collection  · CT chest no evidence of PE indeterminate 1 2 cm hypervascular lesion in the right hepatic lobe recommending nonemergent contrast enhanced MRI    Significant Findings / Test Results:     · As above with Proteus wound infection and bacteremia now cleared    Incidental Findings:   · None     Test Results Pending at Discharge (will require follow up): · None     Outpatient Tests Requested:  · None    Complications:  None    Hospital Course:     Carmelo Puente is a 23 y o  male patient who originally presented to the hospital on 10/31/2017 due to fever and drainage from surgical wound  Patient was noted to have postoperative wound infection at the area of a previous spine surgery on October 17th with of lumbar interventricular disc with hemilaminectomy medial fasciotomy and diskectomy  His med to hospital with sepsis with high-grade fever and tachycardia    Treated aggressively with antibiotics blood cultures and wound cultures grew out the same Proteus  He continued to improve  She remained afebrile for approximately 48 hours and is now being discharged home  Wound does have a drain in place  Did undergo a washout and wound closure  His continue to improved plan is for the drain to stay in place to follow up with surgery in 2 days  His condition on discharge is improved the day will be coming out of the house tonight to assure antibiotic dosing given at 8:00 p m  tonight  Finding of indeterminate lesion on liver noted plan for outpatient MRI reviewed with the family    Condition at Discharge: fair     Discharge Day Visit / Exam:     * Please refer to separate progress note for these details *    Discussion with Family:  Family education on need for continue and complete course of antibiotics and follow up with PCP as above as well as surgery  IV antibiotics to complete through December 13, 2017  Patient with Infectious Disease follow-up on November 30, 2017 at 2:30 p m  Discharge instructions/Information to patient and family:   See after visit summary for information provided to patient and family  Provisions for Follow-Up Care:  See after visit summary for information related to follow-up care and any pertinent home health orders  Disposition:     Home with VNA Services (Reminder: Complete face to face encounter)    For Discharges to [facility] SNF:   · Not Applicable to this Patient - Not Applicable to this Patient    Planned Readmission: no    Discharge Statement:  I spent 30 minutes discharging the patient  This time was spent on the day of discharge  I had direct contact with the patient on the day of discharge  Greater than 50% of the total time was spent examining patient, answering all patient questions, arranging and discussing plan of care with patient as well as directly providing post-discharge instructions    Additional time then spent on discharge activities  Discharge Medications:  See after visit summary for reconciled discharge medications provided to patient and family  ** Please Note: This note has been constructed using a voice recognition system   **

## 2017-11-08 NOTE — PHYSICAL THERAPY NOTE
PHYSICAL THERAPY NOTE        Patient Name: Denice Lyn  [id number]'G Date: 11/8/2017 11/08/17 1001   Pain Assessment   Pain Assessment 0-10   Pain Score 8   Pain Type Acute pain   Pain Location Back   Hospital Pain Intervention(s) Medication (See MAR)  (RN medicated pt approx 20-30 min prior to attempt visit )   Restrictions/Precautions   Weight Bearing Precautions Per Order No   Other Precautions Cognitive;Agitated;Multiple lines; Fall Risk;Pain  (SAÚL drain)   General   Chart Reviewed Yes   Additional Pertinent History s/p hemilami and foraminotomy L3/4 and L4/5 10/17->sepsis and wound infection;11/06/17 s/p debridement wound and closure washout   Response to Previous Treatment Patient with no complaints from previous session  Family/Caregiver Present Yes  (parents at bedside; agreeable to initiating family training)   Cognition   Overall Cognitive Status Impaired   Arousal/Participation Responsive  (uncooperative when he experienced pain)   Attention Attends with cues to redirect   Orientation Level Oriented X4   Memory Within functional limits   Following Commands Follows one step commands with increased time or repetition   Comments pt perseverating on pain prior to moving or being touched, and required frequent re-direction   Subjective   Subjective "My back! My back! It hurts now because of what you did "   Afia Sinks Afia Sinks "I need a wheelchair! I'm so sorry, I need a wheelchair!"   Bed Mobility   Rolling L 4  Minimal assistance   Additional items Assist x 1; Increased time required;Verbal cues;LE management; Bedrails   Supine to Sit 1  Dependent   Additional Comments dependent x2 to attempt sidelying->sit multiple times  Pt shouted as soon as he was touched and refused to get OOB  Pt insisted on PT allowing him to transfer from bed to floor in order to get OOB becuase that is how he does it at home  Pt upset by PT not allowing him to get on the floor      Transfers   Sit to Stand Unable to assess   Endurance Deficit Endurance Deficit Yes   Activity Tolerance   Activity Tolerance Treatment limited secondary to agitation;Patient limited by pain; Patient limited by fatigue   Nurse Made Aware RN confirmed pt appropriate for PT tx and medicated pt prior to attempted mobilization  Assessment   Prognosis Fair   Problem List Decreased strength;Decreased endurance; Impaired balance;Decreased mobility; Decreased coordination;Decreased safety awareness; Impaired judgement;Decreased cognition;Obesity; Decreased skin integrity;Pain   Assessment Pt demonstrated difficulty cooperating with PT due to pain  Pt's parents were at bedside and willing to observe PT session as well as participate in family training  Pt was not able to even sit EOB with dependent assist of 2 people  Pt shouted in pain when PT and mother attempted to assist him with transitioning to EOB  Pt lifted BLEs back onto bed and refused to re-attempt  Attempted to scoot pt upward in bed for improved positioning, however, he continued to shout about his pain and refused to be touched by PT  Pt was also upset that PT would not allow him to transfer to floor (states he puts knees on floor when transferring OOB at home)  Pt's mother and father requested PT to re-attempt this afternoon at approx 13:30  Discussed scheduled p m  session with OT services  Goals   Patient Goals to get better   STG Expiration Date 11/17/17   Treatment Day 1   Plan   Treatment/Interventions Functional transfer training; Endurance training;Patient/family training;Equipment eval/education; Bed mobility;Gait training;Spoke to nursing;OT   Progress Slow progress, decreased activity tolerance   PT Frequency 5x/wk  (1x/weekend)   Recommendation   Recommendation Post acute IP rehab   Equipment Recommended Walker; Wheelchair  (TBA as pt progresses)   PT - OK to Discharge No   Additional Comments scheduled p m  session with family to re-attempt mobilization; pt agreeable     Bianca Hope PT

## 2017-11-08 NOTE — PLAN OF CARE
Problem: PHYSICAL THERAPY ADULT  Goal: Performs mobility at highest level of function for planned discharge setting  See evaluation for individualized goals  Treatment/Interventions: Functional transfer training, Endurance training, Patient/family training, Equipment eval/education, Bed mobility, Gait training, Spoke to nursing, OT  Equipment Recommended: Brianna Ellis (mobility progress; use of RW for upright mobility with A)       See flowsheet documentation for full assessment, interventions and recommendations  Outcome: Progressing  Prognosis: Fair  Problem List: Decreased strength, Decreased endurance, Impaired balance, Decreased mobility, Decreased coordination, Decreased cognition, Impaired judgement, Decreased safety awareness, Obesity, Decreased skin integrity, Pain  Assessment: Pt seen for second session  ModA x2 to transition to EOB sitting and modA x2 sit<>stand  Pt able to take a few side steps toward bedside chair with Estrella x2 and RW  Pt refused to ambulate and requested a third PT session after time to rest    Barriers to Discharge: Inaccessible home environment  Barriers to Discharge Comments: Per mother, pt can remain on 1st level  Pt will still have 3 MARIB[address] home  Recommendation: Post acute IP rehab     PT - OK to Discharge: No    See flowsheet documentation for full assessment

## 2017-11-08 NOTE — INCIDENTAL FINDINGS
The following findings require follow up:  Radiographic finding   Finding:  Indeterminate lesion on liver   Follow up required:  MRI of the liver   Follow up should be done within 3 month(s)    Please notify the following clinician to assist with the follow up:   Dr Milton Mary

## 2017-11-09 ENCOUNTER — GENERIC CONVERSION - ENCOUNTER (OUTPATIENT)
Dept: OTHER | Facility: OTHER | Age: 27
End: 2017-11-09

## 2017-11-12 ENCOUNTER — GENERIC CONVERSION - ENCOUNTER (OUTPATIENT)
Dept: OTHER | Facility: OTHER | Age: 27
End: 2017-11-12

## 2017-11-13 ENCOUNTER — LAB REQUISITION (OUTPATIENT)
Dept: LAB | Facility: HOSPITAL | Age: 27
End: 2017-11-13
Payer: MEDICARE

## 2017-11-13 DIAGNOSIS — T81.31XA DISRUPTION OF EXTERNAL OPERATION (SURGICAL) WOUND, NOT ELSEWHERE CLASSIFIED, INITIAL ENCOUNTER: ICD-10-CM

## 2017-11-13 LAB
BASOPHILS # BLD AUTO: 0.04 THOUSANDS/ΜL (ref 0–0.1)
BASOPHILS NFR BLD AUTO: 1 % (ref 0–1)
CREAT SERPL-MCNC: 0.55 MG/DL (ref 0.6–1.3)
EOSINOPHIL # BLD AUTO: 0.13 THOUSAND/ΜL (ref 0–0.61)
EOSINOPHIL NFR BLD AUTO: 2 % (ref 0–6)
ERYTHROCYTE [DISTWIDTH] IN BLOOD BY AUTOMATED COUNT: 13.6 % (ref 11.6–15.1)
GFR SERPL CREATININE-BSD FRML MDRD: 144 ML/MIN/1.73SQ M
HCT VFR BLD AUTO: 31.7 % (ref 36.5–49.3)
HGB BLD-MCNC: 10.3 G/DL (ref 12–17)
LYMPHOCYTES # BLD AUTO: 1.54 THOUSANDS/ΜL (ref 0.6–4.47)
LYMPHOCYTES NFR BLD AUTO: 18 % (ref 14–44)
MCH RBC QN AUTO: 25.9 PG (ref 26.8–34.3)
MCHC RBC AUTO-ENTMCNC: 32.5 G/DL (ref 31.4–37.4)
MCV RBC AUTO: 80 FL (ref 82–98)
MONOCYTES # BLD AUTO: 0.7 THOUSAND/ΜL (ref 0.17–1.22)
MONOCYTES NFR BLD AUTO: 8 % (ref 4–12)
NEUTROPHILS # BLD AUTO: 6.04 THOUSANDS/ΜL (ref 1.85–7.62)
NEUTS SEG NFR BLD AUTO: 71 % (ref 43–75)
NRBC BLD AUTO-RTO: 0 /100 WBCS
PLATELET # BLD AUTO: 369 THOUSANDS/UL (ref 149–390)
PMV BLD AUTO: 10 FL (ref 8.9–12.7)
RBC # BLD AUTO: 3.98 MILLION/UL (ref 3.88–5.62)
WBC # BLD AUTO: 8.6 THOUSAND/UL (ref 4.31–10.16)

## 2017-11-13 PROCEDURE — 85025 COMPLETE CBC W/AUTO DIFF WBC: CPT | Performed by: INTERNAL MEDICINE

## 2017-11-13 PROCEDURE — 82565 ASSAY OF CREATININE: CPT | Performed by: INTERNAL MEDICINE

## 2017-11-15 ENCOUNTER — GENERIC CONVERSION - ENCOUNTER (OUTPATIENT)
Dept: OTHER | Facility: OTHER | Age: 27
End: 2017-11-15

## 2017-11-16 ENCOUNTER — GENERIC CONVERSION - ENCOUNTER (OUTPATIENT)
Dept: OTHER | Facility: OTHER | Age: 27
End: 2017-11-16

## 2017-11-17 ENCOUNTER — HOSPITAL ENCOUNTER (INPATIENT)
Facility: HOSPITAL | Age: 27
LOS: 13 days | Discharge: HOME HEALTH | DRG: 862 | End: 2017-11-30
Attending: INTERNAL MEDICINE | Admitting: HOSPITALIST
Payer: MEDICARE

## 2017-11-17 ENCOUNTER — APPOINTMENT (EMERGENCY)
Dept: CT IMAGING | Facility: HOSPITAL | Age: 27
End: 2017-11-17
Payer: MEDICARE

## 2017-11-17 ENCOUNTER — HOSPITAL ENCOUNTER (EMERGENCY)
Facility: HOSPITAL | Age: 27
End: 2017-11-17
Attending: EMERGENCY MEDICINE | Admitting: EMERGENCY MEDICINE
Payer: MEDICARE

## 2017-11-17 ENCOUNTER — APPOINTMENT (EMERGENCY)
Dept: RADIOLOGY | Facility: HOSPITAL | Age: 27
End: 2017-11-17
Payer: MEDICARE

## 2017-11-17 ENCOUNTER — GENERIC CONVERSION - ENCOUNTER (OUTPATIENT)
Dept: OTHER | Facility: OTHER | Age: 27
End: 2017-11-17

## 2017-11-17 VITALS
DIASTOLIC BLOOD PRESSURE: 68 MMHG | TEMPERATURE: 98.6 F | BODY MASS INDEX: 38.83 KG/M2 | OXYGEN SATURATION: 95 % | SYSTOLIC BLOOD PRESSURE: 143 MMHG | WEIGHT: 315 LBS | HEART RATE: 120 BPM | RESPIRATION RATE: 16 BRPM

## 2017-11-17 DIAGNOSIS — R50.9 FEVER: Primary | ICD-10-CM

## 2017-11-17 DIAGNOSIS — T14.8XXA WOUND INFECTION: Primary | ICD-10-CM

## 2017-11-17 DIAGNOSIS — L08.9 WOUND INFECTION: Primary | ICD-10-CM

## 2017-11-17 DIAGNOSIS — F84.0 AUTISM: Chronic | ICD-10-CM

## 2017-11-17 DIAGNOSIS — E86.0 DEHYDRATION: ICD-10-CM

## 2017-11-17 DIAGNOSIS — H53.8 BLURRY VISION, BILATERAL: ICD-10-CM

## 2017-11-17 DIAGNOSIS — F31.9 BIPOLAR DISORDER (HCC): Chronic | ICD-10-CM

## 2017-11-17 PROBLEM — R79.89 HIGH SERUM LACTATE: Status: ACTIVE | Noted: 2017-11-17

## 2017-11-17 PROBLEM — E66.01 MORBID OBESITY DUE TO EXCESS CALORIES (HCC): Chronic | Status: ACTIVE | Noted: 2017-11-17

## 2017-11-17 PROBLEM — E66.01 MORBID OBESITY DUE TO EXCESS CALORIES (HCC): Status: ACTIVE | Noted: 2017-11-17

## 2017-11-17 LAB
ALBUMIN SERPL BCP-MCNC: 3 G/DL (ref 3.5–5)
ALP SERPL-CCNC: 42 U/L (ref 46–116)
ALT SERPL W P-5'-P-CCNC: 27 U/L (ref 12–78)
ANION GAP SERPL CALCULATED.3IONS-SCNC: 12 MMOL/L (ref 4–13)
APTT PPP: 38 SECONDS (ref 23–35)
AST SERPL W P-5'-P-CCNC: 23 U/L (ref 5–45)
BASOPHILS # BLD AUTO: 0.04 THOUSANDS/ΜL (ref 0–0.1)
BASOPHILS NFR BLD AUTO: 1 % (ref 0–1)
BILIRUB SERPL-MCNC: 0.4 MG/DL (ref 0.2–1)
BILIRUB UR QL STRIP: NEGATIVE
BUN SERPL-MCNC: 11 MG/DL (ref 5–25)
CALCIUM SERPL-MCNC: 9.6 MG/DL (ref 8.3–10.1)
CHLORIDE SERPL-SCNC: 98 MMOL/L (ref 100–108)
CLARITY UR: CLEAR
CO2 SERPL-SCNC: 26 MMOL/L (ref 21–32)
COLOR UR: YELLOW
CREAT SERPL-MCNC: 0.95 MG/DL (ref 0.6–1.3)
EOSINOPHIL # BLD AUTO: 0.01 THOUSAND/ΜL (ref 0–0.61)
EOSINOPHIL NFR BLD AUTO: 0 % (ref 0–6)
ERYTHROCYTE [DISTWIDTH] IN BLOOD BY AUTOMATED COUNT: 13.7 % (ref 11.6–15.1)
GFR SERPL CREATININE-BSD FRML MDRD: 110 ML/MIN/1.73SQ M
GLUCOSE SERPL-MCNC: 107 MG/DL (ref 65–140)
GLUCOSE UR STRIP-MCNC: NEGATIVE MG/DL
HCT VFR BLD AUTO: 36.1 % (ref 36.5–49.3)
HGB BLD-MCNC: 11.6 G/DL (ref 12–17)
HGB UR QL STRIP.AUTO: NEGATIVE
INR PPP: 1.05 (ref 0.86–1.16)
KETONES UR STRIP-MCNC: NEGATIVE MG/DL
LACTATE SERPL-SCNC: 1.5 MMOL/L (ref 0.5–2)
LACTATE SERPL-SCNC: 2.1 MMOL/L (ref 0.5–2)
LEUKOCYTE ESTERASE UR QL STRIP: NEGATIVE
LYMPHOCYTES # BLD AUTO: 0.83 THOUSANDS/ΜL (ref 0.6–4.47)
LYMPHOCYTES NFR BLD AUTO: 12 % (ref 14–44)
MCH RBC QN AUTO: 25.3 PG (ref 26.8–34.3)
MCHC RBC AUTO-ENTMCNC: 32.1 G/DL (ref 31.4–37.4)
MCV RBC AUTO: 79 FL (ref 82–98)
MONOCYTES # BLD AUTO: 0.56 THOUSAND/ΜL (ref 0.17–1.22)
MONOCYTES NFR BLD AUTO: 8 % (ref 4–12)
NEUTROPHILS # BLD AUTO: 5.75 THOUSANDS/ΜL (ref 1.85–7.62)
NEUTS SEG NFR BLD AUTO: 79 % (ref 43–75)
NITRITE UR QL STRIP: NEGATIVE
PH UR STRIP.AUTO: 7 [PH] (ref 4.5–8)
PLATELET # BLD AUTO: 355 THOUSANDS/UL (ref 149–390)
PMV BLD AUTO: 9.6 FL (ref 8.9–12.7)
POTASSIUM SERPL-SCNC: 3.6 MMOL/L (ref 3.5–5.3)
PROT SERPL-MCNC: 7.8 G/DL (ref 6.4–8.2)
PROT UR STRIP-MCNC: NEGATIVE MG/DL
PROTHROMBIN TIME: 14 SECONDS (ref 12.1–14.4)
RBC # BLD AUTO: 4.58 MILLION/UL (ref 3.88–5.62)
SODIUM SERPL-SCNC: 136 MMOL/L (ref 136–145)
SP GR UR STRIP.AUTO: 1.01 (ref 1–1.03)
UROBILINOGEN UR QL STRIP.AUTO: 0.2 E.U./DL
VALPROATE SERPL-MCNC: 77 UG/ML (ref 50–100)
WBC # BLD AUTO: 7.19 THOUSAND/UL (ref 4.31–10.16)

## 2017-11-17 PROCEDURE — 80164 ASSAY DIPROPYLACETIC ACD TOT: CPT | Performed by: PHYSICIAN ASSISTANT

## 2017-11-17 PROCEDURE — 99285 EMERGENCY DEPT VISIT HI MDM: CPT

## 2017-11-17 PROCEDURE — 85610 PROTHROMBIN TIME: CPT | Performed by: PHYSICIAN ASSISTANT

## 2017-11-17 PROCEDURE — 81003 URINALYSIS AUTO W/O SCOPE: CPT | Performed by: PHYSICIAN ASSISTANT

## 2017-11-17 PROCEDURE — 96365 THER/PROPH/DIAG IV INF INIT: CPT

## 2017-11-17 PROCEDURE — 96375 TX/PRO/DX INJ NEW DRUG ADDON: CPT

## 2017-11-17 PROCEDURE — 71020 HB CHEST X-RAY 2VW FRONTAL&LATL: CPT

## 2017-11-17 PROCEDURE — 80053 COMPREHEN METABOLIC PANEL: CPT | Performed by: PHYSICIAN ASSISTANT

## 2017-11-17 PROCEDURE — 96361 HYDRATE IV INFUSION ADD-ON: CPT

## 2017-11-17 PROCEDURE — 83605 ASSAY OF LACTIC ACID: CPT | Performed by: PHYSICIAN ASSISTANT

## 2017-11-17 PROCEDURE — 96366 THER/PROPH/DIAG IV INF ADDON: CPT

## 2017-11-17 PROCEDURE — 85730 THROMBOPLASTIN TIME PARTIAL: CPT | Performed by: PHYSICIAN ASSISTANT

## 2017-11-17 PROCEDURE — 93005 ELECTROCARDIOGRAM TRACING: CPT | Performed by: PHYSICIAN ASSISTANT

## 2017-11-17 PROCEDURE — 96367 TX/PROPH/DG ADDL SEQ IV INF: CPT

## 2017-11-17 PROCEDURE — 85025 COMPLETE CBC W/AUTO DIFF WBC: CPT | Performed by: PHYSICIAN ASSISTANT

## 2017-11-17 PROCEDURE — 36415 COLL VENOUS BLD VENIPUNCTURE: CPT | Performed by: PHYSICIAN ASSISTANT

## 2017-11-17 PROCEDURE — 87040 BLOOD CULTURE FOR BACTERIA: CPT | Performed by: PHYSICIAN ASSISTANT

## 2017-11-17 RX ORDER — LIDOCAINE 50 MG/G
2 PATCH TOPICAL DAILY
Status: DISCONTINUED | OUTPATIENT
Start: 2017-11-18 | End: 2017-11-30 | Stop reason: HOSPADM

## 2017-11-17 RX ORDER — LEVOTHYROXINE SODIUM 0.12 MG/1
125 TABLET ORAL
Status: DISCONTINUED | OUTPATIENT
Start: 2017-11-17 | End: 2017-11-18

## 2017-11-17 RX ORDER — METHOCARBAMOL 500 MG/1
500 TABLET, FILM COATED ORAL EVERY 6 HOURS PRN
Status: DISCONTINUED | OUTPATIENT
Start: 2017-11-17 | End: 2017-11-30 | Stop reason: HOSPADM

## 2017-11-17 RX ORDER — PREGABALIN 75 MG/1
75 CAPSULE ORAL 2 TIMES DAILY
Status: DISCONTINUED | OUTPATIENT
Start: 2017-11-18 | End: 2017-11-30 | Stop reason: HOSPADM

## 2017-11-17 RX ORDER — DOCUSATE SODIUM 100 MG/1
100 CAPSULE, LIQUID FILLED ORAL 2 TIMES DAILY
Status: DISCONTINUED | OUTPATIENT
Start: 2017-11-17 | End: 2017-11-30 | Stop reason: HOSPADM

## 2017-11-17 RX ORDER — METHOCARBAMOL 500 MG/1
500 TABLET, FILM COATED ORAL EVERY 6 HOURS PRN
Status: DISCONTINUED | OUTPATIENT
Start: 2017-11-17 | End: 2017-11-18

## 2017-11-17 RX ORDER — ONDANSETRON 2 MG/ML
4 INJECTION INTRAMUSCULAR; INTRAVENOUS EVERY 6 HOURS PRN
Status: DISCONTINUED | OUTPATIENT
Start: 2017-11-17 | End: 2017-11-30 | Stop reason: HOSPADM

## 2017-11-17 RX ORDER — POLYETHYLENE GLYCOL 3350 17 G/17G
17 POWDER, FOR SOLUTION ORAL DAILY
Status: DISCONTINUED | OUTPATIENT
Start: 2017-11-18 | End: 2017-11-30 | Stop reason: HOSPADM

## 2017-11-17 RX ORDER — TRAMADOL HYDROCHLORIDE 50 MG/1
50 TABLET ORAL EVERY 6 HOURS PRN
Status: DISCONTINUED | OUTPATIENT
Start: 2017-11-17 | End: 2017-11-18

## 2017-11-17 RX ORDER — ONDANSETRON 2 MG/ML
4 INJECTION INTRAMUSCULAR; INTRAVENOUS ONCE
Status: COMPLETED | OUTPATIENT
Start: 2017-11-17 | End: 2017-11-17

## 2017-11-17 RX ORDER — RISPERIDONE 1 MG/1
2 TABLET, FILM COATED ORAL 2 TIMES DAILY
Status: DISCONTINUED | OUTPATIENT
Start: 2017-11-17 | End: 2017-11-30 | Stop reason: HOSPADM

## 2017-11-17 RX ORDER — OXYCODONE HYDROCHLORIDE 10 MG/1
10 TABLET ORAL EVERY 6 HOURS PRN
Status: DISCONTINUED | OUTPATIENT
Start: 2017-11-17 | End: 2017-11-17

## 2017-11-17 RX ORDER — SENNOSIDES 8.6 MG
1 TABLET ORAL DAILY
Status: DISCONTINUED | OUTPATIENT
Start: 2017-11-18 | End: 2017-11-30 | Stop reason: HOSPADM

## 2017-11-17 RX ORDER — TRAMADOL HYDROCHLORIDE 50 MG/1
100 TABLET ORAL EVERY 6 HOURS PRN
Status: DISCONTINUED | OUTPATIENT
Start: 2017-11-17 | End: 2017-11-18

## 2017-11-17 RX ORDER — ACETAMINOPHEN 325 MG/1
650 TABLET ORAL EVERY 6 HOURS PRN
Status: DISCONTINUED | OUTPATIENT
Start: 2017-11-17 | End: 2017-11-30 | Stop reason: HOSPADM

## 2017-11-17 RX ORDER — BENZTROPINE MESYLATE 1 MG/1
1 TABLET ORAL 2 TIMES DAILY
Status: DISCONTINUED | OUTPATIENT
Start: 2017-11-17 | End: 2017-11-30 | Stop reason: HOSPADM

## 2017-11-17 RX ORDER — SODIUM CHLORIDE 9 MG/ML
75 INJECTION, SOLUTION INTRAVENOUS CONTINUOUS
Status: DISCONTINUED | OUTPATIENT
Start: 2017-11-17 | End: 2017-11-18

## 2017-11-17 RX ORDER — FENOFIBRATE 145 MG/1
145 TABLET, COATED ORAL DAILY
Status: DISCONTINUED | OUTPATIENT
Start: 2017-11-18 | End: 2017-11-30 | Stop reason: HOSPADM

## 2017-11-17 RX ORDER — ACETAMINOPHEN 325 MG/1
975 TABLET ORAL EVERY 6 HOURS PRN
Status: DISCONTINUED | OUTPATIENT
Start: 2017-11-17 | End: 2017-11-18

## 2017-11-17 RX ORDER — TRAMADOL HYDROCHLORIDE 50 MG/1
50 TABLET ORAL EVERY 6 HOURS PRN
COMMUNITY
End: 2017-11-30 | Stop reason: HOSPADM

## 2017-11-17 RX ORDER — RISPERIDONE 1 MG/1
4 TABLET, FILM COATED ORAL 2 TIMES DAILY
Status: DISCONTINUED | OUTPATIENT
Start: 2017-11-17 | End: 2017-11-30 | Stop reason: HOSPADM

## 2017-11-17 RX ORDER — MORPHINE SULFATE 10 MG/ML
10 INJECTION, SOLUTION INTRAMUSCULAR; INTRAVENOUS ONCE
Status: COMPLETED | OUTPATIENT
Start: 2017-11-17 | End: 2017-11-17

## 2017-11-17 RX ORDER — DIVALPROEX SODIUM 500 MG/1
1000 TABLET, DELAYED RELEASE ORAL DAILY
Status: DISCONTINUED | OUTPATIENT
Start: 2017-11-18 | End: 2017-11-18

## 2017-11-17 RX ORDER — OXYCODONE HYDROCHLORIDE 10 MG/1
10 TABLET ORAL EVERY 6 HOURS PRN
Status: DISCONTINUED | OUTPATIENT
Start: 2017-11-17 | End: 2017-11-18

## 2017-11-17 RX ORDER — PREGABALIN 50 MG/1
50 CAPSULE ORAL 2 TIMES DAILY
Status: DISCONTINUED | OUTPATIENT
Start: 2017-11-17 | End: 2017-11-17

## 2017-11-17 RX ORDER — CLONAZEPAM 0.5 MG/1
0.5 TABLET ORAL 2 TIMES DAILY
Status: DISCONTINUED | OUTPATIENT
Start: 2017-11-17 | End: 2017-11-30 | Stop reason: HOSPADM

## 2017-11-17 RX ORDER — DIVALPROEX SODIUM 500 MG/1
500 TABLET, DELAYED RELEASE ORAL EVERY 12 HOURS SCHEDULED
Status: DISCONTINUED | OUTPATIENT
Start: 2017-11-17 | End: 2017-11-30 | Stop reason: HOSPADM

## 2017-11-17 RX ORDER — PREGABALIN 75 MG/1
50 CAPSULE ORAL 2 TIMES DAILY
COMMUNITY
End: 2017-11-30 | Stop reason: HOSPADM

## 2017-11-17 RX ADMIN — VANCOMYCIN HYDROCHLORIDE 1750 MG: 1 INJECTION, POWDER, LYOPHILIZED, FOR SOLUTION INTRAVENOUS at 18:53

## 2017-11-17 RX ADMIN — CLONAZEPAM 0.5 MG: 0.5 TABLET ORAL at 23:13

## 2017-11-17 RX ADMIN — RISPERIDONE 2 MG: 1 TABLET ORAL at 23:13

## 2017-11-17 RX ADMIN — MORPHINE SULFATE 10 MG: 10 INJECTION, SOLUTION INTRAMUSCULAR; INTRAVENOUS at 20:20

## 2017-11-17 RX ADMIN — ALTEPLASE 2 MG: 2.2 INJECTION, POWDER, LYOPHILIZED, FOR SOLUTION INTRAVENOUS at 23:37

## 2017-11-17 RX ADMIN — ONDANSETRON 4 MG: 2 INJECTION INTRAMUSCULAR; INTRAVENOUS at 20:17

## 2017-11-17 RX ADMIN — DOCUSATE SODIUM 100 MG: 100 CAPSULE, LIQUID FILLED ORAL at 23:13

## 2017-11-17 RX ADMIN — METHOCARBAMOL 500 MG: 500 TABLET ORAL at 23:43

## 2017-11-17 RX ADMIN — CEFEPIME 2000 MG: 2 INJECTION, POWDER, FOR SOLUTION INTRAMUSCULAR; INTRAVENOUS at 17:06

## 2017-11-17 RX ADMIN — SODIUM CHLORIDE 2000 ML: 0.9 INJECTION, SOLUTION INTRAVENOUS at 17:08

## 2017-11-17 RX ADMIN — DIVALPROEX SODIUM 500 MG: 500 TABLET, DELAYED RELEASE ORAL at 23:13

## 2017-11-17 RX ADMIN — RISPERIDONE 4 MG: 1 TABLET ORAL at 23:14

## 2017-11-17 RX ADMIN — OXYCODONE HYDROCHLORIDE 10 MG: 10 TABLET ORAL at 22:08

## 2017-11-17 RX ADMIN — LEVOTHYROXINE SODIUM 125 MCG: 125 TABLET ORAL at 23:13

## 2017-11-17 NOTE — SEPSIS NOTE
Sepsis Note   Arlyss Record Mirna 33 y o  male MRN: [id number]  Unit/Bed#: ED 06 Encounter: [id number]            Initial Sepsis Screening     Row Name 11/17/17 3992                Is the patient's history suggestive of a new or worsening infection? (!)  Yes (Proceed)  -JG        Suspected source of infection wound infection  -JG        Are two or more of the following signs & symptoms of infection both present and new to the patient? No  -JG        Indicate SIRS criteria Tachycardia > 90 bpm  -JG        If the answer is yes to both questions, suspicion of sepsis is present          If severe sepsis is present AND tissue hypoperfusion perists in the hour after fluid resuscitation or lactate > 4, the patient meets criteria for SEPTIC SHOCK          Are any of the following organ dysfunction criteria present within 6 hours of suspected infection and SIRS criteria that are NOT considered to be chronic conditions? No  -JG        Organ dysfunction Lactate > 2 0 mmol/L  -JG        Date of presentation of severe sepsis          Time of presentation of severe sepsis          Tissue hypoperfusion persists in the hour after crystalloid fluid administration, evidenced, by either:          Was hypotension present within one hour of the conclusion of crystalloid fluid administration?           Date of presentation of septic shock          Time of presentation of septic shock            User Key  (r) = Recorded By, (t) = Taken By, (c) = Cosigned By    [address] Name Provider Type    [address] Chen PA-C Physician Assistant

## 2017-11-17 NOTE — ED NOTES
Pt refusing straight catheter at this time  Provider made aware       Kendell Lopez, RN  11/17/17 7594

## 2017-11-17 NOTE — ED NOTES
Pt refuses CT scan without medication  Provider made aware         Charisma Theodore RN  11/17/17 5664

## 2017-11-17 NOTE — SEPSIS NOTE
Sepsis Note   Jose Enrique Bradley 33 y o  male MRN: [id number]  Unit/Bed#: ED 06 Encounter: [id number]            Initial Sepsis Screening     Row Name 11/17/17 1722                Is the patient's history suggestive of a new or worsening infection? (!)  Yes (Proceed)  -JG        Suspected source of infection wound infection  -JG        Are two or more of the following signs & symptoms of infection both present and new to the patient? No  -JG        Indicate SIRS criteria Tachycardia > 90 bpm  -JG        If the answer is yes to both questions, suspicion of sepsis is present          If severe sepsis is present AND tissue hypoperfusion perists in the hour after fluid resuscitation or lactate > 4, the patient meets criteria for SEPTIC SHOCK          Are any of the following organ dysfunction criteria present within 6 hours of suspected infection and SIRS criteria that are NOT considered to be chronic conditions? No  -JG        Organ dysfunction Lactate > 2 0 mmol/L  -JG        Date of presentation of severe sepsis          Time of presentation of severe sepsis          Tissue hypoperfusion persists in the hour after crystalloid fluid administration, evidenced, by either:          Was hypotension present within one hour of the conclusion of crystalloid fluid administration?         Date of presentation of septic shock          Time of presentation of septic shock            User Key  (r) = Recorded By, (t) = Taken By, (c) = Cosigned By    [address] Name Provider Type    [address] Chen PA-C Physician Assistant               Default Flowsheet Data (last 720 hours)      Sepsis Reassessment     Row Name 11/17/17 1831                   Volume Status and Tissue Perfusion Post Fluid Resuscitation- Must Document ALL of the Following:    Vital Signs Reviewed Yes  -JG        Cardio Normal S1/S2; Regular rate and rhythm; No murmor  -JG        Pulmonary Normal effort;Clear to auscultation  -JG        Capillary Refill Brisk  -JG        Peripheral Pulses Radial;Dorsalis Pedis; Posterior Tibialis  -JG        Peripheral Pulse +2  -JG        Dorsalis Pedis +2  -JG        Posterior Tibialis +2  -JG        Skin Warm  -JG           *OR*   Intensive Monitoring- Must Document Two * of the Following Four *:    Vital Signs Reviewed          * Central Venous Pressure (CVP or RAP)          * Central Venous Oxygen (SVO2, ScvO2 or Oxygen saturation via central catheter)          * Bedside Cardiovascular US in IVC diameter and % collapse          * Passive Leg Raise OR Crystalloid Challenge            User Key  (r) = Recorded By, (t) = Taken By, (c) = Cosigned By    Initials Name Provider Type    [address] Chen PA-C Physician Assistant

## 2017-11-17 NOTE — ED PROVIDER NOTES
History  Chief Complaint   Patient presents with    Fever - 9 weeks to 74 years     c/o fevers as high as 102f today  Pt  had recent back surgery  33 yowm who is autistic presents to the ER with his parents after recently having back surgery and multiple wound debridements  Surgery for the spine was on 10/17  Last wound debridement with closure of the wound is 11/6  Patient was DCed to his home  He has not been active  He has been bedbound since his DC  He has been receiving IV Ancef since the wound debridemet  Today, he developed a fever of 102 F  He was given 500 mg of Tyleol and th fever resolves  He denies any upper respiratory symptoms  No chills or shaking rigors  He c/o pain into his left thigh  No numbness, tingling or weakness  He does have a chronic weakness of the right EHL  He denies any calf pain  He uses a [place] catheter as he has always been urinary incontinence  He has a isak rebollar drain  History provided by:  Patient      Prior to Admission Medications   Prescriptions Last Dose Informant Patient Reported?  Taking?   acetaminophen (TYLENOL) 500 mg tablet   Yes No   Sig: Take 500 mg by mouth every 6 (six) hours as needed for mild pain   benztropine (COGENTIN) 1 mg tablet   Yes No   Sig: Take 1 mg by mouth 2 (two) times a day   ceFAZolin (ANCEF) 2000 mg IVPB   No No   Sig: Infuse 2,000 mg into a venous catheter every 8 (eight) hours for 10 days   clonazePAM (KlonoPIN) 1 mg tablet   Yes No   Sig: Take 0 5 mg by mouth 2 (two) times a day   divalproex sodium (DEPAKOTE) 500 mg EC tablet   Yes No   Sig: Take 500 mg by mouth every 12 (twelve) hours     divalproex sodium (DEPAKOTE) 500 mg EC tablet   Yes No   Sig: Take 1,000 mg by mouth daily Mid day    docusate sodium (COLACE) 100 mg capsule   No No   Sig: Take 1 capsule by mouth 2 (two) times a day   fenofibrate (TRIGLIDE) 160 MG tablet   Yes No   Sig: Take 160 mg by mouth daily   influenza inactivated quadrivalent vaccine (FLULAVAL) 0 5 ML BEBA   No No   Sig: Inject 0 5 mL into the shoulder, thigh, or buttocks prior to discharge (Vaccination) for up to 1 dose   levothyroxine 125 mcg tablet   Yes No   Sig: Take 125 mcg by mouth daily at bedtime   methocarbamol (ROBAXIN) 500 mg tablet   No No   Sig: Take 1 tablet by mouth every 6 (six) hours as needed for muscle spasms   oxyCODONE (ROXICODONE) 10 MG TABS   No No   Sig: Take 1 tablet by mouth every 6 (six) hours as needed for severe pain for up to 10 days Max Daily Amount: 40 mg   risperiDONE (RisperDAL) 2 mg tablet   Yes No   Sig: Take 2 mg by mouth 2 (two) times a day   risperiDONE (RisperDAL) 4 mg tablet   Yes No   Sig: Take 4 mg by mouth 2 (two) times a day   senna (SENOKOT) 8 6 mg   No No   Sig: Take 1 tablet by mouth daily at bedtime      Facility-Administered Medications: None       Past Medical History:   Diagnosis Date    Autism     Bipolar 1 disorder ([facility] [address] )     Disease of thyroid gland     Hyperlipidemia     Lumbar disc disease     Psychiatric disorder     Compulsive Disorder    Sleep apnea     Urinary incontinence        Past Surgical History:   Procedure Laterality Date    COLONOSCOPY      INCISION AND DRAINAGE POSTERIOR SPINE N/A 11/1/2017    Procedure: INCISION AND DRAINAGE (I&D) SPINE;  Surgeon: John Martinez MD;  [facility];  Service: Neurosurgery    NJ LAMNOTMY INCL W/DCMPRSN NRV ROOT 1 INTRSPC LUMBR Bilateral 10/17/2017    Procedure: LEFT L3/4 AND RIGHT L4/5 MICRODISCECTOMIES, HEMILAMINECTOMIES; POSSIBLE EPIDURAL STEROID INJECTIONS;  Surgeon: John Martinez MD;  [facility];  Service: Neurosurgery    WISDOM TOOTH EXTRACTION      WOUND DEBRIDEMENT N/A 11/3/2017    Procedure: DEBRIDEMENT WOUND [facility] OUT), wound vac placement back;  Surgeon: Nemesio Seals DO;  [facility];  Service: General    WOUND DEBRIDEMENT N/A 11/6/2017    Procedure: Hayder Rogers ([address] OUT),CLOSURE OF [address];  Surgeon: Gerald Lyn MD;  [facility];  Service: General       History reviewed  No pertinent family history  I have reviewed and agree with the history as documented  Social History   Substance Use Topics    Smoking status: Never Smoker    Smokeless tobacco: Never Used    Alcohol use No        Review of Systems   Constitutional: Positive for activity change, appetite change, fatigue and fever  Negative for chills, diaphoresis and unexpected weight change  HENT: Negative for congestion, ear discharge, ear pain, mouth sores, postnasal drip, rhinorrhea and sore throat  Eyes: Negative for photophobia, pain, discharge, redness, itching and visual disturbance  Respiratory: Negative for chest tightness and shortness of breath  Cardiovascular: Negative for chest pain and leg swelling  Gastrointestinal: Negative for abdominal distention, abdominal pain, diarrhea, nausea and vomiting  Endocrine: Negative for cold intolerance, heat intolerance, polydipsia, polyphagia and polyuria  Genitourinary: Negative for difficulty urinating, dysuria, enuresis, flank pain, frequency, hematuria and urgency  Musculoskeletal: Positive for back pain  Negative for arthralgias, gait problem, joint swelling, myalgias, neck pain and neck stiffness  Skin: Positive for color change  Negative for pallor and rash  Neurological: Negative for weakness  Hematological: Negative for adenopathy  Does not bruise/bleed easily  Psychiatric/Behavioral: Negative for confusion  All other systems reviewed and are negative        Physical Exam  ED Triage Vitals [11/17/17 1508]   Temperature Pulse Respirations Blood Pressure SpO2   98 6 °F (37 °C) (!) 133 18 156/97 96 %      Temp Source Heart Rate Source Patient Position - Orthostatic VS BP Location FiO2 (%)   Oral Monitor Sitting Right arm --      Pain Score       Worst Possible Pain           Orthostatic Vital Signs  Vitals:    11/17/17 1730 11/17/17 1800 11/17/17 1815 11/17/17 1830   BP: 157/73 145/75 145/66 143/68   Pulse: (!) 122 (!) 126 (!) 122 (!) 120   Patient Position - Orthostatic VS: Lying          Physical Exam   Constitutional: He is oriented to person, place, and time  He appears well-developed and well-nourished  No distress  HENT:   Head: Normocephalic  Right Ear: External ear normal    Left Ear: External ear normal    Dry mucous membranes   Eyes: Conjunctivae are normal  Right eye exhibits no discharge  Neck: No JVD present  No tracheal deviation present  No thyromegaly present  Cardiovascular: Normal rate, regular rhythm and normal heart sounds  Exam reveals no gallop and no friction rub  No murmur heard  Pulmonary/Chest: Effort normal and breath sounds normal  No stridor  No respiratory distress  He has no wheezes  He has no rales  He exhibits no tenderness  Abdominal: Soft  He exhibits no distension and no mass  There is no tenderness  There is no rebound and no guarding  Musculoskeletal: He exhibits no edema or tenderness  Lymphadenopathy:     He has no cervical adenopathy  Neurological: He is alert and oriented to person, place, and time  Skin: Skin is warm  Capillary refill takes less than 2 seconds  He is not diaphoretic  No erythema  Healing midline incision over the lower lumbar area  There is Leopold Dura drain present was and serous sanguinous fluid within the bulb  Psychiatric: He has a normal mood and affect  His behavior is normal  Judgment and thought content normal    Nursing note and vitals reviewed        ED Medications  Medications   sodium chloride 0 9 % bolus 2,000 mL (0 mL Intravenous Stopped 11/17/17 2034)   vancomycin (VANCOCIN) 1,750 mg in sodium chloride 0 9 % 500 mL IVPB (1,750 mg Intravenous New Bag 11/17/17 1853)   ondansetron (ZOFRAN) injection 4 mg (4 mg Intravenous Given 11/17/17 2017)   morphine (PF) 10 mg/mL injection 10 mg (10 mg Intravenous Given 11/17/17 2020)       Diagnostic Studies  Results Reviewed     Procedure Component Value Units Date/Time    Valproic acid level, total [[id number]]  (Normal) Collected:  11/17/17 1639    Lab Status:  Final result Specimen:  Blood from Arm, Left Updated:  11/17/17 2313     Valproic Acid, Total 77 ug/mL     Lactic acid, plasma [[id number]]  (Normal) Collected:  11/17/17 1844    Lab Status:  Final result Specimen:  Blood from Arm, Left Updated:  11/17/17 1921     LACTIC ACID 1 5 mmol/L     Narrative:         Result may be elevated if tourniquet was used during collection  UA w Reflex to Microscopic [[id number]]  (Normal) Collected:  11/17/17 1844    Lab Status:  Final result Specimen:  Urine from Urine, Clean Catch Updated:  11/17/17 1901     Color, UA Yellow     Clarity, UA Clear     Specific Gravity, UA 1 010     pH, UA 7 0     Leukocytes, UA Negative     Nitrite, UA Negative     Protein, UA Negative mg/dl      Glucose, UA Negative mg/dl      Ketones, UA Negative mg/dl      Urobilinogen, UA 0 2 E U /dl      Bilirubin, UA Negative     Blood, UA Negative    Lactic acid, plasma [[id number]]  (Abnormal) Collected:  11/17/17 1638    Lab Status:  Final result Specimen:  Blood from Arm, Left Updated:  11/17/17 1717     LACTIC ACID 2 1 (HH) mmol/L     Narrative:         Result may be elevated if tourniquet was used during collection      Comprehensive metabolic panel [[id number]]  (Abnormal) Collected:  11/17/17 1638    Lab Status:  Final result Specimen:  Blood from Arm, Left Updated:  11/17/17 1706     Sodium 136 mmol/L      Potassium 3 6 mmol/L      Chloride 98 (L) mmol/L      CO2 26 mmol/L      Anion Gap 12 mmol/L      BUN 11 mg/dL      Creatinine 0 95 mg/dL      Glucose 107 mg/dL      Calcium 9 6 mg/dL      AST 23 U/L      ALT 27 U/L      Alkaline Phosphatase 42 (L) U/L      Total Protein 7 8 g/dL      Albumin 3 0 (L) g/dL      Total Bilirubin 0 40 mg/dL      eGFR 110 ml/min/1 73sq m     Narrative:         National Kidney Disease Education Program recommendations are as follows:  GFR calculation is accurate only with a steady state creatinine  Chronic Kidney disease less than 60 ml/min/1 73 sq  meters  Kidney failure less than 15 ml/min/1 73 sq  meters  Protime-INR [[id number]]  (Normal) Collected:  11/17/17 1638    Lab Status:  Final result Specimen:  Blood from Arm, Left Updated:  11/17/17 1701     Protime 14 0 seconds      INR 1 05    APTT [[id number]]  (Abnormal) Collected:  11/17/17 1638    Lab Status:  Final result Specimen:  Blood from Arm, Left Updated:  11/17/17 1701     PTT 38 (H) seconds     Narrative: Therapeutic Heparin Range = 60-90 seconds    CBC and differential [[id number]]  (Abnormal) Collected:  11/17/17 1638    Lab Status:  Final result Specimen:  Blood from Arm, Left Updated:  11/17/17 1650     WBC 7 19 Thousand/uL      RBC 4 58 Million/uL      Hemoglobin 11 6 (L) g/dL      Hematocrit 36 1 (L) %      MCV 79 (L) fL      MCH 25 3 (L) pg      MCHC 32 1 g/dL      RDW 13 7 %      MPV 9 6 fL      Platelets 053 Thousands/uL      Neutrophils Relative 79 (H) %      Lymphocytes Relative 12 (L) %      Monocytes Relative 8 %      Eosinophils Relative 0 %      Basophils Relative 1 %      Neutrophils Absolute 5 75 Thousands/µL      Lymphocytes Absolute 0 83 Thousands/µL      Monocytes Absolute 0 56 Thousand/µL      Eosinophils Absolute 0 01 Thousand/µL      Basophils Absolute 0 04 Thousands/µL     Blood culture #1 [[id number]] Collected:  11/17/17 1638    Lab Status: In process Specimen:  Blood from Arm, Left Updated:  11/17/17 1646    Blood culture #2 [[id number]] Collected:  11/17/17 1638    Lab Status: In process Specimen:  Blood from Arm, Left Updated:  11/17/17 1646                 XR chest 2 views   ED Interpretation by Graciela Ortega PA-C (11/17 1645)   No acute disease      Final Result by Tim Harmon DO (11/17 1656)      No active pulmonary disease           Workstation performed: [id number]                    Procedures  ECG 12 Lead Documentation  Date/Time: 11/17/2017 5:02 PM  Performed by: April See  Authorized by: Tammy Perez     Indications / Diagnosis:  Fever  ECG reviewed by me, the ED Provider: yes    Patient location:  ED  Previous ECG:     Previous ECG:  Compared to current    Similarity:  No change    Comparison to cardiac monitor: Yes    Interpretation:     Interpretation: normal    Rate:     ECG rate:  131    ECG rate assessment: normal    Rhythm:     Rhythm: sinus tachycardia    Ectopy:     Ectopy: none    QRS:     QRS axis:  Normal    QRS intervals:  Normal  Conduction:     Conduction: normal    ST segments:     ST segments:  Normal  T waves:     T waves: non-specific             Phone Contacts  ED Phone Contact    ED Course  ED Course as of Nov 17 2319 Fri Nov 17, 2017   1745 Patient refused CT scan  1757 Refuses Urinary catheter  9707 I spoke to Dr Corrine Clay from [facility]'s neural surgical group  He is aware of the patient and he would like the patient transferred over to [facility] so they can care for him as well  Will be admitted to the [facility] service  I will take care of the transfer  Plan rehab eventually  Rufina Lomas 1 I spoke to Dr Jet Nobles from [facility]  He is aware that the patient refused to be straight cath for urine to rule out an infection  He also refused to have a CT scan to rule out the possibility of a pulmonary embolism  He is agreeable to be transferred to [address] at [facility] so he can have nurse surgical care and wound management  Understands that the ultimate goal was to send him to rehab as he has failed at home  1836 Wound culture reviewed  10/30/17  Proteus Mirabalis  Susceptable to [facility]  Initial Sepsis Screening     Row Name 11/17/17 1722                Is the patient's history suggestive of a new or worsening infection?  (!)  Yes (Proceed)  -JG        Suspected source of infection wound infection  -JG        Are two or more of the following signs & symptoms of infection both present and new to the patient? No  -JG        Indicate SIRS criteria Tachycardia > 90 bpm  -JG        If the answer is yes to both questions, suspicion of sepsis is present          If severe sepsis is present AND tissue hypoperfusion perists in the hour after fluid resuscitation or lactate > 4, the patient meets criteria for SEPTIC SHOCK          Are any of the following organ dysfunction criteria present within 6 hours of suspected infection and SIRS criteria that are NOT considered to be chronic conditions? No  -JG        Organ dysfunction Lactate > 2 0 mmol/L  -JG        Date of presentation of severe sepsis          Time of presentation of severe sepsis          Tissue hypoperfusion persists in the hour after crystalloid fluid administration, evidenced, by either:          Was hypotension present within one hour of the conclusion of crystalloid fluid administration?         Date of presentation of septic shock          Time of presentation of septic shock            User Key  (r) = Recorded By, (t) = Taken By, (c) = Cosigned By    [address] Name Provider Type    [address] PA-C Physician Assistant           Default Flowsheet Data (last 720 hours)      Sepsis Reassessment     Row Name 11/17/17 4458                   Volume Status and Tissue Perfusion Post Fluid Resuscitation- Must Document ALL of the Following:    Vital Signs Reviewed Yes  -JG        Cardio Normal S1/S2; Regular rate and rhythm; No murmor  -JG        Pulmonary Normal effort;Clear to auscultation  -JG        Capillary Refill Brisk  -JG        Peripheral Pulses Radial;Dorsalis Pedis; Posterior Tibialis  -JG        Peripheral Pulse +2  -JG        Dorsalis Pedis +2  -JG        Posterior Tibialis +2  -JG        Skin Warm  -JG           *OR*   Intensive Monitoring- Must Document Two * of the Following Four *:    Vital Signs Reviewed          * Central Venous Pressure (CVP or RAP)          * Central Venous Oxygen (SVO2, ScvO2 or Oxygen saturation via central catheter)          * Bedside Cardiovascular US in IVC diameter and % collapse          * Passive Leg Raise OR Crystalloid Challenge            User Key  (r) = Recorded By, (t) = Taken By, (c) = Cosigned By    Initials Name Provider Type    Beatrice Suarez PA-C Physician Assistant                MDM  Number of Diagnoses or Management Options  Dehydration: new and requires workup  Fever: new and requires workup     Amount and/or Complexity of Data Reviewed  Clinical lab tests: ordered and reviewed  Tests in the radiology section of CPT®: ordered and reviewed  Tests in the medicine section of CPT®: ordered and reviewed    Risk of Complications, Morbidity, and/or Mortality  Presenting problems: high  Diagnostic procedures: high  Management options: high  General comments: This patient presents emergency room with complaints of fever and generalized weakness  He is status post cauda equina syndrome with the secondary wound dehiscence and wound infection  He recently had a debridement which grew out Proteus mirabilis  He presented to the emergency room complaining of pain as well  He had laboratory studies and a chest x-ray that were reviewed  I also recommended that he have a CT of his chest to the fact that he was hypoxic with a sat of 92% with a heart rate of 140 when he 1st came to the emergency room  His heart rate did respond to intravenous hydration  Patient did meet SIRS criteria but not acute sepsis  He refused to have the CT of the chest an understands his risks of a pulmonary embolism and the possibility of hypoxia, intubation, death  He told me that he would consider it after pain management for his back pain  I spoke to the patient's neurosurgeon Dr Nelsy Mar at [facility]  The neurosurgeon recommended that the patient be transferred [place] for further care as they do not come to [facility]    Patient will be treated with the wound management  I did tell the neurosurgeon that the patient was not safe to be at home by himself  He was going to make plans for him to have a rehab following this admission  Patient Progress  Patient progress: stable    The patient presented with a condition in which there was a high probability of imminent or life-threatening deterioration, and critical care services (excluding separately billable procedures) totalled 30-74 minutes  Disposition  Final diagnoses:   Fever - Status pads of cauda equina with decompression with postoperative wound dehiscence and infection  Residual right foot drop   Dehydration     Time reflects when diagnosis was documented in both MDM as applicable and the Disposition within this note     Time User Action Codes Description Comment    11/17/2017  6:25 PM Kajal Pyo Add [R50 9] Fever     11/17/2017  6:25 PM Kajal Pyo Add [E86 0] Dehydration     11/17/2017  6:26 PM Kajal Pyo Modify [R50 9] Fever Status pads of cauda equina with decompression with postoperative wound dehiscence and infection  Residual right foot drop      ED Disposition     ED Disposition Condition Comment    Transfer to Another [address] should be transferred out to [address] [address]        MD Documentation    [address] Most Recent Value   Patient Condition  The patient has been stabilized such that within reasonable medical probability, no material deterioration of the patient condition or the condition of the unborn child(rufino) is likely to result from the transfer   Reason for Transfer  Level of Care needed not available at this facility [Neurosurgical care]   Benefits of Transfer  Specialized equipment and/or services available at the receiving facility (Include comment)________________________, Continuity of care [Neurosurgical care]   Risks of Transfer  Potential for delay in receiving treatment, Potential deterioration of medical condition, Loss of IV, Increased discomfort during transfer, Possible worsening of condition or death during transfer   Accepting Physician  Dr Shaunna Sweet Name, [facility]   Sending MD Buddy Zuniga PA-C/Alfredito NEFF   Provider Certification  General risk, such as traffic hazards, adverse weather conditions, rough terrain or turbulence, possible failure of equipment (including vehicle or aircraft), or consequences of actions of persons outside the control of the transport personnel      RN Documentation    [address] Name, [facility]      Follow-up Information    None       Discharge Medication List as of 11/17/2017  8:45 PM      CONTINUE these medications which have NOT CHANGED    Details   acetaminophen (TYLENOL) 500 mg tablet Take 500 mg by mouth every 6 (six) hours as needed for mild pain, Until Discontinued, Historical Med      benztropine (COGENTIN) 1 mg tablet Take 1 mg by mouth 2 (two) times a day, Historical Med      ceFAZolin (ANCEF) 2000 mg IVPB Infuse 2,000 mg into a venous catheter every 8 (eight) hours for 10 days, Starting Wed 11/8/2017, Until Sat 11/18/2017, No Print      clonazePAM (KlonoPIN) 1 mg tablet Take 0 5 mg by mouth 2 (two) times a day, Historical Med      !! divalproex sodium (DEPAKOTE) 500 mg EC tablet Take 500 mg by mouth every 12 (twelve) hours  , Historical Med      !! divalproex sodium (DEPAKOTE) 500 mg EC tablet Take 1,000 mg by mouth daily Mid day , Historical Med      docusate sodium (COLACE) 100 mg capsule Take 1 capsule by mouth 2 (two) times a day, Starting Wed 11/8/2017, No Print      fenofibrate (TRIGLIDE) 160 MG tablet Take 160 mg by mouth daily, Historical Med      influenza inactivated quadrivalent vaccine (FLULAVAL) 0 5 ML BEBA Inject 0 5 mL into the shoulder, thigh, or buttocks prior to discharge (Vaccination) for up to 1 dose, Starting Wed 11/8/2017, No Print      levothyroxine 125 mcg tablet Take 125 mcg by mouth daily at bedtime, Until Discontinued, Historical Med      methocarbamol (ROBAXIN) 500 mg tablet Take 1 tablet by mouth every 6 (six) hours as needed for muscle spasms, Starting Tue 10/17/2017, Print      oxyCODONE (ROXICODONE) 10 MG TABS Take 1 tablet by mouth every 6 (six) hours as needed for severe pain for up to 10 days Max Daily Amount: 40 mg, Starting Wed 11/8/2017, Until Sat 11/18/2017, Print      !! risperiDONE (RisperDAL) 2 mg tablet Take 2 mg by mouth 2 (two) times a day, Historical Med      !! risperiDONE (RisperDAL) 4 mg tablet Take 4 mg by mouth 2 (two) times a day, Historical Med      senna (SENOKOT) 8 6 mg Take 1 tablet by mouth daily at bedtime, Starting Wed 11/8/2017, No Print       !! - Potential duplicate medications found  Please discuss with provider  No discharge procedures on file      ED Provider  Electronically Signed by           Praneeth Sanford PA-C  11/17/17 1843

## 2017-11-17 NOTE — ED NOTES
Mother states that [facility] RNA changed PICC dressing on 11/13/2017     Lizeth Stephenson RN  11/17/17 4491

## 2017-11-18 ENCOUNTER — APPOINTMENT (INPATIENT)
Dept: NON INVASIVE DIAGNOSTICS | Facility: HOSPITAL | Age: 27
DRG: 862 | End: 2017-11-18
Payer: MEDICARE

## 2017-11-18 ENCOUNTER — APPOINTMENT (INPATIENT)
Dept: RADIOLOGY | Facility: HOSPITAL | Age: 27
DRG: 862 | End: 2017-11-18
Payer: MEDICARE

## 2017-11-18 PROBLEM — IMO0001 WOUND INFECTION AFTER SURGERY, SUBSEQUENT ENCOUNTER: Chronic | Status: RESOLVED | Noted: 2017-11-18 | Resolved: 2017-11-18

## 2017-11-18 PROBLEM — R79.89 HIGH SERUM LACTATE: Status: RESOLVED | Noted: 2017-11-17 | Resolved: 2017-11-18

## 2017-11-18 PROBLEM — R00.0 SINUS TACHYCARDIA: Status: ACTIVE | Noted: 2017-11-18

## 2017-11-18 PROBLEM — I82.449 DEEP VEIN THROMBOSIS (DVT) OF TIBIAL VEIN (HCC): Status: ACTIVE | Noted: 2017-11-18

## 2017-11-18 PROBLEM — IMO0001 WOUND INFECTION AFTER SURGERY, SUBSEQUENT ENCOUNTER: Chronic | Status: ACTIVE | Noted: 2017-11-18

## 2017-11-18 PROBLEM — K59.00 CONSTIPATION: Chronic | Status: ACTIVE | Noted: 2017-11-18

## 2017-11-18 PROBLEM — R65.20 SEVERE SEPSIS (HCC): Status: ACTIVE | Noted: 2017-10-31

## 2017-11-18 LAB
ANION GAP SERPL CALCULATED.3IONS-SCNC: 6 MMOL/L (ref 4–13)
APTT PPP: 37 SECONDS (ref 23–35)
ATRIAL RATE: 137 BPM
BUN SERPL-MCNC: 10 MG/DL (ref 5–25)
CALCIUM SERPL-MCNC: 8 MG/DL (ref 8.3–10.1)
CHLORIDE SERPL-SCNC: 106 MMOL/L (ref 100–108)
CO2 SERPL-SCNC: 27 MMOL/L (ref 21–32)
CREAT SERPL-MCNC: 0.6 MG/DL (ref 0.6–1.3)
ERYTHROCYTE [DISTWIDTH] IN BLOOD BY AUTOMATED COUNT: 13.8 % (ref 11.6–15.1)
ERYTHROCYTE [DISTWIDTH] IN BLOOD BY AUTOMATED COUNT: 14 % (ref 11.6–15.1)
GFR SERPL CREATININE-BSD FRML MDRD: 139 ML/MIN/1.73SQ M
GLUCOSE SERPL-MCNC: 87 MG/DL (ref 65–140)
HCT VFR BLD AUTO: 27.9 % (ref 36.5–49.3)
HCT VFR BLD AUTO: 29.8 % (ref 36.5–49.3)
HGB BLD-MCNC: 9 G/DL (ref 12–17)
HGB BLD-MCNC: 9.8 G/DL (ref 12–17)
INR PPP: 1.33 (ref 0.86–1.16)
MCH RBC QN AUTO: 25.6 PG (ref 26.8–34.3)
MCH RBC QN AUTO: 25.9 PG (ref 26.8–34.3)
MCHC RBC AUTO-ENTMCNC: 32.3 G/DL (ref 31.4–37.4)
MCHC RBC AUTO-ENTMCNC: 32.9 G/DL (ref 31.4–37.4)
MCV RBC AUTO: 79 FL (ref 82–98)
MCV RBC AUTO: 80 FL (ref 82–98)
P AXIS: 61 DEGREES
PLATELET # BLD AUTO: 224 THOUSANDS/UL (ref 149–390)
PLATELET # BLD AUTO: 266 THOUSANDS/UL (ref 149–390)
PLATELET # BLD AUTO: 268 THOUSANDS/UL (ref 149–390)
PMV BLD AUTO: 9 FL (ref 8.9–12.7)
PMV BLD AUTO: 9.1 FL (ref 8.9–12.7)
PMV BLD AUTO: 9.2 FL (ref 8.9–12.7)
POTASSIUM SERPL-SCNC: 3.6 MMOL/L (ref 3.5–5.3)
PR INTERVAL: 164 MS
PROTHROMBIN TIME: 16.6 SECONDS (ref 12.1–14.4)
QRS AXIS: 71 DEGREES
QRSD INTERVAL: 86 MS
QT INTERVAL: 280 MS
QTC INTERVAL: 414 MS
RBC # BLD AUTO: 3.51 MILLION/UL (ref 3.88–5.62)
RBC # BLD AUTO: 3.78 MILLION/UL (ref 3.88–5.62)
SODIUM SERPL-SCNC: 139 MMOL/L (ref 136–145)
T WAVE AXIS: 75 DEGREES
VENTRICULAR RATE: 131 BPM
WBC # BLD AUTO: 4 THOUSAND/UL (ref 4.31–10.16)
WBC # BLD AUTO: 6.99 THOUSAND/UL (ref 4.31–10.16)

## 2017-11-18 PROCEDURE — 85049 AUTOMATED PLATELET COUNT: CPT | Performed by: HOSPITALIST

## 2017-11-18 PROCEDURE — 85730 THROMBOPLASTIN TIME PARTIAL: CPT | Performed by: INTERNAL MEDICINE

## 2017-11-18 PROCEDURE — 85027 COMPLETE CBC AUTOMATED: CPT | Performed by: INTERNAL MEDICINE

## 2017-11-18 PROCEDURE — 93971 EXTREMITY STUDY: CPT

## 2017-11-18 PROCEDURE — 85610 PROTHROMBIN TIME: CPT | Performed by: INTERNAL MEDICINE

## 2017-11-18 PROCEDURE — 94760 N-INVAS EAR/PLS OXIMETRY 1: CPT

## 2017-11-18 PROCEDURE — 85027 COMPLETE CBC AUTOMATED: CPT | Performed by: HOSPITALIST

## 2017-11-18 PROCEDURE — 74177 CT ABD & PELVIS W/CONTRAST: CPT

## 2017-11-18 PROCEDURE — 80048 BASIC METABOLIC PNL TOTAL CA: CPT | Performed by: HOSPITALIST

## 2017-11-18 RX ORDER — OXYCODONE HYDROCHLORIDE 10 MG/1
10 TABLET ORAL EVERY 6 HOURS PRN
Status: DISCONTINUED | OUTPATIENT
Start: 2017-11-18 | End: 2017-11-30 | Stop reason: HOSPADM

## 2017-11-18 RX ORDER — LEVOTHYROXINE SODIUM 0.03 MG/1
25 TABLET ORAL
Status: DISCONTINUED | OUTPATIENT
Start: 2017-11-20 | End: 2017-11-30 | Stop reason: HOSPADM

## 2017-11-18 RX ORDER — HEPARIN SODIUM 1000 [USP'U]/ML
10000 INJECTION, SOLUTION INTRAVENOUS; SUBCUTANEOUS ONCE
Status: COMPLETED | OUTPATIENT
Start: 2017-11-18 | End: 2017-11-18

## 2017-11-18 RX ORDER — OXYCODONE HYDROCHLORIDE 5 MG/1
5 TABLET ORAL EVERY 4 HOURS PRN
Status: DISCONTINUED | OUTPATIENT
Start: 2017-11-18 | End: 2017-11-30 | Stop reason: HOSPADM

## 2017-11-18 RX ORDER — LEVOFLOXACIN 750 MG/1
750 TABLET ORAL EVERY 24 HOURS
Status: DISCONTINUED | OUTPATIENT
Start: 2017-11-18 | End: 2017-11-30 | Stop reason: HOSPADM

## 2017-11-18 RX ORDER — LEVOTHYROXINE SODIUM 0.05 MG/1
50 TABLET ORAL
Status: DISCONTINUED | OUTPATIENT
Start: 2017-11-19 | End: 2017-11-30 | Stop reason: HOSPADM

## 2017-11-18 RX ORDER — HEPARIN SODIUM 10000 [USP'U]/100ML
3-30 INJECTION, SOLUTION INTRAVENOUS
Status: DISCONTINUED | OUTPATIENT
Start: 2017-11-18 | End: 2017-11-27

## 2017-11-18 RX ORDER — DIVALPROEX SODIUM 500 MG/1
1000 TABLET, DELAYED RELEASE ORAL DAILY
Status: DISCONTINUED | OUTPATIENT
Start: 2017-11-19 | End: 2017-11-30 | Stop reason: HOSPADM

## 2017-11-18 RX ORDER — DIVALPROEX SODIUM 500 MG/1
1000 TABLET, EXTENDED RELEASE ORAL ONCE
Status: DISCONTINUED | OUTPATIENT
Start: 2017-11-18 | End: 2017-11-18 | Stop reason: SDUPTHER

## 2017-11-18 RX ORDER — LEVOTHYROXINE SODIUM 0.05 MG/1
50 TABLET ORAL SEE ADMIN INSTRUCTIONS
COMMUNITY
End: 2018-06-14 | Stop reason: SDUPTHER

## 2017-11-18 RX ADMIN — BENZTROPINE MESYLATE 1 MG: 1 TABLET ORAL at 01:43

## 2017-11-18 RX ADMIN — PREGABALIN 75 MG: 75 CAPSULE ORAL at 08:45

## 2017-11-18 RX ADMIN — CEFAZOLIN SODIUM 2000 MG: 2 SOLUTION INTRAVENOUS at 05:59

## 2017-11-18 RX ADMIN — RISPERIDONE 4 MG: 1 TABLET ORAL at 08:45

## 2017-11-18 RX ADMIN — HYDROMORPHONE HYDROCHLORIDE 0.5 MG: 1 INJECTION, SOLUTION INTRAMUSCULAR; INTRAVENOUS; SUBCUTANEOUS at 03:16

## 2017-11-18 RX ADMIN — METHOCARBAMOL 500 MG: 500 TABLET ORAL at 12:03

## 2017-11-18 RX ADMIN — HEPARIN SODIUM 10000 UNITS: 1000 INJECTION INTRAVENOUS; SUBCUTANEOUS at 16:21

## 2017-11-18 RX ADMIN — CLONAZEPAM 0.5 MG: 0.5 TABLET ORAL at 08:46

## 2017-11-18 RX ADMIN — HEPARIN SODIUM 18 UNITS/KG/HR: 10000 INJECTION, SOLUTION INTRAVENOUS at 16:04

## 2017-11-18 RX ADMIN — BENZTROPINE MESYLATE 1 MG: 1 TABLET ORAL at 18:05

## 2017-11-18 RX ADMIN — CEFAZOLIN SODIUM 2000 MG: 2 SOLUTION INTRAVENOUS at 00:13

## 2017-11-18 RX ADMIN — DIVALPROEX SODIUM 500 MG: 500 TABLET, DELAYED RELEASE ORAL at 08:45

## 2017-11-18 RX ADMIN — SENNOSIDES 8.6 MG: 8.6 TABLET, FILM COATED ORAL at 08:46

## 2017-11-18 RX ADMIN — METHOCARBAMOL 500 MG: 500 TABLET ORAL at 23:23

## 2017-11-18 RX ADMIN — OXYCODONE HYDROCHLORIDE 10 MG: 10 TABLET ORAL at 20:42

## 2017-11-18 RX ADMIN — FENOFIBRATE 145 MG: 145 TABLET ORAL at 08:46

## 2017-11-18 RX ADMIN — HYDROMORPHONE HYDROCHLORIDE 0.5 MG: 1 INJECTION, SOLUTION INTRAMUSCULAR; INTRAVENOUS; SUBCUTANEOUS at 11:00

## 2017-11-18 RX ADMIN — PREGABALIN 75 MG: 75 CAPSULE ORAL at 18:05

## 2017-11-18 RX ADMIN — BENZTROPINE MESYLATE 1 MG: 1 TABLET ORAL at 08:46

## 2017-11-18 RX ADMIN — DOCUSATE SODIUM 100 MG: 100 CAPSULE, LIQUID FILLED ORAL at 18:05

## 2017-11-18 RX ADMIN — METHOCARBAMOL 500 MG: 500 TABLET ORAL at 18:11

## 2017-11-18 RX ADMIN — VANCOMYCIN HYDROCHLORIDE 1750 MG: 1 INJECTION, POWDER, LYOPHILIZED, FOR SOLUTION INTRAVENOUS at 16:19

## 2017-11-18 RX ADMIN — IOHEXOL 100 ML: 350 INJECTION, SOLUTION INTRAVENOUS at 11:29

## 2017-11-18 RX ADMIN — DIVALPROEX SODIUM 1000 MG: 500 TABLET, DELAYED RELEASE ORAL at 08:45

## 2017-11-18 RX ADMIN — RISPERIDONE 2 MG: 1 TABLET ORAL at 08:46

## 2017-11-18 RX ADMIN — SODIUM CHLORIDE 75 ML/HR: 0.9 INJECTION, SOLUTION INTRAVENOUS at 00:11

## 2017-11-18 RX ADMIN — CLONAZEPAM 0.5 MG: 0.5 TABLET ORAL at 18:05

## 2017-11-18 RX ADMIN — LEVOFLOXACIN 750 MG: 750 TABLET, FILM COATED ORAL at 13:50

## 2017-11-18 RX ADMIN — RISPERIDONE 2 MG: 1 TABLET ORAL at 18:05

## 2017-11-18 RX ADMIN — RISPERIDONE 4 MG: 1 TABLET ORAL at 18:09

## 2017-11-18 RX ADMIN — DIVALPROEX SODIUM 500 MG: 500 TABLET, DELAYED RELEASE ORAL at 20:42

## 2017-11-18 RX ADMIN — POLYETHYLENE GLYCOL 3350 17 G: 17 POWDER, FOR SOLUTION ORAL at 08:45

## 2017-11-18 RX ADMIN — DOCUSATE SODIUM 100 MG: 100 CAPSULE, LIQUID FILLED ORAL at 08:46

## 2017-11-18 NOTE — H&P
History and Physical - [facility] Internal Medicine    Patient Information: Janet Jacobsen 33 y o  male MRN: [id number]  Unit/Bed#: [facility] 905-01 Encounter: [id number]  Admitting Physician: Cheyanne Gan MD  PCP: Contreras Chong MD  Date of Admission:  11/17/17    Assessment/Plan:    Hospital Problem List:     Principal Problem:    Wound infection  Active Problems:    Hypothyroid    Bipolar disorder ([address] )    Autism    Morbid obesity due to excess calories (HCC)    High serum lactate    Leg swelling    Plan for the Primary Problem(s):  · Severe sepsis most likely to wound infection  · Continue current antibiotic vancomycin added and the patient will be seen by infectious disease specialist  · General surgeon consult  · Neurosurgery consult  · Continue IV fluids, antiemetic, Tylenol  · Adult pain management protocol    Plan for Additional Problems:   · Hypothyroidism continue Synthroid  · Morbid obesity, low-calorie diet, DVT prophylaxis with Lovenox b i d  patient most likely has obstructive sleep apnea ,will order CPAP  · Bipolar , Autism will continue home Rx  · Elevated  lactate secondary to #1  Improved with IV fluids  · Right lower extremity edema and calf pain ,will rule out DVT ultrasound of lower extremity ordered ,patient on Lovenox b i d     VTE Prophylaxis: Enoxaparin (Lovenox)  / sequential compression device   Code Status: full  POLST: There is no POLST form on file for this patient (pre-hospital)    Anticipated Length of Stay:  Patient will be admitted on an Inpatient basis with an anticipated length of stay of  >2 midnights  Justification for Hospital Stay: ID, neurosurgery and general surgeon consult    Total Time for Visit, including Counseling / Coordination of Care: 45 minutes  Greater than 50% of this total time spent on direct patient counseling and coordination of care      Chief Complaint:   Fever, weakness and wound infection    History of Present Illness:    Hasmukh MARTELL Margot Almonte is a 33 y o  male who was transferred from [facility] for further evaluation of fever and  spinal surgery wound infection  Of note patient underwent lumbar interventricular disc with hemilaminectomy medial fasciotomy and diskectomy on October 17 with subsequent postoperative wound infection required admission on October 31st   Patient underwent wound debridement with closure of the wound on November 6 by surgical team   He was seen by infection disease specialist  for Proteus bacteremia who recommended to continue IV Ancef until December 13  Patient was discharged on December 8 and today he presented with fever maximum 102 F at home  He took Tylenol at home, documented fever maximum in the ER 38 2C  LA 2 1--->1 5  WBC 7000  Decided fever patient also reports being on meds bed-bound since his discharge due to difficulty with ambulation and back pain  He denies  bowel incontinence, neurologic deficit  Patient noted to be tachycardic and hypoxic in the ER with O2 sat in low 90s on room air, patient adamantly refused CTA PE studies he is agreeable to proceed with a lower extremity doppler to r/o DVT  Patient admitted on inpatient basis for multiple specialists consults not available at [facility] facility  Review of Systems:    Review of Systems   Constitutional: Positive for fever  Skin: Positive for wound         Past Medical and Surgical History:     Past Medical History:   Diagnosis Date    Autism     Bipolar 1 disorder ([address] )     Disease of thyroid gland     Hyperlipidemia     Lumbar disc disease     Psychiatric disorder     Compulsive Disorder    Sleep apnea     Urinary incontinence        Past Surgical History:   Procedure Laterality Date    COLONOSCOPY      INCISION AND DRAINAGE POSTERIOR SPINE N/A 11/1/2017    Procedure: INCISION AND DRAINAGE (I&D) SPINE;  Surgeon: Mundo Potter MD;  [facility];  Service: Neurosurgery    [place] LAMNOTMY INCL W/DCMPRSN NRV ROOT 1 INTRSPC LUMBR Bilateral 10/17/2017    Procedure: LEFT L3/4 AND RIGHT L4/5 MICRODISCECTOMIES, HEMILAMINECTOMIES; POSSIBLE EPIDURAL STEROID INJECTIONS;  Surgeon: Rose Garcia MD;  Location: [facility];  Service: Neurosurgery    [facility] N/A 11/3/2017    Procedure: DEBRIDEMENT WOUND [facility] OUT), wound vac placement back;  Surgeon: Stephanie Hilton DO;  Location: [facility] MA[facility];  Service: General    WOUND DEBRIDEMENT N/A 11/6/2017    Procedure: Javid Amezquita ([address] [facility] National;  Surgeon: Aditi Bennett MD;  Location: [facility] MA[facility] OR;  Service: General       Meds/Allergies:    Prior to Admission medications    Medication Sig Start Date End Date Taking?  Authorizing Provider   pregabalin (LYRICA) 75 mg capsule Take 50 mg by mouth 2 (two) times a day   Yes Historical Provider, MD   traMADol (ULTRAM) 50 mg tablet Take 50 mg by mouth every 6 (six) hours as needed for moderate pain   Yes Historical Provider, MD   acetaminophen (TYLENOL) 500 mg tablet Take 500 mg by mouth every 6 (six) hours as needed for mild pain    Historical Provider, MD   benztropine (COGENTIN) 1 mg tablet Take 1 mg by mouth 2 (two) times a day    Historical Provider, MD   ceFAZolin (ANCEF) 2000 mg IVPB Infuse 2,000 mg into a venous catheter every 8 (eight) hours for 10 days 11/8/17 11/18/17  Soraya Tena MD   clonazePAM (KlonoPIN) 1 mg tablet Take 0 5 mg by mouth 2 (two) times a day    Historical Provider, MD   divalproex sodium (DEPAKOTE) 500 mg EC tablet Take 500 mg by mouth every 12 (twelve) hours      Historical Provider, MD   divalproex sodium (DEPAKOTE) 500 mg EC tablet Take 1,000 mg by mouth daily Mid day     Historical Provider, MD   docusate sodium (COLACE) 100 mg capsule Take 1 capsule by mouth 2 (two) times a day 11/8/17   Soraya Tena MD   fenofibrate (TRIGLIDE) 160 MG tablet Take 160 mg by mouth daily    Historical Provider, MD   influenza inactivated quadrivalent vaccine (FLULAVAL) 0 5 ML BEBA Inject 0 5 mL into the shoulder, thigh, or buttocks prior to discharge (Vaccination) for up to 1 dose 11/8/17   Charlesetta Kehr, MD   levothyroxine 125 mcg tablet Take 125 mcg by mouth daily at bedtime    Historical Provider, MD   methocarbamol (ROBAXIN) 500 mg tablet Take 1 tablet by mouth every 6 (six) hours as needed for muscle spasms 10/17/17   Rudy Lee MD   oxyCODONE (ROXICODONE) 10 MG TABS Take 1 tablet by mouth every 6 (six) hours as needed for severe pain for up to 10 days Max Daily Amount: 40 mg 11/8/17 11/18/17  Charlesetta Kehr, MD   risperiDONE (RisperDAL) 2 mg tablet Take 2 mg by mouth 2 (two) times a day    Historical Provider, MD   risperiDONE (RisperDAL) 4 mg tablet Take 4 mg by mouth 2 (two) times a day    Historical Provider, MD   senna (SENOKOT) 8 6 mg Take 1 tablet by mouth daily at bedtime 11/8/17   Charlesetta Kehr, MD     I have reviewed home medications with a medical source (PCP, Pharmacy, other)  Allergies: Allergies   Allergen Reactions    Allegra [Fexofenadine]     Aspirin     Ciprofloxacin     Erythromycin     Mercury     Motrin [Ibuprofen]     Penicillins     Pentothal [Thiopental]     Sulfa Antibiotics        Social History:     Marital Status: Single   Occupation: none  Patient Pre-hospital Living Situation: home  Patient Pre-hospital Level of Mobility: reg  Patient Pre-hospital Diet Restrictions: reg  Substance Use History:   History   Alcohol Use No     History   Smoking Status    Never Smoker   Smokeless Tobacco    Never Used     History   Drug Use No       Family History:    non-contributory    Physical Exam:     Vitals:        Physical Exam   Constitutional: He is oriented to person, place, and time  Morbidly obese   HENT:   Head: Normocephalic  Eyes: Pupils are equal, round, and reactive to light  Neck: Normal range of motion  Cardiovascular: Regular rhythm  Pulmonary/Chest: No respiratory distress  Abdominal: He exhibits distension   There is no tenderness  Musculoskeletal: He exhibits no tenderness  Neurological: He is alert and oriented to person, place, and time  Skin:   Wound with VAC in place   Psychiatric: He has a normal mood and affect  Additional Data:     Lab Results: I have personally reviewed pertinent reports  Results from last 7 days  Lab Units 11/17/17  1638   WBC Thousand/uL 7 19   HEMOGLOBIN g/dL 11 6*   HEMATOCRIT % 36 1*   PLATELETS Thousands/uL 355   NEUTROS PCT % 79*   LYMPHS PCT % 12*   MONOS PCT % 8   EOS PCT % 0       Results from last 7 days  Lab Units 11/17/17  1638   SODIUM mmol/L 136   POTASSIUM mmol/L 3 6   CHLORIDE mmol/L 98*   CO2 mmol/L 26   BUN mg/dL 11   CREATININE mg/dL 0 95   CALCIUM mg/dL 9 6   TOTAL PROTEIN g/dL 7 8   BILIRUBIN TOTAL mg/dL 0 40   ALK PHOS U/L 42*   ALT U/L 27   AST U/L 23   GLUCOSE RANDOM mg/dL 107       Results from last 7 days  Lab Units 11/17/17  1638   INR  1 05       Imaging: I have personally reviewed pertinent reports  Xr Chest 2 Views    Result Date: 11/17/2017  Narrative: CHEST INDICATION:  Postop fever  History of back surgery  COMPARISON:  None VIEWS:  Frontal and lateral projections IMAGES:  3 FINDINGS:  Right upper extremity PICC line  Cardiomediastinal silhouette appears unremarkable  The lungs are clear  No pneumothorax or pleural effusion  Visualized osseous structures appear within normal limits for the patient's age  Impression: No active pulmonary disease  Workstation performed: [id number]     Xr Abdomen 1 View Kub    Result Date: 11/3/2017  Narrative: ABDOMEN INDICATION:  Abdominal distention  COMPARISON:  None  VIEWS:  AP supine IMAGES:  4 FINDINGS: There is diffuse air distended small and large bowel loops consistent with adynamic ileus  Contrast material can be seen in the renal collecting system and the urinary bladder  No discernible free air on this supine study    Upright or left lateral decubitus imaging is more sensitive to detect subtle free air in the appropriate setting  No pathologic calcifications or soft tissue masses  Visualized lung bases are clear  Visualized osseous structures are unremarkable for the patient's age  Impression: There is diffuse air distended small and large bowel loops consistent with adynamic ileus  Workstation performed: [id number]     Cta Chest Pe Study    Result Date: 11/3/2017  Narrative: CTA - CHEST WITH IV CONTRAST - PULMONARY ANGIOGRAM INDICATION: Hypoxia, tachycardia  COMPARISON: Relevant images of the abdomen and pelvic CT from 5/17/2017  TECHNIQUE: CTA examination of the chest was performed using angiographic technique according to a protocol specifically tailored to evaluate for pulmonary embolism  Reformatted images were created in axial, sagittal, and coronal planes  In addition, coronal 3D MIP postprocessing was performed on the acquisition scanner  Radiation dose length product (DLP) for this visit:  3238 98 mGy-cm   This examination, like all CT scans performed in the [facility], was performed utilizing techniques to minimize radiation dose exposure, including the use of iterative reconstruction and automated exposure control  IV Contrast:  A total 170 mL of iohexol (OMNIPAQUE)  (the 1st scanned using 85 mL of contrast demonstrated suboptimal enhancement of the pulmonary artery's, therefore 2nd repeat scan was performed  FINDINGS: Exam for pulmonary embolus is moderately limited due to suboptimal contrast opacification of the pulmonary arteries despite repeat injection  PULMONARY ARTERIAL TREE:  No large central pulmonary emboli are seen, but segmental and smaller pulmonary emboli cannot be excluded  LUNGS:  Lungs are clear  There is no tracheal or endobronchial lesion  PLEURA:  Unremarkable  HEART/AORTA:  Unremarkable for patient's age  Incidentally noted is aberrant right subclavian artery  MEDIASTINUM AND FAVIO:  Unremarkable   CHEST WALL AND LOWER NECK:       Normal  VISUALIZED STRUCTURES IN THE UPPER ABDOMEN:  There is fatty liver change  There is an indeterminate 1 2 cm hypervascular lesion in the right hepatic lobe (series 5 image 165 ) OSSEOUS STRUCTURES:  No acute fracture or destructive osseous lesion  Impression: Exam for pulmonary embolus is moderately limited due to suboptimal contrast opacification of the pulmonary arteries despite repeat injection  No large central pulmonary emboli are seen, but segmental and smaller pulmonary emboli cannot be excluded  There is an indeterminate 1 2 cm hypervascular lesion in the right hepatic lobe (series 5 image 165 )  Recommend characterization with contrast enhanced MRI of the abdomen on nonemergent basis  Workstation performed: [id number]     Ct Abdomen Pelvis W Contrast    Result Date: 11/7/2017  Narrative: CT ABDOMEN AND PELVIS WITH IV CONTRAST INDICATION:  Abdominal pain, fever COMPARISON: None  TECHNIQUE:  CT examination of the abdomen and pelvis was performed  Reformatted images were created in axial, sagittal, and coronal planes  Radiation dose length product (DLP) for this visit:  2593 mGy-cm   This examination, like all CT scans performed in the [facility], was performed utilizing techniques to minimize radiation dose exposure, including the use of iterative reconstruction and automated exposure control  IV Contrast:  100 mL of iohexol (OMNIPAQUE)     Enteric Contrast:  Enteric contrast was administered  FINDINGS: ABDOMEN LOWER CHEST:  No significant abnormalities identified in the lower chest  LIVER/BILIARY TREE:  Unremarkable  GALLBLADDER:  No calcified gallstones  No pericholecystic inflammatory change  SPLEEN:  Unremarkable  PANCREAS:  Unremarkable  ADRENAL GLANDS:  Unremarkable  KIDNEYS/URETERS:  There is a stable 1 3 cm cyst within the midpole of the right kidney  There is no hydronephrosis  STOMACH AND BOWEL:  Unremarkable  APPENDIX:  No findings to suggest appendicitis  ABDOMINOPELVIC CAVITY:  No ascites or free intraperitoneal air  No lymphadenopathy  VESSELS:  Unremarkable for patient's age  PELVIS REPRODUCTIVE ORGANS:  Unremarkable for patient's age  URINARY BLADDER:  Unremarkable  ABDOMINAL WALL/INGUINAL REGIONS: There is trace fluid and some stranding within the subcutaneous tissues of the back extending from approximately L2-L5  Tiny foci of air also seen  A surgical drain is in place  There is no loculated fluid collection  OSSEOUS STRUCTURES:  No acute fracture or destructive osseous lesion  Impression: No acute abnormality within the abdomen or pelvis  Fluid and stranding within the subcutaneous tissues of the back with surgical drain in place  No loculated fluid collection identified  Workstation performed: [id number]       EKG, Pathology, and Other Studies Reviewed on Admission:   · EKG: sinus    Allscripts / Epic Records Reviewed: Yes     ** Please Note: This note has been constructed using a voice recognition system   **

## 2017-11-18 NOTE — CONSULTS
Consultation - Neurosurgery   Halley Bradley 33 y o  male MRN: [id number]  Unit[facility]Bed[facility] 905-01 Encounter: [id number]      Assessment/Plan     Assessment:  1  Post op # 17  incision and drainage (I&D) spine  2  Sepsis  3  Wound dehiscence  4  Proteus wound infection  5  Acute blood loss anemia  6  Herniation of lumbar intervertebral disc s/p hemilaminotomy and foraminotmy L3/4, L4/5 (10/17)  7  Hypothyroid  8  Bipolar disorder  9  Autism   10  Below the knee DVT    Plan:  - seen and examined with Dr Pedro Damico  - no neurosurgical intervention anticipated  - await ID input- started on Levofloxacin  - mobilize with PT and OT and consider STR if he agrees  - I called his mother and left as message  - will follow    History of Present Illness   Consults    HPI: Bernice Callahan is a 33y o  year old male with a history of autism and ADHD with progressive lower extremity weakness  He had large disc herniations at L3-4 and L 4-5  Her underwent a left L3-4 hemilaminectomy October 17  He elected for early discharge but returned with a wound dehiscence  He then underwent I&D and washout of the lumbar incision with general surgery  He was discharged to home with a SAÚL drain in place and antibiotics per ID  Now he returns again with a fever of 102 at home  He continues to have back pain up to 8/10  He refuses to get out of bed  He uses a condom catheter I'm not sure why  In the ER he had O2 sats in the low 90's  He refused a CTA-PE study  Venous dopplers were done showing acute-subacute DVT in one of the distal tibial veins  Apparently he was refusing Lovenox at home as well  Meanwhile he has no leukocytosis and his incision in dry and intact without erythema or drainage  Neurological exam remains stable to the extent that he allows  Ct shows a lot of soft tissues changes without an obvious collection  The drain has since fallen out       Consults    Review of Systems   Constitutional:        Refusing to move   HENT: Negative  Eyes: Negative  Respiratory: Negative  Cardiovascular: Negative  Gastrointestinal: Negative  Endocrine: Negative  Musculoskeletal: Positive for back pain  Skin: Negative  Allergic/Immunologic: Negative  Neurological: Positive for weakness  Hematological: Negative  Psychiatric/Behavioral:        Autism and ADHD related       Historical Information   Past Medical History:   Diagnosis Date    Autism     Bipolar 1 disorder ([address] )     Disease of thyroid gland     Hyperlipidemia     Lumbar disc disease     Psychiatric disorder     Compulsive Disorder    Sleep apnea     Urinary incontinence      Past Surgical History:   Procedure Laterality Date    COLONOSCOPY      INCISION AND DRAINAGE POSTERIOR SPINE N/A 11/1/2017    Procedure: INCISION AND DRAINAGE (I&D) SPINE;  Surgeon: Mariely Sprague MD;  [facility];  Service: Neurosurgery    [place] LAMNOTMY INCL W/DCMPRSN NRV ROOT 1 INTRSPC LUMBR Bilateral 10/17/2017    Procedure: LEFT L3/4 AND RIGHT L4/5 MICRODISCECTOMIES, HEMILAMINECTOMIES; POSSIBLE EPIDURAL STEROID INJECTIONS;  Surgeon: Mariely Sprague MD;  Location[facility];  Service: Neurosurgery    WISDOM TOOTH EXTRACTION     [address] [place] [address] N/A 11/3/2017    Procedure: DEBRIDEMENT WOUND [facility] OUT), wound vac placement back;  Surgeon: Issa Anglin DO;  Location: [facility];  Service: General    WOUND DEBRIDEMENT N/A 11/6/2017    Procedure: Chris Mcdaniel ([address] [facility] National;  Surgeon: Chucho Vale MD;  Location: [facility];  Service: General     History   Alcohol Use No     History   Drug Use No     History   Smoking Status    Never Smoker   Smokeless Tobacco    Never Used     History reviewed  No pertinent family history      Meds/Allergies   all current active meds have been reviewed  Allergies   Allergen Reactions    Allegra [Fexofenadine]     Aspirin     Ciprofloxacin     Erythromycin     Mercury     Motrin [Ibuprofen]     Penicillins     Pentothal [Thiopental]     Sulfa Antibiotics        Objective     Intake/Output Summary (Last 24 hours) at 11/18/17 1310  Last data filed at 11/18/17 0933   Gross per 24 hour   Intake              900 ml   Output              650 ml   Net              250 ml     Blood pressure 107/75, pulse (!) 111, temperature 99 1 °F (37 3 °C), temperature source Oral, resp  rate 18, height 5' 10" (1 778 m), weight (!) 145 kg (319 lb), SpO2 96 %  Physical Exam   Constitutional:   Obese     HENT:   Head: Normocephalic and atraumatic  Eyes: Pupils are equal, round, and reactive to light  Neck: Normal range of motion  Cardiovascular: Normal rate  Pulmonary/Chest: Effort normal    Abdominal: Soft  Neurological: He is alert  Skin: Skin is warm  Neurologic Exam     Mental Status   Level of consciousness: alert    Cranial Nerves   Cranial nerves II through XII intact  CN III, IV, VI   Pupils are equal, round, and reactive to light  Motor Exam     Strength   Strength 5/5 except as noted  Right strength: Chronic right foot drop  4/5 hip flexion  Difficult to access due to lack of cooperation     Lab Results: I have personally reviewed pertinent results        Imaging Studies: I have personally reviewed pertinent films in PACS      VTE Prophylaxis: Enoxaparin (Lovenox)

## 2017-11-18 NOTE — PROGRESS NOTES
Pt's SAÚL drain became dislodged while rolling patient  General sx team paged and notified  No new orders, will continue to monitor

## 2017-11-18 NOTE — ASSESSMENT & PLAN NOTE
· POA  Fever/tachycardia  Unclear source  · Has PICC, consider bacteremia; blood cx pending  · UA negative  Surgical site appears without infection at this time  F/U CT scan to assess for collection  · ID/Gen Surgery/Neurosurgery following, appreciate input  · Was on IV cefazolin from prior admission from proteus sepsis, day #19   Switched to PO levaquin today by ID  · Monitor QTC closely

## 2017-11-18 NOTE — PROGRESS NOTES
Ramírez BAZAN spoke with Esthela  Pts PICC line was flush but no blood return was noted  Alteplase was order, and used on the purple port, after 120 minutes blood return was noted  Got approval to run fluids through the red port even though there was no blood return  Also pts mother called and stated she noticed his right calf seemed to be bigger than the left  I measured both calf and noted a rough 2 1/2 cm difference between them  A VAS lower limb venous duplex was ordered to rule out DVT

## 2017-11-18 NOTE — ASSESSMENT & PLAN NOTE
· Continue synthroid   Does on med rec was incorrect  · Takes 25 mcg Monday-Friday and 50 mcg Sat and Sun according to mother

## 2017-11-18 NOTE — ASSESSMENT & PLAN NOTE
· Very likely could have PE given acute DVT, recent surgery and immobilization  · Refused CTA PE Study  Wanted to tx DVT with lovenox, pt has been refusing  · D/W patient and mother at bedside   WILLING TO HAVE CTA PE STUDY AND WILLING TO DO TX DOSE LOVENOX WITH EVENTUAL TRANSITION TO NOAC

## 2017-11-18 NOTE — CONSULTS
Consultation - Infectious Disease   Sergio Brothersncjimmytrini 33 y o  male MRN: [id number]  Unit/Bed#: [facility] 905-01 Encounter: [id number]      IMPRESSION & RECOMMENDATIONS:   Impression/Recommendations:  1  SIRS versus sepsis  POA:  Fever and tachycardia  Consider bacteremia given indwelling PICC; blood cultures pending  Urinalysis negative  Surgical site appears clean and intact without infection  Consider noninfectious due to severe constipation, DVT, drug fever  Clinically stable and nontoxic  Rec:   · Continue antibiotics as below  · Follow-up blood cultures from admission  · Follow temperatures and white blood cell count closely  · Continue supportive care as per the primary service    2  History of Proteus sepsis due to postoperative wound infection  High risk IV antibiotic candidate due to #5, now with readmission with fever  Sensitive to FQ and baseline QTC normal   Unclear allergy to ciprofloxacin  Rec:   · Attempt to change antibiotics to oral levofloxacin  · Follow QTC closely with repeat EKG in 48 hours  · Follow temperatures and white blood cell count closely  · Supportive care as per the primary service    3  Severe constipation  Possibly due to narcotic pain medicine  Rec:   · Continue MiraLax per patient request  · Patient declines enema or suppository    4  RLE subacute/chronic DVT    5  Autism/BPD    Discussed in detail with neurosurgery  Antibiotics:  Cefazolin #19    Thank you for this consultation  We will follow along with you  HISTORY OF PRESENT ILLNESS:  Reason for Consult:  Fever    HPI: Starla Lindsey is a 33 y o  male with a history of lumbar disc disease who recently underwent L3 through L5 microdiskectomy due to cauda equina syndrome and right foot drop on 10/17  He was recently admitted in early November for Proteus sepsis due to a postoperative wound infection  He underwent I and D x2 with concern for deep infection intraoperatively    Patient's repeat blood cultures were negative and he was discharged on 11/8 to home with IV antibiotics  The patient has autism and bipolar disorder and was receiving IV antibiotics with the assistance of his mother with the plan to complete 6 weeks total through 12/13/2017  He returned to the emergency department yesterday after he developed fever at home  Upon presentation he was noted to have a low-grade fever of 100 7 with tachycardia  He underwent a UA which was normal and a chest x-ray showed no pneumonia  His PICC line was noted to be occluded  Blood cultures were obtained and he was given a dose of cefepime and vancomycin  Upon questioning the patient's biggest complaint is nausea and he states that he is severely constipated on unable to move his bowels  Of note he did have a right lower extremity Doppler which showed subacute to chronic DVT posterior tibial veins  He was reportedly noncompliant at home with mobility and Lovenox shots  Given his fever we are asked to comment on further evaluation and management  REVIEW OF SYSTEMS:  As per HPI  Noted to complaint of right lower extremity swelling in ER, but did not describe to me  A complete system-based review of systems is otherwise negative      PAST MEDICAL HISTORY:  Past Medical History:   Diagnosis Date    Autism     Bipolar 1 disorder ([address] )     Disease of thyroid gland     Hyperlipidemia     Lumbar disc disease     Psychiatric disorder     Compulsive Disorder    Sleep apnea     Urinary incontinence      Past Surgical History:   Procedure Laterality Date    COLONOSCOPY      INCISION AND DRAINAGE POSTERIOR SPINE N/A 11/1/2017    Procedure: INCISION AND DRAINAGE (I&D) SPINE;  Surgeon: Augustina Cameron MD;  [facility];  Service: Neurosurgery    FL LAMNOTMY INCL W/DCMPRSN NRV ROOT 1 INTRSPC LUMBR Bilateral 10/17/2017    Procedure: LEFT L3/4 AND RIGHT L4/5 MICRODISCECTOMIES, HEMILAMINECTOMIES; POSSIBLE EPIDURAL STEROID INJECTIONS;  Surgeon: Augustina Cameron MD; [facility];  Service: Neurosurgery    WISDOM TOOTH EXTRACTION      WOUND DEBRIDEMENT N/A 11/3/2017    Procedure: DEBRIDEMENT WOUND [facility] OUT), wound vac placement back;  Surgeon: Cherie Og DO;  [facility];  Service: General    WOUND DEBRIDEMENT N/A 2017    Procedure: Vineet Blue ([address] [facility];  Surgeon: Yolanda Vargas MD;  Location: [facility];  Service: General       FAMILY HISTORY:  Non-contributory    SOCIAL HISTORY:  History   Alcohol Use No     History   Drug Use No     History   Smoking Status    Never Smoker   Smokeless Tobacco    Never Used       ALLERGIES:  Allergies   Allergen Reactions    Allegra [Fexofenadine]     Aspirin     Ciprofloxacin     Erythromycin     Mercury     Motrin [Ibuprofen]     Penicillins     Pentothal [Thiopental]     Sulfa Antibiotics        MEDICATIONS:  All current active medications have been reviewed  PHYSICAL EXAM:  Vitals:  HR:  [113-133] 125  Resp:  [16-18] 18  BP: (117-157)/(63-97) 117/70  SpO2:  [92 %-96 %] 95 %  Temp (24hrs), Av 3 °F (37 4 °C), Min:98 6 °F (37 °C), Max:100 7 °F (38 2 °C)  Current: Temperature: 98 6 °F (37 °C)     Physical Exam:  General:  Well-nourished, well-developed, in no acute distress  Eyes:  Conjunctive clear with no hemorrhages or effusions  Oropharynx:  No ulcers, no lesions  Neck:  Supple, no lymphadenopathy  Lungs:  Clear to auscultation bilaterally anteriorly, no accessory muscle use  Cardiac:  Regular rate and rhythm, no murmurs  Abdomen:  Soft, non-tender, non-distended  Extremities:  No peripheral cyanosis, clubbing, or edema, RUE PICC site intact  Skin:  Fungal rash over lower back, surgical incision intact with sutures, SAÚL drain with serosanguineous fluid, no ulcers  Neurological:  Moves all four extremities spontaneously, sensation grossly intact    LABS, IMAGING, & OTHER STUDIES:  Lab Results:  I have personally reviewed pertinent labs      Results from last 7 days  Lab Units 11/18/17  0443 11/17/17  1638 11/13/17  1507   SODIUM mmol/L 139 136  --    POTASSIUM mmol/L 3 6 3 6  --    CHLORIDE mmol/L 106 98*  --    CO2 mmol/L 27 26  --    ANION GAP mmol/L 6 12  --    BUN mg/dL 10 11  --    CREATININE mg/dL 0 60 0 95 0 55*   EGFR ml/min/1 73sq m 139 110 144   GLUCOSE RANDOM mg/dL 87 107  --    CALCIUM mg/dL 8 0* 9 6  --    AST U/L  --  23  --    ALT U/L  --  27  --    ALK PHOS U/L  --  42*  --    TOTAL PROTEIN g/dL  --  7 8  --    ALBUMIN g/dL  --  3 0*  --    BILIRUBIN TOTAL mg/dL  --  0 40  --        Results from last 7 days  Lab Units 11/18/17  0443 11/18/17  0141 11/17/17  1638 11/13/17  1507   WBC Thousand/uL 6 99  --  7 19 8 60   HEMOGLOBIN g/dL 9 0*  --  11 6* 10 3*   PLATELETS Thousands/uL 268 266 355 369         Blood cultures pending  Urinalysis negative    Imaging Studies:   I have personally reviewed pertinent imaging study reports and images in PACS  Chest x-ray 11/17 no pneumonia  Doppler 11/18 subacute to chronic occlusive DVT right calf    EKG, Pathology, and Other Studies:   I have personally reviewed pertinent reports

## 2017-11-18 NOTE — CASE MANAGEMENT
Initial Clinical Review    Admission: Date/Time/Statement: 11/17/17 @ 2112 Direct admit from [facility] This Encounter   Procedures    Inpatient Admission     Standing Status:   Standing     Number of Occurrences:   1     Order Specific Question:   Admitting Physician     Answer:   Chelsea Santanan     Order Specific Question:   Level of Care     Answer:   Med Surg [16]     Order Specific Question:   Estimated length of stay     Answer:   More than 2 Midnights     Order Specific Question:   Certification     Answer:   I certify that inpatient services are medically necessary for this patient for a duration of greater than two midnights  See H&P and MD Progress Notes for additional information about the patient's course of treatment  Chief Complaint: No chief complaint on file  History of Illness: Luis Enrique Aburto is a 33 y o  male who was transferred from [facility] for further evaluation of fever and  spinal surgery wound infection  Of note patient underwent lumbar interventricular disc with hemilaminectomy medial fasciotomy and diskectomy on October 17 with subsequent postoperative wound infection required admission on October 31st   Patient underwent wound debridement with closure of the wound on November 6 by surgical team   He was seen by infection disease specialist  for Proteus bacteremia who recommended to continue IV Ancef until December 13  Patient was discharged on December 8 and today he presented with fever maximum 102 F at home  He took Tylenol at home, documented fever maximum in the ER 38 2C  LA 2 1--->1 5  WBC 7000  Decided fever patient also reports being on meds bed-bound since his discharge due to difficulty with ambulation and back pain  He denies  bowel incontinence, neurologic deficit    Patient noted to be tachycardic and hypoxic in the ER with O2 sat in low 90s on room air, patient adamantly refused CTA PE studies he is agreeable to proceed with a lower extremity doppler to r/o DVT  Patient admitted on inpatient basis for multiple specialists consults not available at [facility]  Vital Signs (abnormal):   11/18/17 0838  98 6 °F (37 °C)   125  18  117/70  95 %  None (Room air)  Lying   11/18/17 0452  --  --  --  --  95 %  --  --   11/17/17 2330  99 1 °F (37 3 °C)  --  --  --  --  --  --   11/17/17 2200   100 7 °F (38 2 °C)   113  17  117/66  92 %  None (Room air)  Lying     Abnormal Labs/Diagnostic Test Results:     HEMOGLOBIN g/dL 11 6*   HEMATOCRIT % 36 1*     CHLORIDE mmol/L 98*     ALK PHOS U/L 42*     Past Medical/Surgical History: Active Ambulatory Problems     Diagnosis Date Noted    Severe sepsis [address] ) 10/31/2017    Wound dehiscence 10/31/2017    Wound infection 10/31/2017    Herniation of lumbar intervertebral disc 10/31/2017    Hypothyroid 10/31/2017    Bipolar disorder ([facility] ) 10/31/2017    Autism 10/31/2017     Resolved Ambulatory Problems     Diagnosis Date Noted    No Resolved Ambulatory Problems     Past Medical History:   Diagnosis Date    Autism     Bipolar 1 disorder ([facility] )     Disease of thyroid gland     Hyperlipidemia     Lumbar disc disease     Psychiatric disorder     Sleep apnea     Urinary incontinence        Admitting Diagnosis: Fever [R50 9]  Back pain [M54 9]  Wound infection [T14  8XXA, L08 9]    Age/Sex: 33 y o  male    Assessment/Plan: Assessment/Plan:     Hospital Problem List:    Principal Problem:    Wound infection  Active Problems:    Hypothyroid    Bipolar disorder ([facility] [address] )    Autism    Morbid obesity due to excess calories ([facility] [address] )    High serum lactate    Leg swelling   Plan for the Primary Problem(s):  · Severe sepsis most likely to wound infection  · Continue current antibiotic vancomycin added and the patient will be seen by infectious disease specialist  · General surgeon consult  · Neurosurgery consult  · Continue IV fluids, antiemetic, Tylenol  ?  Adult pain management protocol   Plan for Additional Problems:   · Hypothyroidism continue Synthroid  · Morbid obesity, low-calorie diet, DVT prophylaxis with Lovenox b i d  patient most likely has obstructive sleep apnea ,will order CPAP  · Bipolar , Autism will continue home Rx  · Elevated  lactate secondary to #1  Improved with IV fluids  · Right lower extremity edema and calf pain ,will rule out DVT ultrasound of lower extremity ordered ,patient on Lovenox b i d      VTE Prophylaxis: Enoxaparin (Lovenox)  / sequential compression device   Code Status: full  POLST: There is no POLST form on file for this patient (pre-hospital)     Anticipated Length of Stay:  Patient will be admitted on an Inpatient basis with an anticipated length of stay of  >2 midnights     Justification for Hospital Stay: ID, neurosurgery and general surgeon consult       Admission Orders:  Scheduled Meds:   benztropine 1 mg Oral BID   ceFAZolin 2,000 mg Intravenous Q8H   clonazePAM 0 5 mg Oral BID   divalproex sodium 1,000 mg Oral Daily   divalproex sodium 500 mg Oral Q12H [address]   docusate sodium 100 mg Oral BID   enoxaparin 40 mg Subcutaneous BID   fenofibrate 145 mg Oral Daily   levothyroxine 125 mcg Oral HS   lidocaine 2 patch Transdermal Daily   polyethylene glycol 17 g Oral Daily   pregabalin 75 mg Oral BID   risperiDONE 2 mg Oral BID   risperiDONE 4 mg Oral BID   senna 1 tablet Oral Daily   vancomycin 15 mg/kg (Adjusted) Intravenous Q24H     Continuous Infusions:   sodium chloride 75 mL/hr Last Rate: 75 mL/hr (11/18/17 0011)     PRN Meds:   acetaminophen    acetaminophen    HYDROmorphone   x1    methocarbamol    methocarbamol    ondansetron    oxyCODONE    oxyCODONE    traMADol    traMADol    Cardiac diet   cpap  Up and OOB as yancy   CT abd   CT lumbar spine  Venous duplex  Acute care sx consult   ID consult   Neuro surgery consult   SCD

## 2017-11-18 NOTE — ED NOTES
2000 hours via [facility], to [facility] & [address], Dr Richard Plant accepting        Shanda Peoples, RN  11/17/17 1946

## 2017-11-18 NOTE — PROGRESS NOTES
Progress Note - Arlyss Record Mirna 33 y o  male MRN: [id number]    Unit/Bed#: [facility] 905-01 Encounter: [id number]          * Sepsis ([address] )   Assessment & Plan    · POA  Fever/tachycardia  Unclear source  · Has PICC, consider bacteremia; blood cx pending  · UA negative  Surgical site appears without infection at this time  F/U CT scan to assess for collection  · ID/Gen Surgery/Neurosurgery following, appreciate input  · Was on IV cefazolin from prior admission from proteus sepsis, day #19  Switched to PO levaquin today by ID  · Monitor QTC closely         Constipation   Assessment & Plan    · Likely related to narcotics and reluctance to mobilize  · Continue bowel regimen        Deep vein thrombosis (DVT) of tibial vein (HCC)   Assessment & Plan    · Per d/w patient, he has not been moving at home and not taking lovenox during past hospital stay because he was "scared of it"  · I discussed risks of DVT as well as possible PE with patient (he refused CTA PE study in ER but has been tachycardic)  · He has been refusing lovenox here but is now agreeable  D/W mother at bedside who wants him to be treated  · D/W neurosurgery PA-C  From their standpoint there is no planned intervention and no contraindication to anticoagulation  · D/W my attenidng, will initiate heparin gtt given reversibility if issue with bleeding   · Pt only wants to get OOB if pain <5/10  · Stressed importance of PT/OT and getting OOB  Pt reluctant         Wound infection   Assessment & Plan    · Recent admission for proteus sepsis 2/2 post-op wound infection s/p L3-L5 microdiskectomy from cauda equina/R foot drop  · Had I&D and went home 11/8 with IV abx via PICC through 12/13  · General surgery and neurosurgery along with ID following  · CT L spine and CT A/P pending         Sinus tachycardia   Assessment & Plan    · Very likely could have PE given acute DVT, recent surgery and immobilization  · Refused CTA PE Study   Wanted to tx DVT with lovenox, pt has been refusing  · D/W patient and mother at bedside  WILLING TO HAVE CTA PE STUDY AND WILLING TO DO heparin gtt  WITH EVENTUAL TRANSITION TO NOAC        Morbid obesity due to excess calories (HCC)   Assessment & Plan    · Body mass index is 45 77 kg/m²  · Recommend weight loss         Autism   Assessment & Plan    · At baseline         Bipolar disorder [facility])   Assessment & Plan    · Continue home medications  Clarified dosing with patient's mother at bedside  Hypothyroid   Assessment & Plan    · Continue synthroid  Does on med rec was incorrect  · Takes 25 mcg Monday-Friday and 50 mcg Sat and Sun according to mother           VTE Pharmacologic Prophylaxis:   Pharmacologic: Heparin Drip  Mechanical VTE Prophylaxis in Place: Yes LLE Only given acute clot in RLE     Patient Centered Rounds: I have performed bedside rounds with nursing staff today  Discussions with Specialists or Other Care Team Provider: My attending, Dr Sue Crigler Nickie Schlein with neurosurgery  Appreciate ID and gen surgery  Education and Discussions with Family / Patient: patient  Family at bedside  Time Spent for Care: 1 hour  More than 50% of total time spent on counseling and coordination of care as described above  Current Length of Stay: 1 day(s)    Current Patient Status: Inpatient   Certification Statement: The patient will continue to require additional inpatient hospital stay due to acute DVT tx, fever, sepsis w/u     Discharge Plan: not stable  Will require PT/OT and possibly STR if agreeable  Code Status: Level 1 - Full Code      Subjective:   Patient reports he is having a lot of pain in his back and his right lower extremity  He denies any chest pain or shortness of breath  He states that he was reluctant to take Lovenox during last hospitalization  I had a lengthy discussion with his mother and him at bedside regarding risk factors for DVT and the DVT that he now has    We also discussed pulmonary embolism  I stated that I discussed the case with both my attending and the physician assistant from Neurosurgery who are both okay with anticoagulation  We will use heparin drip given reversibility and short half life in the event that there is any bleeding  If he does well over the next couple days and there is no planned surgical intervention we will plan to transition to a novel oral anticoagulant for 3-6 months  Pt and family also willing to w/u for PE  Objective:     Vitals:   Temp (24hrs), Av 4 °F (37 4 °C), Min:98 6 °F (37 °C), Max:100 7 °F (38 2 °C)    HR:  [111-132] 111  Resp:  [16-18] 18  BP: (107-157)/(63-92) 107/75  SpO2:  [92 %-96 %] 96 %  Body mass index is 45 77 kg/m²  Input and Output Summary (last 24 hours): Intake/Output Summary (Last 24 hours) at 17 1508  Last data filed at 17 1455   Gross per 24 hour   Intake             1080 ml   Output             2650 ml   Net            -1570 ml       Physical Exam:     Physical Exam   Constitutional: He is oriented to person, place, and time  Morbidly obese    Cardiovascular: Regular rhythm  Tachycardia present  Pulmonary/Chest:   Poor effort    Abdominal: Soft  Bowel sounds are normal  He exhibits no distension  There is no tenderness  Genitourinary:   Genitourinary Comments: Condom cath present    Musculoskeletal: He exhibits edema (RLE > LLE) and tenderness (RLE)  Refuses to roll over for back exam    Neurological: He is alert and oriented to person, place, and time  Skin: Skin is warm and dry  Psychiatric:   Strange affect    Nursing note and vitals reviewed      Additional Data:     Labs:      Results from last 7 days  Lab Units 17  0443  17  1638   WBC Thousand/uL 6 99  --  7 19   HEMOGLOBIN g/dL 9 0*  --  11 6*   HEMATOCRIT % 27 9*  --  36 1*   PLATELETS Thousands/uL 268  < > 355   NEUTROS PCT %  --   --  79*   LYMPHS PCT %  --   --  12*   MONOS PCT %  --   --  8   EOS PCT %  --   --  0   < > = values in this interval not displayed  Results from last 7 days  Lab Units 11/18/17  0443 11/17/17  1638   SODIUM mmol/L 139 136   POTASSIUM mmol/L 3 6 3 6   CHLORIDE mmol/L 106 98*   CO2 mmol/L 27 26   BUN mg/dL 10 11   CREATININE mg/dL 0 60 0 95   CALCIUM mg/dL 8 0* 9 6   TOTAL PROTEIN g/dL  --  7 8   BILIRUBIN TOTAL mg/dL  --  0 40   ALK PHOS U/L  --  42*   ALT U/L  --  27   AST U/L  --  23   GLUCOSE RANDOM mg/dL 87 107       Results from last 7 days  Lab Units 11/17/17  1638   INR  1 05       * I Have Reviewed All Lab Data Listed Above  * Additional Pertinent Lab Tests Reviewed: All Labs Within Last 24 Hours Reviewed    Imaging:    Imaging Reports Reviewed Today Include: all available   Imaging Personally Reviewed by Myself Includes:  none    Recent Cultures (last 7 days):           Last 24 Hours Medication List:     benztropine 1 mg Oral BID   clonazePAM 0 5 mg Oral BID   [START ON 11/19/2017] divalproex sodium 1,000 mg Oral Daily   divalproex sodium 500 mg Oral Q12H [address]   docusate sodium 100 mg Oral BID   enoxaparin 1 mg/kg Subcutaneous BID   fenofibrate 145 mg Oral Daily   levofloxacin 750 mg Oral Q24H   [START ON 11/20/2017] levothyroxine 25 mcg Oral Once per day on Mon Tue Wed Thu Fri   [START ON 11/19/2017] levothyroxine 50 mcg Oral Early Morning   lidocaine 2 patch Transdermal Daily   polyethylene glycol 17 g Oral Daily   pregabalin 75 mg Oral BID   risperiDONE 2 mg Oral BID   risperiDONE 4 mg Oral BID   senna 1 tablet Oral Daily   vancomycin 15 mg/kg (Adjusted) Intravenous Q24H        Today, Patient Was Seen By: Molly Antunez PA-C    ** Please Note: Dictation voice to text software may have been used in the creation of this document   **

## 2017-11-18 NOTE — ASSESSMENT & PLAN NOTE
· Per d/w patient, he has not been moving at home and not taking lovenox because he was "scared of it"  · I discussed risks of DVT as well as possible PE with patient (he refused CTA PE study in ER but has been tachycardic)  · He has been refusing lovenox here but is now agreeable  D/W mother at bedside who wants him to be treated  · D/W neurosurgery PA-C  From their standpoint there is no planned intervention and no contraindication to anticoagulation  · Pt only wants to get OOB if pain <5/10  · Stressed importance of PT/OT and getting OOB   Pt reluctant

## 2017-11-18 NOTE — ASSESSMENT & PLAN NOTE
· Recent admission for proteus sepsis 2/2 post-op wound infection s/p L3-L5 microdiskectomy from cauda equina/R foot drop  · Had I&D and went home 11/8 with IV abx via PICC through 12/13  · General surgery and neurosurgery along with ID following  · CT L spine and CT A/P pending

## 2017-11-18 NOTE — CONSULTS
Consultation - General Surgery  Tee Bradley 33 y o  male MRN: [id number]  Unit/Bed#: [facility] 905-01 Encounter: [id number]        Assessment/Plan     Assessment:  33 M with sacral wound after lumbar surgery s/p washout, closure over drain, now with sepsis    Plan:  Would recommend obtaining CT scan to delineate source of sepsis  Wound appearing well healed without surrounding erythema to suggest cellulitis  SAÚL with serosang output    History of Present Illness     HPI:  Janet Jacobsen is a 33 y o  male who presents with tachycardia, fever  s, and chills  He has a history of lumbar surgery in October, complicated by a wound which was opened  This subsequently underwent local wound care and then was closed over a drain  He returns today because of a few days of fevers chills with a temperature of 102 reported at home  He states he went to the [facility], where he was recommended to have a CT scan  However due to pain, he refused a CT scan  He was subsequently transferred to [facility]  He denies any drainage from the site  He is unable to see the site itself due to its location  Review of Systems   Constitutional: Positive for chills and fever  HENT: Negative  Eyes: Negative  Respiratory: Negative  Cardiovascular: Positive for palpitations  Gastrointestinal: Negative  Endocrine: Negative  Genitourinary: Negative  Musculoskeletal: Positive for back pain  Skin: Negative  Allergic/Immunologic: Negative  Neurological: Negative  Hematological: Negative  Psychiatric/Behavioral: Negative          Historical Information   Past Medical History:   Diagnosis Date    Autism     Bipolar 1 disorder ([facility] [address] )     Disease of thyroid gland     Hyperlipidemia     Lumbar disc disease     Psychiatric disorder     Compulsive Disorder    Sleep apnea     Urinary incontinence      Past Surgical History:   Procedure Laterality Date    COLONOSCOPY      INCISION AND DRAINAGE POSTERIOR SPINE N/A 11/1/2017    Procedure: INCISION AND DRAINAGE (I&D) SPINE;  Surgeon: Alessandro Holloway MD;  Location: [facility];  Service: Neurosurgery    [place] [address] INCL W/DCMPRSN NRV ROOT 1 INTRSPC LUMBR Bilateral 10/17/2017    Procedure: LEFT L3/4 AND RIGHT L4/5 MICRODISCECTOMIES, HEMILAMINECTOMIES; POSSIBLE EPIDURAL STEROID INJECTIONS;  Surgeon: Alessandro Holloway MD;  Location[facility];  Service: Neurosurgery    [address] N/A 11/3/2017    Procedure: DEBRIDEMENT WOUND [facility] OUT), wound vac placement back;  Surgeon: Manisha Alex DO;  Location[facility];  Service: General    WOUND DEBRIDEMENT N/A 11/6/2017    Procedure: America Clement ([address] Gerardo Begun;  Surgeon: Dewey Hawthorne MD;  [facility];  Service: General     Social History   History   Alcohol Use No     History   Drug Use No     History   Smoking Status    Never Smoker   Smokeless Tobacco    Never Used     Family History: History reviewed  No pertinent family history  Meds/Allergies   PTA meds:   Prior to Admission Medications   Prescriptions Last Dose Informant Patient Reported?  Taking?   acetaminophen (TYLENOL) 500 mg tablet   Yes No   Sig: Take 500 mg by mouth every 6 (six) hours as needed for mild pain   benztropine (COGENTIN) 1 mg tablet   Yes No   Sig: Take 1 mg by mouth 2 (two) times a day   ceFAZolin (ANCEF) 2000 mg IVPB   No No   Sig: Infuse 2,000 mg into a venous catheter every 8 (eight) hours for 10 days   clonazePAM (KlonoPIN) 1 mg tablet   Yes No   Sig: Take 0 5 mg by mouth 2 (two) times a day   divalproex sodium (DEPAKOTE) 500 mg EC tablet   Yes No   Sig: Take 500 mg by mouth every 12 (twelve) hours     divalproex sodium (DEPAKOTE) 500 mg EC tablet   Yes No   Sig: Take 1,000 mg by mouth daily Mid day    docusate sodium (COLACE) 100 mg capsule   No No   Sig: Take 1 capsule by mouth 2 (two) times a day   fenofibrate (TRIGLIDE) 160 MG tablet   Yes No   Sig: Take 160 mg by mouth daily   influenza inactivated quadrivalent vaccine (FLULAVAL) 0 5 ML BEBA   No No   Sig: Inject 0 5 mL into the shoulder, thigh, or buttocks prior to discharge (Vaccination) for up to 1 dose   levothyroxine 125 mcg tablet   Yes No   Sig: Take 125 mcg by mouth daily at bedtime   methocarbamol (ROBAXIN) 500 mg tablet   No No   Sig: Take 1 tablet by mouth every 6 (six) hours as needed for muscle spasms   oxyCODONE (ROXICODONE) 10 MG TABS   No No   Sig: Take 1 tablet by mouth every 6 (six) hours as needed for severe pain for up to 10 days Max Daily Amount: 40 mg   pregabalin (LYRICA) 75 mg capsule 11/17/2017 at 830  Yes Yes   Sig: Take 50 mg by mouth 2 (two) times a day   risperiDONE (RisperDAL) 2 mg tablet   Yes No   Sig: Take 2 mg by mouth 2 (two) times a day   risperiDONE (RisperDAL) 4 mg tablet   Yes No   Sig: Take 4 mg by mouth 2 (two) times a day   senna (SENOKOT) 8 6 mg   No No   Sig: Take 1 tablet by mouth daily at bedtime   traMADol (ULTRAM) 50 mg tablet 11/17/2017 at Unknown time  Yes Yes   Sig: Take 50 mg by mouth every 6 (six) hours as needed for moderate pain      Facility-Administered Medications: None     Allergies   Allergen Reactions    Allegra [Fexofenadine]     Aspirin     Ciprofloxacin     Erythromycin     Mercury     Motrin [Ibuprofen]     Penicillins     Pentothal [Thiopental]     Sulfa Antibiotics        Objective   First Vitals:   Blood Pressure: 117/66 (11/17/17 2200)  Pulse: (!) 113 (11/17/17 2200)  Temperature: (!) 100 7 °F (38 2 °C) (11/17/17 2200)  Temp Source: Oral (11/17/17 2200)  Respirations: 17 (11/17/17 2200)  SpO2: 92 % (11/17/17 2200)    Current Vitals:   Blood Pressure: 117/66 (11/17/17 2200)  Pulse: (!) 113 (11/17/17 2200)  Temperature: 99 1 °F (37 3 °C) (11/17/17 2330)  Temp Source: Oral (11/17/17 2330)  Respirations: 17 (11/17/17 2200)  SpO2: 92 % (11/17/17 2200)      Intake/Output Summary (Last 24 hours) at 11/18/17 0422  Last data filed at 11/18/17 3234   Gross per 24 hour   Intake              720 ml   Output                0 ml   Net              720 ml       Invasive Devices     Peripherally Inserted Central Catheter Line            PICC Line 83/66/70 Right Basilic 11 days          Drain            External Urinary Catheter Small 14 days    Closed/Suction Drain Midline Back 15 Fr  11 days    External Urinary Catheter Small 1 day                Physical Exam   Constitutional: He is oriented to person, place, and time  He appears well-developed and well-nourished  HENT:   Head: Normocephalic  Eyes: Pupils are equal, round, and reactive to light  Neck: Normal range of motion  Cardiovascular:   Tachycardic   Pulmonary/Chest: Effort normal  No respiratory distress  Abdominal: Soft  He exhibits no distension  There is no tenderness  Neurological: He is alert and oriented to person, place, and time  Skin: Skin is warm  Psychiatric: He has a normal mood and affect  Lab Results:   CBC:   Lab Results   Component Value Date    WBC 6 99 11/18/2017    HGB 9 0 (L) 11/18/2017    HCT 27 9 (L) 11/18/2017    MCV 80 (L) 11/18/2017     11/18/2017    MCH 25 6 (L) 11/18/2017    MCHC 32 3 11/18/2017    RDW 14 0 11/18/2017    MPV 9 1 11/18/2017   , CMP:   Lab Results   Component Value Date     11/18/2017    K 3 6 11/18/2017     11/18/2017    CO2 27 11/18/2017    ANIONGAP 6 11/18/2017    BUN 10 11/18/2017    CREATININE 0 60 11/18/2017    GLUCOSE 87 11/18/2017    CALCIUM 8 0 (L) 11/18/2017    AST 23 11/17/2017    ALT 27 11/17/2017    ALKPHOS 42 (L) 11/17/2017    PROT 7 8 11/17/2017    ALBUMIN 3 0 (L) 11/17/2017    BILITOT 0 40 11/17/2017    EGFR 139 11/18/2017     Imaging: I have personally reviewed pertinent reports  EKG, Pathology, and Other Studies: I have personally reviewed pertinent reports        Code Status: Level 1 - Full Code  Advance Directive and Living Will:      Power of :    POLST:

## 2017-11-18 NOTE — ED NOTES
PACS states they will call back with transport time to [facility] AND [facility] ED     Kendell Lopez RN  11/17/17 4252

## 2017-11-19 ENCOUNTER — APPOINTMENT (INPATIENT)
Dept: RADIOLOGY | Facility: HOSPITAL | Age: 27
DRG: 862 | End: 2017-11-19
Payer: MEDICARE

## 2017-11-19 LAB
APTT PPP: 39 SECONDS (ref 23–35)
APTT PPP: 53 SECONDS (ref 23–35)
APTT PPP: 83 SECONDS (ref 23–35)
HCT VFR BLD AUTO: 31.6 % (ref 36.5–49.3)
HGB BLD-MCNC: 10.3 G/DL (ref 12–17)

## 2017-11-19 PROCEDURE — 85014 HEMATOCRIT: CPT | Performed by: INTERNAL MEDICINE

## 2017-11-19 PROCEDURE — 71275 CT ANGIOGRAPHY CHEST: CPT

## 2017-11-19 PROCEDURE — 85730 THROMBOPLASTIN TIME PARTIAL: CPT | Performed by: INTERNAL MEDICINE

## 2017-11-19 PROCEDURE — 85018 HEMOGLOBIN: CPT | Performed by: INTERNAL MEDICINE

## 2017-11-19 RX ADMIN — METHOCARBAMOL 500 MG: 500 TABLET ORAL at 12:45

## 2017-11-19 RX ADMIN — RISPERIDONE 2 MG: 1 TABLET ORAL at 09:36

## 2017-11-19 RX ADMIN — LEVOTHYROXINE SODIUM 50 MCG: 50 TABLET ORAL at 05:18

## 2017-11-19 RX ADMIN — PREGABALIN 75 MG: 75 CAPSULE ORAL at 18:32

## 2017-11-19 RX ADMIN — OXYCODONE HYDROCHLORIDE 10 MG: 10 TABLET ORAL at 02:23

## 2017-11-19 RX ADMIN — PREGABALIN 75 MG: 75 CAPSULE ORAL at 09:39

## 2017-11-19 RX ADMIN — RISPERIDONE 4 MG: 1 TABLET ORAL at 18:29

## 2017-11-19 RX ADMIN — ACETAMINOPHEN 650 MG: 325 TABLET, FILM COATED ORAL at 12:45

## 2017-11-19 RX ADMIN — RISPERIDONE 4 MG: 1 TABLET ORAL at 09:46

## 2017-11-19 RX ADMIN — CLONAZEPAM 0.5 MG: 0.5 TABLET ORAL at 09:36

## 2017-11-19 RX ADMIN — FENOFIBRATE 145 MG: 145 TABLET ORAL at 09:38

## 2017-11-19 RX ADMIN — HYDROMORPHONE HYDROCHLORIDE 0.5 MG: 1 INJECTION, SOLUTION INTRAMUSCULAR; INTRAVENOUS; SUBCUTANEOUS at 12:54

## 2017-11-19 RX ADMIN — HYDROMORPHONE HYDROCHLORIDE 0.5 MG: 1 INJECTION, SOLUTION INTRAMUSCULAR; INTRAVENOUS; SUBCUTANEOUS at 10:40

## 2017-11-19 RX ADMIN — DIVALPROEX SODIUM 500 MG: 500 TABLET, DELAYED RELEASE ORAL at 09:38

## 2017-11-19 RX ADMIN — BENZTROPINE MESYLATE 1 MG: 1 TABLET ORAL at 18:30

## 2017-11-19 RX ADMIN — CLONAZEPAM 0.5 MG: 0.5 TABLET ORAL at 18:30

## 2017-11-19 RX ADMIN — METHOCARBAMOL 500 MG: 500 TABLET ORAL at 05:18

## 2017-11-19 RX ADMIN — OXYCODONE HYDROCHLORIDE 10 MG: 10 TABLET ORAL at 07:57

## 2017-11-19 RX ADMIN — HEPARIN SODIUM 24 UNITS/KG/HR: 10000 INJECTION, SOLUTION INTRAVENOUS at 23:28

## 2017-11-19 RX ADMIN — HEPARIN SODIUM 20 UNITS/KG/HR: 10000 INJECTION, SOLUTION INTRAVENOUS at 03:16

## 2017-11-19 RX ADMIN — DIVALPROEX SODIUM 500 MG: 500 TABLET, DELAYED RELEASE ORAL at 20:14

## 2017-11-19 RX ADMIN — RISPERIDONE 2 MG: 1 TABLET ORAL at 18:29

## 2017-11-19 RX ADMIN — DOCUSATE SODIUM 100 MG: 100 CAPSULE, LIQUID FILLED ORAL at 09:36

## 2017-11-19 RX ADMIN — SENNOSIDES 8.6 MG: 8.6 TABLET, FILM COATED ORAL at 09:36

## 2017-11-19 RX ADMIN — HEPARIN SODIUM 24 UNITS/KG/HR: 10000 INJECTION, SOLUTION INTRAVENOUS at 12:58

## 2017-11-19 RX ADMIN — DIVALPROEX SODIUM 1000 MG: 500 TABLET, DELAYED RELEASE ORAL at 12:46

## 2017-11-19 RX ADMIN — BENZTROPINE MESYLATE 1 MG: 1 TABLET ORAL at 09:35

## 2017-11-19 RX ADMIN — LEVOFLOXACIN 750 MG: 750 TABLET, FILM COATED ORAL at 17:49

## 2017-11-19 RX ADMIN — IOHEXOL 50 ML: 350 INJECTION, SOLUTION INTRAVENOUS at 13:45

## 2017-11-19 RX ADMIN — ACETAMINOPHEN 650 MG: 325 TABLET, FILM COATED ORAL at 20:14

## 2017-11-19 NOTE — PROGRESS NOTES
Progress Note - Infectious Disease   Halley Bradley 33 y o  male MRN: [id number]  Unit/Bed#: [facility] 905-01 Encounter: [id number]      Impression/Recommendations:  1  SIRS versus sepsis  POA:  Fever and tachycardia  Urinalysis and blood cultures negative  Surgical site appears clean and intact without infection  CT A/P negative for abscess; SQ air likely due to recently removed SAÚL drain  Consider noninfectious due to severe constipation, DVT, ? PE, drug fever  Clinically stable and nontoxic  Rec:       · Continue antibiotics as below  · Follow up final blood cultures from admission  · Follow temperatures and white blood cell count closely  · Continue supportive care as per the primary service     2  History of Proteus sepsis due to postoperative wound infection  High risk IV antibiotic candidate due to #5, now with readmission with fever  Sensitive to FQ and baseline QTC normal   Unclear allergy to ciprofloxacin  Tolerating levofloxacin  Rec:       · Continue levofloxacin for now  · Follow QTC closely with repeat EKG in AM  · Follow temperatures and white blood cell count closely  · Supportive care as per the primary service     3  Severe constipation  Possibly due to narcotic pain medicine  Rec:       · Continue MiraLax per patient request  · Patient declined enema or suppository     4  RLE subacute/chronic DVT  Consider PE given tachycardia and fever  Patient refused CTA   Rec:  · Continue anticoagulation as per the primary service  · Follow-up TTE as per the primary service    5  Autism/BPD     Antibiotics:  Levofloxacin #2  Antibiotics #20    Subjective:  Patient sleeping but easily arousable  Had fever this morning and was sent for CT scan to rule out PE but patient reportedly refused the test   Denies current fevers, chills, or sweats  Denies nausea, vomiting, or diarrhea  Has not yet moved his bowels    No itching or rashes    Objective:  Vitals:  HR:  [102-120] 120  Resp:  [18] 18  BP: ()/(54-91) 127/75  SpO2:  [87 %-97 %] 87 %  Temp (24hrs), Av 1 °F (37 8 °C), Min:98 4 °F (36 9 °C), Max:102 5 °F (39 2 °C)  Current: Temperature: (!) 102 2 °F (39 °C)    Physical Exam:   General:  No acute distress  Eyes:  Normal lids and conjunctivae  ENT:  Normal external ears and nose  Neck:  Neck symmetric with midline trachea  Pulmonary:  Normal respiratory effort without accessory muscle use  Cardiovascular:  Regular rate and rhythm; nonpitting RLE edema  Gastrointestinal:  No tenderness or distention  Musculoskeletal:  No digital clubbing or cyanosis  Skin:  No visible rashes; No palpable nodules  Neurologic:  Sensation grossly intact to light touch  Psychiatric:  Alert and oriented; Normal mood    Lab Results:  I have personally reviewed pertinent labs  Results from last 7 days  Lab Units 17  0443 17  1638 17  1507   SODIUM mmol/L 139 136  --    POTASSIUM mmol/L 3 6 3 6  --    CHLORIDE mmol/L 106 98*  --    CO2 mmol/L 27 26  --    ANION GAP mmol/L 6 12  --    BUN mg/dL 10 11  --    CREATININE mg/dL 0 60 0 95 0 55*   EGFR ml/min/1 73sq m 139 110 144   GLUCOSE RANDOM mg/dL 87 107  --    CALCIUM mg/dL 8 0* 9 6  --    AST U/L  --  23  --    ALT U/L  --  27  --    ALK PHOS U/L  --  42*  --    TOTAL PROTEIN g/dL  --  7 8  --    ALBUMIN g/dL  --  3 0*  --    BILIRUBIN TOTAL mg/dL  --  0 40  --        Results from last 7 days  Lab Units 17  0638 17  1615 17  0443 17  0141 17  1638   WBC Thousand/uL  --  4 00* 6 99  --  7 19   HEMOGLOBIN g/dL 10 3* 9 8* 9 0*  --  11 6*   PLATELETS Thousands/uL  --  224 268 266 355       Results from last 7 days  Lab Units 17  1638   BLOOD CULTURE  No Growth at 24 hrs  No Growth at 24 hrs  Imaging Studies:   I have personally reviewed pertinent imaging study reports and images in PACS  CT A/P  with contrast soft tissue swelling with focus of subcu gas  No abscess      EKG, Pathology, and Other Studies: I have personally reviewed pertinent reports

## 2017-11-19 NOTE — ASSESSMENT & PLAN NOTE
· Recent admission for proteus sepsis 2/2 post-op wound infection s/p L3-L5 microdiskectomy from cauda equina/R foot drop  · Had I&D and went home 11/8 with IV abx via PICC through 12/13  Transitioned from IV cefazolin to PO levaquin yesterday per ID  · General surgery and neurosurgery along with ID following  · CT L spine/CT A/P with soft tissue swelling, no collection noted  There are post surgical changes at L3-4 and L4-5  Also new endplate demineralization around L3-4 disc space that is new   Recommended MRI L spine to r/o osteo per rad report  · Appreciate ID/neurosurgery input on this

## 2017-11-19 NOTE — PROGRESS NOTES
Progress Note - General Surgery   Viviane Bradley 33 y o  male MRN: [id number]  Unit/Bed#: [facility] 905-01 Encounter: [id number]    Assessment:  33 M with lumbosacral wound after lumbar surgery, s/p washout, closure over drain, now with sepsis    Plan:  CT scan without collection  SAÚL came out yesterday    Subjective/Objective     Subjective: No acute events  Complaining of back spasms    Objective:    Blood pressure 127/78, pulse 105, temperature 99 °F (37 2 °C), temperature source Oral, resp  rate 18, height 5' 10" (1 778 m), weight (!) 145 kg (319 lb), SpO2 93 %  ,Body mass index is 45 77 kg/m²  Intake/Output Summary (Last 24 hours) at 11/19/17 5207  Last data filed at 11/19/17 0601   Gross per 24 hour   Intake          5237 75 ml   Output             5900 ml   Net          -662 25 ml       Invasive Devices     Peripherally Inserted Central Catheter Line            PICC Line 91/69/85 Right Basilic 12 days          Drain            External Urinary Catheter Small 15 days    Closed/Suction Drain Midline Back 15 Fr  12 days    External Urinary Catheter Small 2 days                Physical Exam:   General: NAD, AAOx3  CV: RRR +S1/S2  Chest: breath sounds bilaterally  Abdomen: soft, NT ND  Extremities: atraumatic, no edema        Results from last 7 days  Lab Units 11/18/17  1615 11/18/17  0443 11/18/17  0141 11/17/17  1638   WBC Thousand/uL 4 00* 6 99  --  7 19   HEMOGLOBIN g/dL 9 8* 9 0*  --  11 6*   HEMATOCRIT % 29 8* 27 9*  --  36 1*   PLATELETS Thousands/uL 224 268 266 355       Results from last 7 days  Lab Units 11/18/17  0443 11/17/17  1638  11/13/17  1507   SODIUM mmol/L 139 136  --   --    POTASSIUM mmol/L 3 6 3 6  --   --    CHLORIDE mmol/L 106 98*  --   --    CO2 mmol/L 27 26  --   --    BUN mg/dL 10 11  --   --    CREATININE mg/dL 0 60 0 95  --  0 55*   GLUCOSE RANDOM mg/dL 87 107  < >  --    CALCIUM mg/dL 8 0* 9 6  --   --    < > = values in this interval not displayed      Results from last 7 days  Lab Units 11/18/17  2342 11/18/17  1614 11/17/17  1638   INR   --  1 33* 1 05   PTT seconds 53* 37* 38*

## 2017-11-19 NOTE — PROGRESS NOTES
Progress Note - Jose Enrique Bradley 33 y o  male MRN: [id number]    [facility] Encounter: [id number]     Pt on the phone when I entered room  He stated "I'm in the middle of back spasms  I cannot be touched, I do not want to talk to you, and I'm on the phone  Please leave"     * Sepsis ([address] )   Assessment & Plan    · POA  Fever/tachycardia  Unclear source  Tmax 102 5 last 24 hrs  · Has PICC line but blood cx negative   · UA negative  Surgical site spine appears without infection as of yesterday  CT scan without collection but does show new endplate demineralization around L3-L4 disc space and MRI L spine recc to r/o infection; appreciate ID/neurosurgery input  · ID/Gen Surgery/Neurosurgery following, appreciate input  · Completed 19 days IV cefazolin from prior admission from proteus sepsis  Switched to PO levaquin yesterday by ID  · Monitor QTC closely         Constipation   Assessment & Plan    · Likely related to narcotics and reluctance to mobilize  · Continue bowel regimen        Deep vein thrombosis (DVT) of tibial vein (HCC)   Assessment & Plan    · Per d/w patient, he has not been moving at home and was refusing lovenox during last hospital stay   · I discussed risks of DVT as well as possible PE with patient (he refused CTA PE study in ER but has been tachycardic)  · Initially refused lovenox here but after length discussion w/ him and mother, want him to be treated with something   · D/W neurosurgery PA-C  From their standpoint there is no planned intervention and no contraindication to anticoagulation  · Pt only wants to get OOB if pain <5/10  · Started heparin gtt last night given unsafe nature of lovenox in morbidly obese  R/O PE with CTA pending  · Will need A/C at dc 3 months  Will d/w CM tomorrow   · Stressed importance of PT/OT and getting OOB   Pt reluctant         Wound infection   Assessment & Plan    · Recent admission for proteus sepsis 2/2 post-op wound infection s/p L3-L5 microdiskectomy from cauda equina/R foot drop  · Had I&D and went home 11/8 with IV abx via PICC through 12/13  Transitioned from IV cefazolin to PO levaquin yesterday per ID  · General surgery and neurosurgery along with ID following  · CT L spine/CT A/P with soft tissue swelling, no collection noted  There are post surgical changes at L3-4 and L4-5  Also new endplate demineralization around L3-4 disc space that is new  Recommended MRI L spine to r/o osteo per rad report  · Appreciate ID/neurosurgery input on this         Sinus tachycardia   Assessment & Plan    · Very likely could have PE given acute DVT, recent surgery, refusal of Lovenox during last stay and immobilization  · Refused CTA PE Study initially, now agreeable  Results pending  · D/W patient and mother at bedside yesterday along with my attending  Now on heparin gtt for DVT pending CTA        Morbid obesity due to excess calories (HCC)   Assessment & Plan    · Body mass index is 45 77 kg/m²  · Recommend weight loss and mobility         Autism   Assessment & Plan    · At baseline         Bipolar disorder [facility])   Assessment & Plan    · Continue home medications  Clarified dosing with patient's mother at bedside  Hypothyroid   Assessment & Plan    · Continue synthroid  Does on med rec was incorrect  · Takes 25 mcg Monday-Friday and 50 mcg Sat and Sun according to mother           VTE Pharmacologic Prophylaxis:   Pharmacologic: Heparin Drip  Mechanical VTE Prophylaxis in Place: Yes    Patient Centered Rounds: I have evaluated patient without nursing staff present due to pt refusal    Discussions with Specialists or Other Care Team Provider: Will d/w my attending  D/W Neurosurgery PABrissaC  Await ID input  Education and Discussions with Family / Patient: Patient would not allow discussion  Time Spent for Care: 15 minutes  More than 50% of total time spent on counseling and coordination of care as described above      Current Length of Stay: 2 day(s)    Current Patient Status: Inpatient   Certification Statement: The patient will continue to require additional inpatient hospital stay due to need for furtehr w/u      Discharge Plan: unclear  Probably needs rehab  Code Status: Level 1 - Full Code      Subjective:       Pt on the phone when I entered room  He stated "I'm in the middle of back spasms  I cannot be touched, I do not want to talk to you, and I'm on the phone  Please leave"   Objective:     Vitals:   Temp (24hrs), Av 6 °F (37 6 °C), Min:98 4 °F (36 9 °C), Max:102 5 °F (39 2 °C)    HR:  [102-115] 115  Resp:  [18] 18  BP: ()/(54-91) 158/91  SpO2:  [93 %-97 %] 94 %  Body mass index is 45 77 kg/m²  Input and Output Summary (last 24 hours): Intake/Output Summary (Last 24 hours) at 17 0850  Last data filed at 17 0601   Gross per 24 hour   Intake          5237 75 ml   Output             7400 ml   Net         -2162 25 ml       Physical Exam:     REFUSED PHYSICAL EXAM    Physical Exam    Additional Data:     Labs:      Results from last 7 days  Lab Units 17  0638 17  1615  17  1638   WBC Thousand/uL  --  4 00*  < > 7 19   HEMOGLOBIN g/dL 10 3* 9 8*  < > 11 6*   HEMATOCRIT % 31 6* 29 8*  < > 36 1*   PLATELETS Thousands/uL  --  224  < > 355   NEUTROS PCT %  --   --   --  79*   LYMPHS PCT %  --   --   --  12*   MONOS PCT %  --   --   --  8   EOS PCT %  --   --   --  0   < > = values in this interval not displayed      Results from last 7 days  Lab Units 17  0443 17  1638   SODIUM mmol/L 139 136   POTASSIUM mmol/L 3 6 3 6   CHLORIDE mmol/L 106 98*   CO2 mmol/L 27 26   BUN mg/dL 10 11   CREATININE mg/dL 0 60 0 95   CALCIUM mg/dL 8 0* 9 6   TOTAL PROTEIN g/dL  --  7 8   BILIRUBIN TOTAL mg/dL  --  0 40   ALK PHOS U/L  --  42*   ALT U/L  --  27   AST U/L  --  23   GLUCOSE RANDOM mg/dL 87 107       Results from last 7 days  Lab Units 17  1614   INR  1 33*       * I Have Reviewed All Lab Data Listed Above  * Additional Pertinent Lab Tests Reviewed: All Labs Within Last 24 Hours Reviewed    Imaging:    Imaging Reports Reviewed Today Include: all  Imaging Personally Reviewed by Myself Includes:  none    Recent Cultures (last 7 days):       Results from last 7 days  Lab Units 11/17/17  1638   BLOOD CULTURE  No Growth at 24 hrs  No Growth at 24 hrs  Last 24 Hours Medication List:     benztropine 1 mg Oral BID   clonazePAM 0 5 mg Oral BID   divalproex sodium 1,000 mg Oral Daily   divalproex sodium 500 mg Oral Q12H [address]   docusate sodium 100 mg Oral BID   fenofibrate 145 mg Oral Daily   levofloxacin 750 mg Oral Q24H   [START ON 11/20/2017] levothyroxine 25 mcg Oral Once per day on Mon Tue Wed Thu Fri   levothyroxine 50 mcg Oral Once per day on Sun Sat   lidocaine 2 patch Transdermal Daily   polyethylene glycol 17 g Oral Daily   pregabalin 75 mg Oral BID   risperiDONE 2 mg Oral BID   risperiDONE 4 mg Oral BID   senna 1 tablet Oral Daily   vancomycin 15 mg/kg (Adjusted) Intravenous Q24H        Today, Patient Was Seen By: Farshad Roe PA-C    ** Please Note: Dictation voice to text software may have been used in the creation of this document   **

## 2017-11-19 NOTE — PROGRESS NOTES
Progress Note - Neurosurgery   [facility] 33 [facility]  [facility] MRN: [id number]  Unit/Bed#: [place] 905-01 Encounter: [id number]    Assessment:  1  Post op # 18  incision and drainage (I&D) spine  2  Sepsis  3  Wound dehiscence  4  Proteus wound infection  5  Acute blood loss anemia  6  Herniation of lumbar intervertebral disc s/p hemilaminotomy and foraminotmy L3/4, L4/5 (10/17)  7  Hypothyroid  8  Bipolar disorder  9  Autism   10  Below the knee DVT- R/O PE       Plan:  - on heparin drip for DVT  - antibiotics per ID  - on going medical management  - no neurosurgical intervention  - needs to mobilize with PT/OT and consider STR  - updated his mother by phone    Subjective/Objective     Looks better today  Pain 0-10       Intake/Output       11/19/17 0701 - 11/20/17 0700      8231-1980 2450-0144 Total       Intake    P O   240  -- 240    Total Intake 240 -- 240       Output    Total Output -- -- --       Net I/O     240 -- 240          Invasive Devices     Peripherally Inserted Central Catheter Line            PICC Line 54/51/73 Right Basilic 12 days          Drain            External Urinary Catheter Small 15 days    Closed/Suction Drain Midline Back 15 Fr  13 days    External Urinary Catheter Small 2 days                Physical Exam:     Vitals: Blood pressure 158/91, pulse (!) 115, temperature (!) 102 5 °F (39 2 °C), temperature source Oral, resp  rate 18, height 5' 10" (1 778 m), weight (!) 145 kg (319 lb), SpO2 94 %  ,Body mass index is 45 77 kg/m²  Obese  Not moving much  Awake alert   Speech clear and fluent  Lungs clear   Heart regular  abdomen soft  Neurological exam unchanged to the extent that he will cooperate      Lab Results: I have personally reviewed pertinent results        Imaging Studies: I have personally reviewed pertinent films in PACS      VTE Pharmacologic Prophylaxis: Heparin    VTE Mechanical Prophylaxis: sequential compression device

## 2017-11-19 NOTE — ASSESSMENT & PLAN NOTE
· POA  Fever/tachycardia  Unclear source  Tmax 102 5 last 24 hrs  · Has PICC line but blood cx negative   · UA negative  Surgical site spine appears without infection as of yesterday  CT scan without collection but does show new endplate demineralization around L3-L4 disc space and MRI L spine recc to r/o infection; appreciate ID/neurosurgery input  · ID/Gen Surgery/Neurosurgery following, appreciate input  · Completed 19 days IV cefazolin from prior admission from proteus sepsis   Switched to PO levaquin yesterday by ID  · Monitor QTC closely

## 2017-11-19 NOTE — ASSESSMENT & PLAN NOTE
· Per d/w patient, he has not been moving at home and was refusing lovenox during last hospital stay   · I discussed risks of DVT as well as possible PE with patient (he refused CTA PE study in ER but has been tachycardic)  · Initially refused lovenox here but after length discussion w/ him and mother, want him to be treated with something   · D/W neurosurgery PA-C  From their standpoint there is no planned intervention and no contraindication to anticoagulation  · Pt only wants to get OOB if pain <5/10  · Started heparin gtt last night given unsafe nature of lovenox in morbidly obese  R/O PE with CTA pending  · Will need A/C at dc 3 months  Will d/w CM tomorrow   · Stressed importance of PT/OT and getting OOB   Pt reluctant

## 2017-11-19 NOTE — PROGRESS NOTES
TRANSPORT ARRIVED ON FLOOR TO TAKE PATIENT TO CAT SCAN FOR CTA  PT REFUSED TEST AT THIS TIME    DR Allyn Velasco

## 2017-11-19 NOTE — ASSESSMENT & PLAN NOTE
· Very likely could have PE given acute DVT, recent surgery, refusal of Lovenox during last stay and immobilization  · Refused CTA PE Study initially, now agreeable  Results pending  · D/W patient and mother at bedside yesterday along with my attending   Now on heparin gtt for DVT pending CTA

## 2017-11-20 ENCOUNTER — APPOINTMENT (INPATIENT)
Dept: NON INVASIVE DIAGNOSTICS | Facility: HOSPITAL | Age: 27
DRG: 862 | End: 2017-11-20
Payer: MEDICARE

## 2017-11-20 PROBLEM — R65.10 SIRS (SYSTEMIC INFLAMMATORY RESPONSE SYNDROME) (HCC): Status: ACTIVE | Noted: 2017-10-31

## 2017-11-20 LAB
ANION GAP SERPL CALCULATED.3IONS-SCNC: 7 MMOL/L (ref 4–13)
APTT PPP: 47 SECONDS (ref 23–35)
APTT PPP: 56 SECONDS (ref 23–35)
APTT PPP: 61 SECONDS (ref 23–35)
APTT PPP: 66 SECONDS (ref 23–35)
ATRIAL RATE: 113 BPM
BASOPHILS # BLD AUTO: 0.03 THOUSANDS/ΜL (ref 0–0.1)
BASOPHILS NFR BLD AUTO: 1 % (ref 0–1)
BUN SERPL-MCNC: 10 MG/DL (ref 5–25)
CALCIUM SERPL-MCNC: 8.7 MG/DL (ref 8.3–10.1)
CHLORIDE SERPL-SCNC: 102 MMOL/L (ref 100–108)
CO2 SERPL-SCNC: 27 MMOL/L (ref 21–32)
CREAT SERPL-MCNC: 0.56 MG/DL (ref 0.6–1.3)
EOSINOPHIL # BLD AUTO: 0.04 THOUSAND/ΜL (ref 0–0.61)
EOSINOPHIL NFR BLD AUTO: 1 % (ref 0–6)
ERYTHROCYTE [DISTWIDTH] IN BLOOD BY AUTOMATED COUNT: 13.9 % (ref 11.6–15.1)
GFR SERPL CREATININE-BSD FRML MDRD: 143 ML/MIN/1.73SQ M
GLUCOSE SERPL-MCNC: 100 MG/DL (ref 65–140)
HCT VFR BLD AUTO: 30 % (ref 36.5–49.3)
HGB BLD-MCNC: 10 G/DL (ref 12–17)
LACTATE SERPL-SCNC: 1 MMOL/L (ref 0.5–2)
LYMPHOCYTES # BLD AUTO: 0.94 THOUSANDS/ΜL (ref 0.6–4.47)
LYMPHOCYTES NFR BLD AUTO: 19 % (ref 14–44)
MCH RBC QN AUTO: 26.1 PG (ref 26.8–34.3)
MCHC RBC AUTO-ENTMCNC: 33.3 G/DL (ref 31.4–37.4)
MCV RBC AUTO: 78 FL (ref 82–98)
MONOCYTES # BLD AUTO: 0.53 THOUSAND/ΜL (ref 0.17–1.22)
MONOCYTES NFR BLD AUTO: 11 % (ref 4–12)
NEUTROPHILS # BLD AUTO: 3.28 THOUSANDS/ΜL (ref 1.85–7.62)
NEUTS SEG NFR BLD AUTO: 68 % (ref 43–75)
NRBC BLD AUTO-RTO: 0 /100 WBCS
P AXIS: 60 DEGREES
PLATELET # BLD AUTO: 247 THOUSANDS/UL (ref 149–390)
PMV BLD AUTO: 9.5 FL (ref 8.9–12.7)
POTASSIUM SERPL-SCNC: 3.8 MMOL/L (ref 3.5–5.3)
PR INTERVAL: 176 MS
QRS AXIS: 69 DEGREES
QRSD INTERVAL: 88 MS
QT INTERVAL: 336 MS
QTC INTERVAL: 460 MS
RBC # BLD AUTO: 3.83 MILLION/UL (ref 3.88–5.62)
SODIUM SERPL-SCNC: 136 MMOL/L (ref 136–145)
T WAVE AXIS: 69 DEGREES
VENTRICULAR RATE: 113 BPM
WBC # BLD AUTO: 4.86 THOUSAND/UL (ref 4.31–10.16)

## 2017-11-20 PROCEDURE — C8929 TTE W OR WO FOL WCON,DOPPLER: HCPCS

## 2017-11-20 PROCEDURE — 93005 ELECTROCARDIOGRAM TRACING: CPT | Performed by: INTERNAL MEDICINE

## 2017-11-20 PROCEDURE — 83605 ASSAY OF LACTIC ACID: CPT | Performed by: PHYSICIAN ASSISTANT

## 2017-11-20 PROCEDURE — 85730 THROMBOPLASTIN TIME PARTIAL: CPT | Performed by: INTERNAL MEDICINE

## 2017-11-20 PROCEDURE — 85025 COMPLETE CBC W/AUTO DIFF WBC: CPT | Performed by: INTERNAL MEDICINE

## 2017-11-20 PROCEDURE — 80048 BASIC METABOLIC PNL TOTAL CA: CPT | Performed by: INTERNAL MEDICINE

## 2017-11-20 PROCEDURE — 94760 N-INVAS EAR/PLS OXIMETRY 1: CPT

## 2017-11-20 RX ADMIN — CLONAZEPAM 0.5 MG: 0.5 TABLET ORAL at 17:21

## 2017-11-20 RX ADMIN — DOCUSATE SODIUM 100 MG: 100 CAPSULE, LIQUID FILLED ORAL at 09:53

## 2017-11-20 RX ADMIN — PREGABALIN 75 MG: 75 CAPSULE ORAL at 17:21

## 2017-11-20 RX ADMIN — RISPERIDONE 2 MG: 1 TABLET ORAL at 17:21

## 2017-11-20 RX ADMIN — DIVALPROEX SODIUM 500 MG: 500 TABLET, DELAYED RELEASE ORAL at 09:53

## 2017-11-20 RX ADMIN — LEVOTHYROXINE SODIUM 25 MCG: 25 TABLET ORAL at 05:10

## 2017-11-20 RX ADMIN — DIVALPROEX SODIUM 1000 MG: 500 TABLET, DELAYED RELEASE ORAL at 12:49

## 2017-11-20 RX ADMIN — POLYETHYLENE GLYCOL 3350 17 G: 17 POWDER, FOR SOLUTION ORAL at 09:54

## 2017-11-20 RX ADMIN — DIVALPROEX SODIUM 500 MG: 500 TABLET, DELAYED RELEASE ORAL at 21:09

## 2017-11-20 RX ADMIN — HEPARIN SODIUM 28 UNITS/KG/HR: 10000 INJECTION, SOLUTION INTRAVENOUS at 08:34

## 2017-11-20 RX ADMIN — OXYCODONE HYDROCHLORIDE 10 MG: 10 TABLET ORAL at 04:22

## 2017-11-20 RX ADMIN — PREGABALIN 75 MG: 75 CAPSULE ORAL at 09:53

## 2017-11-20 RX ADMIN — OXYCODONE HYDROCHLORIDE 10 MG: 10 TABLET ORAL at 10:18

## 2017-11-20 RX ADMIN — RISPERIDONE 4 MG: 1 TABLET ORAL at 17:21

## 2017-11-20 RX ADMIN — METHOCARBAMOL 500 MG: 500 TABLET ORAL at 21:09

## 2017-11-20 RX ADMIN — LEVOFLOXACIN 750 MG: 750 TABLET, FILM COATED ORAL at 17:22

## 2017-11-20 RX ADMIN — BENZTROPINE MESYLATE 1 MG: 1 TABLET ORAL at 17:21

## 2017-11-20 RX ADMIN — ACETAMINOPHEN 650 MG: 325 TABLET, FILM COATED ORAL at 19:46

## 2017-11-20 RX ADMIN — BENZTROPINE MESYLATE 1 MG: 1 TABLET ORAL at 09:53

## 2017-11-20 RX ADMIN — METHOCARBAMOL 500 MG: 500 TABLET ORAL at 15:44

## 2017-11-20 RX ADMIN — SENNOSIDES 8.6 MG: 8.6 TABLET, FILM COATED ORAL at 09:53

## 2017-11-20 RX ADMIN — METHOCARBAMOL 500 MG: 500 TABLET ORAL at 10:18

## 2017-11-20 RX ADMIN — FENOFIBRATE 145 MG: 145 TABLET ORAL at 09:53

## 2017-11-20 RX ADMIN — ACETAMINOPHEN 650 MG: 325 TABLET, FILM COATED ORAL at 06:19

## 2017-11-20 RX ADMIN — DOCUSATE SODIUM 100 MG: 100 CAPSULE, LIQUID FILLED ORAL at 17:21

## 2017-11-20 RX ADMIN — HEPARIN SODIUM 28 UNITS/KG/HR: 10000 INJECTION, SOLUTION INTRAVENOUS at 15:40

## 2017-11-20 RX ADMIN — OXYCODONE HYDROCHLORIDE 10 MG: 10 TABLET ORAL at 17:23

## 2017-11-20 RX ADMIN — CLONAZEPAM 0.5 MG: 0.5 TABLET ORAL at 09:53

## 2017-11-20 RX ADMIN — PERFLUTREN 0.6 ML/MIN: 6.52 INJECTION, SUSPENSION INTRAVENOUS at 12:42

## 2017-11-20 RX ADMIN — ACETAMINOPHEN 650 MG: 325 TABLET, FILM COATED ORAL at 12:49

## 2017-11-20 NOTE — ASSESSMENT & PLAN NOTE
· On heparin drip  · Case management looking into pricing for NOACs  · CT of chest negative for pulmonary embolism - in light of intermittent tachycardia there is an echocardiogram pending to evaluate for any right heart strain  · Monitor vitals and maintain hemodynamic stability

## 2017-11-20 NOTE — PROGRESS NOTES
Progress Note - Neurosurgery   Gomez Bradley 33 y o  male MRN: [id number]  [facility] Encounter: [id number]    Assessment:  1  Post op # 19  incision and drainage (I&D) spine  2  Sepsis  3  Wound dehiscence  4  Proteus wound infection  5  Acute blood loss anemia  6  Herniation of lumbar intervertebral disc s/p hemilaminotomy and foraminotmy L3/4, L4/5 (10/17)  7  Hypothyroid  8  Bipolar disorder  9  Autism   10  Below the knee DVT    Plan:  · Exam: laying flat in bed, NAD  KHANNA wo focal weakness  LT intact  3/3 JPS intact  Declined evaluation of incision at this time  · Imaging: personally reviewed and reviewed by attending:  · 11/18 - VAS lower limb duplex: Subacute to chronic occlusive deep vein thrombosis noted in the one of the paried posterior tibial veins at distal to mid calf   · 11/18 - CT lumbar recon: 1) New endplate demineralization around the L3-4 disc level could represent postsurgical change or sequelae of discitis-osteomyelitis  Recommend further evaluation with lumbar MRI w/wo contrast   2) Postsurgical soft tissue swelling with small focus of subcutaneous gas  However no drainable encapsulated fluid collections are present  · 11/18 - CT abd/pelv w: 1) No acute abdominopelvic findings  · 11/19 - CTA chest PE study: 1) no gross pulmonary embolism to the lobar level  Segmental and smaller levels are not well evaluated  No CT findings of right heart strain  2) No acute thoracic findings  · ID following:   · Tmax of 103 on 11/19 at [facility], WBC 4 86 - continue to trend  · Blood cultures collected on 11/2/17, 11/3/17 and 11/17/17 have been negative  · Urine culture collected 11/3 - no growth   · Continue levofloxacin for Proteus Mirabilis  · Pending echocardiogram   · PT/OT: Patient was evaluated and treated by PT/OT last admission on 11/8 with recommendation of post acute rehab but family/patient wished to go home with family support   Will continue to work with therapy  · DVT ppx: SCDS, on heparin gtt  · Pain control: improving per patient  He states he believes Tramadol that he was using prior to surgical intervention helped more than the Oxycodone  · Medical management per primary team    · No nsx intervention required at this time  Subjective/Objective   Chief Complaint: "I'm doing slightly better"    Subjective: Patient states his pain is slightly improving  He currently rates his back pain 4 out of 10  He denies any fevers, chills, chest pain, SOB, abdominal pain, N/V, weakness or numbness  He admits to having bowel movements x2 since admission  He believes the tramadol he used prior to surgery improved pain better than the oxycodone  Objective: laying flat in bed, NAD  I/O       11/18 0701 - 11/19 0700 11/19 0701 - 11/20 0700 11/20 0701 - 11/21 0700    P  O  4680 3060 360    I V  (mL/kg) 258 9 (1 8) 793 2 (5 5)     IV Piggyback 500      Total Intake(mL/kg) 5438 9 (37 5) 3853 2 (26 6) 360 (2 5)    Urine (mL/kg/hr) 7400 (2 1) 6425 (1 8)     Stool 0 (0) 1 (0)     Total Output 7400 6426      Net -1961 1 -2572 8 +360           Unmeasured Stool Occurrence 1 x            Invasive Devices     Peripherally Inserted Central Catheter Line            PICC Line 44/03/15 Right Basilic 13 days          Drain            External Urinary Catheter Small 16 days    Closed/Suction Drain Midline Back 15 Fr  14 days    External Urinary Catheter Small 3 days                Physical Exam:  Vitals: Blood pressure 119/63, pulse (!) 110, temperature 99 8 °F (37 7 °C), resp  rate 18, height 5' 10" (1 778 m), weight (!) 145 kg (319 lb), SpO2 95 %  ,Body mass index is 45 77 kg/m²  General appearance: alert, appears stated age, cooperative and no distress  Head: Normocephalic, without obvious abnormality, atraumatic  Eyes: EOMI  Back: refused examination at this time  Lungs: non labored breathing  Heart: regular heart rate  Abdomen: soft, no guarding or tenderness on palpation     Neurologic: Mental status: Alert, oriented x3, thought content appropriate  Speech is fluent, clear able to repeat  Cranial nerves: grossly intact (Cranial nerves II-XII)  Facial symmetry at rest and with expression  Tongue is midline  Sensory: normal to LT in BUE and BLE  Motor: moving all extremities without focal weakness  Strength in BUE: biceps/triceps: 5/5, : 5/5  BLE: KF/KE: 5/5, DF/PF: 5/5  Reflexes: 1+ and symmetric  negative vega, negative clonus  Coordination: 3/3 JPS intact  Lab Results:    Results from last 7 days  Lab Units 11/20/17  0511 11/19/17  0638 11/18/17  1615 11/18/17  0443  11/17/17  1638 11/13/17  1507   WBC Thousand/uL 4 86  --  4 00* 6 99  --  7 19 8 60   HEMOGLOBIN g/dL 10 0* 10 3* 9 8* 9 0*  --  11 6* 10 3*   HEMATOCRIT % 30 0* 31 6* 29 8* 27 9*  --  36 1* 31 7*   PLATELETS Thousands/uL 247  --  224 268  < > 355 369   NEUTROS PCT % 68  --   --   --   --  79* 71   MONOS PCT % 11  --   --   --   --  8 8   < > = values in this interval not displayed  Results from last 7 days  Lab Units 11/20/17  0511 11/18/17  0443 11/17/17  1638   SODIUM mmol/L 136 139 136   POTASSIUM mmol/L 3 8 3 6 3 6   CHLORIDE mmol/L 102 106 98*   CO2 mmol/L 27 27 26   BUN mg/dL 10 10 11   CREATININE mg/dL 0 56* 0 60 0 95   CALCIUM mg/dL 8 7 8 0* 9 6   TOTAL PROTEIN g/dL  --   --  7 8   BILIRUBIN TOTAL mg/dL  --   --  0 40   ALK PHOS U/L  --   --  42*   ALT U/L  --   --  27   AST U/L  --   --  23   GLUCOSE RANDOM mg/dL 100 87 107               Results from last 7 days  Lab Units 11/20/17  0755 11/20/17  0009 11/19/17  1733  11/18/17  1614 11/17/17  1638   INR   --   --   --   --  1 33* 1 05   PTT seconds 47* 56* 83*  < > 37* 38*   < > = values in this interval not displayed  No results found for: TROPONINT  ABG:No results found for: PHART, MQE6SXY, PO2ART, WPS1QCF, U5HKMYHG, BEART, SOURCE    Imaging Studies: I have personally reviewed pertinent reports     and I have personally reviewed pertinent films in PACS    EKG, Pathology, and Other Studies: I have personally reviewed pertinent reports        VTE  Prophylaxis: Sequential compression device (Venodyne)  and Heparin

## 2017-11-20 NOTE — PROGRESS NOTES
Rufino DAVENPORT notified pt Temp 103,Hr 117, Tylenol 650 mg given,  Temp re-check 101 & 98 8   New  Order draw lactic acid *1

## 2017-11-20 NOTE — SOCIAL WORK
Cm met with patient's mother and re-confirmed living and functioning information  Per mother patient remains living with her in a 2 story home with 4-5 steps to enter the home and 14 steps to 2nd floor bedroom  Patient is a readmission within 30 days  Patient was not complaint with getting up and per mother it took 5 people to help move patient  Patient's mother in agreement with a referral to rehab  Cm explained that [facility] process and reconfirmed mental health diagnosis of bi-polar disorder  Cm to send Options information to the [place] for review  Per conversation with patient's mother, she would like a referral to [facility]  Cm sent referral for review

## 2017-11-20 NOTE — PROGRESS NOTES
Rounds completed with Dr Araseli Kenyon of [facility]  Pt continues to be on Heparin gtt due to DVT  Encourage ambulation, continue with pain control  Patient resting comfortably in bed at this time, no new complaints, will continue to monitor

## 2017-11-20 NOTE — PLAN OF CARE
Problem: Potential for Falls  Goal: Patient will remain free of falls  INTERVENTIONS:  - Assess patient frequently for physical needs  -  Identify cognitive and physical deficits and behaviors that affect risk of falls    -  [place] fall precautions as indicated by assessment   - Educate patient/family on patient safety including physical limitations  - Instruct patient to call for assistance with activity based on assessment  - Modify environment to reduce risk of injury  - Consider OT/PT consult to assist with strengthening/mobility   Outcome: Progressing      Problem: Prexisting or High Potential for Compromised Skin Integrity  Goal: Skin integrity is maintained or improved  INTERVENTIONS:  - Identify patients at risk for skin breakdown  - Assess and monitor skin integrity  - Assess and monitor nutrition and hydration status  - Monitor labs (i e  albumin)  - Assess for incontinence   - Turn and reposition patient  - Assist with mobility/ambulation  - Relieve pressure over bony prominences  - Avoid friction and shearing  - Provide appropriate hygiene as needed including keeping skin clean and dry  - Evaluate need for skin moisturizer/barrier cream  - Collaborate with interdisciplinary team (i e  Nutrition, Rehabilitation, etc )   - Patient/family teaching   Outcome: Progressing      Problem: PAIN - ADULT  Goal: Verbalizes/displays adequate comfort level or baseline comfort level  Interventions:  - Encourage patient to monitor pain and request assistance  - Assess pain using appropriate pain scale  - Administer analgesics based on type and severity of pain and evaluate response  - Implement non-pharmacological measures as appropriate and evaluate response  - Consider cultural and social influences on pain and pain management  - Notify physician/advanced practitioner if interventions unsuccessful or patient reports new pain   Outcome: Progressing      Problem: INFECTION - ADULT  Goal: Absence or prevention of progression during hospitalization  INTERVENTIONS:  - Assess and monitor for signs and symptoms of infection  - Monitor lab/diagnostic results  - Monitor all insertion sites, i e  indwelling lines, tubes, and drains  - Monitor endotracheal (as able) and nasal secretions for changes in amount and color  - [place] appropriate cooling/warming therapies per order  - Administer medications as ordered  - Instruct and encourage patient and family to use good hand hygiene technique  - Identify and instruct in appropriate isolation precautions for identified infection/condition   Outcome: Progressing    Goal: Absence of fever/infection during neutropenic period  INTERVENTIONS:  - Monitor WBC  - Implement neutropenic guidelines   Outcome: Progressing      Problem: SAFETY ADULT  Goal: Maintain or return to baseline ADL function  INTERVENTIONS:  -  Assess patient's ability to carry out ADLs; assess patient's baseline for ADL function and identify physical deficits which impact ability to perform ADLs (bathing, care of mouth/teeth, toileting, grooming, dressing, etc )  - Assess/evaluate cause of self-care deficits   - Assess range of motion  - Assess patient's mobility; develop plan if impaired  - Assess patient's need for assistive devices and provide as appropriate  - Encourage maximum independence but intervene and supervise when necessary  ¯ Involve family in performance of ADLs  ¯ Assess for home care needs following discharge   ¯ Request OT consult to assist with ADL evaluation and planning for discharge  ¯ Provide patient education as appropriate   Outcome: Progressing    Goal: Maintain or return mobility status to optimal level  INTERVENTIONS:  - Assess patient's baseline mobility status (ambulation, transfers, stairs, etc )    - Identify cognitive and physical deficits and behaviors that affect mobility  - Identify mobility aids required to assist with transfers and/or ambulation (gait belt, sit-to-stand, lift, walker, cane, etc )  - [place] fall precautions as indicated by assessment  - Record patient progress and toleration of activity level on Mobility SBAR; progress patient to next Phase/Stage  - Instruct patient to call for assistance with activity based on assessment  - Request Rehabilitation consult to assist with strengthening/weightbearing, etc    Outcome: Progressing      Problem: DISCHARGE PLANNING  Goal: Discharge to home or other facility with appropriate resources  INTERVENTIONS:  - Identify barriers to discharge w/patient and caregiver  - Arrange for needed discharge resources and transportation as appropriate  - Identify discharge learning needs (meds, wound care, etc )  - Arrange for interpretive services to assist at discharge as needed  - Refer to Case Management Department for coordinating discharge planning if the patient needs post-hospital services based on physician/advanced practitioner order or complex needs related to functional status, cognitive ability, or social support system   Outcome: Progressing      Problem: Nutrition/Hydration-ADULT  Goal: Nutrient/Hydration intake appropriate for improving, restoring or maintaining nutritional needs  Monitor and assess patient's nutrition/hydration status for malnutrition (ex- brittle hair, bruises, dry skin, pale skin and conjunctiva, muscle wasting, smooth red tongue, and disorientation)  Collaborate with interdisciplinary team and initiate plan and interventions as ordered  Monitor patient's weight and dietary intake as ordered or per policy  Utilize nutrition screening tool and intervene per policy  Determine patient's food preferences and provide high-protein, high-caloric foods as appropriate       INTERVENTIONS:  - Monitor oral intake, urinary output, labs, and treatment plans  - Assess nutrition and hydration status and recommend course of action  - Evaluate amount of meals eaten  - Assist patient with eating if necessary   - Allow adequate time for meals  - Recommend/ encourage appropriate diets, oral nutritional supplements, and vitamin/mineral supplements  - Order, calculate, and assess calorie counts as needed  - Recommend, monitor, and adjust tube feedings and TPN/PPN based on assessed needs  - Assess need for intravenous fluids  - Provide specific nutrition/hydration education as appropriate  - Include patient/family/caregiver in decisions related to nutrition   Outcome: Progressing

## 2017-11-20 NOTE — PROGRESS NOTES
Progress Note - Infectious Disease   Halley Bradley 33 y o  male MRN: [id number]  Unit/Bed#: [facility] 905-01 Encounter: [id number]      Impression/Plan:  1   SIRS versus sepsis  POA:  Fever and tachycardia  Urinalysis and blood cultures negative  Surgical site appears clean and intact without infection  CT A/P negative for abscess; SQ air likely due to recently removed SAÚL drain  Consider noninfectious due to severe constipation, DVT, ? PE, drug fever  Clinically stable and nontoxic  Patient continues to have fever  Continue antibiotics as below  Follow up final blood cultures from admission  Follow temperatures and white blood cell count closely  Continue supportive care as per the primary service     2   History of Proteus sepsis due to postoperative wound infection  High risk IV antibiotic candidate due to #5, now with readmission with fever  Sensitive to FQ and baseline QTC normal   Unclear allergy to ciprofloxacin  Tolerating levofloxacin   Continue levofloxacin for now  Follow QTC closely with repeat EKG in AM  Follow temperatures and white blood cell count closely  Supportive care as per the primary service     3   Severe constipation  Possibly due to narcotic pain medicine  Continue MiraLax per patient request  Patient declined enema or suppository     4   RLE subacute/chronic DVT  Does not appear complicated by pulmonary embolism based on CT findings  Could be the cause of the fever  Continue anticoagulation as per the primary service  Follow-up TTE as per the primary service     5   Autism/BPD    Antibiotics:  Levaquin 3  Antibiotics 21    Subjective:  Patient still having fever; no nausea, vomiting, diarrhea; no cough, shortness of breath; decreased back pain  No new symptoms    He is currently undergoing echocardiogram     Objective:  Vitals:  HR:  [102-120] 110  Resp:  [18-21] 18  BP: (119-144)/(61-78) 119/63  SpO2:  [87 %-99 %] 95 %  Temp (24hrs), Av 7 °F (38 2 °C), Min:98 8 °F (37 1 °C), Max:103 °F (39 4 °C)  Current: Temperature: 99 8 °F (37 7 °C)    Physical Exam:   General Appearance:  Alert, nontoxic, no acute distress  Throat: Oropharynx moist without lesions  Lungs:   Clear to auscultation anteriorly; respirations unlabored   Heart:  Tachycardic; no murmur, rub or gallop   Abdomen:   Soft, non-tender, non-distended, positive bowel sounds  Back incision dressed  Extremities: No clubbing, cyanosis or edema   Skin: No new rashes or lesions  No draining wounds noted  Labs, Imaging, & Other studies:   All pertinent labs and imaging studies were personally reviewed    Results from last 7 days  Lab Units 11/20/17  0511 11/19/17  0638 11/18/17  1615 11/18/17  0443   WBC Thousand/uL 4 86  --  4 00* 6 99   HEMOGLOBIN g/dL 10 0* 10 3* 9 8* 9 0*   PLATELETS Thousands/uL 247  --  224 268       Results from last 7 days  Lab Units 11/20/17  0511 11/18/17  0443 11/17/17  1638   SODIUM mmol/L 136 139 136   POTASSIUM mmol/L 3 8 3 6 3 6   CHLORIDE mmol/L 102 106 98*   CO2 mmol/L 27 27 26   ANION GAP mmol/L 7 6 12   BUN mg/dL 10 10 11   CREATININE mg/dL 0 56* 0 60 0 95   EGFR ml/min/1 73sq m 143 139 110   GLUCOSE RANDOM mg/dL 100 87 107   CALCIUM mg/dL 8 7 8 0* 9 6   AST U/L  --   --  23   ALT U/L  --   --  27   ALK PHOS U/L  --   --  42*   TOTAL PROTEIN g/dL  --   --  7 8   ALBUMIN g/dL  --   --  3 0*   BILIRUBIN TOTAL mg/dL  --   --  0 40       Results from last 7 days  Lab Units 11/17/17  1638   BLOOD CULTURE  No Growth at 48 hrs  No Growth at 48 hrs

## 2017-11-20 NOTE — ASSESSMENT & PLAN NOTE
· Patient states he is now having bowel movements with daily MiraLax   · Currently refusing suppositories and enemas  · Likely secondary to opioid analgesia regimen

## 2017-11-20 NOTE — ASSESSMENT & PLAN NOTE
· Present on admission with fever/tachycardia   Unclear if infectious in etiology at this time source - maximum temperature of 101° at approximately 8:56 p m  last night   · Appreciate Infectious Disease evaluation - recently completed a near three week course of IV Ancef for Proteus sepsis and is currently on an oral Levaquin regimen for a recent wound infection from spinal surgery although these persistent fevers may also be due to his concurrent DVT - CT scan without collection but does show questionably new endplate demineralization around L3-L4 disc space - neurosurgery following

## 2017-11-20 NOTE — PROGRESS NOTES
Progress Note - Chris Willard 33 y o  male MRN: [id number]    Unit/Bed#: [facility] 905-01 Encounter: [id number]            [facility] Hospitalist [facility] - Internal Medicine Progress Note  Patient: Chris Willard 33 y o  male   MRN: [id number]  PCP: Marlee Ivory MD  Unit/Bed#: [facility] 905-01 Encounter: [id number]  Date Of Visit: 17         Subjective:   Seen examined this morning  States his neck/back pain has somewhat improved  He feels less anxious today  He remains on a heparin drip for his lower extremity DVT  Will continue working with PT/OT for ambulation  Developed a maximum temperature of 103° yesterday evening  No new complaints otherwise  Awaiting echocardiogram today  Objective:     Vitals:   Temp (24hrs), Av 7 °F (38 2 °C), Min:98 8 °F (37 1 °C), Max:103 °F (39 4 °C)    HR:  [102-120] 110  Resp:  [18-21] 18  BP: (119-144)/(61-78) 119/63  SpO2:  [87 %-99 %] 95 %  Body mass index is 45 77 kg/m²  Input and Output Summary (last 24 hours):        Intake/Output Summary (Last 24 hours) at 17 1134  Last data filed at 17 1018   Gross per 24 hour   Intake          3973 21 ml   Output             4326 ml   Net          -352 79 ml       Physical Exam:     GENERAL:  Obese - no current distress  HEAD:  Normocephalic - atraumatic  EYES: PERRL - EOMI   MOUTH:  Mucosa moist  NECK:  Supple - full range of motion  CARDIAC:  Intermittently tachycardic but regular rhythm - S1/S2 positive  PULMONARY:  Clear but mildly diminished breath sounds bilaterally - nonlabored respirations  ABDOMEN:  Soft - nontender/nondistended - active bowel sounds  MUSCULOSKELETAL:  Deconditioned motor strength/range of motion due to body habitus - right calf tenderness noted   NEUROLOGIC:  Alert/oriented xn3  SKIN:  Back incisional scar C/D/I - age-appropriate wrinkles/plan she is otherwise       Additional Data:     Labs & Recent Cultures:       Results from last 7 days  Lab Units 11/20/17  0511   WBC Thousand/uL 4 86   HEMOGLOBIN g/dL 10 0*   HEMATOCRIT % 30 0*   PLATELETS Thousands/uL 247   NEUTROS PCT % 68   LYMPHS PCT % 19   MONOS PCT % 11   EOS PCT % 1       Results from last 7 days  Lab Units 11/20/17  0511  11/17/17  1638   SODIUM mmol/L 136  < > 136   POTASSIUM mmol/L 3 8  < > 3 6   CHLORIDE mmol/L 102  < > 98*   CO2 mmol/L 27  < > 26   BUN mg/dL 10  < > 11   CREATININE mg/dL 0 56*  < > 0 95   CALCIUM mg/dL 8 7  < > 9 6   TOTAL PROTEIN g/dL  --   --  7 8   BILIRUBIN TOTAL mg/dL  --   --  0 40   ALK PHOS U/L  --   --  42*   ALT U/L  --   --  27   AST U/L  --   --  23   GLUCOSE RANDOM mg/dL 100  < > 107   < > = values in this interval not displayed  Results from last 7 days  Lab Units 11/18/17  1614   INR  1 33*           Results from last 7 days  Lab Units 11/17/17  1638   BLOOD CULTURE  No Growth at 48 hrs  No Growth at 48 hrs           Last 24 Hours Medication List:     benztropine 1 mg Oral BID   clonazePAM 0 5 mg Oral BID   divalproex sodium 1,000 mg Oral Daily   divalproex sodium 500 mg Oral Q12H [address]   docusate sodium 100 mg Oral BID   fenofibrate 145 mg Oral Daily   levofloxacin 750 mg Oral Q24H   levothyroxine 25 mcg Oral Once per day on Mon Tue Wed Thu Fri   levothyroxine 50 mcg Oral Once per day on Sun Sat   lidocaine 2 patch Transdermal Daily   polyethylene glycol 17 g Oral Daily   pregabalin 75 mg Oral BID   risperiDONE 2 mg Oral BID   risperiDONE 4 mg Oral BID   senna 1 tablet Oral Daily         Assessments:    Deep vein thrombosis (DVT) of tibial vein (HCC)   Assessment & Plan    · On heparin drip  · Case management looking into pricing for NOACs  · CT of chest negative for pulmonary embolism - in light of intermittent tachycardia there is an echocardiogram pending to evaluate for any right heart strain  · Monitor vitals and maintain hemodynamic stability         Constipation   Assessment & Plan    · Patient states he is now having bowel movements with daily MiraLax · Currently refusing suppositories and enemas  · Likely secondary to opioid analgesia regimen        * SIRS - Recent Spinal Wound Infection with Proteus    Assessment & Plan    · Present on admission with fever/tachycardia  Unclear if infectious in etiology at this time source - maximum temperature of 103° at approximately 7:49 p m  last night   · Appreciate Infectious Disease evaluation - recently completed a near three week course of IV Ancef for Proteus sepsis and is currently on an oral Levaquin regimen for a recent wound infection from spinal surgery although these persistent fevers may also be due to his concurrent DVT - CT scan without collection but does show questionably new endplate demineralization around L3-L4 disc space - neurosurgery following        Autism   Assessment & Plan    · At baseline  · Outpatient followup         Bipolar disorder ([facility] [address] )   Assessment & Plan    · C/w Risperdal (w/ Cogentin) and Depakote regimen         Morbid obesity due to excess calories ([facility] )   Assessment & Plan    · Body mass index is 45 77 kg/m²  · Recommend weight loss and lifestyle modifications        Hypothyroid   Assessment & Plan    · Continue Synthroid regimen  · TSH earlier this month within normal limits              VTE Prophylaxis: Heparin drip        Time Spent for Care: 33 minutes  More than 50% of total time spent on counseling and coordination of care as described above  Current Length of Stay: 3 day(s)      Code Status: Level 1 - Full Code       Today, Patient Was Seen By: Shahnaz Aldridge MD    ** Please Note: This note has been constructed using a voice recognition system   **

## 2017-11-21 LAB
APTT PPP: 112 SECONDS (ref 23–35)
APTT PPP: 57 SECONDS (ref 23–35)
APTT PPP: 58 SECONDS (ref 23–35)
ATRIAL RATE: 104 BPM
HCT VFR BLD AUTO: 30.5 % (ref 36.5–49.3)
HGB BLD-MCNC: 10 G/DL (ref 12–17)
P AXIS: 52 DEGREES
PR INTERVAL: 168 MS
QRS AXIS: 65 DEGREES
QRSD INTERVAL: 86 MS
QT INTERVAL: 318 MS
QTC INTERVAL: 418 MS
T WAVE AXIS: 71 DEGREES
VENTRICULAR RATE: 104 BPM

## 2017-11-21 PROCEDURE — G8981 BODY POS CURRENT STATUS: HCPCS

## 2017-11-21 PROCEDURE — 85014 HEMATOCRIT: CPT | Performed by: INTERNAL MEDICINE

## 2017-11-21 PROCEDURE — 97163 PT EVAL HIGH COMPLEX 45 MIN: CPT

## 2017-11-21 PROCEDURE — 93005 ELECTROCARDIOGRAM TRACING: CPT | Performed by: INTERNAL MEDICINE

## 2017-11-21 PROCEDURE — 97167 OT EVAL HIGH COMPLEX 60 MIN: CPT

## 2017-11-21 PROCEDURE — G8982 BODY POS GOAL STATUS: HCPCS

## 2017-11-21 PROCEDURE — 85730 THROMBOPLASTIN TIME PARTIAL: CPT | Performed by: HOSPITALIST

## 2017-11-21 PROCEDURE — G8987 SELF CARE CURRENT STATUS: HCPCS

## 2017-11-21 PROCEDURE — G8988 SELF CARE GOAL STATUS: HCPCS

## 2017-11-21 PROCEDURE — 85018 HEMOGLOBIN: CPT | Performed by: INTERNAL MEDICINE

## 2017-11-21 RX ADMIN — RISPERIDONE 4 MG: 1 TABLET ORAL at 09:31

## 2017-11-21 RX ADMIN — CLONAZEPAM 0.5 MG: 0.5 TABLET ORAL at 09:32

## 2017-11-21 RX ADMIN — SENNOSIDES 8.6 MG: 8.6 TABLET, FILM COATED ORAL at 09:32

## 2017-11-21 RX ADMIN — OXYCODONE HYDROCHLORIDE 10 MG: 10 TABLET ORAL at 16:01

## 2017-11-21 RX ADMIN — LEVOFLOXACIN 750 MG: 750 TABLET, FILM COATED ORAL at 12:20

## 2017-11-21 RX ADMIN — HEPARIN SODIUM 27 UNITS/KG/HR: 10000 INJECTION, SOLUTION INTRAVENOUS at 16:01

## 2017-11-21 RX ADMIN — HEPARIN SODIUM 28 UNITS/KG/HR: 10000 INJECTION, SOLUTION INTRAVENOUS at 00:07

## 2017-11-21 RX ADMIN — CLONAZEPAM 0.5 MG: 0.5 TABLET ORAL at 18:17

## 2017-11-21 RX ADMIN — FENOFIBRATE 145 MG: 145 TABLET ORAL at 09:33

## 2017-11-21 RX ADMIN — HEPARIN SODIUM 28 UNITS/KG/HR: 10000 INJECTION, SOLUTION INTRAVENOUS at 06:29

## 2017-11-21 RX ADMIN — DIVALPROEX SODIUM 500 MG: 500 TABLET, DELAYED RELEASE ORAL at 21:12

## 2017-11-21 RX ADMIN — RISPERIDONE 2 MG: 1 TABLET ORAL at 18:17

## 2017-11-21 RX ADMIN — BENZTROPINE MESYLATE 1 MG: 1 TABLET ORAL at 12:20

## 2017-11-21 RX ADMIN — OXYCODONE HYDROCHLORIDE 10 MG: 10 TABLET ORAL at 09:32

## 2017-11-21 RX ADMIN — RISPERIDONE 2 MG: 1 TABLET ORAL at 09:32

## 2017-11-21 RX ADMIN — POLYETHYLENE GLYCOL 3350 17 G: 17 POWDER, FOR SOLUTION ORAL at 09:33

## 2017-11-21 RX ADMIN — BENZTROPINE MESYLATE 1 MG: 1 TABLET ORAL at 18:17

## 2017-11-21 RX ADMIN — DOCUSATE SODIUM 100 MG: 100 CAPSULE, LIQUID FILLED ORAL at 09:32

## 2017-11-21 RX ADMIN — DIVALPROEX SODIUM 1000 MG: 500 TABLET, DELAYED RELEASE ORAL at 12:20

## 2017-11-21 RX ADMIN — METHOCARBAMOL 500 MG: 500 TABLET ORAL at 21:15

## 2017-11-21 RX ADMIN — OXYCODONE HYDROCHLORIDE 5 MG: 5 TABLET ORAL at 04:19

## 2017-11-21 RX ADMIN — LEVOTHYROXINE SODIUM 25 MCG: 25 TABLET ORAL at 05:56

## 2017-11-21 RX ADMIN — RISPERIDONE 4 MG: 1 TABLET ORAL at 18:16

## 2017-11-21 RX ADMIN — METHOCARBAMOL 500 MG: 500 TABLET ORAL at 04:28

## 2017-11-21 RX ADMIN — DIVALPROEX SODIUM 500 MG: 500 TABLET, DELAYED RELEASE ORAL at 09:32

## 2017-11-21 RX ADMIN — METHOCARBAMOL 500 MG: 500 TABLET ORAL at 13:27

## 2017-11-21 RX ADMIN — PREGABALIN 75 MG: 75 CAPSULE ORAL at 09:32

## 2017-11-21 RX ADMIN — PREGABALIN 75 MG: 75 CAPSULE ORAL at 18:17

## 2017-11-21 NOTE — PLAN OF CARE
Problem: PHYSICAL THERAPY ADULT  Goal: Performs mobility at highest level of function for planned discharge setting  See evaluation for individualized goals  Treatment/Interventions: Continued evaluation  Equipment Recommended: Other (Comment) (TBD; pt owns RW)       See flowsheet documentation for full assessment, interventions and recommendations  Prognosis: Fair  Problem List: Decreased strength, Decreased range of motion, Decreased endurance, Impaired balance, Decreased mobility, Decreased coordination, Decreased cognition, Decreased safety awareness, Obesity, Orthopedic restrictions, Pain  Assessment: Pt is a 33year old male presenting as a transfer from [facility] for further eval of fever and wound infection  Pt underwent "lumbar interventricular disc with hemilaminectomy medial fasciotomy and diskectomy on October 17 with subsequent postoperative wound infection required admission on October 31st"  PT consulted to assess functional mobility and assist with safe d/c planning  PT eval performed with up and OOB orders  Pt with a problem  List which includes SIRS, wound dehiscence, hypothyroid, bipolar dx, autism, obesity, DVT, constipation and sinus tachy  PMH includes urinary incontinence, sleep apnea and psychiatric disorder  PTA, pt living at home with his mother  Following last admission, PT recommending home however, pt's mother refusing  At home, pt reports living relatively sedentary lifestyle in recliner where he did little ambulation and required assistance from his mother for all ADLs and transfers  On eval, pt presenting with decreased strength, pain, poor tolerance to activity and decreased overall functional mobility  Pt with minimal participation on eval where he was willing to perform ROM and strength assessment in bed as well as rolling to R  Declined all other aspects of eval  PT to continue to follow pt during stay to progress functional mobility (I) and safety     Barriers to Discharge: Inaccessible home environment, Decreased caregiver support     Recommendation: Short-term skilled PT     PT - OK to Discharge: Yes (to rehab when medically appropriate )    See flowsheet documentation for full assessment

## 2017-11-21 NOTE — PHYSICAL THERAPY NOTE
Physical Therapy Evaluation    Patient's Name: Selina Bradley    Admitting Diagnosis  Fever [R50 9]  Back pain [M54 9]  Wound infection [T14  8XXA, L08 9]    Problem List  Patient Active Problem List   Diagnosis    SIRS - Recent Spinal Wound Infection with Proteus     Wound dehiscence    Herniation of lumbar intervertebral disc    Hypothyroid    Bipolar disorder ([facility] [address] )    Autism    Morbid obesity due to excess calories ([facility] )    Deep vein thrombosis (DVT) of tibial vein (HCC)    Constipation    Sinus tachycardia       Past Medical History  Past Medical History:   Diagnosis Date    Autism     [facility]ipolar 1 disorder [facility]     Disease of thyroid gland     Hyperlipidemia     Lumbar disc disease     Psychiatric disorder     Compulsive Disorder    Sleep apnea     Urinary incontinence        Past Surgical History  Past Surgical History:   Procedure Laterality Date    COLONOSCOPY      INCISION AND DRAINAGE POSTERIOR SPINE N/A 11/1/2017    Procedure: INCISION AND DRAINAGE (I&D) SPINE;  Surgeon: Jose Vera MD;  [facility];  Service: Neurosurgery    OH LAMNOTMY INCL W/DCMPRSN NRV ROOT 1 INTRSPC LUMBR Bilateral 10/17/2017    Procedure: LEFT L3/4 AND RIGHT L4/5 MICRODISCECTOMIES, HEMILAMINECTOMIES; POSSIBLE EPIDURAL STEROID INJECTIONS;  Surgeon: Jose Vera MD;  [facility];  Service: Neurosurgery    WISDOM TOOTH EXTRACTION      WOUND DEBRIDEMENT N/A 11/3/2017    Procedure: DEBRIDEMENT WOUND ([address]), wound vac placement back;  Surgeon: Rebecca Best DO;  [facility];  Service: General    WOUND DEBRIDEMENT N/A 11/6/2017    Procedure: Bettye Adam ([address] [facility];  Surgeon: Randi New MD;  [facility];  Service: General      11/21/17 1139   Note Type   Note type Eval/Treat   Pain Assessment   Pain Assessment 0-10   Pain Score 7   Pain Type Acute pain   Pain Location Back   Effect of Pain on Daily Activities impaired tolerance to any mobility Patient's Stated Pain Goal No pain   Hospital Pain Intervention(s) Repositioned   Response to Interventions attempted repositioning   Home Living   Type of [address] Two level;Stairs to enter with rails  (3 MARIBETH)   Bathroom Shower/Tub Tub/shower unit   Bathroom Toilet Standard   [address]   Additional Comments Pt reports owning a RW at home  From recent d/c, pt admits to a relatively sedentary lifestyle in a recliner 2* inability to ambualte or perform ADLs  He reports his mother assisted with all tasks  Pt able to wash upper bodyt at home  Prior Function   Level of [place] Needs assistance with IADLs; Needs assistance with ADLs and functional mobility   Lives With Family   Receives Help From Family   ADL Assistance Needs assistance   IADLs Needs assistance   Falls in the last 6 months 0   Vocational On disability   Comments Pt reports his mother present at all times  Restrictions/Precautions   Weight Bearing Precautions Per Order No  (up and OOB orders)   Other Precautions Cognitive; Chair Alarm; Bed Alarm; Fall Risk;Pain   General   Family/Caregiver Present No   Cognition   Overall Cognitive Status Impaired   Arousal/Participation Responsive   Attention Attends with cues to redirect   Orientation Level Oriented X4   Memory Within functional limits   Following Commands Follows one step commands without difficulty   Comments Pt with baseline autism and bipolar dx   Responds well to simple conversation and being in charge of sessions    RUE Assessment   RUE Assessment WFL   LUE Assessment   LUE Assessment WFL   RLE Assessment   RLE Assessment X   Strength RLE   R Hip Flexion 3/5   R Hip ABduction 2+/5   R Hip ADduction 2+/5   R Knee Flexion 3/5   R Knee Extension 3/5   R Ankle Dorsiflexion 3/5   R Ankle Plantar Flexion 3+/5   LLE Assessment   LLE Assessment X   Strength LLE   L Hip Flexion 3+/5   L Hip ABduction 3+/5   L Hip ADduction 3+/5   L Knee Flexion 3+/5   L Knee Extension 3+/5   L Ankle Dorsiflexion 4/5   L Ankle Plantar Flexion 4/5   Coordination   Movements are Fluid and Coordinated 0   Coordination and Movement Description bradykinetic; antalgic    Sensation X   Light Touch   RLE Light Touch Impaired   RLE Light Touch Comments reports ? pattern of numbess/tingling   LLE Light Touch Grossly intact   Proprioception   RLE Proprioception Impaired   LLE Proprioception Impaired   Bed Mobility   Rolling R 5  Supervision   Additional items Increased time required  (pt declining assistance)   Additional Comments Pt declining rolling to L and he reports this increases his pain to a 20/10  Pt declining OOB transfer  Pt declining EOB transfer  Pt declining placing bed in chair position in increase toleracne to upright    Transfers   Sit to Stand Unable to assess   Ambulation/Elevation   Gait pattern Not tested   Endurance Deficit   Endurance Deficit Yes   Endurance Deficit Description fatigue, pain    Activity Tolerance   Activity Tolerance Patient limited by fatigue;Patient limited by pain   Medical Staff Made Aware Chise-OT; [facility]-CM   Nurse Made Aware appropriate to see per MERRY White    Assessment   Prognosis Fair   Problem List Decreased strength;Decreased range of motion;Decreased endurance; Impaired balance;Decreased mobility; Decreased coordination;Decreased cognition;Decreased safety awareness; Obesity;Orthopedic restrictions;Pain   Assessment Pt is a 33year old male presenting as a transfer from [facility] for further eval of fever and wound infection  Pt underwent "lumbar interventricular disc with hemilaminectomy medial fasciotomy and diskectomy on October 17 with subsequent postoperative wound infection required admission on October 31st"  PT consulted to assess functional mobility and assist with safe d/c planning  PT eval performed with up and OOB orders   Pt with a problem  List which includes SIRS, wound dehiscence, hypothyroid, bipolar dx, autism, obesity, DVT, constipation and sinus tachy  PMH includes urinary incontinence, sleep apnea and psychiatric disorder  PTA, pt living at home with his mother  Following last admission, PT recommending home however, pt's mother refusing  At home, pt reports living relatively sedentary lifestyle in recliner where he did little ambulation and required assistance from his mother for all ADLs and transfers  On eval, pt presenting with decreased strength, pain, poor tolerance to activity and decreased overall functional mobility  Pt with minimal participation on eval where he was willing to perform ROM and strength assessment in bed as well as rolling to R  Declined all other aspects of eval  PT to continue to follow pt during stay to progress functional mobility (I) and safety  Barriers to Discharge Inaccessible home environment;Decreased caregiver support   Goals   Patient Goals to be in less pain   LTG Expiration Date 12/05/17   Long Term Goal #1 PT to see   Treatment Day 0   Plan   Treatment/Interventions Continued evaluation   PT Frequency 5x/wk   Recommendation   Recommendation Short-term skilled PT   Equipment Recommended Other (Comment)  (TBD; pt owns RW)   PT - OK to Discharge Yes  (to rehab when medically appropriate )   Additional Comments Pt repositioning with pillow   All needs within reach    Barthel Index   Feeding 10   Bathing 0   Grooming Score 5   Dressing Score 5   Bladder Score 10   Bowels Score 10   Toilet Use Score 5   Transfers (Bed/Chair) Score 0   Mobility (Level Surface) Score 0   Stairs Score 0   Barthel Index Score 45           Maritza Arambula, PT

## 2017-11-21 NOTE — PROGRESS NOTES
Progress Note - Infectious Disease   Mic Bradley 33 y o  male MRN: [id number]  Unit/Bed#: [facility] 905-01 Encounter: [id number]      Impression/Plan:  1   SIRS versus sepsis-POA:  Fever and tachycardia  Urinalysis and blood cultures negative  Surgical site appears clean and intact without infection  CT A/P negative for abscess; SQ air likely due to recently removed SAÚL drain  Likely noninfectious due to severe constipation, DVT, ? PE, drug fever  Clinically stable and nontoxic  The patient's temperatures come down and he is feeling better  Continue antibiotics as below  Follow up final blood cultures from admission  Follow temperatures and white blood cell count closely  Continue supportive care as per the primary service     2   History of Proteus sepsis due to postoperative wound infection-High risk IV antibiotic candidate due to #5, now with readmission with fever  Sensitive to FQ and baseline QTC normal   Unclear allergy to ciprofloxacin  Tolerating levofloxacin  His QTC has increased  Continue levofloxacin for now  Plan at least 20 more days of antibiotics  Will repeat EKG to monitor QTC  Follow temperatures and white blood cell count closely  Supportive care as per the primary service     3   Severe constipation-Possibly due to narcotic pain medicine  Continue MiraLax per patient request  Monitor symptomatology     4   RLE subacute/chronic DVT-Does not appear complicated by pulmonary embolism based on CT findings  Could be the cause of the fever  Continue anticoagulation as per the primary service  Follow-up TTE as per the primary service     5   Autism/BPD    Antibiotics:  Levaquin 4  Antibiotics 22    Subjective:  Patient has no fever, chills, sweats; he still had a low-grade fever over the last 24 hours; no nausea, vomiting, diarrhea; no cough, shortness of breath; no pain  No new symptoms  He is feeling much better overall      Objective:  Vitals:  HR:  [100-124] 106  Resp:  [18-20] 20  BP: (125-135)/(70-80) 125/79  SpO2:  [94 %-97 %] 97 %  Temp (24hrs), Av °F (37 2 °C), Min:97 8 °F (36 6 °C), Max:100 5 °F (38 1 °C)  Current: Temperature: 97 8 °F (36 6 °C)    Physical Exam:   General Appearance:  Alert, nontoxic, no acute distress  Throat: Oropharynx moist without lesions  Lungs:   Clear to auscultation anteriorly; respirations unlabored   Heart:  Tachycardic; no murmur, rub or gallop   Abdomen:   Soft, non-tender, non-distended, positive bowel sounds  Back with dressing in place  Extremities: No clubbing, cyanosis or edema  Right upper extremity PICC line without erythema or drainage   Skin: No new rashes or lesions  No draining wounds noted  Labs, Imaging, & Other studies:   All pertinent labs and imaging studies were personally reviewed    Results from last 7 days  Lab Units 17  0612 17  0511 17  0638 17  1615 17  0443   WBC Thousand/uL  --  4 86  --  4 00* 6 99   HEMOGLOBIN g/dL 10 0* 10 0* 10 3* 9 8* 9 0*   PLATELETS Thousands/uL  --  247  --  224 268       Results from last 7 days  Lab Units 17  0511 17  0443 17  1638   SODIUM mmol/L 136 139 136   POTASSIUM mmol/L 3 8 3 6 3 6   CHLORIDE mmol/L 102 106 98*   CO2 mmol/L 27 27 26   ANION GAP mmol/L 7 6 12   BUN mg/dL 10 10 11   CREATININE mg/dL 0 56* 0 60 0 95   EGFR ml/min/1 73sq m 143 139 110   GLUCOSE RANDOM mg/dL 100 87 107   CALCIUM mg/dL 8 7 8 0* 9 6   AST U/L  --   --  23   ALT U/L  --   --  27   ALK PHOS U/L  --   --  42*   TOTAL PROTEIN g/dL  --   --  7 8   ALBUMIN g/dL  --   --  3 0*   BILIRUBIN TOTAL mg/dL  --   --  0 40       Results from last 7 days  Lab Units 17  1638   BLOOD CULTURE  No Growth at 72 hrs  No Growth at 72 hrs

## 2017-11-21 NOTE — CONSULTS
I came to see the patient  He states that he is not interested in rehab  He is his "own guardianship" and he does not believe that he needs psychiatric evaluation  Continue psychotropic medications   Discussed with  and primary team    Dr Noel Patel

## 2017-11-21 NOTE — CASE MANAGEMENT
Continued Stay Review    Date: 11/21    Vital Signs: /77   Pulse 104   Temp 98 9 °F (37 2 °C) (Oral)   Resp 20   Ht 5' 10" (1 778 m)   Wt (!) 145 kg (319 lb)   SpO2 95%   BMI 45 77 kg/m²     Medications:   Scheduled Meds:   benztropine 1 mg Oral BID   clonazePAM 0 5 mg Oral BID   divalproex sodium 1,000 mg Oral Daily   divalproex sodium 500 mg Oral Q12H [address]   docusate sodium 100 mg Oral BID   fenofibrate 145 mg Oral Daily   levofloxacin 750 mg Oral Q24H   levothyroxine 25 mcg Oral Once per day on Mon Tue Wed Thu Fri   levothyroxine 50 mcg Oral Once per day on Sun Sat   lidocaine 2 patch Transdermal Daily   polyethylene glycol 17 g Oral Daily   pregabalin 75 mg Oral BID   risperiDONE 2 mg Oral BID   risperiDONE 4 mg Oral BID   senna 1 tablet Oral Daily     Continuous Infusions:   heparin (porcine) 3-30 Units/kg/hr (Order-Specific) Last Rate: 25 Units/kg/hr (11/21/17 0840)     PRN Meds:   acetaminophen    HYDROmorphone    methocarbamol    ondansetron    oxyCODONE    Abnormal Labs/Diagnostic Results:   Updated: 11/21/17 0646        Hemoglobin 10 0 (L) 12 0 - 17 0 g/dL     Hematocrit 30 5 (L) 36 5 - 49 3 %      Age/Sex: 33 y o  male     Assessment/Plan:   11/21 Neurosurgery progress notes:  Assessment:  1  Post op # 20  incision and drainage (I&D) spine  2  Sepsis  3  Wound dehiscence  4  Proteus wound infection  5  Acute blood loss anemia  6  Herniation of lumbar intervertebral disc s/p hemilaminotomy and foraminotmy L3/4, L4/5 (10/17)  7  Hypothyroid  8  Bipolar disorder  9  Autism   10  Below the knee DVT     Plan:  · Exam: Awake, alert  KHANNA wo focal weakness, refused HF/HE examination  LT and PP decreased per patient on right lateral aspect  Incision is well approximated without signs of erythema or active discharge  Appreciated minimal blood ting discharge on patient's sheets      · Imaging: personally reviewed and reviewed by attending:  ? 11/18 - VAS lower limb duplex: Subacute to chronic occlusive deep vein thrombosis noted in the one of the paried posterior tibial veins at distal to mid calf   ? 11/18 - CT lumbar recon: 1) New endplate demineralization around the L3-4 disc level could represent postsurgical change or sequelae of discitis-osteomyelitis  Recommend further evaluation with lumbar MRI w/wo contrast   2) Postsurgical soft tissue swelling with small focus of subcutaneous gas  However no drainable encapsulated fluid collections are present  ? 11/18 - CT abd/pelv w: 1) No acute abdominopelvic findings  ? 11/19 - CTA chest PE study: 1) no gross pulmonary embolism to the lobar level  Segmental and smaller levels are not well evaluated  No CT findings of right heart strain  2) No acute thoracic findings  · ID following:   ? Tmax of 100 4 in the last 24h, WBC 4 86 - continue to trend  ? Blood cultures collected on 11/2/17, 11/3/17 and 11/17/17 have been negative  ? Urine culture collected 11/3 - no growth   ? Continue levofloxacin for Proteus Mirabilis that grew from tissue culture on 11/1  · 11/20 - echocardiogram normal   · PT/OT: Patient was evaluated and treated by PT/OT last admission on 11/8 with recommendation of post acute rehab but family/patient wished to go home with family support  Will continue to work with therapy  · DVT ppx: SCDS, on heparin gtt  ? From nsx standpoint may transition to full anticoagulation per primary team    · Pain control: improving per patient  He states he believes Tramadol that he was using prior to surgical intervention helped more than the Oxycodone  · Medical management per primary team    · No nsx intervention required at this time  Nsx will follow PRN hospitalization        --------------------------------------------------------------------------------------------------------------------------------    11/21 Infectious Disease cons:  Impression/Plan:  1   SIRS versus sepsis-POA:  Fever and tachycardia  Urinalysis and blood cultures negative  Surgical site appears clean and intact without infection  CT A/P negative for abscess; SQ air likely due to recently removed SAÚL drain  Likely noninfectious due to severe constipation, DVT, ? PE, drug fever  Clinically stable and nontoxic  The patient's temperatures come down and he is feeling better  Continue antibiotics as below  Follow up final blood cultures from admission  Follow temperatures and white blood cell count closely  Continue supportive care as per the primary service     2   History of Proteus sepsis due to postoperative wound infection-High risk IV antibiotic candidate due to #5, now with readmission with fever  Sensitive to FQ and baseline QTC normal   Unclear allergy to ciprofloxacin  Tolerating levofloxacin  His QTC has increased  Continue levofloxacin for now  Plan at least 20 more days of antibiotics  Will repeat EKG to monitor QTC  Follow temperatures and white blood cell count closely  Supportive care as per the primary service     3   Severe constipation-Possibly due to narcotic pain medicine    Continue MiraLax per patient request  Monitor symptomatology     4   RLE subacute/chronic DVT-Does not appear complicated by pulmonary embolism based on CT findings   Could be the cause of the fever     Continue anticoagulation as per the primary service  Follow-up TTE as per the primary service     5   Autism/BPD     Antibiotics:  Levaquin 4  Antibiotics 22    Discharge Plan: Referral to [facility] and [facility]

## 2017-11-21 NOTE — PROGRESS NOTES
Progress Note - Neurosurgery   Tayo Bradley 33 y o  male MRN: [id number]  Unit/Bed#: [facility] 905-01 Encounter: [id number]    Assessment:  1  Post op # 20  incision and drainage (I&D) spine  2  Sepsis  3  Wound dehiscence  4  Proteus wound infection  5  Acute blood loss anemia  6  Herniation of lumbar intervertebral disc s/p hemilaminotomy and foraminotmy L3/4, L4/5 (10/17)  7  Hypothyroid  8  Bipolar disorder  9  Autism   10  Below the knee DVT    Plan:  · Exam: Awake, alert  KHANNA wo focal weakness, refused HF/HE examination  LT and PP decreased per patient on right lateral aspect  Incision is well approximated without signs of erythema or active discharge  Appreciated minimal blood ting discharge on patient's sheets  · Imaging: personally reviewed and reviewed by attending:  · 11/18 - VAS lower limb duplex: Subacute to chronic occlusive deep vein thrombosis noted in the one of the paried posterior tibial veins at distal to mid calf   · 11/18 - CT lumbar recon: 1) New endplate demineralization around the L3-4 disc level could represent postsurgical change or sequelae of discitis-osteomyelitis  Recommend further evaluation with lumbar MRI w/wo contrast   2) Postsurgical soft tissue swelling with small focus of subcutaneous gas  However no drainable encapsulated fluid collections are present  · 11/18 - CT abd/pelv w: 1) No acute abdominopelvic findings  · 11/19 - CTA chest PE study: 1) no gross pulmonary embolism to the lobar level  Segmental and smaller levels are not well evaluated  No CT findings of right heart strain  2) No acute thoracic findings     · ID following:   · Tmax of 100 4 in the last 24h, WBC 4 86 - continue to trend  · Blood cultures collected on 11/2/17, 11/3/17 and 11/17/17 have been negative  · Urine culture collected 11/3 - no growth   · Continue levofloxacin for Proteus Mirabilis that grew from tissue culture on 11/1  · 11/20 - echocardiogram normal   · PT/OT: Patient was evaluated and treated by PT/OT last admission on 11/8 with recommendation of post acute rehab but family/patient wished to go home with family support  Will continue to work with therapy  · DVT ppx: SCDS, on heparin gtt  · From nsx standpoint may transition to full anticoagulation per primary team    · Pain control: improving per patient  He states he believes Tramadol that he was using prior to surgical intervention helped more than the Oxycodone  · Medical management per primary team    · No nsx intervention required at this time  Nsx will follow PRN hospitalization  Subjective/Objective   Chief Complaint: "You're here to look at my back"    Subjective: patient states he's slowly, but surely getting better  He states his pain is currently at a 3-4 out of 10  He admits to the pain worsening with movement causing it to elevate to 20 out of 10  He denies working with physical therapy because he cannot do the things they wish him to do  He denies headaches, dizziness, chest pain, SOB  Objective: laying flat in bed, playing on cell phone  NAD  I/O       11/19 0701 - 11/20 0700 11/20 0701 - 11/21 0700 11/21 0701 - 11/22 0700    P  O  3060 1800     I V  (mL/kg) 793 2 (5 5)      IV Piggyback       Total Intake(mL/kg) 3853 2 (26 6) 1800 (12 4)     Urine (mL/kg/hr) 6425 (1 8) 3300 (0 9) 1200 (2)    Stool 1 (0)  0 (0)    Total Output 6426 3300 1200    Net -2572 8 -1500 -1200           Unmeasured Urine Occurrence  2 x     Unmeasured Stool Occurrence   1 x          Invasive Devices     Peripherally Inserted Central Catheter Line            PICC Line 56/93/40 Right Basilic 14 days          Drain            External Urinary Catheter Small 17 days    Closed/Suction Drain Midline Back 15 Fr  15 days    External Urinary Catheter Small 4 days                Physical Exam:  Vitals: Blood pressure 123/77, pulse 104, temperature 98 9 °F (37 2 °C), temperature source Oral, resp   rate 20, height 5' 10" (1 778 m), weight (!) 145 kg (319 lb), SpO2 95 %  ,Body mass index is 45 77 kg/m²  General appearance: alert, appears stated age, cooperative and no distress  Head: Normocephalic, without obvious abnormality, atraumatic  Eyes: tracks appropriately in room  Back: midline incision is well approximated, nonerythematous, no active discharge, nonedematous  No tenderness on palpation  Lungs: non labored breathing  Heart: regular heart rate  Neurologic:   Mental status: Alert, thought content appropriate  Sensory: normal to LT and PP with exceptions to noted decreased in right lateral aspect of lower extremity throughout proximal and distal  Motor: moving all extremities  Limited examination secondary to refusing HF/E examination due to pain  KF/KE 5/5, DF/PF: 5/5  Reflexes: 1+ and symmetric  negative vega, negatie clonus  Coordination: 3/3 JPS intact  DSS intact  Lab Results:    Results from last 7 days  Lab Units 11/21/17  0612 11/20/17  0511 11/19/17  0638 11/18/17  1615 11/18/17 0443  11/17/17  1638   WBC Thousand/uL  --  4 86  --  4 00* 6 99  --  7 19   HEMOGLOBIN g/dL 10 0* 10 0* 10 3* 9 8* 9 0*  --  11 6*   HEMATOCRIT % 30 5* 30 0* 31 6* 29 8* 27 9*  --  36 1*   PLATELETS Thousands/uL  --  247  --  224 268  < > 355   NEUTROS PCT %  --  68  --   --   --   --  79*   MONOS PCT %  --  11  --   --   --   --  8   < > = values in this interval not displayed      Results from last 7 days  Lab Units 11/20/17  0511 11/18/17 0443 11/17/17  1638   SODIUM mmol/L 136 139 136   POTASSIUM mmol/L 3 8 3 6 3 6   CHLORIDE mmol/L 102 106 98*   CO2 mmol/L 27 27 26   BUN mg/dL 10 10 11   CREATININE mg/dL 0 56* 0 60 0 95   CALCIUM mg/dL 8 7 8 0* 9 6   TOTAL PROTEIN g/dL  --   --  7 8   BILIRUBIN TOTAL mg/dL  --   --  0 40   ALK PHOS U/L  --   --  42*   ALT U/L  --   --  27   AST U/L  --   --  23   GLUCOSE RANDOM mg/dL 100 87 107               Results from last 7 days  Lab Units 11/21/17  0612 11/20/17  2051 11/20/17  1439  11/18/17  1617 11/17/17  1638   INR   --   --   --   --  1 33* 1 05   PTT seconds 112* 66* 61*  < > 37* 38*   < > = values in this interval not displayed  No results found for: TROPONINT  ABG:No results found for: PHART, MVV1ORR, PO2ART, TEX0XAQ, Y8TEZMCZ, BEART, SOURCE    Imaging Studies: I have personally reviewed pertinent reports  and I have personally reviewed pertinent films in PACS    EKG, Pathology, and Other Studies: I have personally reviewed pertinent reports        VTE  Prophylaxis: Sequential compression device (Venodyne)  and Heparin

## 2017-11-21 NOTE — PROGRESS NOTES
[facility] Internal Medicine Progress Note  Patient: Shira Bhatt 33 y o  male   MRN: [id number]  PCP: Mann Ruggiero MD  [facility] Encounter: [id number]  Date Of Visit: 11/21/17    Assessment/Plan:  ·   Principal Problem:    SIRS - Recent Spinal Wound Infection with Proteus   Active Problems:    Hypothyroid    Bipolar disorder ([facility] [address] )    Autism    Morbid obesity due to excess calories ([facility] [address] )    Deep vein thrombosis (DVT) of tibial vein (HCC)    Constipation    Sinus tachycardia    Sepsis secondary to wound infection postoperative day 20 incision and drainage of spine  Had herniation of lumbar intervertebral disc status post hemilaminectomy and foraminectomy L3-L4/-L4-L5 October 2017  tachycardia 106, fever 99 F T-max over hospitalized 103 F; T-max over the last 24 hours 100 5  Blood pressure elevated 132/74  Patient reports pain and appears uncomfortable lying flat in bed; a freight of turning due  To precipitate a back spasms  Proteus septicemia due to postoperative wound infection  Currently on levofloxacin-at least 20 days of antibiotic  Continue vital signs monitoring  Supportive care with pain medication      Deep venous thrombosis tibial vein  Continue heparin drip- PTT therapeutic  Will consider long term anticoagulation    Autism/ bipolar disorder  -continue with respiradal + cogent and Depakote regimen  -patient refused psychiatric evaluation today    Morbid obesity  -diet and lifestyle changes counseling provided  -and courage physical activity    Hypothyroidism  Continue Synthroid    PT OT evaluation necessary for discharge to acute rehab      VTE Pharmacologic Prophylaxis:   Pharmacologic: Heparin Drip  Mechanical VTE Prophylaxis in Place: Yes    Patient Centered Rounds: I have performed bedside rounds with nursing staff today      Education and Discussions with Family / Patient:  Patient  Current Length of Stay: 4 day(s)    Current Patient Status: Inpatient   Certification Statement: The patient will continue to require additional inpatient hospital stay due to Medical management    Discharge Plan:  Rehab    Code Status: Level 1 - Full Code      Subjective:   Patient seen and examined at bedside, reported back pain, willing to move because he is afraid of back spasms which may occur 4-5 at time  Review of systems negative otherwise for lower extremity pain, swelling, chest pain, shortness of breath, changes in bowel habits  Objective:     Vitals:   Temp (24hrs), Av 8 °F (37 1 °C), Min:97 7 °F (36 5 °C), Max:100 5 °F (38 1 °C)    HR:  [104-124] 106  Resp:  [19-20] 20  BP: (123-133)/(74-80) 132/74  SpO2:  [94 %-98 %] 96 %  Body mass index is 45 77 kg/m²  Input and Output Summary (last 24 hours):        Intake/Output Summary (Last 24 hours) at 17 1545  Last data filed at 17 1501   Gross per 24 hour   Intake             1080 ml   Output             3050 ml   Net            -1970 ml       Physical Exam:  General Appearance:    Awake/ Alert, cooperative, no distress, appropriately responsive- appears uncomfortable unable to cooperate with the physical exam due to fear of precipitating back spasms    Head:    Normocephalic, without obvious abnormality, atraumatic, mucous membranes moist    Eyes:    Conjunctiva/corneas clear, EOM's intact, uses corrective glasses   Neck:   Supple, no cervical lymphadenopathy   Lungs:     Clear to auscultation bilaterally, respirations unlabored, no crackles or wheeze     Heart:    Regular rate and rhythm, S1 and S2 no murmurs rubs or gallops   Abdomen:     Soft, non-tender, bowel sounds active all four quadrants,     no masses, no organomegaly   Extremities:   Extremities normal, atraumatic, no cyanosis or edema   Neurologic:  Bilateral lower extremity edema nonpitting, around ankles         Additional Data:     Labs:      Results from last 7 days  Lab Units 17  0612 17  0511   WBC Thousand/uL  --  4 86   HEMOGLOBIN g/dL 10 0* 10 0*   HEMATOCRIT % 30 5* 30 0*   PLATELETS Thousands/uL  --  247   NEUTROS PCT %  --  68   LYMPHS PCT %  --  19   MONOS PCT %  --  11   EOS PCT %  --  1       Results from last 7 days  Lab Units 11/20/17  0511  11/17/17  1638   SODIUM mmol/L 136  < > 136   POTASSIUM mmol/L 3 8  < > 3 6   CHLORIDE mmol/L 102  < > 98*   CO2 mmol/L 27  < > 26   BUN mg/dL 10  < > 11   CREATININE mg/dL 0 56*  < > 0 95   CALCIUM mg/dL 8 7  < > 9 6   TOTAL PROTEIN g/dL  --   --  7 8   BILIRUBIN TOTAL mg/dL  --   --  0 40   ALK PHOS U/L  --   --  42*   ALT U/L  --   --  27   AST U/L  --   --  23   GLUCOSE RANDOM mg/dL 100  < > 107   < > = values in this interval not displayed  Results from last 7 days  Lab Units 11/18/17  1614   INR  1 33*       * I Have Reviewed All Lab Data Listed Above  * Additional Pertinent Lab Tests Reviewed: [facility] Admission Reviewed    Cultures:   Blood Culture:   Lab Results   Component Value Date    BLOODCX No Growth at 72 hrs  11/17/2017    BLOODCX No Growth at 72 hrs  11/17/2017    BLOODCX No Growth After 5 Days  11/03/2017    BLOODCX No Growth After 5 Days  11/03/2017    BLOODCX No Growth After 5 Days   11/02/2017    BLOODCX Proteus mirabilis (A) 11/02/2017    BLOODCX Proteus mirabilis (A) 10/31/2017     Urine Culture:   Lab Results   Component Value Date    URINECX No Growth <1000 cfu/mL 11/03/2017     Sputum Culture: No components found for: SPUTUMCX  Wound Culture:   Lab Results   Component Value Date    WOUNDCULT 1+ Growth of Proteus mirabilis (A) 10/31/2017       Last 24 Hours Medication List:     benztropine 1 mg Oral BID   clonazePAM 0 5 mg Oral BID   divalproex sodium 1,000 mg Oral Daily   divalproex sodium 500 mg Oral Q12H [address]   docusate sodium 100 mg Oral BID   fenofibrate 145 mg Oral Daily   levofloxacin 750 mg Oral Q24H   levothyroxine 25 mcg Oral Once per day on Mon Tue Wed Thu Fri   levothyroxine 50 mcg Oral Once per day on Sun Sat   lidocaine 2 patch Transdermal Daily polyethylene glycol 17 g Oral Daily   pregabalin 75 mg Oral BID   risperiDONE 2 mg Oral BID   risperiDONE 4 mg Oral BID   senna 1 tablet Oral Daily        Today, Patient Was Seen By: Eri Tejada MD    ** Please Note: Dragon 360 Dictation voice to text software may have been used in the creation of this document   **

## 2017-11-21 NOTE — PLAN OF CARE
Problem: OCCUPATIONAL THERAPY ADULT  Goal: Performs self-care activities at highest level of function for planned discharge setting  See evaluation for individualized goals  Treatment Interventions: ADL retraining, Functional transfer training, Endurance training, Cognitive reorientation, Patient/family training, Equipment evaluation/education, Compensatory technique education, Continued evaluation, Activityengagement, Energy conservation          See flowsheet documentation for full assessment, interventions and recommendations  Limitation: Decreased ADL status, Decreased Safe judgement during ADL, Decreased cognition, Decreased endurance, Decreased high-level ADLs, Decreased self-care trans, Mood limitation  Prognosis: Good  Assessment: Pt is a 34 Y/O male known to the neurosx team who presented to [facility] w/ c/o fever and spinal sx would infection  Pt s/p lumbar interventricular disc w/ hemilaminectomy medial fasciotomy and diskectomy on Oct 17 w/ postoperative wound infection readmission on 10/31, and wound debridement and closure on 11/6  Pt dx w/ DVT of tibial vein and sepsis, wound dehiscence, and proteus wound infection  Pt PMH includes bipolar disorder, Autism, morbid obesity, leg swelling, herniation of lumbar intervertebral disc, acute blood loss anemia  Pt previously seen by OT/PT last admission w/ recommendation to d/c to rehab however pt d/c home  Pt reports since d/c from last admission, he has been sleeping and staying in a recliner w/ limited to no mobility 2' severe back pain  Pt reports he was receiving assistance via his mother for sponge bathing, dressing, and was provided all meals  Pt currently requires mod A for UB ADLS, max A for LB ADLS, and is supervision for bed mobility  Pt refusing further mobility at this time 2' back pain  Pt educated on role of therapy and risks of limited mobility  Pt reports he is willing to increase activity/mobility tomorrow   Pt currently experiencing the following limitations: pain, decreased activity tolerance, self-limiting, fatigue, decreased sitting tolerance, decreased standing tolerance, impaired balance, increased risk for falls, overall weakness, decreased insight/safety awareness/judgment, slow to process/respond, decreased ADL status, and decreased functional mobility/transfers  Recommend pt d/c to short term rehab when medically cleared  Will continue to follow to address the below goals        OT Discharge Recommendation: Short Term Rehab  OT - OK to Discharge: Yes (to short term rehab when medically cleared)

## 2017-11-21 NOTE — OCCUPATIONAL THERAPY NOTE
[address]  Evaluation     Patient Name: Pamela ACEVEDO Date: 11/21/2017  Problem List  Patient Active Problem List   Diagnosis    SIRS - Recent Spinal Wound Infection with Proteus     Wound dehiscence    Herniation of lumbar intervertebral disc    Hypothyroid    Bipolar disorder ([facility]    Autism    Morbid obesity due to excess calories ([facility]    Deep vein thrombosis (DVT) of tibial vein (HCC)    Constipation    Sinus tachycardia     Past Medical History  Past Medical History:   Diagnosis Date    Autism     Bipolar 1 disorder ([facility]     Disease of thyroid gland     Hyperlipidemia     Lumbar disc disease     Psychiatric disorder     Compulsive Disorder    Sleep apnea     Urinary incontinence      Past Surgical History  Past Surgical History:   Procedure Laterality Date    COLONOSCOPY      INCISION AND DRAINAGE POSTERIOR SPINE N/A 11/1/2017    Procedure: INCISION AND DRAINAGE (I&D) SPINE;  Surgeon: Frank Young MD;  [facility];  Service: Neurosurgery    [place] LAMNOTMY INCL W/DCMPRSN NRV ROOT 1 INTRSPC LUMBR Bilateral 10/17/2017    Procedure: LEFT L3/4 AND RIGHT L4/5 MICRODISCECTOMIES, HEMILAMINECTOMIES; POSSIBLE EPIDURAL STEROID INJECTIONS;  Surgeon: Frank Young MD;  [facility];  Service: Neurosurgery    WISDOM TOOTH EXTRACTION      WOUND DEBRIDEMENT N/A 11/3/2017    Procedure: DEBRIDEMENT WOUND ([address]), wound vac placement back;  Surgeon: Joselito Prasad DO;  [facility];  Service: General    WOUND DEBRIDEMENT N/A 11/6/2017    Procedure: Bret Goode ([address] [facility];  Surgeon: Lo Dowd MD;  [facility];  Service: General         11/21/17 1140   Note Type   Note type Eval/Treat   Restrictions/Precautions   Other Precautions Cognitive; Chair Alarm; Bed Alarm; Fall Risk;Pain   Pain Assessment   Pain Assessment 0-10   Pain Score 7   Pain Location Back   Pain Orientation Bilateral   Hospital Pain Intervention(s) Repositioned; Ambulation/increased activity; Emotional support   Response to Interventions tolerated   Home Living   Type of [address] Two level;Stairs to enter with rails  (3STE)   Bathroom Shower/Tub Tub/shower unit   Bathroom Toilet Standard   Bathroom Accessibility Accessible   Additional Comments Pt reports no use of DME PTA 2' inability to get up  When asked about use of RW following recent d/c pt reports he "didnt even get to use it" as he has been immobile  Prior Function   Level of [place] Independent with ADLs and functional mobility   Lives With [employer] Help From Family   ADL Assistance Independent   IADLs Independent   Falls in the last 6 months 0   Vocational On disability   Lifestyle   Autonomy Pt reports s/p return home from last admission, pt requires assistance w/ all ADLS and functional mobility 2' severe back pain  Pt reports he has been sitting in recliner since return home w/ limited mobility  Pt reports independence in ADLS/light IADLS and functional mobility/transfers at baseline  Reciprocal Relationships Pt has supportive mother and family who can provide assistance as needed    However pt reports, "I was waking her up at 3 in the morning for meds and she almost had to be hospitalized because of all the things I needed'   Service to Others Pt is currently on disability    Intrinsic Gratification Enjoys watching the steelers and saints   Psychosocial   Psychosocial (WDL) WDL   Subjective   Subjective "If you are asking me to get out of bed, you need to come back at another time, the pain will be so bad I could die"   ADL   Where Assessed Edge of bed   Eating Assistance 5  Supervision/Setup   Grooming Assistance 5  Supervision/Setup   UB Bathing Assistance 3  Moderate Assistance   LB Bathing Assistance 2  Maximal Assistance   [address] 3  Moderate Assistance   LB Dressing Assistance 2  Maximal [address]  2  Maximal Assistance   Functional Assistance 2  Maximal Assistance   Bed Mobility   Rolling R 5  Supervision   Additional items Increased time required;Verbal cues   Additional Comments Pt refusing to attempt further transfers/mobility or being placed in chair position 2' severe back pain and request to lay in bed  Activity Tolerance   Activity Tolerance Patient limited by pain   Medical Staff Made Aware Will contact CM regarding d/c recommendation   Nurse Made Aware Appropriate to see per MERRY GUTIÉRREZ Assessment   RUE Assessment [facility]   RU Strength   RUE Overall Strength Deficits; Within Functional Limits - able to perform ADL tasks with strength  (4/5 MMT)   LUE Assessment   LUE Assessment WFL   LUE Strength   LUE Overall Strength Deficits; Within Functional Limits - able to perform ADL tasks with strength  (4/5 MMT)   Hand Function   Gross Motor Coordination Functional   Fine Motor Coordination Functional   Sensation   Light Touch No apparent deficits   Sharp/Dull No apparent deficits   Cognition   Overall Cognitive Status Impaired   Arousal/Participation Alert   Attention Attends with cues to redirect   Orientation Level Oriented X4   Memory Within functional limits   Following Commands Follows one step commands with increased time or repetition   Comments Pt w/ baseline autism and bipolar d/o  Pt required multiple cues and encouragement for participation  Pt is self limiting w/ fear of pain  Assessment   Limitation Decreased ADL status; Decreased Safe judgement during ADL;Decreased cognition;Decreased endurance;Decreased high-level ADLs; Decreased self-care trans;Mood limitation   Prognosis Good   Assessment Pt is a 32 Y/O male known to the neurosx team who presented to [facility] w/ c/o fever and spinal sx would infection  Pt s/p lumbar interventricular disc w/ hemilaminectomy medial fasciotomy and diskectomy on Oct 17 w/ postoperative wound infection readmission on 10/31, and wound debridement and closure on 11/6   Pt dx w/ DVT of tibial vein and sepsis, wound dehiscence, and proteus wound infection  Pt PMH includes bipolar disorder, Autism, morbid obesity, leg swelling, herniation of lumbar intervertebral disc, acute blood loss anemia  Pt previously seen by OT/PT last admission w/ recommendation to d/c to rehab however pt d/c home  Pt reports since d/c from last admission, he has been sleeping and staying in a recliner w/ limited to no mobility 2' severe back pain  Pt reports he was receiving assistance via his mother for sponge bathing, dressing, and was provided all meals  Pt currently requires mod A for UB ADLS, max A for LB ADLS, and is supervision for bed mobility  Pt refusing further mobility at this time 2' back pain  Pt educated on role of therapy and risks of limited mobility  Pt reports he is willing to increase activity/mobility tomorrow  Pt currently experiencing the following limitations: pain, decreased activity tolerance, self-limiting, fatigue, decreased sitting tolerance, decreased standing tolerance, impaired balance, increased risk for falls, overall weakness, decreased insight/safety awareness/judgment, slow to process/respond, decreased ADL status, and decreased functional mobility/transfers  Recommend pt d/c to short term rehab when medically cleared  Will continue to follow to address the below goals  Goals   Patient Goals to have less pain    LTG Time Frame 7-10   Long Term Goal #1 see below   Plan   Treatment Interventions ADL retraining;Functional transfer training; Endurance training;Cognitive reorientation;Patient/family training;Equipment evaluation/education; Compensatory technique education;Continued evaluation; Activityengagement; Energy conservation   Goal Expiration Date 12/01/17   OT Frequency 3-5x/wk   Recommendation   OT Discharge Recommendation Short Term Rehab   OT - OK to Discharge Yes  (to short term rehab when medically cleared)   Barthel Index   Feeding 10   Bathing 0   Grooming Score 5 Dressing Score 5   Bladder Score 10   Bowels Score 10   Toilet Use Score 5   Transfers (Bed/Chair) Score 5   Mobility (Level Surface) Score 0   Stairs Score 0   Barthel Index Score 50   Modified Aragon Scale   Modified Aragon Scale 4         OTR to see to assess functional transfers     Occupational therapy goals to be met within 7-10 Days    Pt will increase bed mobility to mod I to increase participation in functional activities    Pt will increase functional transfers/mobility, on off all surfaces, including toilet, with mod I and use of DME as needed     Pt will complete toileting, including clothing management and perianal hygiene, with mod I and good balance/safety     Pt will increase UB/LB ADLS to mod I w/ use of compensatory techniques while seated with good balance/safety     Pt will participate in a light grooming task w/ set up and mod I while standing at sink for ~3-5 minutes with good balance/safety    Pt will participate in simulated light IADL task to mod I with good balance/safety and good carryover of safety/energy conservation/compensatory techniques    Pt will increase activity tolerance to ~30 minutes to increase functional participation in therapy sessions    Pt will be attentive 100% of the time during continued cognitive assessment for assistance in safe d/c planning    Pt will demonstrate good carry over of energy conservation/safety/compensatory techniques when participating in tasks      Documentation completed by [address] Apgar, OTS  Occupational Therapy Student

## 2017-11-21 NOTE — PLAN OF CARE
Problem: Potential for Falls  Goal: Patient will remain free of falls  INTERVENTIONS:  - Assess patient frequently for physical needs  -  Identify cognitive and physical deficits and behaviors that affect risk of falls    -  [place] fall precautions as indicated by assessment   - Educate patient/family on patient safety including physical limitations  - Instruct patient to call for assistance with activity based on assessment  - Modify environment to reduce risk of injury  - Consider OT/PT consult to assist with strengthening/mobility   Outcome: Progressing      Problem: Prexisting or High Potential for Compromised Skin Integrity  Goal: Skin integrity is maintained or improved  INTERVENTIONS:  - Identify patients at risk for skin breakdown  - Assess and monitor skin integrity  - Assess and monitor nutrition and hydration status  - Monitor labs (i e  albumin)  - Assess for incontinence   - Turn and reposition patient  - Assist with mobility/ambulation  - Relieve pressure over bony prominences  - Avoid friction and shearing  - Provide appropriate hygiene as needed including keeping skin clean and dry  - Evaluate need for skin moisturizer/barrier cream  - Collaborate with interdisciplinary team (i e  Nutrition, Rehabilitation, etc )   - Patient/family teaching   Outcome: Progressing      Problem: PAIN - ADULT  Goal: Verbalizes/displays adequate comfort level or baseline comfort level  Interventions:  - Encourage patient to monitor pain and request assistance  - Assess pain using appropriate pain scale  - Administer analgesics based on type and severity of pain and evaluate response  - Implement non-pharmacological measures as appropriate and evaluate response  - Consider cultural and social influences on pain and pain management  - Notify physician/advanced practitioner if interventions unsuccessful or patient reports new pain   Outcome: Progressing      Problem: INFECTION - ADULT  Goal: Absence or prevention of progression during hospitalization  INTERVENTIONS:  - Assess and monitor for signs and symptoms of infection  - Monitor lab/diagnostic results  - Monitor all insertion sites, i e  indwelling lines, tubes, and drains  - Monitor endotracheal (as able) and nasal secretions for changes in amount and color  - [place] appropriate cooling/warming therapies per order  - Administer medications as ordered  - Instruct and encourage patient and family to use good hand hygiene technique  - Identify and instruct in appropriate isolation precautions for identified infection/condition   Outcome: Progressing    Goal: Absence of fever/infection during neutropenic period  INTERVENTIONS:  - Monitor WBC  - Implement neutropenic guidelines   Outcome: Progressing      Problem: SAFETY ADULT  Goal: Maintain or return to baseline ADL function  INTERVENTIONS:  -  Assess patient's ability to carry out ADLs; assess patient's baseline for ADL function and identify physical deficits which impact ability to perform ADLs (bathing, care of mouth/teeth, toileting, grooming, dressing, etc )  - Assess/evaluate cause of self-care deficits   - Assess range of motion  - Assess patient's mobility; develop plan if impaired  - Assess patient's need for assistive devices and provide as appropriate  - Encourage maximum independence but intervene and supervise when necessary  ¯ Involve family in performance of ADLs  ¯ Assess for home care needs following discharge   ¯ Request OT consult to assist with ADL evaluation and planning for discharge  ¯ Provide patient education as appropriate   Outcome: Not Progressing    Goal: Maintain or return mobility status to optimal level  INTERVENTIONS:  - Assess patient's baseline mobility status (ambulation, transfers, stairs, etc )    - Identify cognitive and physical deficits and behaviors that affect mobility  - Identify mobility aids required to assist with transfers and/or ambulation (gait belt, sit-to-stand, lift, walker, cane, etc )  - [place] fall precautions as indicated by assessment  - Record patient progress and toleration of activity level on Mobility SBAR; progress patient to next Phase/Stage  - Instruct patient to call for assistance with activity based on assessment  - Request Rehabilitation consult to assist with strengthening/weightbearing, etc    Outcome: Not Progressing      Problem: DISCHARGE PLANNING  Goal: Discharge to home or other facility with appropriate resources  INTERVENTIONS:  - Identify barriers to discharge w/patient and caregiver  - Arrange for needed discharge resources and transportation as appropriate  - Identify discharge learning needs (meds, wound care, etc )  - Arrange for interpretive services to assist at discharge as needed  - Refer to Case Management Department for coordinating discharge planning if the patient needs post-hospital services based on physician/advanced practitioner order or complex needs related to functional status, cognitive ability, or social support system   Outcome: Progressing      Problem: Nutrition/Hydration-ADULT  Goal: Nutrient/Hydration intake appropriate for improving, restoring or maintaining nutritional needs  Monitor and assess patient's nutrition/hydration status for malnutrition (ex- brittle hair, bruises, dry skin, pale skin and conjunctiva, muscle wasting, smooth red tongue, and disorientation)  Collaborate with interdisciplinary team and initiate plan and interventions as ordered  Monitor patient's weight and dietary intake as ordered or per policy  Utilize nutrition screening tool and intervene per policy  Determine patient's food preferences and provide high-protein, high-caloric foods as appropriate       INTERVENTIONS:  - Monitor oral intake, urinary output, labs, and treatment plans  - Assess nutrition and hydration status and recommend course of action  - Evaluate amount of meals eaten  - Assist patient with eating if necessary   - Allow adequate time for meals  - Recommend/ encourage appropriate diets, oral nutritional supplements, and vitamin/mineral supplements  - Order, calculate, and assess calorie counts as needed  - Recommend, monitor, and adjust tube feedings and TPN/PPN based on assessed needs  - Assess need for intravenous fluids  - Provide specific nutrition/hydration education as appropriate  - Include patient/family/caregiver in decisions related to nutrition   Outcome: Progressing      Problem: DISCHARGE PLANNING - CARE MANAGEMENT  Goal: Discharge to post-acute care or home with appropriate resources  INTERVENTIONS:  - Conduct assessment to determine patient/family and health care team treatment goals, and need for post-acute services based on payer coverage, community resources, and patient preferences, and barriers to discharge  - Address psychosocial, clinical, and financial barriers to discharge as identified in assessment in conjunction with the patient/family and health care team  - Arrange appropriate level of post-acute services according to patient's   needs and preference and payer coverage in collaboration with the physician and health care team  - Communicate with and update the patient/family, physician, and health care team regarding progress on the discharge plan  - Arrange appropriate transportation to post-acute venues  -Patient to be evaluated  Patient anticipated for d/c to rehab      Outcome: Progressing

## 2017-11-22 LAB
APTT PPP: 79 SECONDS (ref 23–35)
APTT PPP: 85 SECONDS (ref 23–35)
BACTERIA BLD CULT: NORMAL
BACTERIA BLD CULT: NORMAL
HCT VFR BLD AUTO: 30.7 % (ref 36.5–49.3)
HGB BLD-MCNC: 10.1 G/DL (ref 12–17)
INR PPP: 1.16 (ref 0.86–1.16)
PROTHROMBIN TIME: 14.9 SECONDS (ref 12.1–14.4)

## 2017-11-22 PROCEDURE — 85014 HEMATOCRIT: CPT | Performed by: INTERNAL MEDICINE

## 2017-11-22 PROCEDURE — 85610 PROTHROMBIN TIME: CPT | Performed by: HOSPITALIST

## 2017-11-22 PROCEDURE — 85730 THROMBOPLASTIN TIME PARTIAL: CPT | Performed by: HOSPITALIST

## 2017-11-22 PROCEDURE — 85018 HEMOGLOBIN: CPT | Performed by: INTERNAL MEDICINE

## 2017-11-22 RX ADMIN — OXYCODONE HYDROCHLORIDE 10 MG: 10 TABLET ORAL at 06:32

## 2017-11-22 RX ADMIN — RISPERIDONE 2 MG: 1 TABLET ORAL at 17:15

## 2017-11-22 RX ADMIN — RISPERIDONE 4 MG: 1 TABLET ORAL at 17:14

## 2017-11-22 RX ADMIN — BENZTROPINE MESYLATE 1 MG: 1 TABLET ORAL at 08:22

## 2017-11-22 RX ADMIN — METHOCARBAMOL 500 MG: 500 TABLET ORAL at 05:04

## 2017-11-22 RX ADMIN — ACETAMINOPHEN 650 MG: 325 TABLET, FILM COATED ORAL at 14:53

## 2017-11-22 RX ADMIN — RISPERIDONE 4 MG: 1 TABLET ORAL at 08:20

## 2017-11-22 RX ADMIN — DOCUSATE SODIUM 100 MG: 100 CAPSULE, LIQUID FILLED ORAL at 08:18

## 2017-11-22 RX ADMIN — LEVOFLOXACIN 750 MG: 750 TABLET, FILM COATED ORAL at 14:51

## 2017-11-22 RX ADMIN — FENOFIBRATE 145 MG: 145 TABLET ORAL at 08:22

## 2017-11-22 RX ADMIN — HEPARIN SODIUM 29.04 UNITS/KG/HR: 10000 INJECTION, SOLUTION INTRAVENOUS at 08:21

## 2017-11-22 RX ADMIN — SENNOSIDES 8.6 MG: 8.6 TABLET, FILM COATED ORAL at 08:20

## 2017-11-22 RX ADMIN — OXYCODONE HYDROCHLORIDE 10 MG: 10 TABLET ORAL at 13:48

## 2017-11-22 RX ADMIN — CLONAZEPAM 0.5 MG: 0.5 TABLET ORAL at 17:14

## 2017-11-22 RX ADMIN — RISPERIDONE 2 MG: 1 TABLET ORAL at 08:20

## 2017-11-22 RX ADMIN — HEPARIN SODIUM 29 UNITS/KG/HR: 10000 INJECTION, SOLUTION INTRAVENOUS at 23:29

## 2017-11-22 RX ADMIN — DIVALPROEX SODIUM 500 MG: 500 TABLET, DELAYED RELEASE ORAL at 08:18

## 2017-11-22 RX ADMIN — DIVALPROEX SODIUM 500 MG: 500 TABLET, DELAYED RELEASE ORAL at 22:41

## 2017-11-22 RX ADMIN — PREGABALIN 75 MG: 75 CAPSULE ORAL at 08:20

## 2017-11-22 RX ADMIN — HEPARIN SODIUM 29.04 UNITS/KG/HR: 10000 INJECTION, SOLUTION INTRAVENOUS at 17:15

## 2017-11-22 RX ADMIN — HEPARIN SODIUM 29 UNITS/KG/HR: 10000 INJECTION, SOLUTION INTRAVENOUS at 00:17

## 2017-11-22 RX ADMIN — PREGABALIN 75 MG: 75 CAPSULE ORAL at 17:14

## 2017-11-22 RX ADMIN — DIVALPROEX SODIUM 1000 MG: 500 TABLET, DELAYED RELEASE ORAL at 11:23

## 2017-11-22 RX ADMIN — METHOCARBAMOL 500 MG: 500 TABLET ORAL at 11:23

## 2017-11-22 RX ADMIN — WARFARIN SODIUM 7 MG: 6 TABLET ORAL at 22:41

## 2017-11-22 RX ADMIN — OXYCODONE HYDROCHLORIDE 10 MG: 10 TABLET ORAL at 22:41

## 2017-11-22 RX ADMIN — LEVOTHYROXINE SODIUM 25 MCG: 25 TABLET ORAL at 05:04

## 2017-11-22 RX ADMIN — CLONAZEPAM 0.5 MG: 0.5 TABLET ORAL at 08:18

## 2017-11-22 RX ADMIN — METHOCARBAMOL 500 MG: 500 TABLET ORAL at 17:14

## 2017-11-22 RX ADMIN — BENZTROPINE MESYLATE 1 MG: 1 TABLET ORAL at 17:16

## 2017-11-22 NOTE — PROGRESS NOTES
Progress Note - Infectious Disease   Renetta Bradley 33 y o  male MRN: [id number]  Unit/Bed#: [facility] 905-01 Encounter: [id number]      Impression/Plan:  1   SIRS versus sepsis-POA:  Fever and tachycardia  Urinalysis and blood cultures negative  Surgical site appears clean and intact without infection  CT A/P negative for abscess; SQ air likely due to recently removed SAÚL drain  Likely noninfectious due to severe constipation, DVT, ? PE, drug fever or viral process  Clinically stable and nontoxic  The fever and tachycardia have resolved  Continue antibiotics as below  Follow up final blood cultures  Monitor temperature  Continue supportive care as per the primary service  Recommend remove PICC line as patient does not need ongoing intravenous antibiotic     2   History of Proteus sepsis due to postoperative wound infection-High risk IV antibiotic candidate due to #5, now with readmission with fever  Sensitive to FQ and baseline QTC normal   Unclear allergy to ciprofloxacin  Tolerating levofloxacin  His follow-up QTC is in the normal range  Continue levofloxacin through 12/13/2017  Check CBC with diff and creatinine 12/4/17  Monitor temperature  Supportive care as per the primary service     3   Severe constipation-Possibly due to narcotic pain medicine  Continue MiraLax per patient request  Monitor symptomatology     4   RLE subacute/chronic DVT-Does not appear complicated by pulmonary embolism based on CT findings   Could be the cause of the fever     Continue anticoagulation as per the primary service  Follow-up TTE as per the primary service     5   Autism/BPD    6  Disposition-patient possibly going to rehab  Okay to discharge from infectious disease standpoint  Follow up with me 12/7/2017 at 2:30 p m  Antibiotics:  Levaquin 5  Antibiotics 23    Subjective:  Patient has no fever, chills, sweats; no nausea, vomiting, diarrhea; no cough, shortness of breath; decreased back pain  No new symptoms    He is feeling better overall  He is concerned about the possibility of going to rehab  Objective:  Vitals:  HR:  [] 98  Resp:  [18-20] 20  BP: (114-133)/(64-83) 130/78  SpO2:  [95 %-98 %] 96 %  Temp (24hrs), Av 5 °F (36 9 °C), Min:97 7 °F (36 5 °C), Max:99 °F (37 2 °C)  Current: Temperature: 98 7 °F (37 1 °C)    Physical Exam:   General Appearance:  Alert, nontoxic, no acute distress  Throat: Oropharynx moist without lesions  Lungs:   Clear to auscultation anteriorly; respirations unlabored   Heart:  RRR; no murmur, rub or gallop   Abdomen:   Soft, non-tender, non-distended, positive bowel sounds  Back wound is dressed with a dry dressing in place  Extremities: No clubbing, cyanosis or edema  Right upper extremity PICC without erythema or drainage   Skin: No new rashes or lesions  No draining wounds noted  Labs, Imaging, & Other studies:   All pertinent labs and imaging studies were personally reviewed    Results from last 7 days  Lab Units 17  0459 17  0612 17  0511  17  1615 17  0443   WBC Thousand/uL  --   --  4 86  --  4 00* 6 99   HEMOGLOBIN g/dL 10 1* 10 0* 10 0*  < > 9 8* 9 0*   PLATELETS Thousands/uL  --   --  247  --  224 268   < > = values in this interval not displayed  Results from last 7 days  Lab Units 17  0511 17  0443 17  1638   SODIUM mmol/L 136 139 136   POTASSIUM mmol/L 3 8 3 6 3 6   CHLORIDE mmol/L 102 106 98*   CO2 mmol/L 27 27 26   ANION GAP mmol/L 7 6 12   BUN mg/dL 10 10 11   CREATININE mg/dL 0 56* 0 60 0 95   EGFR ml/min/1 73sq m 143 139 110   GLUCOSE RANDOM mg/dL 100 87 107   CALCIUM mg/dL 8 7 8 0* 9 6   AST U/L  --   --  23   ALT U/L  --   --  27   ALK PHOS U/L  --   --  42*   TOTAL PROTEIN g/dL  --   --  7 8   ALBUMIN g/dL  --   --  3 0*   BILIRUBIN TOTAL mg/dL  --   --  0 40       Results from last 7 days  Lab Units 17  6593   BLOOD CULTURE  No Growth After 4 Days  No Growth After 4 Days

## 2017-11-22 NOTE — PROGRESS NOTES
[facility] Internal Medicine Progress Note  Patient: Carlos Enrique Fitzgerald 33 y o  male   MRN: [id number]  PCP: Colleen Albert MD  Unit/Bed#: [facility] 905-01 Encounter: [id number]  Date Of Visit: 11/22/17    Assessment/Plan:  ·   Principal Problem:    SIRS - Recent Spinal Wound Infection with Proteus   Active Problems:    Hypothyroid    Bipolar disorder ([facility] [address] )    Autism    Morbid obesity due to excess calories ([facility] [address] )    Deep vein thrombosis (DVT) of tibial vein (HCC)    Constipation    Sinus tachycardia    Sepsis secondary to wound infection postop day 21 incision and drainage of spine  -still has fever; tachycardia with signs of infection persisting  -patient continues to be afraid of changing position  -as per Id:  Continue antimicrobial coverage, follow-up blood cultures; PICC line to be removed as there is no further necessity for IV antibiotics  -history of Proteus sepsis- continue Levaquin until 12/13/2017    Right lower extremity DVT  Heparin PTT therapeutic  Will start warfarin tonight  INR tomorrow    Hypothyroidism  Continue meds    Psychiatric disorder/autism/bipolar disorder  Is not agreeable with physical therapy at this point to encourage ambulation  Patient is fearful of back spasms developing  Will not comply with physical therapy  Continue psychotropic meds  Plan to communicate with parents to decide further plan of care; left a message for call back from mother    Anemia of chronic disease  Low H/H  Asymptomatic   No blood transfusions at this time    Constipation  -continue bowel regimen  PT OT evaluation is very necessary    VTE Pharmacologic Prophylaxis:   Pharmacologic: Heparin  Mechanical VTE Prophylaxis in Place: Yes    Patient Centered Rounds: I have performed bedside rounds with nursing staff today      Discussions with Specialists or Other Care Team Provider:  PT    Education and Discussions with Family / Patient:  Left a voice message for mother to return call  Current Length of Stay: 5 day(s)    Current Patient Status: Inpatient   Certification Statement: The patient will continue to require additional inpatient hospital stay due to medical management    Discharge Plan: rehab    Code Status: Level 1 - Full Code      Subjective:   No new complaints  Afraid to move in bed, changing positions  Objective:     Vitals:   Temp (24hrs), Av 5 °F (36 9 °C), Min:98 3 °F (36 8 °C), Max:98 7 °F (37 1 °C)    HR:  [] 115  Resp:  [18-20] 18  BP: (114-135)/(64-83) 116/69  SpO2:  [96 %-98 %] 98 %  Body mass index is 45 77 kg/m²  Input and Output Summary (last 24 hours): Intake/Output Summary (Last 24 hours) at 17 1849  Last data filed at 17 1421   Gross per 24 hour   Intake             1672 ml   Output             4000 ml   Net            -2328 ml       Physical Exam:  General Appearance: Morbidly obese, Alert, cooperative, no distress, appropriately responsive    Head:    Normocephalic, without obvious abnormality, atraumatic, mucous membranes moist    Eyes:    Conjunctival/corneas clear, EOM's intact   Neck:   Supple   Lungs:     Clear to auscultation bilaterally, respirations unlabored, no crackles or wheeze     Heart:    Regular rate and rhythm, S1 and S2 no murmur heard   Abdomen:     Soft, non-tender, bowel sounds active all four quadrants,     no masses, no organomegaly   Extremities:   Right lower leg edema present  DP 2+ present   Neurologic:  nonfocal         Additional Data:     Labs:      Results from last 7 days  Lab Units 17  0459  17  0511   WBC Thousand/uL  --   --  4 86   HEMOGLOBIN g/dL 10 1*  < > 10 0*   HEMATOCRIT % 30 7*  < > 30 0*   PLATELETS Thousands/uL  --   --  247   NEUTROS PCT %  --   --  68   LYMPHS PCT %  --   --  19   MONOS PCT %  --   --  11   EOS PCT %  --   --  1   < > = values in this interval not displayed      Results from last 7 days  Lab Units 17  0511  17  1638   SODIUM mmol/L 136  < > 136   POTASSIUM mmol/L 3 8 < > 3 6   CHLORIDE mmol/L 102  < > 98*   CO2 mmol/L 27  < > 26   BUN mg/dL 10  < > 11   CREATININE mg/dL 0 56*  < > 0 95   CALCIUM mg/dL 8 7  < > 9 6   TOTAL PROTEIN g/dL  --   --  7 8   BILIRUBIN TOTAL mg/dL  --   --  0 40   ALK PHOS U/L  --   --  42*   ALT U/L  --   --  27   AST U/L  --   --  23   GLUCOSE RANDOM mg/dL 100  < > 107   < > = values in this interval not displayed  Results from last 7 days  Lab Units 11/18/17  1614   INR  1 33*       * I Have Reviewed All Lab Data Listed Above  * Additional Pertinent Lab Tests Reviewed: [facility] Admission Reviewed    Cultures:   Blood Culture:   Lab Results   Component Value Date    BLOODCX No Growth After 4 Days  11/17/2017    BLOODCX No Growth After 4 Days  11/17/2017    BLOODCX No Growth After 5 Days  11/03/2017    BLOODCX No Growth After 5 Days  11/03/2017    BLOODCX No Growth After 5 Days   11/02/2017    BLOODCX Proteus mirabilis (A) 11/02/2017    BLOODCX Proteus mirabilis (A) 10/31/2017     Urine Culture:   Lab Results   Component Value Date    URINECX No Growth <1000 cfu/mL 11/03/2017     Sputum Culture: No components found for: SPUTUMCX  Wound Culture:   Lab Results   Component Value Date    WOUNDCULT 1+ Growth of Proteus mirabilis (A) 10/31/2017       Last 24 Hours Medication List:     benztropine 1 mg Oral BID   clonazePAM 0 5 mg Oral BID   divalproex sodium 1,000 mg Oral Daily   divalproex sodium 500 mg Oral Q12H [address]   docusate sodium 100 mg Oral BID   fenofibrate 145 mg Oral Daily   levofloxacin 750 mg Oral Q24H   levothyroxine 25 mcg Oral Once per day on Mon Tue Wed Thu Fri   levothyroxine 50 mcg Oral Once per day on Sun Sat   lidocaine 2 patch Transdermal Daily   polyethylene glycol 17 g Oral Daily   pregabalin 75 mg Oral BID   risperiDONE 2 mg Oral BID   risperiDONE 4 mg Oral BID   senna 1 tablet Oral Daily   warfarin 7 mg Oral Once HS        Today, Patient Was Seen By: Liudmila Scott MD    ** Please Note: Dragon 360 Dictation voice to text software may have been used in the creation of this document   **

## 2017-11-22 NOTE — OCCUPATIONAL THERAPY NOTE
OCCUPATIONAL THERAPY CANCEL NOTE:     ATTEMPTED TO SEE PT FOR OT TREATMENT SESSION, HOWEVER, PT REFUSING ANY ACTIVITY AT THIS TIME, STATING "I WILL ATTEMPT TO GET OUT OF BED ON THE 26TH  OR 27TH"  PT EDUCATED ON THE IMPORTANCE OF OOB ACTIVITY/PARTICIPATION IN THERAPY  CONT TO REFUSE  WILL CONTINUE TO FOLLOW      Elias Sampson, GRACIELA, OTR/L

## 2017-11-22 NOTE — PHYSICAL THERAPY NOTE
PT CANCEL    PT TREATMENT ATTEMPTED THIS DATE AND TIME  Pt REFUSING  WILL FOLLOW UP AS APPROPRIATE AND TIME ALLOWS       Humberto Torres PT

## 2017-11-23 LAB
APTT PPP: 86 SECONDS (ref 23–35)
HCT VFR BLD AUTO: 31.2 % (ref 36.5–49.3)
HGB BLD-MCNC: 10.1 G/DL (ref 12–17)
INR PPP: 1.16 (ref 0.86–1.16)
PROTHROMBIN TIME: 14.8 SECONDS (ref 12.1–14.4)

## 2017-11-23 PROCEDURE — 85018 HEMOGLOBIN: CPT | Performed by: INTERNAL MEDICINE

## 2017-11-23 PROCEDURE — 85014 HEMATOCRIT: CPT | Performed by: INTERNAL MEDICINE

## 2017-11-23 PROCEDURE — 85610 PROTHROMBIN TIME: CPT | Performed by: HOSPITALIST

## 2017-11-23 PROCEDURE — 85730 THROMBOPLASTIN TIME PARTIAL: CPT | Performed by: HOSPITALIST

## 2017-11-23 RX ADMIN — LEVOTHYROXINE SODIUM 25 MCG: 25 TABLET ORAL at 06:02

## 2017-11-23 RX ADMIN — LIDOCAINE 2 PATCH: 50 PATCH CUTANEOUS at 09:07

## 2017-11-23 RX ADMIN — DIVALPROEX SODIUM 500 MG: 500 TABLET, DELAYED RELEASE ORAL at 20:00

## 2017-11-23 RX ADMIN — RISPERIDONE 4 MG: 1 TABLET ORAL at 17:58

## 2017-11-23 RX ADMIN — METHOCARBAMOL 500 MG: 500 TABLET ORAL at 14:11

## 2017-11-23 RX ADMIN — PREGABALIN 75 MG: 75 CAPSULE ORAL at 18:01

## 2017-11-23 RX ADMIN — BENZTROPINE MESYLATE 1 MG: 1 TABLET ORAL at 18:01

## 2017-11-23 RX ADMIN — CLONAZEPAM 0.5 MG: 0.5 TABLET ORAL at 09:04

## 2017-11-23 RX ADMIN — LEVOFLOXACIN 750 MG: 750 TABLET, FILM COATED ORAL at 14:11

## 2017-11-23 RX ADMIN — FENOFIBRATE 145 MG: 145 TABLET ORAL at 09:06

## 2017-11-23 RX ADMIN — BENZTROPINE MESYLATE 1 MG: 1 TABLET ORAL at 09:06

## 2017-11-23 RX ADMIN — DIVALPROEX SODIUM 500 MG: 500 TABLET, DELAYED RELEASE ORAL at 09:04

## 2017-11-23 RX ADMIN — CLONAZEPAM 0.5 MG: 0.5 TABLET ORAL at 18:01

## 2017-11-23 RX ADMIN — HEPARIN SODIUM 29 UNITS/KG/HR: 10000 INJECTION, SOLUTION INTRAVENOUS at 19:48

## 2017-11-23 RX ADMIN — METHOCARBAMOL 500 MG: 500 TABLET ORAL at 23:11

## 2017-11-23 RX ADMIN — HEPARIN SODIUM 29 UNITS/KG/HR: 10000 INJECTION, SOLUTION INTRAVENOUS at 06:02

## 2017-11-23 RX ADMIN — OXYCODONE HYDROCHLORIDE 10 MG: 10 TABLET ORAL at 19:58

## 2017-11-23 RX ADMIN — DIVALPROEX SODIUM 1000 MG: 500 TABLET, DELAYED RELEASE ORAL at 12:29

## 2017-11-23 RX ADMIN — OXYCODONE HYDROCHLORIDE 10 MG: 10 TABLET ORAL at 12:30

## 2017-11-23 RX ADMIN — PREGABALIN 75 MG: 75 CAPSULE ORAL at 09:04

## 2017-11-23 RX ADMIN — RISPERIDONE 2 MG: 1 TABLET ORAL at 17:57

## 2017-11-23 RX ADMIN — RISPERIDONE 2 MG: 1 TABLET ORAL at 09:03

## 2017-11-23 RX ADMIN — HEPARIN SODIUM 29 UNITS/KG/HR: 10000 INJECTION, SOLUTION INTRAVENOUS at 12:43

## 2017-11-23 RX ADMIN — ACETAMINOPHEN 650 MG: 325 TABLET, FILM COATED ORAL at 15:39

## 2017-11-23 RX ADMIN — RISPERIDONE 4 MG: 1 TABLET ORAL at 09:04

## 2017-11-23 NOTE — PROGRESS NOTES
Rounded with night shift nurse Sundar Grief at change of shift  Patient resting comfortably with no complaints  Will continue to monitor

## 2017-11-23 NOTE — PROGRESS NOTES
Spoke with Dr Jaylene Ruiz with [facility], plan for today is continue with PICC line and heparin gtt, aware and saw open back wound, no new orders, continue to monitor pt, encourage pt to sit out in chair today

## 2017-11-23 NOTE — PROGRESS NOTES
Spoke to [facility] with [facility]  Okay to leave PICC in place due the heparin drip and frequent lab draws   Will address with day team tomorrow

## 2017-11-23 NOTE — PROGRESS NOTES
[facility] Internal Medicine Progress Note  Patient: Bernice Callahan 33 y o  male   MRN: [id number]  PCP: Vahid Tolliver MD  [facility] Encounter: [id number]  Date Of Visit: 11/23/17    Assessment/Plan:  ·   Principal Problem:    SIRS - Recent Spinal Wound Infection with Proteus   Active Problems:    Hypothyroid    Bipolar disorder ([facility] [address] )    Autism    Morbid obesity due to excess calories ([facility] [address] )    Deep vein thrombosis (DVT) of tibial vein (HCC)    Constipation    Sinus tachycardia     DVT R tibial vein  -continue heparin- bridging to warfarin  -no evidence of use of new oral anticoagulantion for patient due to morbid obesity  -will continue to give warfarin tonight   -repeat INR in morning  -PICC line to be removed once patient is therapeutic with INR    Sepsis secondary to wound infection s/p I/D  During hospitalization fevers, tachycardia, with proteus bacteremia- makes Sepsis secondary to proteus the likely diagnosis  During clinical exam  Right sided Area lateral to midline in lumbar area L3/L2,  observed today with nurse -2cm circumscribed opening, no active drainage, no erythema  -continue levofloxacin 12/13/2017-  -vital signs - patient T Max over the past 24h- 99 4F  -will continue to monitor clinically  -patient is medically stable to be discharged to SNF/REHAB  -Patient has not been compliant with physical therapy during hospitalization  -pain control    Continue current management :  Hypothyroidism-stable  Bipolar disorder-stable  Anemia chronic disease(microcytic /hypochromic)- H/H stable    VTE Pharmacologic Prophylaxis:   Pharmacologic: Heparin Drip  Mechanical VTE Prophylaxis in Place: Yes    Patient Centered Rounds: I have performed bedside rounds with nursing staff today  Education and Discussions with Family / Patient: Both    Current Length of Stay: 6 day(s)    Current Patient Status: Inpatient   Certification Statement: The patient will continue to require additional inpatient hospital stay due to medical management    Discharge Plan: SNF tomorrow    Code Status: Level 1 - Full Code      Subjective:   No new complaints  Agrees with physical exam today      Objective:     Vitals:   Temp (24hrs), Av 6 °F (37 °C), Min:97 9 °F (36 6 °C), Max:99 4 °F (37 4 °C)    HR:  [] 97  Resp:  [18] 18  BP: (116-154)/(69-76) 137/76  SpO2:  [97 %-98 %] 97 %  Body mass index is 45 77 kg/m²  Input and Output Summary (last 24 hours): Intake/Output Summary (Last 24 hours) at 17 1342  Last data filed at 17 1316   Gross per 24 hour   Intake          1438 98 ml   Output             3995 ml   Net         -2556 02 ml       Physical Exam:  General Appearance: Morbidly obese, Alert, cooperative, no distress, appropriately responsive, able to turn body today, agreeable with physical exam   Head:    Normocephalic, without obvious abnormality, atraumatic, mucous membranes moist    Eyes:    Conjunctiva pallor noted/corneas clear, EOM's intact   Neck:   Supple, no cervical LAD   Lungs:     Clear to auscultation bilaterally, respirations unlabored, no crackles or wheeze heard    Heart:    Regular rate and rhythm, S1 and S2 heard no murmurs   Abdomen:     Soft, non-tender, bowel sounds active all four quadrants,     no masses, no organomegaly; Lumbar I/D site clean-non-draining   Extremities:   Extremities normal, atraumatic, no cyanosis or edema   Neurologic:  nonfocal         Additional Data:     Labs:      Results from last 7 days  Lab Units 17  0553  17  0511   WBC Thousand/uL  --   --  4 86   HEMOGLOBIN g/dL 10 1*  < > 10 0*   HEMATOCRIT % 31 2*  < > 30 0*   PLATELETS Thousands/uL  --   --  247   NEUTROS PCT %  --   --  68   LYMPHS PCT %  --   --  19   MONOS PCT %  --   --  11   EOS PCT %  --   --  1   < > = values in this interval not displayed      Results from last 7 days  Lab Units 17  0511  17  1638   SODIUM mmol/L 136  < > 136   POTASSIUM mmol/L 3 8  < > 3 6   CHLORIDE mmol/L 102  < > 98*   CO2 mmol/L 27  < > 26   BUN mg/dL 10  < > 11   CREATININE mg/dL 0 56*  < > 0 95   CALCIUM mg/dL 8 7  < > 9 6   TOTAL PROTEIN g/dL  --   --  7 8   BILIRUBIN TOTAL mg/dL  --   --  0 40   ALK PHOS U/L  --   --  42*   ALT U/L  --   --  27   AST U/L  --   --  23   GLUCOSE RANDOM mg/dL 100  < > 107   < > = values in this interval not displayed  Results from last 7 days  Lab Units 11/23/17  0553   INR  1 16       * I Have Reviewed All Lab Data Listed Above  * Additional Pertinent Lab Tests Reviewed: [facility] Admission Reviewed    Cultures:   Blood Culture:   Lab Results   Component Value Date    BLOODCX No Growth After 5 Days  11/17/2017    BLOODCX No Growth After 5 Days  11/17/2017    BLOODCX No Growth After 5 Days  11/03/2017    BLOODCX No Growth After 5 Days  11/03/2017    BLOODCX No Growth After 5 Days   11/02/2017    BLOODCX Proteus mirabilis (A) 11/02/2017    BLOODCX Proteus mirabilis (A) 10/31/2017     Urine Culture:   Lab Results   Component Value Date    URINECX No Growth <1000 cfu/mL 11/03/2017     Sputum Culture: No components found for: SPUTUMCX  Wound Culture:   Lab Results   Component Value Date    WOUNDCULT 1+ Growth of Proteus mirabilis (A) 10/31/2017       Last 24 Hours Medication List:     benztropine 1 mg Oral BID   clonazePAM 0 5 mg Oral BID   divalproex sodium 1,000 mg Oral Daily   divalproex sodium 500 mg Oral Q12H [address]   docusate sodium 100 mg Oral BID   fenofibrate 145 mg Oral Daily   levofloxacin 750 mg Oral Q24H   levothyroxine 25 mcg Oral Once per day on Mon Tue Wed Thu Fri   levothyroxine 50 mcg Oral Once per day on Sun Sat   lidocaine 2 patch Transdermal Daily   polyethylene glycol 17 g Oral Daily   pregabalin 75 mg Oral BID   risperiDONE 2 mg Oral BID   risperiDONE 4 mg Oral BID   senna 1 tablet Oral Daily        Today, Patient Was Seen By: Liudmila Scott MD    ** Please Note: Dragon 360 Dictation voice to text software may have been used in the creation of this document   **

## 2017-11-23 NOTE — PLAN OF CARE
Problem: Potential for Falls  Goal: Patient will remain free of falls  INTERVENTIONS:  - Assess patient frequently for physical needs  -  Identify cognitive and physical deficits and behaviors that affect risk of falls    -  [place] fall precautions as indicated by assessment   - Educate patient/family on patient safety including physical limitations  - Instruct patient to call for assistance with activity based on assessment  - Modify environment to reduce risk of injury  - Consider OT/PT consult to assist with strengthening/mobility   Outcome: Progressing      Problem: Prexisting or High Potential for Compromised Skin Integrity  Goal: Skin integrity is maintained or improved  INTERVENTIONS:  - Identify patients at risk for skin breakdown  - Assess and monitor skin integrity  - Assess and monitor nutrition and hydration status  - Monitor labs (i e  albumin)  - Assess for incontinence   - Turn and reposition patient  - Assist with mobility/ambulation  - Relieve pressure over bony prominences  - Avoid friction and shearing  - Provide appropriate hygiene as needed including keeping skin clean and dry  - Evaluate need for skin moisturizer/barrier cream  - Collaborate with interdisciplinary team (i e  Nutrition, Rehabilitation, etc )   - Patient/family teaching   Outcome: Progressing      Problem: PAIN - ADULT  Goal: Verbalizes/displays adequate comfort level or baseline comfort level  Interventions:  - Encourage patient to monitor pain and request assistance  - Assess pain using appropriate pain scale  - Administer analgesics based on type and severity of pain and evaluate response  - Implement non-pharmacological measures as appropriate and evaluate response  - Consider cultural and social influences on pain and pain management  - Notify physician/advanced practitioner if interventions unsuccessful or patient reports new pain   Outcome: Progressing      Problem: INFECTION - ADULT  Goal: Absence or prevention of progression during hospitalization  INTERVENTIONS:  - Assess and monitor for signs and symptoms of infection  - Monitor lab/diagnostic results  - Monitor all insertion sites, i e  indwelling lines, tubes, and drains  - Monitor endotracheal (as able) and nasal secretions for changes in amount and color  - [place] appropriate cooling/warming therapies per order  - Administer medications as ordered  - Instruct and encourage patient and family to use good hand hygiene technique  - Identify and instruct in appropriate isolation precautions for identified infection/condition   Outcome: Progressing    Goal: Absence of fever/infection during neutropenic period  INTERVENTIONS:  - Monitor WBC  - Implement neutropenic guidelines   Outcome: Progressing      Problem: SAFETY ADULT  Goal: Maintain or return to baseline ADL function  INTERVENTIONS:  -  Assess patient's ability to carry out ADLs; assess patient's baseline for ADL function and identify physical deficits which impact ability to perform ADLs (bathing, care of mouth/teeth, toileting, grooming, dressing, etc )  - Assess/evaluate cause of self-care deficits   - Assess range of motion  - Assess patient's mobility; develop plan if impaired  - Assess patient's need for assistive devices and provide as appropriate  - Encourage maximum independence but intervene and supervise when necessary  ¯ Involve family in performance of ADLs  ¯ Assess for home care needs following discharge   ¯ Request OT consult to assist with ADL evaluation and planning for discharge  ¯ Provide patient education as appropriate   Outcome: Progressing    Goal: Maintain or return mobility status to optimal level  INTERVENTIONS:  - Assess patient's baseline mobility status (ambulation, transfers, stairs, etc )    - Identify cognitive and physical deficits and behaviors that affect mobility  - Identify mobility aids required to assist with transfers and/or ambulation (gait belt, sit-to-stand, lift, walker, cane, etc )  - [place] fall precautions as indicated by assessment  - Record patient progress and toleration of activity level on Mobility SBAR; progress patient to next Phase/Stage  - Instruct patient to call for assistance with activity based on assessment  - Request Rehabilitation consult to assist with strengthening/weightbearing, etc    Outcome: Progressing      Problem: DISCHARGE PLANNING  Goal: Discharge to home or other facility with appropriate resources  INTERVENTIONS:  - Identify barriers to discharge w/patient and caregiver  - Arrange for needed discharge resources and transportation as appropriate  - Identify discharge learning needs (meds, wound care, etc )  - Arrange for interpretive services to assist at discharge as needed  - Refer to Case Management Department for coordinating discharge planning if the patient needs post-hospital services based on physician/advanced practitioner order or complex needs related to functional status, cognitive ability, or social support system   Outcome: Progressing      Problem: Nutrition/Hydration-ADULT  Goal: Nutrient/Hydration intake appropriate for improving, restoring or maintaining nutritional needs  Monitor and assess patient's nutrition/hydration status for malnutrition (ex- brittle hair, bruises, dry skin, pale skin and conjunctiva, muscle wasting, smooth red tongue, and disorientation)  Collaborate with interdisciplinary team and initiate plan and interventions as ordered  Monitor patient's weight and dietary intake as ordered or per policy  Utilize nutrition screening tool and intervene per policy  Determine patient's food preferences and provide high-protein, high-caloric foods as appropriate       INTERVENTIONS:  - Monitor oral intake, urinary output, labs, and treatment plans  - Assess nutrition and hydration status and recommend course of action  - Evaluate amount of meals eaten  - Assist patient with eating if necessary   - Allow adequate time for meals  - Recommend/ encourage appropriate diets, oral nutritional supplements, and vitamin/mineral supplements  - Order, calculate, and assess calorie counts as needed  - Recommend, monitor, and adjust tube feedings and TPN/PPN based on assessed needs  - Assess need for intravenous fluids  - Provide specific nutrition/hydration education as appropriate  - Include patient/family/caregiver in decisions related to nutrition   Outcome: Progressing      Problem: DISCHARGE PLANNING - CARE MANAGEMENT  Goal: Discharge to post-acute care or home with appropriate resources  INTERVENTIONS:  - Conduct assessment to determine patient/family and health care team treatment goals, and need for post-acute services based on payer coverage, community resources, and patient preferences, and barriers to discharge  - Address psychosocial, clinical, and financial barriers to discharge as identified in assessment in conjunction with the patient/family and health care team  - Arrange appropriate level of post-acute services according to patient's   needs and preference and payer coverage in collaboration with the physician and health care team  - Communicate with and update the patient/family, physician, and health care team regarding progress on the discharge plan  - Arrange appropriate transportation to post-acute venues  -Patient to be evaluated  Patient anticipated for d/c to rehab      Outcome: Progressing

## 2017-11-24 LAB
APTT PPP: 112 SECONDS (ref 23–35)
APTT PPP: 69 SECONDS (ref 23–35)
APTT PPP: 94 SECONDS (ref 23–35)

## 2017-11-24 PROCEDURE — 85730 THROMBOPLASTIN TIME PARTIAL: CPT | Performed by: HOSPITALIST

## 2017-11-24 RX ORDER — WARFARIN SODIUM 5 MG/1
10 TABLET ORAL
Status: DISCONTINUED | OUTPATIENT
Start: 2017-11-24 | End: 2017-11-25

## 2017-11-24 RX ADMIN — PREGABALIN 75 MG: 75 CAPSULE ORAL at 09:48

## 2017-11-24 RX ADMIN — DIVALPROEX SODIUM 500 MG: 500 TABLET, DELAYED RELEASE ORAL at 20:44

## 2017-11-24 RX ADMIN — HEPARIN SODIUM 26 UNITS/KG/HR: 10000 INJECTION, SOLUTION INTRAVENOUS at 07:49

## 2017-11-24 RX ADMIN — LEVOTHYROXINE SODIUM 25 MCG: 25 TABLET ORAL at 05:32

## 2017-11-24 RX ADMIN — HEPARIN SODIUM 24 UNITS/KG/HR: 10000 INJECTION, SOLUTION INTRAVENOUS at 21:40

## 2017-11-24 RX ADMIN — RISPERIDONE 2 MG: 1 TABLET ORAL at 18:59

## 2017-11-24 RX ADMIN — LEVOFLOXACIN 750 MG: 750 TABLET, FILM COATED ORAL at 14:20

## 2017-11-24 RX ADMIN — DIVALPROEX SODIUM 1000 MG: 500 TABLET, DELAYED RELEASE ORAL at 14:20

## 2017-11-24 RX ADMIN — HEPARIN SODIUM 29 UNITS/KG/HR: 10000 INJECTION, SOLUTION INTRAVENOUS at 03:41

## 2017-11-24 RX ADMIN — RISPERIDONE 4 MG: 1 TABLET ORAL at 18:59

## 2017-11-24 RX ADMIN — RISPERIDONE 4 MG: 1 TABLET ORAL at 09:52

## 2017-11-24 RX ADMIN — FENOFIBRATE 145 MG: 145 TABLET ORAL at 09:51

## 2017-11-24 RX ADMIN — METHOCARBAMOL 500 MG: 500 TABLET ORAL at 19:30

## 2017-11-24 RX ADMIN — RISPERIDONE 2 MG: 1 TABLET ORAL at 09:49

## 2017-11-24 RX ADMIN — BENZTROPINE MESYLATE 1 MG: 1 TABLET ORAL at 19:00

## 2017-11-24 RX ADMIN — DIVALPROEX SODIUM 500 MG: 500 TABLET, DELAYED RELEASE ORAL at 09:49

## 2017-11-24 RX ADMIN — LIDOCAINE 2 PATCH: 50 PATCH CUTANEOUS at 09:49

## 2017-11-24 RX ADMIN — WARFARIN SODIUM 10 MG: 5 TABLET ORAL at 18:58

## 2017-11-24 RX ADMIN — OXYCODONE HYDROCHLORIDE 5 MG: 5 TABLET ORAL at 20:45

## 2017-11-24 RX ADMIN — CLONAZEPAM 0.5 MG: 0.5 TABLET ORAL at 18:58

## 2017-11-24 RX ADMIN — SENNOSIDES 8.6 MG: 8.6 TABLET, FILM COATED ORAL at 09:48

## 2017-11-24 RX ADMIN — PREGABALIN 75 MG: 75 CAPSULE ORAL at 18:58

## 2017-11-24 RX ADMIN — BENZTROPINE MESYLATE 1 MG: 1 TABLET ORAL at 09:48

## 2017-11-24 RX ADMIN — CLONAZEPAM 0.5 MG: 0.5 TABLET ORAL at 09:48

## 2017-11-24 NOTE — PHYSICAL THERAPY NOTE
PT Discharge Note  PT attempted, pt cont to decline - multiple reasons, incl LE weakness, needing to take phone call, agreeable to get out only via wc- PT attempted to accommodate pt request for a wc, whereupon pt declined and deferred PT for tomorrow; Pt has been seen by PT last on 11/21/17, discussed w CM/KS; pt is not appropriate for acute skilled PT 2* limited motivation and participation; rec nsg intervention to prevent disuse atrophy; will d/c PT at this time; pls reconsultv when pt is able and willing to participate in skilled PT

## 2017-11-24 NOTE — PROGRESS NOTES
Progress Note - Infectious Disease   [facility] Kiko Bradley 33 y o  male MRN: [id number]  [facility] Encounter: [id number]      Impression/Plan:  1   SIRS versus sepsis-POA:  Fever and tachycardia  Urinalysis and blood cultures negative  Surgical site appears clean and intact without infection  CT A/P negative for abscess; SQ air likely due to recently removed SAÚL drain  Likely noninfectious due to severe constipation, DVT, ? PE, drug fever or viral process  Clinically stable and nontoxic  The fever and tachycardia have resolved  The patient was not bacteremic  Continue antibiotics as below  Monitor temperature  Continue supportive care as per the primary service  Recommend remove PICC line as soon is no longer needed for anticoagulation       2   History of Proteus sepsis due to postoperative wound infection-High risk IV antibiotic candidate due to #5, now with readmission with fever  Sensitive to FQ and baseline QTC normal   Unclear allergy to ciprofloxacin  Tolerating levofloxacin  His follow-up QTC is in the normal range  Continue levofloxacin through 12/13/2017  Check CBC with diff and creatinine 12/4/17  Monitor temperature  Supportive care as per the primary service     3   Severe constipation-Possibly due to narcotic pain medicine  Improved and now having bowel movements  Continue MiraLax per patient request  Monitor symptomatology     4   RLE subacute/chronic DVT-Does not appear complicated by pulmonary embolism based on CT findings   Could be the cause of the fever   Slow to become therapeutic  Continue anticoagulation as per the primary service     5   Autism/BPD     6  Disposition-patient possibly going to rehab  Okay to discharge from infectious disease standpoint  Follow up with me 12/7/2017 at 2:30 p m  Will see the patient again 11/27/2017 if not discharged  Please call if questions      Antibiotics:  Levaquin 7  Antibiotics 25    Subjective:  Patient has no fever, chills, sweats; no nausea, vomiting, diarrhea; no cough, shortness of breath; no increased pain  No new symptoms  He seems in better spirits  Objective:  Vitals:  HR:  [] 99  Resp:  [18-20] 20  BP: (121-133)/(72-84) 133/78  SpO2:  [97 %-98 %] 97 %  Temp (24hrs), Av 7 °F (37 1 °C), Min:98 2 °F (36 8 °C), Max:99 6 °F (37 6 °C)  Current: Temperature: 98 2 °F (36 8 °C)    Physical Exam:   General Appearance:  Alert, nontoxic, no acute distress  Throat: Oropharynx moist without lesions  Lungs:   Clear to auscultation anteriorly; respirations unlabored   Heart:  RRR; no murmur, rub or gallop   Abdomen:   Soft, non-tender, non-distended, positive bowel sounds  Extremities: No clubbing, cyanosis or edema   Skin: No new rashes or lesions  No draining wounds noted  Labs, Imaging, & Other studies:   All pertinent labs and imaging studies were personally reviewed    Results from last 7 days  Lab Units 17  0553 17  0459 17  0612 17  0511  17  1615 17  0443   WBC Thousand/uL  --   --   --  4 86  --  4 00* 6 99   HEMOGLOBIN g/dL 10 1* 10 1* 10 0* 10 0*  < > 9 8* 9 0*   PLATELETS Thousands/uL  --   --   --  247  --  224 268   < > = values in this interval not displayed  Results from last 7 days  Lab Units 17  0511 17  0443 17  1638   SODIUM mmol/L 136 139 136   POTASSIUM mmol/L 3 8 3 6 3 6   CHLORIDE mmol/L 102 106 98*   CO2 mmol/L 27 27 26   ANION GAP mmol/L 7 6 12   BUN mg/dL 10 10 11   CREATININE mg/dL 0 56* 0 60 0 95   EGFR ml/min/1 73sq m 143 139 110   GLUCOSE RANDOM mg/dL 100 87 107   CALCIUM mg/dL 8 7 8 0* 9 6   AST U/L  --   --  23   ALT U/L  --   --  27   ALK PHOS U/L  --   --  42*   TOTAL PROTEIN g/dL  --   --  7 8   ALBUMIN g/dL  --   --  3 0*   BILIRUBIN TOTAL mg/dL  --   --  0 40       Results from last 7 days  Lab Units 17  1631   BLOOD CULTURE  No Growth After 5 Days  No Growth After 5 Days

## 2017-11-24 NOTE — PROGRESS NOTES
[facility] Progress Note  Patient: Codey Neff 33 y o  male   MRN: [id number]  PCP: Aguilar Nolan MD  Unit/Bed#: [facility] 905-01 Encounter: [id number]  Date Of Visit: 11/24/17    Assessment/Plan:  ·   Principal Problem:    SIRS - Recent Spinal Wound Infection with Proteus   Active Problems:    Hypothyroid    Bipolar disorder ([facility] [address] )    Autism    Morbid obesity due to excess calories ([facility] [address] )    Deep vein thrombosis (DVT) of tibial vein (HCC)    Constipation    Sinus tachycardia    Sepsis secondary to Proteus infection  Abscess opening on the right lumbar area seen and examined today clean nondraining  Temperature control 98 2, vital signs unremarkable  Continue Levaquin until 12/13/2017  Remove PICC once warfarin therapeutic    DVT  Continue heparin bridge until INR is therapeutic for warfarin  Daily INR  Warfarin 10 mg q h s  will titrate down dose once therapeutic INR is obtained    Physical deconditioning due to noncompliance of physical therapy  Spoke with patient extensively as well as mother about the need to comply with physical therapy inpatient  Patient continues to refuse physical therapy    Psychiatric disorder-stable      VTE Pharmacologic Prophylaxis:   Pharmacologic: Heparin Drip  Mechanical VTE Prophylaxis in Place: Yes    Patient Centered Rounds: I have performed bedside rounds with nursing staff today  Discussions with Specialists or Other Care Team Provider:  Yes    Education and Discussions with Family / Patient:  Patient    Current Length of Stay: 7 day(s)    Current Patient Status: Inpatient   Certification Statement: The patient will continue to require additional inpatient hospital stay due to Medical management    Discharge Plan:  Rehab    Code Status: Level 1 - Full Code      Subjective:   No new complaints  Patient believes he is paralyzed  Patient is on willing to participate in physical therapy with fear of back spasms worsening pain    Review of systems negative otherwise    Objective:     Vitals:   Temp (24hrs), Av 7 °F (37 1 °C), Min:98 2 °F (36 8 °C), Max:99 6 °F (37 6 °C)    HR:  [] 99  Resp:  [18-20] 20  BP: (121-133)/(72-84) 133/78  SpO2:  [97 %-98 %] 97 %  Body mass index is 45 77 kg/m²  Input and Output Summary (last 24 hours): Intake/Output Summary (Last 24 hours) at 17 1148  Last data filed at 17 0923   Gross per 24 hour   Intake          3341 26 ml   Output             4550 ml   Net         -1208 74 ml       Physical Exam:  General Appearance: Morbidly obese, Alert, cooperative, no distress, appropriately responsive    Head:    Normocephalic, without obvious abnormality, atraumatic, mucous membranes moist    Eyes:    Conjunctiva/corneas clear, EOM's intact   Neck:   Supple   Lungs:     Clear to auscultation bilaterally, respirations unlabored, no crackles or wheeze     Heart:    Regular rate and rhythm, S1 and S2 no murmurs heard   Abdomen:     Soft, non-tender, bowel sounds active all four quadrants,     no masses, no organomegaly   Extremities:   Extremities normal, atraumatic, no cyanosis, 1+ edema right leg/ ankle   Neurologic:  nonfocal, able to move toes range of motion intact of all extremities  Reflexes intact         Additional Data:     Labs:      Results from last 7 days  Lab Units 17  0553  17  0511   WBC Thousand/uL  --   --  4 86   HEMOGLOBIN g/dL 10 1*  < > 10 0*   HEMATOCRIT % 31 2*  < > 30 0*   PLATELETS Thousands/uL  --   --  247   NEUTROS PCT %  --   --  68   LYMPHS PCT %  --   --  19   MONOS PCT %  --   --  11   EOS PCT %  --   --  1   < > = values in this interval not displayed      Results from last 7 days  Lab Units 17  0511  17  1638   SODIUM mmol/L 136  < > 136   POTASSIUM mmol/L 3 8  < > 3 6   CHLORIDE mmol/L 102  < > 98*   CO2 mmol/L 27  < > 26   BUN mg/dL 10  < > 11   CREATININE mg/dL 0 56*  < > 0 95   CALCIUM mg/dL 8 7  < > 9 6   TOTAL PROTEIN g/dL  --   --  7 8 BILIRUBIN TOTAL mg/dL  --   --  0 40   ALK PHOS U/L  --   --  42*   ALT U/L  --   --  27   AST U/L  --   --  23   GLUCOSE RANDOM mg/dL 100  < > 107   < > = values in this interval not displayed  Results from last 7 days  Lab Units 11/23/17  0553   INR  1 16       * I Have Reviewed All Lab Data Listed Above  * Additional Pertinent Lab Tests Reviewed: [facility] Admission Reviewed    Cultures:   Blood Culture:   Lab Results   Component Value Date    BLOODCX No Growth After 5 Days  11/17/2017    BLOODCX No Growth After 5 Days  11/17/2017    BLOODCX No Growth After 5 Days  11/03/2017    BLOODCX No Growth After 5 Days  11/03/2017    BLOODCX No Growth After 5 Days  11/02/2017    BLOODCX Proteus mirabilis (A) 11/02/2017    BLOODCX Proteus mirabilis (A) 10/31/2017     Urine Culture:   Lab Results   Component Value Date    URINECX No Growth <1000 cfu/mL 11/03/2017     Sputum Culture: No components found for: SPUTUMCX  Wound Culture:   Lab Results   Component Value Date    WOUNDCULT 1+ Growth of Proteus mirabilis (A) 10/31/2017       Last 24 Hours Medication List:     benztropine 1 mg Oral BID   clonazePAM 0 5 mg Oral BID   divalproex sodium 1,000 mg Oral Daily   divalproex sodium 500 mg Oral Q12H [address]   docusate sodium 100 mg Oral BID   fenofibrate 145 mg Oral Daily   levofloxacin 750 mg Oral Q24H   levothyroxine 25 mcg Oral Once per day on Mon Tue Wed Thu Fri   levothyroxine 50 mcg Oral Once per day on Sun Sat   lidocaine 2 patch Transdermal Daily   polyethylene glycol 17 g Oral Daily   pregabalin 75 mg Oral BID   risperiDONE 2 mg Oral BID   risperiDONE 4 mg Oral BID   senna 1 tablet Oral Daily   warfarin 10 mg Oral Daily (warfarin)        Today, Patient Was Seen By: Janel Brambila MD    ** Please Note: Dragon 360 Dictation voice to text software may have been used in the creation of this document   **

## 2017-11-25 LAB
APTT PPP: 58 SECONDS (ref 23–35)
APTT PPP: 68 SECONDS (ref 23–35)
APTT PPP: 81 SECONDS (ref 23–35)
INR PPP: 1.15 (ref 0.86–1.16)
PROTHROMBIN TIME: 14.7 SECONDS (ref 12.1–14.4)

## 2017-11-25 PROCEDURE — 85730 THROMBOPLASTIN TIME PARTIAL: CPT | Performed by: HOSPITALIST

## 2017-11-25 PROCEDURE — 85610 PROTHROMBIN TIME: CPT | Performed by: HOSPITALIST

## 2017-11-25 RX ORDER — WARFARIN SODIUM 5 MG/1
10 TABLET ORAL
Status: DISCONTINUED | OUTPATIENT
Start: 2017-11-25 | End: 2017-11-25 | Stop reason: SDUPTHER

## 2017-11-25 RX ORDER — WARFARIN SODIUM 5 MG/1
10 TABLET ORAL
Status: COMPLETED | OUTPATIENT
Start: 2017-11-25 | End: 2017-11-25

## 2017-11-25 RX ADMIN — RISPERIDONE 4 MG: 1 TABLET ORAL at 17:41

## 2017-11-25 RX ADMIN — RISPERIDONE 2 MG: 1 TABLET ORAL at 17:41

## 2017-11-25 RX ADMIN — LEVOFLOXACIN 750 MG: 750 TABLET, FILM COATED ORAL at 14:19

## 2017-11-25 RX ADMIN — HEPARIN SODIUM 26 UNITS/KG/HR: 10000 INJECTION, SOLUTION INTRAVENOUS at 07:14

## 2017-11-25 RX ADMIN — FENOFIBRATE 145 MG: 145 TABLET ORAL at 08:57

## 2017-11-25 RX ADMIN — LEVOTHYROXINE SODIUM 50 MCG: 50 TABLET ORAL at 05:12

## 2017-11-25 RX ADMIN — RISPERIDONE 2 MG: 1 TABLET ORAL at 08:58

## 2017-11-25 RX ADMIN — PREGABALIN 75 MG: 75 CAPSULE ORAL at 08:56

## 2017-11-25 RX ADMIN — DIVALPROEX SODIUM 1000 MG: 500 TABLET, DELAYED RELEASE ORAL at 12:11

## 2017-11-25 RX ADMIN — WARFARIN SODIUM 10 MG: 5 TABLET ORAL at 20:04

## 2017-11-25 RX ADMIN — METHOCARBAMOL 500 MG: 500 TABLET ORAL at 15:49

## 2017-11-25 RX ADMIN — PREGABALIN 75 MG: 75 CAPSULE ORAL at 17:40

## 2017-11-25 RX ADMIN — DIVALPROEX SODIUM 500 MG: 500 TABLET, DELAYED RELEASE ORAL at 08:57

## 2017-11-25 RX ADMIN — LIDOCAINE 2 PATCH: 50 PATCH CUTANEOUS at 08:58

## 2017-11-25 RX ADMIN — HEPARIN SODIUM 26 UNITS/KG/HR: 10000 INJECTION, SOLUTION INTRAVENOUS at 15:55

## 2017-11-25 RX ADMIN — BENZTROPINE MESYLATE 1 MG: 1 TABLET ORAL at 08:57

## 2017-11-25 RX ADMIN — CLONAZEPAM 0.5 MG: 0.5 TABLET ORAL at 08:56

## 2017-11-25 RX ADMIN — CLONAZEPAM 0.5 MG: 0.5 TABLET ORAL at 17:40

## 2017-11-25 RX ADMIN — BENZTROPINE MESYLATE 1 MG: 1 TABLET ORAL at 17:40

## 2017-11-25 RX ADMIN — DIVALPROEX SODIUM 500 MG: 500 TABLET, DELAYED RELEASE ORAL at 21:28

## 2017-11-25 RX ADMIN — RISPERIDONE 4 MG: 1 TABLET ORAL at 09:00

## 2017-11-25 NOTE — PROGRESS NOTES
Spoke with Dr Soraida Hillman with [facility], plan for today is continue with PICC line and heparin gtt, continue to monitor pt

## 2017-11-25 NOTE — PLAN OF CARE
Problem: Potential for Falls  Goal: Patient will remain free of falls  INTERVENTIONS:  - Assess patient frequently for physical needs  -  Identify cognitive and physical deficits and behaviors that affect risk of falls    -  [place] fall precautions as indicated by assessment   - Educate patient/family on patient safety including physical limitations  - Instruct patient to call for assistance with activity based on assessment  - Modify environment to reduce risk of injury  - Consider OT/PT consult to assist with strengthening/mobility   Outcome: Progressing      Problem: Prexisting or High Potential for Compromised Skin Integrity  Goal: Skin integrity is maintained or improved  INTERVENTIONS:  - Identify patients at risk for skin breakdown  - Assess and monitor skin integrity  - Assess and monitor nutrition and hydration status  - Monitor labs (i e  albumin)  - Assess for incontinence   - Turn and reposition patient  - Assist with mobility/ambulation  - Relieve pressure over bony prominences  - Avoid friction and shearing  - Provide appropriate hygiene as needed including keeping skin clean and dry  - Evaluate need for skin moisturizer/barrier cream  - Collaborate with interdisciplinary team (i e  Nutrition, Rehabilitation, etc )   - Patient/family teaching   Outcome: Progressing      Problem: PAIN - ADULT  Goal: Verbalizes/displays adequate comfort level or baseline comfort level  Interventions:  - Encourage patient to monitor pain and request assistance  - Assess pain using appropriate pain scale  - Administer analgesics based on type and severity of pain and evaluate response  - Implement non-pharmacological measures as appropriate and evaluate response  - Consider cultural and social influences on pain and pain management  - Notify physician/advanced practitioner if interventions unsuccessful or patient reports new pain   Outcome: Progressing      Problem: INFECTION - ADULT  Goal: Absence or prevention of progression during hospitalization  INTERVENTIONS:  - Assess and monitor for signs and symptoms of infection  - Monitor lab/diagnostic results  - Monitor all insertion sites, i e  indwelling lines, tubes, and drains  - Monitor endotracheal (as able) and nasal secretions for changes in amount and color  - [place] appropriate cooling/warming therapies per order  - Administer medications as ordered  - Instruct and encourage patient and family to use good hand hygiene technique  - Identify and instruct in appropriate isolation precautions for identified infection/condition   Outcome: Progressing    Goal: Absence of fever/infection during neutropenic period  INTERVENTIONS:  - Monitor WBC  - Implement neutropenic guidelines   Outcome: Progressing      Problem: SAFETY ADULT  Goal: Maintain or return to baseline ADL function  INTERVENTIONS:  -  Assess patient's ability to carry out ADLs; assess patient's baseline for ADL function and identify physical deficits which impact ability to perform ADLs (bathing, care of mouth/teeth, toileting, grooming, dressing, etc )  - Assess/evaluate cause of self-care deficits   - Assess range of motion  - Assess patient's mobility; develop plan if impaired  - Assess patient's need for assistive devices and provide as appropriate  - Encourage maximum independence but intervene and supervise when necessary  ¯ Involve family in performance of ADLs  ¯ Assess for home care needs following discharge   ¯ Request OT consult to assist with ADL evaluation and planning for discharge  ¯ Provide patient education as appropriate   Outcome: Progressing    Goal: Maintain or return mobility status to optimal level  INTERVENTIONS:  - Assess patient's baseline mobility status (ambulation, transfers, stairs, etc )    - Identify cognitive and physical deficits and behaviors that affect mobility  - Identify mobility aids required to assist with transfers and/or ambulation (gait belt, sit-to-stand, lift, walker, cane, etc )  - [place] fall precautions as indicated by assessment  - Record patient progress and toleration of activity level on Mobility SBAR; progress patient to next Phase/Stage  - Instruct patient to call for assistance with activity based on assessment  - Request Rehabilitation consult to assist with strengthening/weightbearing, etc    Outcome: Progressing      Problem: DISCHARGE PLANNING  Goal: Discharge to home or other facility with appropriate resources  INTERVENTIONS:  - Identify barriers to discharge w/patient and caregiver  - Arrange for needed discharge resources and transportation as appropriate  - Identify discharge learning needs (meds, wound care, etc )  - Arrange for interpretive services to assist at discharge as needed  - Refer to Case Management Department for coordinating discharge planning if the patient needs post-hospital services based on physician/advanced practitioner order or complex needs related to functional status, cognitive ability, or social support system   Outcome: Progressing      Problem: Nutrition/Hydration-ADULT  Goal: Nutrient/Hydration intake appropriate for improving, restoring or maintaining nutritional needs  Monitor and assess patient's nutrition/hydration status for malnutrition (ex- brittle hair, bruises, dry skin, pale skin and conjunctiva, muscle wasting, smooth red tongue, and disorientation)  Collaborate with interdisciplinary team and initiate plan and interventions as ordered  Monitor patient's weight and dietary intake as ordered or per policy  Utilize nutrition screening tool and intervene per policy  Determine patient's food preferences and provide high-protein, high-caloric foods as appropriate       INTERVENTIONS:  - Monitor oral intake, urinary output, labs, and treatment plans  - Assess nutrition and hydration status and recommend course of action  - Evaluate amount of meals eaten  - Assist patient with eating if necessary   - Allow adequate time for meals  - Recommend/ encourage appropriate diets, oral nutritional supplements, and vitamin/mineral supplements  - Order, calculate, and assess calorie counts as needed  - Recommend, monitor, and adjust tube feedings and TPN/PPN based on assessed needs  - Assess need for intravenous fluids  - Provide specific nutrition/hydration education as appropriate  - Include patient/family/caregiver in decisions related to nutrition   Outcome: Progressing      Problem: DISCHARGE PLANNING - CARE MANAGEMENT  Goal: Discharge to post-acute care or home with appropriate resources  INTERVENTIONS:  - Conduct assessment to determine patient/family and health care team treatment goals, and need for post-acute services based on payer coverage, community resources, and patient preferences, and barriers to discharge  - Address psychosocial, clinical, and financial barriers to discharge as identified in assessment in conjunction with the patient/family and health care team  - Arrange appropriate level of post-acute services according to patient's   needs and preference and payer coverage in collaboration with the physician and health care team  - Communicate with and update the patient/family, physician, and health care team regarding progress on the discharge plan  - Arrange appropriate transportation to post-acute venues  -Patient to be evaluated  Patient anticipated for d/c to rehab      Outcome: Progressing

## 2017-11-25 NOTE — PROGRESS NOTES
[facility] Progress Note  Patient: Celeste Parry 33 y o  male   MRN: [id number]  PCP: Ted Bee MD  [facility] Encounter: [id number]  Date Of Visit: 11/25/17    Assessment/Plan:  ·   Principal Problem:    SIRS - Recent Spinal Wound Infection with Proteus   Active Problems:    Hypothyroid    Bipolar disorder ([facility] [address] )    Autism    Morbid obesity due to excess calories ([facility] [address] )    Deep vein thrombosis (DVT) of tibial vein (HCC)    Constipation    Sinus tachycardia    Sepsis secondary to Proteus infection  Unable to assess lumbar area due to noncompliance with physical exam  Vital signs unremarkable except for tachycardia of 110  Continue Levaquin until 12/30/2017  Remove PICC once off heparin    Low H&H-chronic anemia  No blood transfusion at this time    DVT  Continue heparin until INR is therapeutic currently subtherapeutic  Will add warfarin 10 mg daily until INR is therapeutic    Noncompliance to physical therapy  Patient is on willing to perform any physical activity which he is afraid that may contribute to worsening back spasms    Psychiatric disorder-filled with mood instability  Refused to see psychiatrist, continue medications      VTE Pharmacologic Prophylaxis:   Pharmacologic: Heparin  Mechanical VTE Prophylaxis in Place: Yes    Patient Centered Rounds: I have performed bedside rounds with nursing staff today  Discussions with Specialists or Other Care Team Provider: yes    Education and Discussions with Family / Patient: patient  Current Length of Stay: 8 day(s)    Current Patient Status: Inpatient   Certification Statement: The patient will continue to require additional inpatient hospital stay due to Medical Management    Discharge Plan:  Rehab    Code Status: Level 1 - Full Code      Subjective:   Patient was unwilling to participate in history taking and physical exam today  Allowed me to have a limited physical exam study    Review of systems poorly obtainable due to the mood of patient  Objective:     Vitals:   Temp (24hrs), Av °F (36 7 °C), Min:97 8 °F (36 6 °C), Max:98 4 °F (36 9 °C)    HR:  [105-110] 110  Resp:  [18-20] 20  BP: (128-140)/(76-89) 134/89  SpO2:  [98 %] 98 %  Body mass index is 45 77 kg/m²  Input and Output Summary (last 24 hours): Intake/Output Summary (Last 24 hours) at 17 1427  Last data filed at 17 1330   Gross per 24 hour   Intake             2423 ml   Output             3700 ml   Net            -1277 ml       Physical Exam:  General Appearance: Morbidly obese, noncooperative, in no apparent distress  Head:    Normocephalic, without obvious abnormality, atraumatic, mucous membranes moist    Eyes:    Conjunctiva/corneas clear, EOM's intact   Neck:   Supple   Lungs:     Clear to auscultation bilaterally, respirations unlabored, no crackles or wheeze heard    Heart:    Regular rate and rhythm, S1 and S2 no murmur   Abdomen:     Soft, non-tender, bowel sounds active all four quadrants,     no masses, no organomegaly   Extremities:   Extremities normal, edema gradually resolving on right lower leg   Neurologic:  nonfocal         Additional Data:     Labs:      Results from last 7 days  Lab Units 17  0553  17  0511   WBC Thousand/uL  --   --  4 86   HEMOGLOBIN g/dL 10 1*  < > 10 0*   HEMATOCRIT % 31 2*  < > 30 0*   PLATELETS Thousands/uL  --   --  247   NEUTROS PCT %  --   --  68   LYMPHS PCT %  --   --  19   MONOS PCT %  --   --  11   EOS PCT %  --   --  1   < > = values in this interval not displayed  Results from last 7 days  Lab Units 17  0511   SODIUM mmol/L 136   POTASSIUM mmol/L 3 8   CHLORIDE mmol/L 102   CO2 mmol/L 27   BUN mg/dL 10   CREATININE mg/dL 0 56*   CALCIUM mg/dL 8 7   GLUCOSE RANDOM mg/dL 100       Results from last 7 days  Lab Units 17  0518   INR  1 15       * I Have Reviewed All Lab Data Listed Above  * Additional Pertinent Lab Tests Reviewed:  All Labs For Current Hospital Admission Reviewed    Cultures:   Blood Culture:   Lab Results   Component Value Date    BLOODCX No Growth After 5 Days  11/17/2017    BLOODCX No Growth After 5 Days  11/17/2017    BLOODCX No Growth After 5 Days  11/03/2017    BLOODCX No Growth After 5 Days  11/03/2017    BLOODCX No Growth After 5 Days  11/02/2017    BLOODCX Proteus mirabilis (A) 11/02/2017    BLOODCX Proteus mirabilis (A) 10/31/2017     Urine Culture:   Lab Results   Component Value Date    URINECX No Growth <1000 cfu/mL 11/03/2017     Sputum Culture: No components found for: SPUTUMCX  Wound Culture:   Lab Results   Component Value Date    WOUNDCULT 1+ Growth of Proteus mirabilis (A) 10/31/2017       Last 24 Hours Medication List:     benztropine 1 mg Oral BID   clonazePAM 0 5 mg Oral BID   divalproex sodium 1,000 mg Oral Daily   divalproex sodium 500 mg Oral Q12H [facility]   docusate sodium 100 mg Oral BID   fenofibrate 145 mg Oral Daily   levofloxacin 750 mg Oral Q24H   levothyroxine 25 mcg Oral Once per day on Mon Tue Wed Thu Fri   levothyroxine 50 mcg Oral Once per day on Sun Sat   lidocaine 2 patch Transdermal Daily   polyethylene glycol 17 g Oral Daily   pregabalin 75 mg Oral BID   risperiDONE 2 mg Oral BID   risperiDONE 4 mg Oral BID   senna 1 tablet Oral Daily   warfarin 10 mg Oral Daily (warfarin)   warfarin 10 mg Oral Daily (warfarin)        Today, Patient Was Seen By: Maria R Burt MD    ** Please Note: Dragon 360 Dictation voice to text software may have been used in the creation of this document   **

## 2017-11-26 LAB
ALBUMIN SERPL BCP-MCNC: 2.7 G/DL (ref 3.5–5)
ALP SERPL-CCNC: 37 U/L (ref 46–116)
ALT SERPL W P-5'-P-CCNC: 48 U/L (ref 12–78)
ANION GAP SERPL CALCULATED.3IONS-SCNC: 8 MMOL/L (ref 4–13)
APTT PPP: 61 SECONDS (ref 23–35)
APTT PPP: 79 SECONDS (ref 23–35)
APTT PPP: 92 SECONDS (ref 23–35)
AST SERPL W P-5'-P-CCNC: 21 U/L (ref 5–45)
BILIRUB SERPL-MCNC: 0.26 MG/DL (ref 0.2–1)
BUN SERPL-MCNC: 11 MG/DL (ref 5–25)
CALCIUM SERPL-MCNC: 9.2 MG/DL (ref 8.3–10.1)
CHLORIDE SERPL-SCNC: 100 MMOL/L (ref 100–108)
CO2 SERPL-SCNC: 27 MMOL/L (ref 21–32)
CREAT SERPL-MCNC: 0.5 MG/DL (ref 0.6–1.3)
GFR SERPL CREATININE-BSD FRML MDRD: 150 ML/MIN/1.73SQ M
GLUCOSE SERPL-MCNC: 93 MG/DL (ref 65–140)
INR PPP: 1.58 (ref 0.86–1.16)
POTASSIUM SERPL-SCNC: 4 MMOL/L (ref 3.5–5.3)
PROT SERPL-MCNC: 7.1 G/DL (ref 6.4–8.2)
PROTHROMBIN TIME: 19 SECONDS (ref 12.1–14.4)
SODIUM SERPL-SCNC: 135 MMOL/L (ref 136–145)

## 2017-11-26 PROCEDURE — 85610 PROTHROMBIN TIME: CPT | Performed by: PHYSICIAN ASSISTANT

## 2017-11-26 PROCEDURE — 85730 THROMBOPLASTIN TIME PARTIAL: CPT | Performed by: HOSPITALIST

## 2017-11-26 PROCEDURE — 80053 COMPREHEN METABOLIC PANEL: CPT | Performed by: HOSPITALIST

## 2017-11-26 RX ORDER — WARFARIN SODIUM 5 MG/1
10 TABLET ORAL
Status: COMPLETED | OUTPATIENT
Start: 2017-11-26 | End: 2017-11-26

## 2017-11-26 RX ADMIN — RISPERIDONE 2 MG: 1 TABLET ORAL at 17:16

## 2017-11-26 RX ADMIN — WARFARIN SODIUM 10 MG: 5 TABLET ORAL at 17:16

## 2017-11-26 RX ADMIN — LEVOTHYROXINE SODIUM 50 MCG: 50 TABLET ORAL at 05:17

## 2017-11-26 RX ADMIN — HEPARIN SODIUM 26 UNITS/KG/HR: 10000 INJECTION, SOLUTION INTRAVENOUS at 00:04

## 2017-11-26 RX ADMIN — RISPERIDONE 2 MG: 1 TABLET ORAL at 09:34

## 2017-11-26 RX ADMIN — BENZTROPINE MESYLATE 1 MG: 1 TABLET ORAL at 09:28

## 2017-11-26 RX ADMIN — BENZTROPINE MESYLATE 1 MG: 1 TABLET ORAL at 17:17

## 2017-11-26 RX ADMIN — LEVOFLOXACIN 750 MG: 750 TABLET, FILM COATED ORAL at 13:24

## 2017-11-26 RX ADMIN — PREGABALIN 75 MG: 75 CAPSULE ORAL at 09:27

## 2017-11-26 RX ADMIN — HEPARIN SODIUM 24 UNITS/KG/HR: 10000 INJECTION, SOLUTION INTRAVENOUS at 09:35

## 2017-11-26 RX ADMIN — RISPERIDONE 4 MG: 1 TABLET ORAL at 09:34

## 2017-11-26 RX ADMIN — DIVALPROEX SODIUM 1000 MG: 500 TABLET, DELAYED RELEASE ORAL at 11:30

## 2017-11-26 RX ADMIN — OXYCODONE HYDROCHLORIDE 10 MG: 10 TABLET ORAL at 23:45

## 2017-11-26 RX ADMIN — LIDOCAINE 2 PATCH: 50 PATCH CUTANEOUS at 09:28

## 2017-11-26 RX ADMIN — CLONAZEPAM 0.5 MG: 0.5 TABLET ORAL at 17:16

## 2017-11-26 RX ADMIN — DIVALPROEX SODIUM 500 MG: 500 TABLET, DELAYED RELEASE ORAL at 09:27

## 2017-11-26 RX ADMIN — HEPARIN SODIUM 24 UNITS/KG/HR: 10000 INJECTION, SOLUTION INTRAVENOUS at 20:11

## 2017-11-26 RX ADMIN — FENOFIBRATE 145 MG: 145 TABLET ORAL at 09:28

## 2017-11-26 RX ADMIN — PREGABALIN 75 MG: 75 CAPSULE ORAL at 17:16

## 2017-11-26 RX ADMIN — RISPERIDONE 4 MG: 1 TABLET ORAL at 17:16

## 2017-11-26 RX ADMIN — CLONAZEPAM 0.5 MG: 0.5 TABLET ORAL at 09:27

## 2017-11-26 RX ADMIN — DIVALPROEX SODIUM 500 MG: 500 TABLET, DELAYED RELEASE ORAL at 20:38

## 2017-11-26 NOTE — PROGRESS NOTES
Spoke with Dr Hardik Armstrong with [facility], plan for today is continue with heparin gtt and work with therapy, continue to monitor pt

## 2017-11-26 NOTE — PLAN OF CARE
Problem: Potential for Falls  Goal: Patient will remain free of falls  INTERVENTIONS:  - Assess patient frequently for physical needs  -  Identify cognitive and physical deficits and behaviors that affect risk of falls    -  [place] fall precautions as indicated by assessment   - Educate patient/family on patient safety including physical limitations  - Instruct patient to call for assistance with activity based on assessment  - Modify environment to reduce risk of injury  - Consider OT/PT consult to assist with strengthening/mobility   Outcome: Progressing      Problem: Prexisting or High Potential for Compromised Skin Integrity  Goal: Skin integrity is maintained or improved  INTERVENTIONS:  - Identify patients at risk for skin breakdown  - Assess and monitor skin integrity  - Assess and monitor nutrition and hydration status  - Monitor labs (i e  albumin)  - Assess for incontinence   - Turn and reposition patient  - Assist with mobility/ambulation  - Relieve pressure over bony prominences  - Avoid friction and shearing  - Provide appropriate hygiene as needed including keeping skin clean and dry  - Evaluate need for skin moisturizer/barrier cream  - Collaborate with interdisciplinary team (i e  Nutrition, Rehabilitation, etc )   - Patient/family teaching   Outcome: Progressing      Problem: PAIN - ADULT  Goal: Verbalizes/displays adequate comfort level or baseline comfort level  Interventions:  - Encourage patient to monitor pain and request assistance  - Assess pain using appropriate pain scale  - Administer analgesics based on type and severity of pain and evaluate response  - Implement non-pharmacological measures as appropriate and evaluate response  - Consider cultural and social influences on pain and pain management  - Notify physician/advanced practitioner if interventions unsuccessful or patient reports new pain   Outcome: Progressing      Problem: INFECTION - ADULT  Goal: Absence or prevention of progression during hospitalization  INTERVENTIONS:  - Assess and monitor for signs and symptoms of infection  - Monitor lab/diagnostic results  - Monitor all insertion sites, i e  indwelling lines, tubes, and drains  - Monitor endotracheal (as able) and nasal secretions for changes in amount and color  - [place] appropriate cooling/warming therapies per order  - Administer medications as ordered  - Instruct and encourage patient and family to use good hand hygiene technique  - Identify and instruct in appropriate isolation precautions for identified infection/condition   Outcome: Progressing    Goal: Absence of fever/infection during neutropenic period  INTERVENTIONS:  - Monitor WBC  - Implement neutropenic guidelines   Outcome: Progressing      Problem: SAFETY ADULT  Goal: Maintain or return to baseline ADL function  INTERVENTIONS:  -  Assess patient's ability to carry out ADLs; assess patient's baseline for ADL function and identify physical deficits which impact ability to perform ADLs (bathing, care of mouth/teeth, toileting, grooming, dressing, etc )  - Assess/evaluate cause of self-care deficits   - Assess range of motion  - Assess patient's mobility; develop plan if impaired  - Assess patient's need for assistive devices and provide as appropriate  - Encourage maximum independence but intervene and supervise when necessary  ¯ Involve family in performance of ADLs  ¯ Assess for home care needs following discharge   ¯ Request OT consult to assist with ADL evaluation and planning for discharge  ¯ Provide patient education as appropriate   Outcome: Progressing    Goal: Maintain or return mobility status to optimal level  INTERVENTIONS:  - Assess patient's baseline mobility status (ambulation, transfers, stairs, etc )    - Identify cognitive and physical deficits and behaviors that affect mobility  - Identify mobility aids required to assist with transfers and/or ambulation (gait belt, sit-to-stand, lift, walker, cane, etc )  - [place] fall precautions as indicated by assessment  - Record patient progress and toleration of activity level on Mobility SBAR; progress patient to next Phase/Stage  - Instruct patient to call for assistance with activity based on assessment  - Request Rehabilitation consult to assist with strengthening/weightbearing, etc    Outcome: Progressing      Problem: DISCHARGE PLANNING  Goal: Discharge to home or other facility with appropriate resources  INTERVENTIONS:  - Identify barriers to discharge w/patient and caregiver  - Arrange for needed discharge resources and transportation as appropriate  - Identify discharge learning needs (meds, wound care, etc )  - Arrange for interpretive services to assist at discharge as needed  - Refer to Case Management Department for coordinating discharge planning if the patient needs post-hospital services based on physician/advanced practitioner order or complex needs related to functional status, cognitive ability, or social support system   Outcome: Progressing      Problem: Nutrition/Hydration-ADULT  Goal: Nutrient/Hydration intake appropriate for improving, restoring or maintaining nutritional needs  Monitor and assess patient's nutrition/hydration status for malnutrition (ex- brittle hair, bruises, dry skin, pale skin and conjunctiva, muscle wasting, smooth red tongue, and disorientation)  Collaborate with interdisciplinary team and initiate plan and interventions as ordered  Monitor patient's weight and dietary intake as ordered or per policy  Utilize nutrition screening tool and intervene per policy  Determine patient's food preferences and provide high-protein, high-caloric foods as appropriate       INTERVENTIONS:  - Monitor oral intake, urinary output, labs, and treatment plans  - Assess nutrition and hydration status and recommend course of action  - Evaluate amount of meals eaten  - Assist patient with eating if necessary   - Allow adequate time for meals  - Recommend/ encourage appropriate diets, oral nutritional supplements, and vitamin/mineral supplements  - Order, calculate, and assess calorie counts as needed  - Recommend, monitor, and adjust tube feedings and TPN/PPN based on assessed needs  - Assess need for intravenous fluids  - Provide specific nutrition/hydration education as appropriate  - Include patient/family/caregiver in decisions related to nutrition   Outcome: Progressing      Problem: DISCHARGE PLANNING - CARE MANAGEMENT  Goal: Discharge to post-acute care or home with appropriate resources  INTERVENTIONS:  - Conduct assessment to determine patient/family and health care team treatment goals, and need for post-acute services based on payer coverage, community resources, and patient preferences, and barriers to discharge  - Address psychosocial, clinical, and financial barriers to discharge as identified in assessment in conjunction with the patient/family and health care team  - Arrange appropriate level of post-acute services according to patient's   needs and preference and payer coverage in collaboration with the physician and health care team  - Communicate with and update the patient/family, physician, and health care team regarding progress on the discharge plan  - Arrange appropriate transportation to post-acute venues  -Patient to be evaluated  Patient anticipated for d/c to rehab      Outcome: Progressing

## 2017-11-26 NOTE — PROGRESS NOTES
[facility] Internal Medicine Progress Note  Patient: Jose Antonio Mosqueda 33 y o  male   MRN: [id number]  PCP: Liam Altamirano MD  Unit/Bed#: [facility] 905-01 Encounter: [id number]  Date Of Visit: 11/26/17    Assessment/Plan:  ·   Principal Problem:    SIRS - Recent Spinal Wound Infection with Proteus   Active Problems:    Hypothyroid    Bipolar disorder ([facility] [address] )    Autism    Morbid obesity due to excess calories ([facility] [address] )    Deep vein thrombosis (DVT) of tibial vein (HCC)    Constipation    Sinus tachycardia    Sepsis secondary to postoperative Proteus infection  Patient on willing to comply with physical exam, was on the phone with mother  Vital signs unremarkable, currently afebrile  Do CBC, CMP with electrolytes tomorrow  Continue Levaquin as per ID until 12/30/2017  PICC removal, once therapeutic on warfarin and can stop heparin    Anemia of chronic disease : Low H&H  Asymptomatic  CBC tomorrow    DVT of right leg  INR- subtherapeutic  Continue warfarin 10 mg at night  Repeat INR tomorrow morning  Will consider regimen at home with 10 mg and 5 mg every alternate days to remain therapeutic    Psychiatric disorder- mood instability present; Continue medication    VTE Pharmacologic Prophylaxis:   Pharmacologic: Heparin Drip  Mechanical VTE Prophylaxis in Place: Yes    Patient Centered Rounds: I have performed bedside rounds with nursing staff today  Education and Discussions with Family / Patient:  Patient  Current Length of Stay: 9 day(s)    Current Patient Status: Inpatient   Certification Statement: The patient will continue to require additional inpatient hospital stay due to Medical management    Discharge Plan:  Rehab    Code Status: Level 1 - Full Code      Subjective:   No new complaints today except for he mentioned that he will participate in physical therapy today    Patient continues to have mood instability and  refuses to have physical exam   Review of systems negative otherwise    Objective:     Vitals: Temp (24hrs), Av 1 °F (36 7 °C), Min:97 8 °F (36 6 °C), Max:98 6 °F (37 °C)    HR:  [83-97] 83  Resp:  [20] 20  BP: (113-148)/(66-76) 113/66  SpO2:  [97 %-98 %] 97 %  Body mass index is 45 77 kg/m²  Input and Output Summary (last 24 hours): Intake/Output Summary (Last 24 hours) at 17 1031  Last data filed at 17 0933   Gross per 24 hour   Intake          1757 46 ml   Output             2825 ml   Net         -1067 54 ml       Physical Exam:  General Appearance: Morbidly obese, non- cooperative, no distress, appropriately responsive    Head:    Normocephalic, without obvious abnormality, atraumatic, mucous membranes moist    Eyes:    Conjunctiva/corneas clear, EOM's intact   Neck:   Supple   Lungs:     Clear to auscultation bilaterally, respirations unlabored, no crackles or wheeze heard    Heart:    Regular rate and rhythm, S1 and S2 no murmur   Abdomen:     Soft, non-tender, bowel sounds active all four quadrants,     no masses, no organomegaly   Extremities: Right lower leg still swollen more than left, left leg in SCD   Neurologic:  nonfocal         Additional Data:     Labs:      Results from last 7 days  Lab Units 17  0553  17  0511   WBC Thousand/uL  --   --  4 86   HEMOGLOBIN g/dL 10 1*  < > 10 0*   HEMATOCRIT % 31 2*  < > 30 0*   PLATELETS Thousands/uL  --   --  247   NEUTROS PCT %  --   --  68   LYMPHS PCT %  --   --  19   MONOS PCT %  --   --  11   EOS PCT %  --   --  1   < > = values in this interval not displayed  Results from last 7 days  Lab Units 17  0511   SODIUM mmol/L 136   POTASSIUM mmol/L 3 8   CHLORIDE mmol/L 102   CO2 mmol/L 27   BUN mg/dL 10   CREATININE mg/dL 0 56*   CALCIUM mg/dL 8 7   GLUCOSE RANDOM mg/dL 100       Results from last 7 days  Lab Units 17  0536   INR  1 58*       * I Have Reviewed All Lab Data Listed Above  * Additional Pertinent Lab Tests Reviewed:  [facility] Admission Reviewed    Cultures:   Blood Culture:   Lab Results   Component Value Date    BLOODCX No Growth After 5 Days  11/17/2017    BLOODCX No Growth After 5 Days  11/17/2017    BLOODCX No Growth After 5 Days  11/03/2017    BLOODCX No Growth After 5 Days  11/03/2017    BLOODCX No Growth After 5 Days  11/02/2017    BLOODCX Proteus mirabilis (A) 11/02/2017    BLOODCX Proteus mirabilis (A) 10/31/2017     Urine Culture:   Lab Results   Component Value Date    URINECX No Growth <1000 cfu/mL 11/03/2017     Sputum Culture: No components found for: SPUTUMCX  Wound Culture:   Lab Results   Component Value Date    WOUNDCULT 1+ Growth of Proteus mirabilis (A) 10/31/2017       Last 24 Hours Medication List:     benztropine 1 mg Oral BID   clonazePAM 0 5 mg Oral BID   divalproex sodium 1,000 mg Oral Daily   divalproex sodium 500 mg Oral Q12H [address]   docusate sodium 100 mg Oral BID   fenofibrate 145 mg Oral Daily   levofloxacin 750 mg Oral Q24H   levothyroxine 25 mcg Oral Once per day on Mon Tue Wed Thu Fri   levothyroxine 50 mcg Oral Once per day on Sun Sat   lidocaine 2 patch Transdermal Daily   polyethylene glycol 17 g Oral Daily   pregabalin 75 mg Oral BID   risperiDONE 2 mg Oral BID   risperiDONE 4 mg Oral BID   senna 1 tablet Oral Daily        Today, Patient Was Seen By: Eric Reyes MD    ** Please Note: Dragon 360 Dictation voice to text software may have been used in the creation of this document   **

## 2017-11-27 LAB
ANISOCYTOSIS BLD QL SMEAR: PRESENT
APTT PPP: 112 SECONDS (ref 23–35)
BASOPHILS # BLD MANUAL: 0 THOUSAND/UL (ref 0–0.1)
BASOPHILS NFR MAR MANUAL: 0 % (ref 0–1)
EOSINOPHIL # BLD MANUAL: 0.3 THOUSAND/UL (ref 0–0.4)
EOSINOPHIL NFR BLD MANUAL: 3 % (ref 0–6)
ERYTHROCYTE [DISTWIDTH] IN BLOOD BY AUTOMATED COUNT: 14.6 % (ref 11.6–15.1)
HCT VFR BLD AUTO: 33 % (ref 36.5–49.3)
HGB BLD-MCNC: 10.8 G/DL (ref 12–17)
INR PPP: 2.06 (ref 0.86–1.16)
LYMPHOCYTES # BLD AUTO: 1.21 THOUSAND/UL (ref 0.6–4.47)
LYMPHOCYTES # BLD AUTO: 12 % (ref 14–44)
MCH RBC QN AUTO: 25.4 PG (ref 26.8–34.3)
MCHC RBC AUTO-ENTMCNC: 32.7 G/DL (ref 31.4–37.4)
MCV RBC AUTO: 78 FL (ref 82–98)
METAMYELOCYTES NFR BLD MANUAL: 5 % (ref 0–1)
MONOCYTES # BLD AUTO: 0.1 THOUSAND/UL (ref 0–1.22)
MONOCYTES NFR BLD: 1 % (ref 4–12)
MYELOCYTES NFR BLD MANUAL: 10 % (ref 0–1)
NEUTROPHILS # BLD MANUAL: 6.98 THOUSAND/UL (ref 1.85–7.62)
NEUTS BAND NFR BLD MANUAL: 5 % (ref 0–8)
NEUTS SEG NFR BLD AUTO: 64 % (ref 43–75)
NRBC BLD AUTO-RTO: 1 /100 WBCS
PLATELET # BLD AUTO: 173 THOUSANDS/UL (ref 149–390)
PLATELET BLD QL SMEAR: ADEQUATE
PMV BLD AUTO: 10.1 FL (ref 8.9–12.7)
POIKILOCYTOSIS BLD QL SMEAR: PRESENT
POLYCHROMASIA BLD QL SMEAR: PRESENT
PROTHROMBIN TIME: 23.4 SECONDS (ref 12.1–14.4)
RBC # BLD AUTO: 4.26 MILLION/UL (ref 3.88–5.62)
RBC MORPH BLD: PRESENT
WBC # BLD AUTO: 10.12 THOUSAND/UL (ref 4.31–10.16)

## 2017-11-27 PROCEDURE — 97532 HB COGNITIVE SKILLS DEVELOPMENT: CPT

## 2017-11-27 PROCEDURE — G8981 BODY POS CURRENT STATUS: HCPCS

## 2017-11-27 PROCEDURE — 85027 COMPLETE CBC AUTOMATED: CPT | Performed by: HOSPITALIST

## 2017-11-27 PROCEDURE — 02PYX3Z REMOVAL OF INFUSION DEVICE FROM GREAT VESSEL, EXTERNAL APPROACH: ICD-10-PCS | Performed by: INTERNAL MEDICINE

## 2017-11-27 PROCEDURE — G8982 BODY POS GOAL STATUS: HCPCS

## 2017-11-27 PROCEDURE — 85610 PROTHROMBIN TIME: CPT | Performed by: HOSPITALIST

## 2017-11-27 PROCEDURE — 85730 THROMBOPLASTIN TIME PARTIAL: CPT | Performed by: HOSPITALIST

## 2017-11-27 PROCEDURE — 97530 THERAPEUTIC ACTIVITIES: CPT

## 2017-11-27 PROCEDURE — 97112 NEUROMUSCULAR REEDUCATION: CPT

## 2017-11-27 PROCEDURE — 97164 PT RE-EVAL EST PLAN CARE: CPT

## 2017-11-27 PROCEDURE — 85007 BL SMEAR W/DIFF WBC COUNT: CPT | Performed by: HOSPITALIST

## 2017-11-27 PROCEDURE — 97535 SELF CARE MNGMENT TRAINING: CPT

## 2017-11-27 RX ORDER — WARFARIN SODIUM 5 MG/1
5 TABLET ORAL
Status: DISCONTINUED | OUTPATIENT
Start: 2017-11-27 | End: 2017-11-30

## 2017-11-27 RX ADMIN — LEVOTHYROXINE SODIUM 25 MCG: 25 TABLET ORAL at 04:55

## 2017-11-27 RX ADMIN — RISPERIDONE 4 MG: 1 TABLET ORAL at 08:54

## 2017-11-27 RX ADMIN — BENZTROPINE MESYLATE 1 MG: 1 TABLET ORAL at 08:56

## 2017-11-27 RX ADMIN — RISPERIDONE 4 MG: 1 TABLET ORAL at 17:19

## 2017-11-27 RX ADMIN — METHOCARBAMOL 500 MG: 500 TABLET ORAL at 18:27

## 2017-11-27 RX ADMIN — DOCUSATE SODIUM 100 MG: 100 CAPSULE, LIQUID FILLED ORAL at 17:19

## 2017-11-27 RX ADMIN — CLONAZEPAM 0.5 MG: 0.5 TABLET ORAL at 08:55

## 2017-11-27 RX ADMIN — OXYCODONE HYDROCHLORIDE 10 MG: 10 TABLET ORAL at 18:27

## 2017-11-27 RX ADMIN — LEVOFLOXACIN 750 MG: 750 TABLET, FILM COATED ORAL at 14:38

## 2017-11-27 RX ADMIN — CLONAZEPAM 0.5 MG: 0.5 TABLET ORAL at 17:19

## 2017-11-27 RX ADMIN — HEPARIN SODIUM 24 UNITS/KG/HR: 10000 INJECTION, SOLUTION INTRAVENOUS at 04:49

## 2017-11-27 RX ADMIN — DIVALPROEX SODIUM 500 MG: 500 TABLET, DELAYED RELEASE ORAL at 21:01

## 2017-11-27 RX ADMIN — WARFARIN SODIUM 5 MG: 5 TABLET ORAL at 17:19

## 2017-11-27 RX ADMIN — ACETAMINOPHEN 650 MG: 325 TABLET, FILM COATED ORAL at 08:55

## 2017-11-27 RX ADMIN — METHOCARBAMOL 500 MG: 500 TABLET ORAL at 12:07

## 2017-11-27 RX ADMIN — PREGABALIN 75 MG: 75 CAPSULE ORAL at 08:55

## 2017-11-27 RX ADMIN — SENNOSIDES 8.6 MG: 8.6 TABLET, FILM COATED ORAL at 08:55

## 2017-11-27 RX ADMIN — DIVALPROEX SODIUM 500 MG: 500 TABLET, DELAYED RELEASE ORAL at 08:55

## 2017-11-27 RX ADMIN — RISPERIDONE 2 MG: 1 TABLET ORAL at 08:55

## 2017-11-27 RX ADMIN — OXYCODONE HYDROCHLORIDE 10 MG: 10 TABLET ORAL at 05:18

## 2017-11-27 RX ADMIN — LIDOCAINE 2 PATCH: 50 PATCH CUTANEOUS at 08:55

## 2017-11-27 RX ADMIN — OXYCODONE HYDROCHLORIDE 10 MG: 10 TABLET ORAL at 12:06

## 2017-11-27 RX ADMIN — DIVALPROEX SODIUM 1000 MG: 500 TABLET, DELAYED RELEASE ORAL at 12:06

## 2017-11-27 RX ADMIN — METHOCARBAMOL 500 MG: 500 TABLET ORAL at 05:17

## 2017-11-27 RX ADMIN — BENZTROPINE MESYLATE 1 MG: 1 TABLET ORAL at 17:20

## 2017-11-27 RX ADMIN — DOCUSATE SODIUM 100 MG: 100 CAPSULE, LIQUID FILLED ORAL at 08:55

## 2017-11-27 RX ADMIN — RISPERIDONE 2 MG: 1 TABLET ORAL at 17:20

## 2017-11-27 RX ADMIN — FENOFIBRATE 145 MG: 145 TABLET ORAL at 08:56

## 2017-11-27 RX ADMIN — PREGABALIN 75 MG: 75 CAPSULE ORAL at 17:19

## 2017-11-27 NOTE — PLAN OF CARE
Problem: PHYSICAL THERAPY ADULT  Goal: Performs mobility at highest level of function for planned discharge setting  See evaluation for individualized goals  Treatment/Interventions: Continued evaluation  Equipment Recommended: Other (Comment) (TBD; pt owns RW)       See flowsheet documentation for full assessment, interventions and recommendations  Outcome: Progressing  Prognosis: Fair  Problem List: Decreased strength, Decreased range of motion, Decreased endurance, Impaired balance, Decreased mobility, Decreased cognition, Impaired judgement, Decreased safety awareness, Obesity, Decreased skin integrity, Orthopedic restrictions, Pain  Assessment: PT re-eval performed this session following a d/c from PT services 2* pt not participating  This session, pt with notable increase in motivation  Able to perform bed mobility and supine <->sit transfer  Unable to perform sit<->stand 2* weakness and pain  Will assess OOB transfer via other means next session  PT to continue to follow pt during stay to progress functioanl mobility (I) and safety  Rehab remains appropriate to maximize funciton and reduce caregiver burden  Barriers to Discharge: Inaccessible home environment, Decreased caregiver support     Recommendation: Short-term skilled PT     PT - OK to Discharge: Yes (to rehab when medically appropriate )    See flowsheet documentation for full assessment

## 2017-11-27 NOTE — PLAN OF CARE
Problem: OCCUPATIONAL THERAPY ADULT  Goal: Performs self-care activities at highest level of function for planned discharge setting  See evaluation for individualized goals  Treatment Interventions: ADL retraining, Functional transfer training, Endurance training, Cognitive reorientation, Patient/family training, Equipment evaluation/education, Compensatory technique education, Continued evaluation, Activityengagement, Energy conservation          See flowsheet documentation for full assessment, interventions and recommendations  Outcome: Progressing  Limitation: Decreased ADL status, Decreased Safe judgement during ADL, Decreased cognition, Decreased endurance, Decreased high-level ADLs, Decreased self-care trans, Mood limitation  Prognosis: Good  Assessment: Pt seen for OT tx session focusing on bed mobility, functional transfers in preparation for ADL tasks, coping skills, and pt/family education  Pt agreeable to participate in therapy session w/ mother present  Pt required extensive education regarding spinal precautions and log rolling techniques  Pt encouraged to use log roll technique for minimization of pain and maintenance of spinal precautions  Pt completed bed mobility, including log rolling to the R, at supervision level  Pt educated on coping skills including deep breathing techniques, meditation, taking breaks, and stretching to prepare self for mobility  Pt demonstrated good carryover of techniques completing deep breathing techniques and motivational speech w/ pt stating "you don't want to end up at a nursing home" "you have to do this" and "you can do this"  Pt completed supine->sit transfer w/ mod A x2 w/ use of log roll technique  Pt sat EOB for ~10 minutes at supervision level  Pt only agreeable to slide board transfers at this time  Therapist encouraged pt to engage in further mobility including completing sliding board transfer to bedside chair, w/ pt agreeable   Pt required extensive time and explanation regarding technique w/ engagement in coping techniques for preparation  Pt able to complete sliding board transfer to bedside chair w/ mod A x1 w/ assist of 2nd for safety  Pt expressed he was pleased w/ therapy session stating, "it feels so good to sit up in a chair, I am so glad we did this"  Pt and pt's mom educated on need for continued mobility/activity to increase independence and improve activity tolerance  Pt is slowly progressing, however, remains significantly limited  Continue to recommend pt d/c to short term rehab when medically cleared  Will continue to follow        OT Discharge Recommendation: Short Term Rehab  OT - OK to Discharge: Yes (to short term rehab when medically cleared)

## 2017-11-27 NOTE — OCCUPATIONAL THERAPY NOTE
Occupational Therapy Progress Note      Patient Name: Cornelius Lopez    ZLFOX'X Date: 11/27/2017    Problem List:   Patient Active Problem List   Diagnosis    SIRS - Recent Spinal Wound Infection with Proteus     Wound dehiscence    Herniation of lumbar intervertebral disc    Hypothyroid    [facility] disorder ([facility] [address] )    Autism    Morbid obesity due to excess calories ([facility] )    Deep vein thrombosis (DVT) of tibial vein (HCC)    Constipation    Sinus tachycardia          11/27/17 1450   Restrictions/Precautions   Weight Bearing Precautions Per Order No   Other Precautions Cognitive; Chair Alarm; Bed Alarm;Spinal precautions;Multiple lines; Fall Risk;Pain   Pain Assessment   Pain Assessment 0-10   Pain Score Worst Possible Pain   Pain Type Acute pain;Surgical pain   Pain Location Back   Pain Orientation Lower   Patient's Stated Pain Goal No pain   Hospital Pain Intervention(s) Repositioned; Ambulation/increased activity; Emotional support;Relaxation technique; Rest   Response to Interventions Pt able to tolerate ambulation/increased activity w/ continued short rest breaks and use of deep breathing and calming techniques   Bed Mobility   Rolling R 5  Supervision   Additional items Bedrails; Increased time required;Verbal cues   Supine to Sit 3  Moderate assistance   Additional items Assist x 2; Increased time required;Verbal cues;LE management   Transfers   Sit to Stand Unable to assess   Sliding Board transfer 3  Moderate assistance   Additional items Assist x 1; Increased time required;Verbal cues   Additional Comments Pt completed sliding board transfer from bed to bedside chair w/ assist of 1 and assist of 2nd for safety  Pt required extensive time for completion of task w/ cues t/o for sequencing, encouragement, and continued participation      Cognition   Overall Cognitive Status Impaired   Arousal/Participation Cooperative   Attention Attends with cues to redirect   Orientation Level Oriented X4   Memory Decreased recall of precautions   Following Commands Follows one step commands with increased time or repetition   Comments Pt w/ baseline Autism and bipolar disorder  Pt requires frequent rest breaks for him to engage in deep breathing, calming techniques to anticipate pain  Pt required extensive instruction to prepare self for transfers  Recommend chair alarm on pt at all times   Activity Tolerance   Activity Tolerance Patient limited by pain   Medical Staff Made Aware Appropriate to see per MERRY Sauceda   Assessment   Assessment Pt seen for OT tx session focusing on bed mobility, functional transfers in preparation for ADL tasks, coping skills, and pt/family education  Pt agreeable to participate in therapy session w/ mother present  Pt required extensive education regarding spinal precautions and log rolling techniques  Pt encouraged to use log roll technique for minimization of pain and maintenance of spinal precautions  Pt completed bed mobility, including log rolling to the R, at supervision level  Pt educated on coping skills including deep breathing techniques, meditation, taking breaks, and stretching to prepare self for mobility  Pt demonstrated good carryover of techniques completing deep breathing techniques and motivational speech w/ pt stating "you don't want to end up at a nursing home" "you have to do this" and "you can do this"  Pt completed supine->sit transfer w/ mod A x2 w/ use of log roll technique  Pt sat EOB for ~10 minutes at supervision level  Pt only agreeable to slide board transfers at this time  Therapist encouraged pt to engage in further mobility including completing sliding board transfer to bedside chair, w/ pt agreeable  Pt required extensive time and explanation regarding technique w/ engagement in coping techniques for preparation  Pt able to complete sliding board transfer to bedside chair w/ mod A x1 w/ assist of 2nd for safety   Pt expressed he was pleased w/ therapy session stating, "it feels so good to sit up in a chair, I am so glad we did this"  Pt and pt's mom educated on need for continued mobility/activity to increase independence and improve activity tolerance  Pt is slowly progressing, however, remains significantly limited  Continue to recommend pt d/c to short term rehab when medically cleared  Will continue to follow  Plan   Treatment Interventions Functional transfer training; Endurance training;Patient/family training;Equipment evaluation/education; Compensatory technique education;Continued evaluation; Energy conservation; Activityengagement   Goal Expiration Date 17   Treatment Day 1   OT Frequency 3-5x/wk   Recommendation   OT Discharge Recommendation Short Term Rehab   OT - OK to Discharge Yes  (to short term rehab when medically cleared)   Barthel Index   Feeding 10   Bathing 0   Grooming Score 5   Dressing Score 5   Bladder Score 10   Bowels Score 10   Toilet Use Score 5   Transfers (Bed/Chair) Score 5   Mobility (Level Surface) Score 0   Stairs Score 0   Barthel Index Score 50   Modified Dov Scale   Modified [place] Scale 4       Documentation completed by Lauren Apgar, OTS  Occupational Therapy Student

## 2017-11-27 NOTE — PROGRESS NOTES
[facility] Internal Medicine Progress Note  Patient: Tiffany Latif 33 y o  male   MRN: [id number]  PCP: Moni Gambino MD  [facility]: [id number]  Date Of Visit: 17    Assessment/Plan:  ·   Principal Problem:    SIRS - Recent Spinal Wound Infection with Proteus   Active Problems:    Hypothyroid    Bipolar disorder ([facility] )    Autism    Morbid obesity due to excess calories ([facility] )    Deep vein thrombosis (DVT) of tibial vein (HCC)    Constipation    Sinus tachycardia      Sepsis secondary to postop infection Proteus  PICC line removed today  Patient appears more clinically better, is willing to participate in physical therapy  Continue Levaquin until 2017    DVT of right leg  INR  Therapeutic   Discontinue heparin  Continue warfarin at 5mg at night daily    Patient is medically stable to be discharged to rehab facility      Psychiatric disorder mood instability present still continue medications    VTE Pharmacologic Prophylaxis:   Pharmacologic: Warfarin (Coumadin)  Mechanical VTE Prophylaxis in Place: Yes    Patient Centered Rounds: I have performed bedside rounds with nursing staff today  Discussions with Specialists or Other Care Team Provider: yes    Education and Discussions with Family / Patient: yes  Current Length of Stay: 10 day(s)    Current Patient Status: Inpatient   Certification Statement: The patient will continue to require additional inpatient hospital stay due to medical mgt    Discharge Plan: rehab    Code Status: Level 1 - Full Code      Subjective:   No new complaints   ROS negative otherwise  Objective:     Vitals:   Temp (24hrs), Av 1 °F (36 7 °C), Min:97 9 °F (36 6 °C), Max:98 3 °F (36 8 °C)    HR:  [] 102  Resp:  [18-22] 18  BP: (118-136)/(74-84) 118/84  SpO2:  [98 %-99 %] 99 %  Body mass index is 45 77 kg/m²  Input and Output Summary (last 24 hours):        Intake/Output Summary (Last 24 hours) at 17 1203  Last data filed at 11/27/17 1145   Gross per 24 hour   Intake           3074 5 ml   Output             4600 ml   Net          -1525 5 ml       Physical Exam:  General Appearance: Morbidly obese Alert, cooperative, no distress, appropriately responsive    Head:    Normocephalic, without obvious abnormality, atraumatic, mucous membranes moist    Eyes:    Conjunctiva/corneas clear, EOM's intact   Neck:   Supple   Lungs:     Clear to auscultation bilaterally, respirations unlabored, no crackles or wheeze     Heart:    Regular rate and rhythm, S1 and S2    Abdomen:     Soft, non-tender, bowel sounds active all four quadrants,     no masses, no organomegaly   Extremities:   Extremities muscle weakness b/l   Neurologic:  nonfocal         Additional Data:     Labs:      Results from last 7 days  Lab Units 11/27/17  0440   WBC Thousand/uL 10 12   HEMOGLOBIN g/dL 10 8*   HEMATOCRIT % 33 0*   PLATELETS Thousands/uL 173   LYMPHO PCT % 12*   MONO PCT MAN % 1*   EOSINO PCT MANUAL % 3       Results from last 7 days  Lab Units 11/26/17  1330   SODIUM mmol/L 135*   POTASSIUM mmol/L 4 0   CHLORIDE mmol/L 100   CO2 mmol/L 27   BUN mg/dL 11   CREATININE mg/dL 0 50*   CALCIUM mg/dL 9 2   TOTAL PROTEIN g/dL 7 1   BILIRUBIN TOTAL mg/dL 0 26   ALK PHOS U/L 37*   ALT U/L 48   AST U/L 21   GLUCOSE RANDOM mg/dL 93       Results from last 7 days  Lab Units 11/27/17  0440   INR  2 06*       * I Have Reviewed All Lab Data Listed Above  * Additional Pertinent Lab Tests Reviewed: [facility] Admission Reviewed    Cultures:   Blood Culture:   Lab Results   Component Value Date    BLOODCX No Growth After 5 Days  11/17/2017    BLOODCX No Growth After 5 Days  11/17/2017    BLOODCX No Growth After 5 Days  11/03/2017    BLOODCX No Growth After 5 Days  11/03/2017    BLOODCX No Growth After 5 Days   11/02/2017    BLOODCX Proteus mirabilis (A) 11/02/2017    BLOODCX Proteus mirabilis (A) 10/31/2017     Urine Culture:   Lab Results   Component Value Date    URINECX No Growth <1000 cfu/mL 11/03/2017     Sputum Culture: No components found for: SPUTUMCX  Wound Culture:   Lab Results   Component Value Date    WOUNDCULT 1+ Growth of Proteus mirabilis (A) 10/31/2017       Last 24 Hours Medication List:     benztropine 1 mg Oral BID   clonazePAM 0 5 mg Oral BID   divalproex sodium 1,000 mg Oral Daily   divalproex sodium 500 mg Oral Q12H [address]   docusate sodium 100 mg Oral BID   fenofibrate 145 mg Oral Daily   levofloxacin 750 mg Oral Q24H   levothyroxine 25 mcg Oral Once per day on Mon Tue Wed Thu Fri   levothyroxine 50 mcg Oral Once per day on Sun Sat   lidocaine 2 patch Transdermal Daily   polyethylene glycol 17 g Oral Daily   pregabalin 75 mg Oral BID   risperiDONE 2 mg Oral BID   risperiDONE 4 mg Oral BID   senna 1 tablet Oral Daily        Today, Patient Was Seen By: Colleen Jimenez MD    ** Please Note: Dragon 360 Dictation voice to text software may have been used in the creation of this document   ** Initiate Treatment: Corn pared down\\nGet wider shoes Detail Level: Zone

## 2017-11-27 NOTE — PHYSICAL THERAPY NOTE
Physical Therapy Re-Evaluation    Patient's Name: Steve Esqueda    Admitting Diagnosis  Fever [R50 9]  Back pain [M54 9]  Wound infection [T14  8XXA, L08 9]    Problem List  Patient Active Problem List   Diagnosis    SIRS - Recent Spinal Wound Infection with Proteus     Wound dehiscence    Herniation of lumbar intervertebral disc    Hypothyroid    [facility] )    Autism    Morbid obesity due to excess calories ([facility] )    Deep vein thrombosis (DVT) of tibial vein (HCC)    Constipation    Sinus tachycardia       Past Medical History  Past Medical History:   Diagnosis Date    Autism     [facility]     Disease of thyroid gland     Hyperlipidemia     Lumbar disc disease     Psychiatric disorder     Compulsive Disorder    Sleep apnea     Urinary incontinence        Past Surgical History  Past Surgical History:   Procedure Laterality Date    COLONOSCOPY      INCISION AND DRAINAGE POSTERIOR SPINE N/A 11/1/2017    Procedure: INCISION AND DRAINAGE (I&D) SPINE;  Surgeon: Alessandro Holloway MD;  [facility];  Service: Neurosurgery    NC LAMNOTMY INCL W/DCMPRSN NRV ROOT 1 INTRSPC LUMBR Bilateral 10/17/2017    Procedure: LEFT L3/4 AND RIGHT L4/5 MICRODISCECTOMIES, HEMILAMINECTOMIES; POSSIBLE EPIDURAL STEROID INJECTIONS;  Surgeon: Alessandro Holloway MD;  [facility];  Service: Neurosurgery    WISDOM TOOTH EXTRACTION      WOUND DEBRIDEMENT N/A 11/3/2017    Procedure: DEBRIDEMENT WOUND ([address]), wound vac placement back;  Surgeon: Manisha Alex DO;  [facility];  Service: General    WOUND DEBRIDEMENT N/A 11/6/2017    Procedure: America Clement ([address];  Surgeon: Dewey Hawthorne MD;  [facility];  Service: General      11/27/17 1410   Note Type   Note type Re-eval   Pain Assessment   Pain Assessment 0-10   Pain Score Worst Possible Pain   Pain Type Acute pain;Chronic pain;Surgical pain   Pain Location Back   Pain Orientation Lower   Effect of Pain on Daily Activities Pt reports pain in back increases to a 38/10 with movement   Patient's Stated Pain Goal No pain   Hospital Pain Intervention(s) Repositioned; Ambulation/increased activity   Home Living   Type of [facility] Two level;Stairs to enter with rails  (3 [address]IB[address])   Bathroom Shower/Tub Tub/shower unit   Bathroom Toilet Standard   Bathroom Accessibility Accessible   Home Equipment Walker   Prior Function   Level of [place] Needs assistance with IADLs; Needs assistance with ADLs and functional mobility   Lives With Family   Receives Help From Family   ADL Assistance Independent   IADLs Independent   Falls in the last 6 months 0   Vocational On disability   Restrictions/Precautions   Weight Bearing Precautions Per Order No   Other Precautions Cognitive; Chair Alarm; Bed Alarm; Fall Risk;Pain;Multiple lines   General   Family/Caregiver Present Yes  (mother and father in law )   Cognition   Overall Cognitive Status Impaired   Arousal/Participation Cooperative   Attention Attends with cues to redirect   Orientation Level Oriented X4   Memory Decreased recall of precautions   Following Commands Follows one step commands with increased time or repetition   Comments Pt with baseline autism   Requires increased time and frequent rest breaks    RUE Assessment   RUE Assessment WFL   LUE Assessment   LUE Assessment WFL   RLE Assessment   RLE Assessment WFL   Strength RLE   R Hip Flexion 3+/5   R Hip ABduction 3+/5   R Hip ADduction 3+/5   R Knee Flexion 3+/5   R Knee Extension 3+/5   R Ankle Dorsiflexion 3+/5   R Ankle Plantar Flexion 3+/5   LLE Assessment   LLE Assessment WFL   Strength LLE   L Hip Flexion 4-/5   L Hip ABduction 4-/5   L Hip ADduction 4-/5   L Knee Flexion 4-/5   L Knee Extension 4-/5   L Ankle Dorsiflexion 4/5   L Ankle Plantar Flexion 4/5   Coordination   Movements are Fluid and Coordinated 0   Coordination and Movement Description antalgic, bradykinetic Sensation X   Light Touch   RLE Light Touch Impaired   LLE Light Touch Impaired   Proprioception   RLE Proprioception Impaired   LLE Proprioception Impaired   Bed Mobility   Rolling R 5  Supervision   Additional items Bedrails; Increased time required   Supine to Sit 3  Moderate assistance   Additional items Assist x 2; Increased time required;Verbal cues;LE management   Additional Comments Pt transferred EOB with moderate Ax2 and increased time  Pt requests time spent (appx 5 minutes) after each position change    Transfers   Sit to Stand Unable to assess   Balance   Static Sitting Fair +   Dynamic Sitting Fair   Endurance Deficit   Endurance Deficit Yes   Endurance Deficit Description ongoing deconditioning    Activity Tolerance   Activity Tolerance Patient limited by fatigue;Patient limited by pain   Medical Staff Made Aware will update CM with d/c dispo   Nurse Made Aware appropriate to see per Quang Alfaro RN    Assessment   Prognosis Fair   Problem List Decreased strength;Decreased range of motion;Decreased endurance; Impaired balance;Decreased mobility; Decreased cognition; Impaired judgement;Decreased safety awareness; Obesity; Decreased skin integrity;Orthopedic restrictions;Pain   Assessment PT re-eval performed this session following a d/c from PT services 2* pt not participating  This session, pt with notable increase in motivation  Able to perform bed mobility and supine <->sit transfer  Unable to perform sit<->stand 2* weakness and pain  Will assess OOB transfer via other means next session  PT to continue to follow pt during stay to progress functioanl mobility (I) and safety  Rehab remains appropriate to maximize funciton and reduce caregiver burden  Barriers to Discharge Inaccessible home environment;Decreased caregiver support   Goals   Patient Goals to get OOB    LTG Expiration Date 12/11/17   Long Term Goal #1 Pt will be mod(I) with bed mobility  Pt will be S with supine<->sit transfer   Pt will be modA with sit<->stand  Pt will stand erect >60 seconds and BUE support  Pt will particiapte in HEP  Pt's balance to improve to GOOD  Treatment Day 1   Plan   Treatment/Interventions Functional transfer training;LE strengthening/ROM; Therapeutic exercise;Cognitive reorientation; Endurance training;Patient/family training;Equipment eval/education; Bed mobility;Gait training;Continued evaluation;Spoke to nursing;OT   PT Frequency 5x/wk   Recommendation   Recommendation Short-term skilled PT   Equipment Recommended Other (Comment)  (TBD)   PT - OK to Discharge Yes  (to rehab when medically appropriate )   Barthel Index   Feeding 10   Bathing 0   Grooming Score 5   Dressing Score 5   Bladder Score 10   Bowels Score 10   Toilet Use Score 5   Transfers (Bed/Chair) Score 5   Mobility (Level Surface) Score 0   Stairs Score 0   Barthel Index Score 50             Maritza Arambula, PT

## 2017-11-27 NOTE — CASE MANAGEMENT
Continued Stay Review    Date: 11/25/2017    Vital Signs:   11/25/17 0813  97 8 °F (36 6 °C)   110 HR  20 Resp  134/89 BP  98 % O2 sat         Medications:   Scheduled Meds:   benztropine 1 mg Oral BID   clonazePAM 0 5 mg Oral BID   divalproex sodium 1,000 mg Oral Daily   divalproex sodium 500 mg Oral Q12H [address]   docusate sodium 100 mg Oral BID   fenofibrate 145 mg Oral Daily   levofloxacin 750 mg Oral Q24H   levothyroxine 25 mcg Oral Once per day on Mon Tue Wed Thu Fri   levothyroxine 50 mcg Oral Once per day on Sun Sat   lidocaine 2 patch Transdermal Daily   polyethylene glycol 17 g Oral Daily   pregabalin 75 mg Oral BID   risperiDONE 2 mg Oral BID   risperiDONE 4 mg Oral BID   senna 1 tablet Oral Daily   warfarin 5 mg Oral Daily (warfarin)     Continuous Infusions: heparin (porcine) 25,000 units in 250 mL infusion (premix)  Rate: 3 8-37 5 mL/hr Freq: Titrated Route: IV    PRN Meds:   acetaminophen    HYDROmorphone    Methocarbamol PO x 1     ondansetron    oxyCODONE      Abnormal Labs/Diagnostic Results:   Protime-INR [[id number]] (Abnormal) Collected: 11/26/17 0536   Lab Status: Final result Specimen: Blood from Central Venous Line Updated: 11/26/17 5376    Protime 19 0 (H) 12 1 - 14 4 seconds     INR 1 58 (H) 0 86 - 1 16    APTT [[id number]] (Abnormal) Collected: 11/25/17 1555   Lab Status: Final result Specimen: Blood from Arm, Right Updated: 11/25/17 1632    PTT 81 (H) 23 - 35 seconds    Narrative:       Therapeutic Heparin Range = 60-90 seconds   Results from last 7 days  Lab Units 11/23/17  0553   11/20/17  0511   WBC Thousand/uL  --   --  4 86   HEMOGLOBIN g/dL 10 1*  < > 10 0*   HEMATOCRIT % 31 2*  < > 30 0*         Age/Sex: 33 y o  male     Assessment/Plan:      Principal Problem:    SIRS - Recent Spinal Wound Infection with Proteus   Active Problems:    Hypothyroid    Bipolar disorder (HCC)    Autism    Morbid obesity due to excess calories (HCC)    Deep vein thrombosis (DVT) of tibial vein (HCC) Constipation    Sinus tachycardia     Sepsis secondary to Proteus infection  Unable to assess lumbar area due to noncompliance with physical exam  Vital signs unremarkable except for tachycardia of 110  Continue Levaquin until 12/30/2017  Remove PICC once off heparin     Low H&H-chronic anemia  No blood transfusion at this time     DVT  Continue heparin until INR is therapeutic currently subtherapeutic  Will add warfarin 10 mg daily until INR is therapeutic     Noncompliance to physical therapy  Patient is on willing to perform any physical activity which he is afraid that may contribute to worsening back spasms     Psychiatric disorder-filled with mood instability  Refused to see psychiatrist, continue medications        VTE Pharmacologic Prophylaxis:   Pharmacologic: Heparin  Mechanical VTE Prophylaxis in Place: Yes      The patient will continue to require additional inpatient hospital stay due to Medical Management     Discharge Plan:  Rehab

## 2017-11-27 NOTE — PHYSICAL THERAPY NOTE
PT TREATMENT        11/27/17 6758   Pain Assessment   Pain Assessment 0-10   Pain Score Worst Possible Pain   Pain Type Acute pain;Chronic pain;Surgical pain   Pain Location Back   Pain Orientation Lower   Effect of Pain on Daily Activities pt able to tolerate movement when pacing and allowing increased time for breathing techniques and calming methods   Patient's Stated Pain Goal No pain   Hospital Pain Intervention(s) Repositioned; Ambulation/increased activity   Restrictions/Precautions   Weight Bearing Precautions Per Order No   Other Precautions Cognitive; Chair Alarm; Bed Alarm;Multiple lines; Fall Risk;Pain   General   Chart Reviewed Yes   Response to Previous Treatment Patient with no complaints from previous session  Family/Caregiver Present Yes  (mother and father in law )   Cognition   Overall Cognitive Status Impaired   Arousal/Participation Cooperative   Attention Attends with cues to redirect   Orientation Level Oriented X4   Memory Decreased recall of precautions   Following Commands Follows one step commands with increased time or repetition   Subjective   Subjective Pt agreeable to particiapte in PT session    Bed Mobility   Supine to Sit 3  Moderate assistance   Additional items Assist x 2; Increased time required;Verbal cues;LE management   Transfers   Sit to Stand Unable to assess   Sliding Board transfer 3  Moderate assistance   Additional items Assist x 1   Additional Comments Pt required increased time sitting EOB to allow for overcoming of fear and preparing himself  Notable UE tremors with increased pain  Pt able to perform slide board transfer with assistance x1 but benefitted from additional 1-2 persons for safety and set up  Pt requires increased time and performs better when having control over the situation  Pt intially not responding well to verbal and physical instruciton but more open to when attempting to perform himself first  Pt able to reposition in chair   Drop arm recliner utilized  Extended time spent educating patient on benefits of OOB and importance of mobility    Ambulation/Elevation   Gait pattern Not appropriate; Not tested   Balance   Static Sitting Fair +   Dynamic Sitting Fair -   Endurance Deficit   Endurance Deficit Yes   Endurance Deficit Description ongoing deconditioning    Activity Tolerance   Activity Tolerance Patient limited by fatigue;Patient limited by pain   Medical Staff Made Aware will update CM with d/c dispo   Nurse Made Aware Radha Busby-RN    Assessment   Prognosis Fair   Problem List Decreased strength;Decreased range of motion;Decreased endurance; Impaired balance;Decreased mobility; Decreased cognition; Impaired judgement;Decreased safety awareness; Obesity; Decreased skin integrity;Orthopedic restrictions;Pain   Assessment Pt engaged in OOB with PT this session using slide board transfer  Pt able to perform with little assistance but benefitted from increased time and additional persons for safety and guarding  Education provided for importance of mobility and OOB  Will trial OOB via drop arm recliner and w/c mobiity as patient allows  PT to continue to follow pt during stay to progress functioanl mobility (I) and safety  Rehab remains appropriate to maximize funciton and reduce caregiver burden  Barriers to Discharge Inaccessible home environment;Decreased caregiver support   Goals   Patient Goals to get OOB    LTG Expiration Date 12/11/17   Long Term Goal #1 Additional goal: OOB via slide board and Ax1  W/c mobility > 50' with verbal instruction    Treatment Day 2   Plan   Treatment/Interventions Functional transfer training;LE strengthening/ROM; Therapeutic exercise; Endurance training;Patient/family training;Equipment eval/education; Bed mobility;Spoke to nursing   Progress Progressing toward goals   PT Frequency 5x/wk   Recommendation   Recommendation Short-term skilled PT   Equipment Recommended Walker; Wheelchair; Other (Comment)  (slide board)   PT - OK to Discharge No  (to rehab when medically appropriate )   Additional Comments OOB in chair with mother by Paddy Martell  PCA and RN notified   RN [facility], PT

## 2017-11-27 NOTE — PLAN OF CARE
Problem: PHYSICAL THERAPY ADULT  Goal: Performs mobility at highest level of function for planned discharge setting  See evaluation for individualized goals  Treatment/Interventions: Continued evaluation  Equipment Recommended: Other (Comment) (TBD; pt owns RW)       See flowsheet documentation for full assessment, interventions and recommendations  Outcome: Progressing  Prognosis: Fair  Problem List: Decreased strength, Decreased range of motion, Decreased endurance, Impaired balance, Decreased mobility, Decreased cognition, Impaired judgement, Decreased safety awareness, Obesity, Decreased skin integrity, Orthopedic restrictions, Pain  Assessment: Pt engaged in OOB with PT this session using slide board transfer  Pt able to perform with little assistance but benefitted from increased time and additional persons for safety and guarding  Education provided for importance of mobility and OOB  Will trial OOB via drop arm recliner and w/c mobiity as patient allows  PT to continue to follow pt during stay to progress functioanl mobility (I) and safety  Rehab remains appropriate to maximize funciton and reduce caregiver burden  Barriers to Discharge: Inaccessible home environment, Decreased caregiver support     Recommendation: Short-term skilled PT     PT - OK to Discharge: No (to rehab when medically appropriate )    See flowsheet documentation for full assessment

## 2017-11-27 NOTE — RESPIRATORY THERAPY NOTE
RT Protocol Note  Prudence Yumi Bradley 33 y o  male MRN: [id number]  [facility]: [id number]    Assessment    Principal Problem:    SIRS - Recent Spinal Wound Infection with Proteus   Active Problems:    Hypothyroid    Bipolar disorder ([facility] )    Autism    Morbid obesity due to excess calories (HCC)    Deep vein thrombosis (DVT) of tibial vein (HCC)    Constipation    Sinus tachycardia      Home Pulmonary Medications:  None  Past Medical History:   Diagnosis Date    Autism     Bipolar 1 disorder ([place] [address] )     Disease of thyroid gland     Hyperlipidemia     Lumbar disc disease     Psychiatric disorder     Compulsive Disorder    Sleep apnea     Urinary incontinence      Social History     Social History    Marital status: Single     Spouse name: N/A    Number of children: N/A    Years of education: N/A     Social History Main Topics    Smoking status: Never Smoker    Smokeless tobacco: Never Used    Alcohol use No    Drug use: No    Sexual activity: Not Asked     Other Topics Concern    None     Social History Narrative    None       Subjective         Objective    Physical Exam:        Vitals:  Blood pressure 136/83, pulse 89, temperature 98 1 °F (36 7 °C), temperature source Oral, resp  rate 22, height 5' 10" (1 778 m), weight (!) 145 kg (319 lb), SpO2 98 %  Imaging and other studies: I have personally reviewed pertinent reports  Plan             Resp Comments: (P) pt not requiring BIPAP since admission  no documentation that pt wears at home  no settings ordered  will D/C BIPAP order

## 2017-11-27 NOTE — OCCUPATIONAL THERAPY NOTE
OCCUPATIONAL THERAPY CANCEL NOTE:      ATTEMPTED TO SEE PT THIS AM FOR OT TX SESSION, HOWEVER, PT REFUSING ANY OOB ACTIVITY AT THIS TIME  WITH ENCOURAGEMENT, PT AGREEABLE TO PARTICIPATE IN THERAPY LATER TODAY BETWEEN [phone]  SPOKE TO RN, MARIANO, REGARDING SCHEDULED THERAPY SESSION THIS AFTERNOON AND PLANNED FOR PAIN MEDICATION AS APPROPRIATE  WILL ATTEMPT BACK       Zacharysé Adrián, MOT, OTR/L

## 2017-11-27 NOTE — PLAN OF CARE
DISCHARGE PLANNING     Discharge to home or other facility with appropriate resources Progressing        DISCHARGE PLANNING - CARE MANAGEMENT     Discharge to post-acute care or home with appropriate resources Progressing        INFECTION - ADULT     Absence or prevention of progression during hospitalization Progressing     Absence of fever/infection during neutropenic period Progressing        Nutrition/Hydration-ADULT     Nutrient/Hydration intake appropriate for improving, restoring or maintaining nutritional needs Progressing        PAIN - ADULT     Verbalizes/displays adequate comfort level or baseline comfort level Progressing        Potential for Falls     Patient will remain free of falls Progressing        Prexisting or High Potential for Compromised Skin Integrity     Skin integrity is maintained or improved Progressing        SAFETY ADULT     Maintain or return to baseline ADL function Progressing     Maintain or return mobility status to optimal level Progressing

## 2017-11-27 NOTE — PROGRESS NOTES
Progress Note - Infectious Disease   Susie Bradley 33 y o  male MRN: [id number]  [facility] Encounter: [id number]      Impression/Plan:  1   SIRS versus sepsis-POA:  Fever and tachycardia  Urinalysis and blood cultures negative  Surgical site appears clean and intact without infection  CT A/P negative for abscess; SQ air likely due to recently removed SAÚL drain  Likely noninfectious due to severe constipation, DVT, ? PE, drug fever or viral process  Clinically stable and nontoxic  The fever and tachycardia have resolved  The patient was not bacteremic  Continue antibiotics as below  Monitor temperature  Continue supportive care as per the primary service  Recommend remove PICC line as soon is no longer needed for anticoagulation       2   History of Proteus sepsis due to postoperative wound infection-High risk IV antibiotic candidate due to #5, now with readmission with fever  Sensitive to FQ and baseline QTC normal   Unclear allergy to ciprofloxacin  Tolerating levofloxacin   His follow-up QTC is in the normal range  Continue levofloxacin through 12/13/2017  Check CBC with diff and creatinine 12/4/17  Monitor temperature  Supportive care as per the primary service     3   Severe constipation-Possibly due to narcotic pain medicine  Improved and now having bowel movements  Continue MiraLax per patient request  Monitor symptomatology     4   RLE subacute/chronic DVT-Does not appear complicated by pulmonary embolism based on CT findings   Could be the cause of the fever  The INR is now therapeutic  The patient still does have some leg swelling  Continue anticoagulation as per the primary service     5   Autism/BPD     6   Disposition-patient possibly going to rehab  Ian Morgan to discharge from infectious disease standpoint   Follow up with me 12/7/2017 at 2:30 p m       Antibiotics:  Levaquin 10  Antibiotics 28  Postop day 26     Subjective:  Patient has no fever, chills, sweats; no nausea, vomiting, diarrhea; no cough, shortness of breath; no increased pain  No new symptoms  He is anxious to get out of the hospital     Objective:  Vitals:  HR:  [] 102  Resp:  [18-22] 18  BP: (118-136)/(74-84) 118/84  SpO2:  [98 %-99 %] 99 %  Temp (24hrs), Av 1 °F (36 7 °C), Min:97 9 °F (36 6 °C), Max:98 3 °F (36 8 °C)  Current: Temperature: 98 3 °F (36 8 °C)    Physical Exam:   General Appearance:  Alert, nontoxic, no acute distress  Throat: Oropharynx moist without lesions  Lungs:   Clear to auscultation anteriorly; respirations unlabored   Heart:  RRR; no murmur, rub or gallop   Abdomen:   Soft, non-tender, non-distended, positive bowel sounds  Extremities: No clubbing, cyanosis   Skin: No new rashes or lesions  No draining wounds noted         Labs, Imaging, & Other studies:   All pertinent labs and imaging studies were personally reviewed    Results from last 7 days  Lab Units 17  0440 17  0553 17  0459   WBC Thousand/uL 10 12  --   --    HEMOGLOBIN g/dL 10 8* 10 1* 10 1*   PLATELETS Thousands/uL 173  --   --        Results from last 7 days  Lab Units 17  1330   SODIUM mmol/L 135*   POTASSIUM mmol/L 4 0   CHLORIDE mmol/L 100   CO2 mmol/L 27   ANION GAP mmol/L 8   BUN mg/dL 11   CREATININE mg/dL 0 50*   EGFR ml/min/1 73sq m 150   GLUCOSE RANDOM mg/dL 93   CALCIUM mg/dL 9 2   AST U/L 21   ALT U/L 48   ALK PHOS U/L 37*   TOTAL PROTEIN g/dL 7 1   ALBUMIN g/dL 2 7*   BILIRUBIN TOTAL mg/dL 0 26

## 2017-11-28 PROCEDURE — 97530 THERAPEUTIC ACTIVITIES: CPT

## 2017-11-28 RX ADMIN — DIVALPROEX SODIUM 1000 MG: 500 TABLET, DELAYED RELEASE ORAL at 13:58

## 2017-11-28 RX ADMIN — PREGABALIN 75 MG: 75 CAPSULE ORAL at 08:34

## 2017-11-28 RX ADMIN — OXYCODONE HYDROCHLORIDE 10 MG: 10 TABLET ORAL at 15:28

## 2017-11-28 RX ADMIN — WARFARIN SODIUM 5 MG: 5 TABLET ORAL at 17:54

## 2017-11-28 RX ADMIN — ACETAMINOPHEN 650 MG: 325 TABLET, FILM COATED ORAL at 08:34

## 2017-11-28 RX ADMIN — BENZTROPINE MESYLATE 1 MG: 1 TABLET ORAL at 17:55

## 2017-11-28 RX ADMIN — DIVALPROEX SODIUM 500 MG: 500 TABLET, DELAYED RELEASE ORAL at 20:42

## 2017-11-28 RX ADMIN — RISPERIDONE 2 MG: 1 TABLET ORAL at 08:34

## 2017-11-28 RX ADMIN — CLONAZEPAM 0.5 MG: 0.5 TABLET ORAL at 17:54

## 2017-11-28 RX ADMIN — DIVALPROEX SODIUM 500 MG: 500 TABLET, DELAYED RELEASE ORAL at 08:34

## 2017-11-28 RX ADMIN — METHOCARBAMOL 500 MG: 500 TABLET ORAL at 08:34

## 2017-11-28 RX ADMIN — METHOCARBAMOL 500 MG: 500 TABLET ORAL at 13:58

## 2017-11-28 RX ADMIN — PREGABALIN 75 MG: 75 CAPSULE ORAL at 17:54

## 2017-11-28 RX ADMIN — LIDOCAINE 2 PATCH: 50 PATCH CUTANEOUS at 08:40

## 2017-11-28 RX ADMIN — RISPERIDONE 2 MG: 1 TABLET ORAL at 17:54

## 2017-11-28 RX ADMIN — METHOCARBAMOL 500 MG: 500 TABLET ORAL at 20:42

## 2017-11-28 RX ADMIN — RISPERIDONE 4 MG: 1 TABLET ORAL at 08:35

## 2017-11-28 RX ADMIN — ACETAMINOPHEN 650 MG: 325 TABLET, FILM COATED ORAL at 13:57

## 2017-11-28 RX ADMIN — RISPERIDONE 4 MG: 1 TABLET ORAL at 17:54

## 2017-11-28 RX ADMIN — LEVOTHYROXINE SODIUM 25 MCG: 25 TABLET ORAL at 05:22

## 2017-11-28 RX ADMIN — CLONAZEPAM 0.5 MG: 0.5 TABLET ORAL at 08:34

## 2017-11-28 RX ADMIN — LEVOFLOXACIN 750 MG: 750 TABLET, FILM COATED ORAL at 13:58

## 2017-11-28 RX ADMIN — FENOFIBRATE 145 MG: 145 TABLET ORAL at 08:35

## 2017-11-28 RX ADMIN — BENZTROPINE MESYLATE 1 MG: 1 TABLET ORAL at 08:35

## 2017-11-28 NOTE — PROGRESS NOTES
The patient was seen and examined today  We discussed the necessity of participating with physical therapy and beginning to ambulate  We discussed that should he fail to do this he is at risk for further clot formation, wound breakdown, and medical decline  He states today that he is committed beginning physical therapy  He would like to start ambulating again  I inspected his incision  He has a healing well  His drain exit site is open but not draining  His mother was on the phone as able to talk her personally  He I re-emphasized the importance of him participated in therapies  I emphasized that in at this time I do not believe he is safe to go home  Additionally should he fail to progress he main requires skilled nursing facility prior to returning to his home environment  She agreed and would help facilitate his participation as much as possible

## 2017-11-28 NOTE — PHYSICAL THERAPY NOTE
Physical Therapy Progress Note     11/28/17 1210   Pain Assessment   Pain Assessment 0-10   Pain Score 3   Pain Type Acute pain   Pain Location Back   Pain Orientation Lower   Pain Descriptors Aching   Pain Frequency Intermittent   Hospital Pain Intervention(s) Repositioned   Response to Interventions tolerated    Restrictions/Precautions   Weight Bearing Precautions Per Order No   Other Precautions Cognitive; Chair Alarm; Bed Alarm; Fall Risk;Pain;Spinal precautions   General   Chart Reviewed Yes   Response to Previous Treatment Patient with no complaints from previous session  Family/Caregiver Present (Mom present end of tx)   Cognition   Overall Cognitive Status Impaired   Arousal/Participation Alert; Cooperative   Attention Attends with cues to redirect   Orientation Level Oriented X4   Following Commands Follows one step commands with increased time or repetition   Subjective   Subjective states that he  knows how to  trnasfer  via slide board   wants to get into  wc   Bed Mobility   Rolling R 4  Minimal assistance   Rolling L 4  Minimal assistance   Supine to Sit 3  Moderate assistance   Additional items Assist x 1;Bedrails; Increased time required;Verbal cues;LE management   Sit to Supine 3  Moderate assistance   Additional items Assist x 2; Increased time required; Impulsive;Verbal cues;LE management   Transfers   Sliding Board transfer 4  Minimal assistance   Additional items Assist x 2; Increased time required;Verbal cues  (second person needed  to  manage wc )   Ambulation/Elevation   Gait pattern Not appropriate   Balance   Static Sitting Fair +   Dynamic Sitting Fair   Endurance Deficit   Endurance Deficit Yes   Endurance Deficit Description pain fear    Activity Tolerance   Activity Tolerance Patient limited by pain  (fear)   Nurse Made Aware Kehinde rn    Assessment   Prognosis Fair   Problem List Decreased strength;Decreased endurance; Impaired balance;Decreased mobility; Decreased cognition;Decreased safety awareness; Obesity;Pain   Assessment pt  agreeable to  perform activities c PT   c extra  time needed  to  complete  each task     he has less pain this session     pt  did  transfer to sitting  c mod A x1 (log roll)   he  does  sit unsupported  on EOB     pt completed   slide board trnasfer   c min A x2   p setup    verbal instruction  needed  for  proper technique and reassurance   pt  did propel wc indep'ly  using  BLE  He was  very  pleased  c mobility      he did  tolerated  sitting   but   suddenly  c/o  not feeling well  (lightheaded) bp 111/73-hr135   p 5 min 125/73 hr 137   AQcindy rn  than Kehinde rn aware  and present   pt  was trnasfered back to bed  c min A x2  he was  made comfortable in bed  hr 109  pt Mom present  for  transfer   all her  questions answered to her satisfaction       Barriers to Discharge Inaccessible home environment   Goals   Patient Goals go home    LTG Expiration Date 12/11/17   Treatment Day 3   Plan   Treatment/Interventions Functional transfer training;Patient/family training;Bed mobility;Spoke to nursing;Spoke to case management   Progress Progressing toward goals   PT Frequency 5x/wk   Recommendation   Recommendation Short-term skilled PT   Equipment Recommended Wheelchair;Walker  (slide  board)   PT - OK to Discharge (to rehab  when  med ready )     Zulay Del Cid, PTA

## 2017-11-28 NOTE — PROGRESS NOTES
Done PT [facility] Progress Note  Patient: Shira Bhatt 33 y o  male   MRN: [id number]  PCP: Mann Ruggiero MD  Unit/Bed#: [facility] 905-01 Encounter: [id number]  Date Of Visit: 11/28/17    Assessment/Plan[de-identified]  Sepsis secondary to spinal wound infection secondary to Proteus  - PICC line has been removed and IV antibiotics discontinued  - patient will remain on Levaquin p o  until December 13 with weekly CBC with differential and BMP  - infectious Disease and Neurosurgery are following    Subacute /Acute DVT of right lower extremity  - INR yesterday 2 06  - Coumadin tonight as per EMR, 5 mg  - INR in a m  Severe deconditioning  - patient has refused to participate in physical therapy  - at this time, extensive deconditioning so severe that the patient is unable to stent the use cane, crutches, walker, also in light of the recent back surgery and for this mother he will require wheelchair at discharge as is declining inpatient rehab  - also, at the discharge, given the patient recent back surgery, with pain and limited mobility in need of frequent repositioning patient would require for electric hospital bed to be provided home    Severe constipation  - continue with current bowel regimen    Autism/BPD  - patient able to make his own medical decisions  - refusing inpatient physical therapy  - this has been discussed with mother, who is in support of this decision    VTE Pharmacologic Prophylaxis: yes  Pharmacologic: Warfarin (Coumadin)  Mechanical VTE Prophylaxis in Place:  Yes     Patient Centered Rounds: I have performed bedside rounds with nursing staff today      Discussions with Specialists or Other Care Team Provider:  physical therapy     Education and Discussions with Family / Patient:  Discussed with patient, mother updated over the phone    Current Length of Stay: 11 day(s)     Current Patient Status: Inpatient   Certification Statement: The patient will continue to require additional inpatient hospital stay due to medical mgt     Discharge Plan:  Home with home PT when improved enough, patient declining rehab    Code Status: Level 1 - Full Code    Subjective:   Patient is feeling better  He tells me that he will work with physical therapy today    Objective:     Vitals:   Temp (24hrs), Av °F (36 7 °C), Min:97 6 °F (36 4 °C), Max:98 7 °F (37 1 °C)    HR:  [102-110] 102  Resp:  [20-21] 20  BP: (116-126)/(57-77) 116/57  SpO2:  [95 %-98 %] 97 %  Body mass index is 45 77 kg/m²  Input and Output Summary (last 24 hours): Intake/Output Summary (Last 24 hours) at 17 1627  Last data filed at 17 1432   Gross per 24 hour   Intake             1530 ml   Output             5050 ml   Net            -3520 ml       Physical Exam:     Physical Exam   Constitutional: He is oriented to person, place, and time  He appears well-developed  Cardiovascular: Normal rate, regular rhythm and normal heart sounds  Exam reveals no friction rub  Pulmonary/Chest: Effort normal and breath sounds normal  No respiratory distress  He has no wheezes  He has no rales  Abdominal: Soft  Bowel sounds are normal  He exhibits no distension  There is no tenderness  There is no rebound  Neurological: He is alert and oriented to person, place, and time  Moves the 4 extremities in bed   Skin: Skin is warm  Psychiatric: He has a normal mood and affect         Additional Data:     Labs:      Results from last 7 days  Lab Units 17  0440   WBC Thousand/uL 10 12   HEMOGLOBIN g/dL 10 8*   HEMATOCRIT % 33 0*   PLATELETS Thousands/uL 173   LYMPHO PCT % 12*   MONO PCT MAN % 1*   EOSINO PCT MANUAL % 3       Results from last 7 days  Lab Units 17  1330   SODIUM mmol/L 135*   POTASSIUM mmol/L 4 0   CHLORIDE mmol/L 100   CO2 mmol/L 27   BUN mg/dL 11   CREATININE mg/dL 0 50*   CALCIUM mg/dL 9 2   TOTAL PROTEIN g/dL 7 1   BILIRUBIN TOTAL mg/dL 0 26   ALK PHOS U/L 37*   ALT U/L 48   AST U/L 21 GLUCOSE RANDOM mg/dL 93       Results from last 7 days  Lab Units 11/27/17  0440   INR  2 06*       * I Have Reviewed All Lab Data Listed Above  * Additional Pertinent Lab Tests Reviewed: All Labs Within Last 24 Hours Reviewed    Imaging:    Imaging Reports Reviewed Today Include:  None  Imaging Personally Reviewed by Myself Includes:  None    Recent Cultures (last 7 days):           Last 24 Hours Medication List:     benztropine 1 mg Oral BID   clonazePAM 0 5 mg Oral BID   divalproex sodium 1,000 mg Oral Daily   divalproex sodium 500 mg Oral Q12H LENO   docusate sodium 100 mg Oral BID   fenofibrate 145 mg Oral Daily   levofloxacin 750 mg Oral Q24H   levothyroxine 25 mcg Oral Once per day on Mon Tue Wed Thu Fri   levothyroxine 50 mcg Oral Once per day on Sun Sat   lidocaine 2 patch Transdermal Daily   polyethylene glycol 17 g Oral Daily   pregabalin 75 mg Oral BID   risperiDONE 2 mg Oral BID   risperiDONE 4 mg Oral BID   senna 1 tablet Oral Daily   warfarin 5 mg Oral Daily (warfarin)        Today, Patient Was Seen By: Denny Boyer MD    ** Please Note: Dragon 360 Dictation voice to text software may have been used in the creation of this document   **

## 2017-11-28 NOTE — PLAN OF CARE
Problem: PHYSICAL THERAPY ADULT  Goal: Performs mobility at highest level of function for planned discharge setting  See evaluation for individualized goals  Treatment/Interventions: Continued evaluation  Equipment Recommended: Other (Comment) (TBD; pt owns RW)       See flowsheet documentation for full assessment, interventions and recommendations  Outcome: Progressing  Prognosis: Fair  Problem List: Decreased strength, Decreased endurance, Impaired balance, Decreased mobility, Decreased cognition, Decreased safety awareness, Obesity, Pain  Assessment: pt  agreeable to  perform activities c PT   c extra  time needed  to  complete  each task     he has less pain this session     pt  did  transfer to sitting  c mod A x1 (log roll)   he  does  sit unsupported  on EOB     pt completed   slide board trnasfer   c min A x2   p setup    verbal instruction  needed  for  proper technique and reassurance   pt  did propel wc indep'ly  using  BLE  He was  very  pleased  c mobility      he did  tolerated  sitting   but   suddenly  c/o  not feeling well  (lightheaded) bp 111/73-hr135   p 5 min 125/73 hr 137   AQlys rn  than Kehinde quiñonez aware  and present   pt  was trnasfered back to bed  c min A x2  he was  made comfortable in bed  hr 109  pt Mom present  for  transfer   all her  questions answered to her satisfaction   Barriers to Discharge: Inaccessible home environment     Recommendation: Short-term skilled PT     PT - OK to Discharge:  (to rehab  when  med ready )    See flowsheet documentation for full assessment

## 2017-11-28 NOTE — SOCIAL WORK
Cm left voice message for patient's mother to discuss referral for [facility] services at time of d/c  Cm to send DME request once all documentation is obtained

## 2017-11-28 NOTE — PROGRESS NOTES
Progress Note - Infectious Disease   Job Gary Bradley 33 y o  male MRN: [id number]  Unit/Bed#: [facility] 905-01 Encounter: [id number]      Impression/Plan:  1   SIRS versus sepsis-POA:  Fever and tachycardia  Urinalysis and blood cultures negative  Surgical site appears clean and intact without infection  CT A/P negative for abscess; SQ air likely due to recently removed SAÚL drain  Likely noninfectious due to severe constipation, DVT, ? PE, drug fever or viral process  Clinically stable and nontoxic  The fever has resolved   The patient was not bacteremic  The PICC line has been removed  Continue antibiotics as below  Monitor temperature  Continue supportive care as per the primary service     2   History of Proteus sepsis due to postoperative wound infection-High risk IV antibiotic candidate due to #5, now with readmission with fever  Sensitive to FQ and baseline QTC normal   Unclear allergy to ciprofloxacin  Tolerating levofloxacin   His follow-up QTC is in the normal range  Continue levofloxacin through 12/13/2017  Check CBC with diff and creatinine 12/4/17  Monitor temperature  Supportive care as per the primary service     3   Severe constipation-Possibly due to narcotic pain medicine   Improved and now having bowel movements  Continue MiraLax per patient request  Monitor symptomatology     4   RLE subacute/chronic DVT-Does not appear complicated by pulmonary embolism based on CT findings   Could be the cause of the fever  The INR is now therapeutic  The patient still does have some leg swelling  Continue anticoagulation as per the primary service     5   Autism/BPD     6   Disposition-patient possibly going to rehab  Laray Fulling to discharge from infectious disease standpoint   Follow up with me 12/7/2017 at 2:30 p m  Antibiotics:  Levaquin 11  Antibiotics 29  Postop day 27     Subjective:  Patient has no fever, chills, sweats; no nausea, vomiting, diarrhea; no cough, shortness of breath; no increased pain  No new symptoms  He is upset by the possibility that he may need to go to rehab  He insists that he is going home  His PICC line was removed yesterday  Objective:  Vitals:  HR:  [102-126] 102  Resp:  [21-22] 21  BP: (118-122)/(76-77) 118/77  SpO2:  [95 %-98 %] 95 %  Temp (24hrs), Av 6 °F (37 °C), Min:98 5 °F (36 9 °C), Max:98 7 °F (37 1 °C)  Current: Temperature: 98 7 °F (37 1 °C)    Physical Exam:   General Appearance:  Alert, nontoxic, no acute distress  Throat: Oropharynx moist without lesions  Lungs:   Clear to auscultation bilaterally; respirations unlabored   Heart:  Tachycardic; no murmur, rub or gallop   Abdomen:   Soft, non-tender, non-distended, positive bowel sounds  Extremities: No clubbing, cyanosis or edema   Skin: No new rashes or lesions  No draining wounds noted         Labs, Imaging, & Other studies:   All pertinent labs and imaging studies were personally reviewed    Results from last 7 days  Lab Units 17  0440 17  0553 17  0459   WBC Thousand/uL 10 12  --   --    HEMOGLOBIN g/dL 10 8* 10 1* 10 1*   PLATELETS Thousands/uL 173  --   --        Results from last 7 days  Lab Units 17  1330   SODIUM mmol/L 135*   POTASSIUM mmol/L 4 0   CHLORIDE mmol/L 100   CO2 mmol/L 27   ANION GAP mmol/L 8   BUN mg/dL 11   CREATININE mg/dL 0 50*   EGFR ml/min/1 73sq m 150   GLUCOSE RANDOM mg/dL 93   CALCIUM mg/dL 9 2   AST U/L 21   ALT U/L 48   ALK PHOS U/L 37*   TOTAL PROTEIN g/dL 7 1   ALBUMIN g/dL 2 7*   BILIRUBIN TOTAL mg/dL 0 26

## 2017-11-29 ENCOUNTER — APPOINTMENT (INPATIENT)
Dept: RADIOLOGY | Facility: HOSPITAL | Age: 27
DRG: 862 | End: 2017-11-29
Payer: MEDICARE

## 2017-11-29 LAB
ANION GAP SERPL CALCULATED.3IONS-SCNC: 7 MMOL/L (ref 4–13)
BASOPHILS # BLD MANUAL: 0 THOUSAND/UL (ref 0–0.1)
BASOPHILS NFR MAR MANUAL: 0 % (ref 0–1)
BUN SERPL-MCNC: 13 MG/DL (ref 5–25)
CALCIUM SERPL-MCNC: 9.3 MG/DL (ref 8.3–10.1)
CHLORIDE SERPL-SCNC: 101 MMOL/L (ref 100–108)
CO2 SERPL-SCNC: 27 MMOL/L (ref 21–32)
CREAT SERPL-MCNC: 0.59 MG/DL (ref 0.6–1.3)
EOSINOPHIL # BLD MANUAL: 0.25 THOUSAND/UL (ref 0–0.4)
EOSINOPHIL NFR BLD MANUAL: 3 % (ref 0–6)
ERYTHROCYTE [DISTWIDTH] IN BLOOD BY AUTOMATED COUNT: 15.3 % (ref 11.6–15.1)
GFR SERPL CREATININE-BSD FRML MDRD: 140 ML/MIN/1.73SQ M
GLUCOSE SERPL-MCNC: 78 MG/DL (ref 65–140)
HCT VFR BLD AUTO: 34.8 % (ref 36.5–49.3)
HGB BLD-MCNC: 11.4 G/DL (ref 12–17)
INR PPP: 2.12 (ref 0.86–1.16)
LG PLATELETS BLD QL SMEAR: PRESENT
LYMPHOCYTES # BLD AUTO: 1.44 THOUSAND/UL (ref 0.6–4.47)
LYMPHOCYTES # BLD AUTO: 17 % (ref 14–44)
MAGNESIUM SERPL-MCNC: 2.2 MG/DL (ref 1.6–2.6)
MCH RBC QN AUTO: 25.5 PG (ref 26.8–34.3)
MCHC RBC AUTO-ENTMCNC: 32.8 G/DL (ref 31.4–37.4)
MCV RBC AUTO: 78 FL (ref 82–98)
METAMYELOCYTES NFR BLD MANUAL: 5 % (ref 0–1)
MONOCYTES # BLD AUTO: 0.59 THOUSAND/UL (ref 0–1.22)
MONOCYTES NFR BLD: 7 % (ref 4–12)
MYELOCYTES NFR BLD MANUAL: 4 % (ref 0–1)
NEUTROPHILS # BLD MANUAL: 4.82 THOUSAND/UL (ref 1.85–7.62)
NEUTS BAND NFR BLD MANUAL: 1 % (ref 0–8)
NEUTS SEG NFR BLD AUTO: 56 % (ref 43–75)
NRBC BLD AUTO-RTO: 1 /100 WBCS
PHOSPHATE SERPL-MCNC: 5.8 MG/DL (ref 2.7–4.5)
PLATELET # BLD AUTO: 182 THOUSANDS/UL (ref 149–390)
PLATELET BLD QL SMEAR: ADEQUATE
PMV BLD AUTO: 10.2 FL (ref 8.9–12.7)
POLYCHROMASIA BLD QL SMEAR: PRESENT
POTASSIUM SERPL-SCNC: 4.2 MMOL/L (ref 3.5–5.3)
PROTHROMBIN TIME: 24 SECONDS (ref 12.1–14.4)
RBC # BLD AUTO: 4.47 MILLION/UL (ref 3.88–5.62)
RBC MORPH BLD: PRESENT
SODIUM SERPL-SCNC: 135 MMOL/L (ref 136–145)
TOXIC GRANULES BLD QL SMEAR: PRESENT
VARIANT LYMPHS # BLD AUTO: 7 %
WBC # BLD AUTO: 8.45 THOUSAND/UL (ref 4.31–10.16)

## 2017-11-29 PROCEDURE — 97530 THERAPEUTIC ACTIVITIES: CPT

## 2017-11-29 PROCEDURE — 80048 BASIC METABOLIC PNL TOTAL CA: CPT | Performed by: INTERNAL MEDICINE

## 2017-11-29 PROCEDURE — 83735 ASSAY OF MAGNESIUM: CPT | Performed by: INTERNAL MEDICINE

## 2017-11-29 PROCEDURE — 70450 CT HEAD/BRAIN W/O DYE: CPT

## 2017-11-29 PROCEDURE — 84100 ASSAY OF PHOSPHORUS: CPT | Performed by: INTERNAL MEDICINE

## 2017-11-29 PROCEDURE — 85007 BL SMEAR W/DIFF WBC COUNT: CPT | Performed by: INTERNAL MEDICINE

## 2017-11-29 PROCEDURE — 85610 PROTHROMBIN TIME: CPT | Performed by: INTERNAL MEDICINE

## 2017-11-29 PROCEDURE — 85027 COMPLETE CBC AUTOMATED: CPT | Performed by: INTERNAL MEDICINE

## 2017-11-29 RX ADMIN — LEVOFLOXACIN 750 MG: 750 TABLET, FILM COATED ORAL at 14:46

## 2017-11-29 RX ADMIN — BENZTROPINE MESYLATE 1 MG: 1 TABLET ORAL at 17:22

## 2017-11-29 RX ADMIN — OXYCODONE HYDROCHLORIDE 10 MG: 10 TABLET ORAL at 14:46

## 2017-11-29 RX ADMIN — RISPERIDONE 4 MG: 1 TABLET ORAL at 08:01

## 2017-11-29 RX ADMIN — OXYCODONE HYDROCHLORIDE 10 MG: 10 TABLET ORAL at 08:01

## 2017-11-29 RX ADMIN — CLONAZEPAM 0.5 MG: 0.5 TABLET ORAL at 08:01

## 2017-11-29 RX ADMIN — DIVALPROEX SODIUM 500 MG: 500 TABLET, DELAYED RELEASE ORAL at 08:01

## 2017-11-29 RX ADMIN — BENZTROPINE MESYLATE 1 MG: 1 TABLET ORAL at 08:01

## 2017-11-29 RX ADMIN — RISPERIDONE 2 MG: 1 TABLET ORAL at 17:21

## 2017-11-29 RX ADMIN — ACETAMINOPHEN 650 MG: 325 TABLET, FILM COATED ORAL at 00:26

## 2017-11-29 RX ADMIN — RISPERIDONE 2 MG: 1 TABLET ORAL at 08:02

## 2017-11-29 RX ADMIN — ACETAMINOPHEN 650 MG: 325 TABLET, FILM COATED ORAL at 11:22

## 2017-11-29 RX ADMIN — FENOFIBRATE 145 MG: 145 TABLET ORAL at 11:22

## 2017-11-29 RX ADMIN — METHOCARBAMOL 500 MG: 500 TABLET ORAL at 14:46

## 2017-11-29 RX ADMIN — METHOCARBAMOL 500 MG: 500 TABLET ORAL at 08:01

## 2017-11-29 RX ADMIN — PREGABALIN 75 MG: 75 CAPSULE ORAL at 17:21

## 2017-11-29 RX ADMIN — WARFARIN SODIUM 5 MG: 5 TABLET ORAL at 17:21

## 2017-11-29 RX ADMIN — RISPERIDONE 4 MG: 1 TABLET ORAL at 17:20

## 2017-11-29 RX ADMIN — DIVALPROEX SODIUM 500 MG: 500 TABLET, DELAYED RELEASE ORAL at 22:09

## 2017-11-29 RX ADMIN — DIVALPROEX SODIUM 1000 MG: 500 TABLET, DELAYED RELEASE ORAL at 11:22

## 2017-11-29 RX ADMIN — ACETAMINOPHEN 650 MG: 325 TABLET, FILM COATED ORAL at 18:27

## 2017-11-29 RX ADMIN — LEVOTHYROXINE SODIUM 25 MCG: 25 TABLET ORAL at 05:59

## 2017-11-29 RX ADMIN — LIDOCAINE 2 PATCH: 50 PATCH CUTANEOUS at 08:01

## 2017-11-29 RX ADMIN — PREGABALIN 75 MG: 75 CAPSULE ORAL at 08:01

## 2017-11-29 RX ADMIN — CLONAZEPAM 0.5 MG: 0.5 TABLET ORAL at 17:21

## 2017-11-29 NOTE — PHYSICAL THERAPY NOTE
Physical Therapy Progress Note     11/29/17 1412   Restrictions/Precautions   Weight Bearing Precautions Per Order No   Other Precautions Cognitive;Pain; Fall Risk  (Chair alarm non functional, staff aware and family present )   General   Chart Reviewed Yes   Family/Caregiver Present Yes   Cognition   Overall Cognitive Status Impaired   Subjective   Subjective Patient wanted to be seen for therapy at 1:30  Agreed to participate but stated "I have to do it my way "   Bed Mobility   Rolling R 5  Supervision   Additional items Assist x 1; Increased time required; Bedrails   Supine to Sit 3  Moderate assistance   Additional items Assist x 1; Increased time required;Verbal cues  (A for trunk)   Transfers   Sliding Board transfer 4  Minimal assistance   Additional items Assist x 2; Increased time required;Verbal cues;Armrests   Endurance Deficit   Endurance Deficit Yes   Endurance Deficit Description Anxiety and fear of pain   Activity Tolerance   Activity Tolerance Patient tolerated treatment well;Treatment limited secondary to agitation  (limited due to anxiety)   Nurse Made Aware yes   Assessment   Prognosis Guarded   Problem List Decreased range of motion; Impaired balance;Decreased mobility; Decreased cognition;Decreased coordination; Impaired judgement;Decreased safety awareness;Pain;Obesity   Assessment Patient was seen as 1:30 p m  per patient request  Patient in bed supine and parents present t/o session  Patient reported he will do things his way  When explained about log roll for supine to sit patient reported he knows what to do  Patient requested to use bedpan and bedpan was placed by rolling to the left with S and use of bed rail  Patient performed supine to sit with Mod A x 1 for trunk and another SBA  Patient required total A in placing the SB under R buttock  Initally patient was not on the SB and wanted to scoot over however with repeated instructions patient agreed to place the SB under his R buttock   patient do not follow instructions welll when given reporting he knows what to do  Patient with outburst of agitation when therapist/staff with CS/CGA for safety reason and to assist  Performed SB transfer from EOB to w/c to the right and cues for technique and hand placement  Patient needs assist/S with all levels of mobility  Patient's anxiety and impaired cognition is a limiting factor  Patient able to propel the w/c with LEs in the hallway independently  Chair alarm not engaged as it was non functional and staff aware of it and patient was instructed to call for help  Barriers to Discharge Inaccessible home environment;Decreased caregiver support   Goals   Patient Goals None reported   Plan   Treatment/Interventions Functional transfer training; Endurance training;Equipment eval/education; Bed mobility;Spoke to nursing; Patient/family training   Progress Progressing toward goals   PT Frequency 5x/wk   Recommendation   Recommendation Short-term skilled PT   Equipment Recommended Other (Comment); Wheelchair  (RW and SB)   PT - OK to Discharge Yes         Mane Delgado PTA  Patient needed constant reassurance t/o the session and increased time needed to complete the task in hand

## 2017-11-29 NOTE — CASE MANAGEMENT
Continued Stay Review    Date:11/29/2017    Vital Signs: /71   Pulse (!) 138   Temp 97 7 °F (36 5 °C) (Oral)   Resp 18   Ht 5' 10" (1 778 m)   Wt (!) 145 kg (319 lb)   SpO2 98%   BMI 45 77 kg/m²     Medications:   Scheduled Meds:   benztropine 1 mg Oral BID   clonazePAM 0 5 mg Oral BID   divalproex sodium 1,000 mg Oral Daily   divalproex sodium 500 mg Oral Q12H [address]   docusate sodium 100 mg Oral BID   fenofibrate 145 mg Oral Daily   levofloxacin 750 mg Oral Q24H   levothyroxine 25 mcg Oral Once per day on Mon Tue Wed Thu Fri   levothyroxine 50 mcg Oral Once per day on Sun Sat   lidocaine 2 patch Transdermal Daily   polyethylene glycol 17 g Oral Daily   pregabalin 75 mg Oral BID   risperiDONE 2 mg Oral BID   risperiDONE 4 mg Oral BID   senna 1 tablet Oral Daily   warfarin 5 mg Oral Daily (warfarin)     Continuous Infusions:    PRN Meds:   acetaminophen    HYDROmorphone    methocarbamol    ondansetron    oxyCODONE    Abnormal Labs/Diagnostic Results:   Protime-INR [[id number]] (Abnormal) Collected: 11/29/17 0446   Lab Status: Final result Specimen: Blood from Arm, Right Updated: 11/29/17 0553    Protime 24 0 (H) 12 1 - 14 4 seconds     INR 2 12 (H) 0 86 - 1 16    CBC and differential [[id number]] (Abnormal) Collected: 11/29/17 0446   Lab Status: Final result Specimen: Blood from Arm, Right Updated: 11/29/17 0718    WBC 8 45 4 31 - 10 16 Thousand/uL     RBC 4 47 3 88 - 5 62 Million/uL     Hemoglobin 11 4 (L) 12 0 - 17 0 g/dL     Hematocrit 34 8 (L) 36 5 - 49 3 %     MCV 78 (L) 82 - 98 fL     MCH 25 5 (L) 26 8 - 34 3 pg     MCHC 32 8 31 4 - 37 4 g/dL     RDW 15 3 (H) 11 6 - 15 1 %     MPV 10 2 8 9 - 12 7 fL     Platelets 087 967 - 358 Thousands/uL     nRBC 1 /100 WBCs     Comment: This is an appended report  Padilla Crowe results have been appended to a previously preliminary verified report  Narrative:     This is an appended report   These results have been appended to a previously verified report     Basic metabolic panel [[id number]] (Abnormal) Collected: 11/29/17 0446   Lab Status: Final result Specimen: Blood from Arm, Right Updated: 11/29/17 0617    Sodium 135 (L) 136 - 145 mmol/L     Potassium 4 2 3 5 - 5 3 mmol/L     Chloride 101 100 - 108 mmol/L     CO2 27 21 - 32 mmol/L     Anion Gap 7 4 - 13 mmol/L     BUN 13 5 - 25 mg/dL     Creatinine 0 59 (L) 0 60 - 1 30 mg/dL     Comment: Standardized to IDMS reference method       Glucose 78 65 - 140 mg/dL     Comment:   If the patient is fasting, the ADA then defines impaired fasting glucose as > 100 mg/dL and diabetes as > or equal to 123 mg/dL  Specimen collection should occur prior to Sulfasalazine administration due to the potential for falsely depressed results  Specimen collection should occur prior to Sulfapyridine administration due to the potential for falsely elevated results  Calcium 9 3 8 3 - 10 1 mg/dL     eGFR 140 ml/min/1 73sq m    Narrative:       National Kidney Disease Education Program recommendations are as follows:  GFR calculation is accurate only with a steady state creatinine  Chronic Kidney disease less than 60 ml/min/1 73 sq  meters  Kidney failure less than 15 ml/min/1 73 sq  meters     Magnesium [[id number]] (Normal) Collected: 11/29/17 0446   Lab Status: Final result Specimen: Blood from Arm, Right Updated: 11/29/17 0617    Magnesium 2 2 1 6 - 2 6 mg/dL    Phosphorus [[id number]] (Abnormal) Collected: 11/29/17 0446   Lab Status: Final result Specimen: Blood from Arm, Right Updated: 11/29/17 0617    Phosphorus 5 8 (H) 2 7 - 4 5 mg/dL    Manual Differential(PHLEBS Do Not Order) [[id number]] (Abnormal)          Age/Sex: 33 y o  male     Assessment/Plan:  Sepsis secondary to spinal wound infection secondary to Proteus  - PICC line has been removed and IV antibiotics discontinued  - patient will remain on Levaquin p o  until December 13 with weekly CBC with differential and BMP  - infectious Disease and Neurosurgery are following     Subacute /Acute DVT of right lower extremity  - INR yesterday 2 06  - Coumadin tonight as per EMR, 5 mg  - INR in a m      Severe deconditioning  - patient has refused to participate in physical therapy  - at this time, extensive deconditioning so severe that the patient is unable to stent the use cane, crutches, walker, also in light of the recent back surgery and for this mother he will require wheelchair at discharge as is declining inpatient rehab  - also, at the discharge, given the patient recent back surgery, with pain and limited mobility in need of frequent repositioning patient would require for electric hospital bed to be provided home     Severe constipation  - continue with current bowel regimen     Autism/BPD  - patient able to make his own medical decisions  - refusing inpatient physical therapy  - this has been discussed with mother, who is in support of this decision     VTE Pharmacologic Prophylaxis: yes  Pharmacologic: Warfarin (Coumadin)  Mechanical VTE Prophylaxis in Place: Yes     Patient Centered Rounds: I have performed bedside rounds with nursing staff today      Discussions with Specialists or Other Care Team Provider:  physical therapy       Current Length of Stay: 11 day(s)     Current Patient Status: Inpatient   Certification Statement: The patient will continue to require additional inpatient hospital stay due to medical mgt     Discharge Plan:  Home with home PT when improved enough, patient declining rehab

## 2017-11-29 NOTE — SOCIAL WORK
Cm reviewed patient during care coordination rounds  Cm continues to work on discharge planning  Cm confirmed that [address] is working on [facility]  [employer] authorization for requested equipment  Cm also re-sent referral to [facility] for [facility] to include home PT, home OT, SN, HHA, SW  Cm sent referral for review

## 2017-11-29 NOTE — PLAN OF CARE
Problem: OCCUPATIONAL THERAPY ADULT  Goal: Performs self-care activities at highest level of function for planned discharge setting  See evaluation for individualized goals  Treatment Interventions: ADL retraining, Functional transfer training, Endurance training, Cognitive reorientation, Patient/family training, Equipment evaluation/education, Compensatory technique education, Continued evaluation, Activityengagement, Energy conservation          See flowsheet documentation for full assessment, interventions and recommendations  Outcome: Progressing  Limitation: Decreased ADL status, Decreased Safe judgement during ADL, Decreased cognition, Decreased endurance, Decreased high-level ADLs, Decreased self-care trans, Mood limitation  Prognosis: Good  Assessment: pt participated in slide board transfers w/c to bed towards his left side  pts parents were present this session  pt will need a drop arm commode for home, hosp bed, slide board and w/c   spoke to pts mother about plan for dressing in lb clothing next session inorder to aide in transfer oob and btb  pt with c/o back spasms once in supine position  maximal reassurnace and prompting to assist with rolling and repositioning onto stretcher for testing   pt requires increased time for mobility attemtps  during session     OT Discharge Recommendation: Short Term Rehab  OT - OK to Discharge: Yes (to short term rehab when medically cleared)  ALEX Alonso

## 2017-11-29 NOTE — PROGRESS NOTES
Progress Note - Infectious Disease   Susan Bradley 33 y o  male MRN: [id number]  [facility] Encounter: [id number]      Impression/Plan:  1   SIRS versus sepsis-POA:  Fever and tachycardia  Urinalysis and blood cultures negative  Surgical site appears clean and intact without infection  CT A/P negative for abscess; SQ air likely due to recently removed SAÚL drain  Likely noninfectious due to severe constipation, DVT, ? PE, drug fever or viral process  Clinically stable and nontoxic  The fever has resolved   The patient was not bacteremic  The PICC line has been removed  Continue antibiotics as below  Monitor temperature  Continue supportive care as per the primary service     2   History of Proteus sepsis due to postoperative wound infection-High risk IV antibiotic candidate due to #5, now with readmission with fever  Sensitive to FQ and baseline QTC normal   Unclear allergy to ciprofloxacin  Tolerating levofloxacin   His follow-up QTC is in the normal range  Continue levofloxacin through 12/13/2017  No need for additional labs as the patient's CBC with diff and BMP have been stable on Levaquin  Monitor temperature  Supportive care as per the primary service     3   Severe constipation-Possibly due to narcotic pain medicine   Improved and now having bowel movements  Continue MiraLax per patient request  Monitor symptomatology     4   RLE subacute/chronic DVT-Does not appear complicated by pulmonary embolism based on CT findings   Could be the cause of the fever   The INR is now therapeutic   The patient still does have some leg swelling, but it is improved  Continue anticoagulation as per the primary service     5   Autism/BPD     6   Disposition-patient possibly going to rehab  Wyatt Rather to discharge from infectious disease standpoint   Follow up with me 12/7/2017 at 2:30 p m       Antibiotics:  Levaquin 12  Antibiotics 30  Postop day 28    Subjective:  Patient has no fever, chills, sweats; no nausea, vomiting, diarrhea; no cough, shortness of breath; no increased pain  No new symptoms  He seems in better spirits  Objective:  Vitals:  HR:  [] 97  Resp:  [18-20] 18  BP: (111-126)/(57-77) 120/77  SpO2:  [96 %-98 %] 96 %  Temp (24hrs), Av 9 °F (36 6 °C), Min:97 6 °F (36 4 °C), Max:98 1 °F (36 7 °C)  Current: Temperature: 98 1 °F (36 7 °C)    Physical Exam:   General Appearance:  Alert, nontoxic, no acute distress  Throat: Oropharynx moist without lesions  Lungs:   Clear to auscultation anteriorly; respirations unlabored   Heart:  RRR; no murmur, rub or gallop   Abdomen:   Soft, non-tender, non-distended, positive bowel sounds  Extremities: No clubbing, cyanosis    Skin: No new rashes or lesions  No draining wounds noted         Labs, Imaging, & Other studies:   All pertinent labs and imaging studies were personally reviewed    Results from last 7 days  Lab Units 17  0553   WBC Thousand/uL 8 45 10 12  --    HEMOGLOBIN g/dL 11 4* 10 8* 10 1*   PLATELETS Thousands/uL 182 173  --        Results from last 7 days  Lab Units 17  1330   SODIUM mmol/L 135* 135*   POTASSIUM mmol/L 4 2 4 0   CHLORIDE mmol/L 101 100   CO2 mmol/L 27 27   ANION GAP mmol/L 7 8   BUN mg/dL 13 11   CREATININE mg/dL 0 59* 0 50*   EGFR ml/min/1 73sq m 140 150   GLUCOSE RANDOM mg/dL 78 93   CALCIUM mg/dL 9 3 9 2   AST U/L  --  21   ALT U/L  --  48   ALK PHOS U/L  --  37*   TOTAL PROTEIN g/dL  --  7 1   ALBUMIN g/dL  --  2 7*   BILIRUBIN TOTAL mg/dL  --  0 26

## 2017-11-29 NOTE — PROGRESS NOTES
[address] Internal Medicine Progress Note  Patient: Dalila Amaya 33 y o  male   MRN: [id number]  PCP: Hunter Quesada MD  Unit/Bed#: [facility] 905-01 Encounter: [id number]  Date Of Visit: 11/29/17    Assessment/Plan:  Sepsis secondary to spinal wound infection secondary to Proteus  - PICC line has been removed and IV antibiotics discontinued  - patient will remain on Levaquin p o  until December 13 as per ID last progress note, this patient CBC and BMP has remained stable while on Levaquin there is no need to repeat blood work as outpatient  - infectious Disease and Neurosurgery are following     Subacute /Acute DVT of right lower extremity  - INR 2 12  - Coumadin tonight at 5 mg  - INR in a m      Complaint of blurry vision  - this happened after physical therapy  - CT scan of the head was ordered stat, the patient arrived in radiology department and refused the images  - consulted Ophthalmology for completion  - at this time unclear if this is a true concern or not as patient is refusing care or might be just related to the fact that the patient spends most of his time in bed and change of position might have caused part of the symptoms during physical therapy  - will continue to monitor, and approached him again with CT scan if symptoms persist or recur    Severe deconditioning  - patient able to move with sliding board from bed to wheelchair today  - he was able to move and propel wheelchair with his feet  - patient will be discharged to home when all supplies received with visiting nurse and physical therapy  - patient consistently refused rehabilitation at skilled nursing facility     Severe constipation  - continue with current bowel regimen     Autism/BPD  - patient able to make his own medical decisions  - refusing inpatient physical therapy  - this has been discussed with mother, who is in support of this decision     VTE Pharmacologic Prophylaxis: yes  Pharmacologic: Warfarin (Coumadin)  Mechanical VTE Prophylaxis in Place: Yes     Patient Centered Rounds: I have performed bedside rounds with nursing staff today      Discussions with Specialists or Other Care Team Provider:  physical therapy     Education and Discussions with Family / Patient:   discussed with the patient and mother at bedside     Current Length of Stay: 12 day(s)     Current Patient Status: Inpatient   Certification Statement: The patient will continue to require additional inpatient hospital stay due to medical mgt     Discharge Plan:  Home with home PT when supplies received, patient declining rehab     Code Status: Level 1 - Full Code    Subjective:   Patient states that he is feeling the same  He complained that physical therapy wants him to move too much during the day and try too hard    Objective:     Vitals:   Temp (24hrs), Av °F (36 7 °C), Min:97 7 °F (36 5 °C), Max:98 1 °F (36 7 °C)    HR:  [] 138  Resp:  [18] 18  BP: (111-120)/(66-77) 119/71  SpO2:  [96 %-98 %] 98 %  Body mass index is 45 77 kg/m²  Input and Output Summary (last 24 hours): Intake/Output Summary (Last 24 hours) at 17 1628  Last data filed at 17 1300   Gross per 24 hour   Intake             1800 ml   Output             3600 ml   Net            -1800 ml       Physical Exam:     Physical Exam   Constitutional: He is oriented to person, place, and time  He appears well-developed  Cardiovascular: Normal rate, regular rhythm and normal heart sounds  Exam reveals no friction rub  No murmur heard  Pulmonary/Chest: Effort normal and breath sounds normal  No respiratory distress  He has no wheezes  He has no rales  Abdominal: Soft  Bowel sounds are normal  He exhibits no distension  There is no tenderness  There is no rebound  Musculoskeletal: He exhibits no edema  Neurological: He is alert and oriented to person, place, and time  Skin: Skin is warm  Psychiatric: He has a normal mood and affect  Additional Data:     Labs:      Results from last 7 days  Lab Units 11/29/17  0446   WBC Thousand/uL 8 45   HEMOGLOBIN g/dL 11 4*   HEMATOCRIT % 34 8*   PLATELETS Thousands/uL 182   LYMPHO PCT % 17   MONO PCT MAN % 7   EOSINO PCT MANUAL % 3       Results from last 7 days  Lab Units 11/29/17  0446 11/26/17  1330   SODIUM mmol/L 135* 135*   POTASSIUM mmol/L 4 2 4 0   CHLORIDE mmol/L 101 100   CO2 mmol/L 27 27   BUN mg/dL 13 11   CREATININE mg/dL 0 59* 0 50*   CALCIUM mg/dL 9 3 9 2   TOTAL PROTEIN g/dL  --  7 1   BILIRUBIN TOTAL mg/dL  --  0 26   ALK PHOS U/L  --  37*   ALT U/L  --  48   AST U/L  --  21   GLUCOSE RANDOM mg/dL 78 93       Results from last 7 days  Lab Units 11/29/17 0446   INR  2 12*       * I Have Reviewed All Lab Data Listed Above  * Additional Pertinent Lab Tests Reviewed: All Labs Within Last 24 Hours Reviewed    Imaging:    Imaging Reports Reviewed Today Include:  None  Imaging Personally Reviewed by Myself Includes:  None    Recent Cultures (last 7 days):           Last 24 Hours Medication List:     benztropine 1 mg Oral BID   clonazePAM 0 5 mg Oral BID   divalproex sodium 1,000 mg Oral Daily   divalproex sodium 500 mg Oral Q12H [address]   docusate sodium 100 mg Oral BID   fenofibrate 145 mg Oral Daily   levofloxacin 750 mg Oral Q24H   levothyroxine 25 mcg Oral Once per day on Mon Tue Wed Thu Fri   levothyroxine 50 mcg Oral Once per day on Sun Sat   lidocaine 2 patch Transdermal Daily   polyethylene glycol 17 g Oral Daily   pregabalin 75 mg Oral BID   risperiDONE 2 mg Oral BID   risperiDONE 4 mg Oral BID   senna 1 tablet Oral Daily   warfarin 5 mg Oral Daily (warfarin)        Today, Patient Was Seen By: Deuce Mcgraw MD    ** Please Note: Dragon 360 Dictation voice to text software may have been used in the creation of this document   **

## 2017-11-29 NOTE — PLAN OF CARE
Problem: PHYSICAL THERAPY ADULT  Goal: Performs mobility at highest level of function for planned discharge setting  See evaluation for individualized goals  Treatment/Interventions: Continued evaluation  Equipment Recommended: Other (Comment) (TBD; pt owns RW)       See flowsheet documentation for full assessment, interventions and recommendations  Outcome: Progressing  Prognosis: Guarded  Problem List: Decreased range of motion, Impaired balance, Decreased mobility, Decreased cognition, Decreased coordination, Impaired judgement, Decreased safety awareness, Pain, Obesity  Assessment: Patient was seen as 1:30 p m  per patient request  Patient in bed supine and parents present t/o session  Patient reported he will do things his way  When explained about log roll for supine to sit patient reported he knows what to do  Patient requested to use bedpan and bedpan was placed by rolling to the left with S and use of bed rail  Patient performed supine to sit with Mod A x 1 for trunk and another SBA  Patient required total A in placing the SB under R buttock  Initally patient was not on the SB and wanted to scoot over however with repeated instructions patient agreed to place the SB under his R buttock  patient do not follow instructions welll when given reporting he knows what to do  Patient with outburst of agitation when therapist/staff with CS/CGA for safety reason and to assist  Performed SB transfer from EOB to w/c to the right and cues for technique and hand placement  Patient needs assist/S with all levels of mobility  Patient's anxiety and impaired cognition is a limiting factor  Patient able to propel the w/c with LEs in the hallway independently  Chair alarm not engaged as it was non functional and staff aware of it and patient was instructed to call for help     Barriers to Discharge: Inaccessible home environment, Decreased caregiver support     Recommendation: Short-term skilled PT     PT - OK to Discharge: Yes    See flowsheet documentation for full assessment

## 2017-11-29 NOTE — OCCUPATIONAL THERAPY NOTE
Occupational Therapy Treatment Note:       11/29/17 1600   Pain Assessment   Pain Assessment No/denies pain   Pain Score Worst Possible Pain   Pain Orientation Lower   Bed Mobility   Rolling R 2  Maximal assistance   Sit to Supine 3  Moderate assistance   Additional items Assist x 2   Transfers   Sliding Board transfer 2  Maximal assistance   Additional items Assist x 2  (max vc's and reassurance)   Cognition   Overall Cognitive Status Impaired   Assessment   Assessment pt participated in slide board transfers w/c to bed towards his left side  pts parents were present this session  pt will need a drop arm commode for home, hosp bed, slide board and w/c   spoke to pts mother about plan for dressing in lb clothing next session inorder to aide in transfer oob and btb  pt with c/o back spasms once in supine position  maximal reassurnace and prompting to assist with rolling and repositioning onto stretcher for testing   pt requires increased time for mobility attemtps  during session   Plan   Treatment Interventions Functional transfer training;ADL retraining;Patient/family training;Equipment evaluation/education   Goal Expiration Date 12/01/17   Treatment Day 2   OT Frequency 3-5x/wk   Recommendation   OT Discharge Recommendation Short Term Rehab   April A ALEX Bell

## 2017-11-30 VITALS
BODY MASS INDEX: 45.1 KG/M2 | DIASTOLIC BLOOD PRESSURE: 63 MMHG | TEMPERATURE: 97.5 F | HEART RATE: 106 BPM | WEIGHT: 315 LBS | OXYGEN SATURATION: 95 % | SYSTOLIC BLOOD PRESSURE: 122 MMHG | RESPIRATION RATE: 20 BRPM | HEIGHT: 70 IN

## 2017-11-30 PROBLEM — L08.9 WOUND INFECTION: Status: ACTIVE | Noted: 2017-11-30

## 2017-11-30 PROBLEM — T14.8XXA WOUND INFECTION: Status: ACTIVE | Noted: 2017-11-30

## 2017-11-30 PROBLEM — R65.10 SIRS (SYSTEMIC INFLAMMATORY RESPONSE SYNDROME) (HCC): Status: RESOLVED | Noted: 2017-10-31 | Resolved: 2017-11-30

## 2017-11-30 PROBLEM — R00.0 SINUS TACHYCARDIA: Status: RESOLVED | Noted: 2017-11-18 | Resolved: 2017-11-30

## 2017-11-30 PROBLEM — K59.00 CONSTIPATION: Chronic | Status: RESOLVED | Noted: 2017-11-18 | Resolved: 2017-11-30

## 2017-11-30 RX ORDER — LEVOTHYROXINE SODIUM 0.03 MG/1
25 TABLET ORAL DAILY
Qty: 30 TABLET | Refills: 0 | Status: SHIPPED | OUTPATIENT
Start: 2017-11-30 | End: 2018-06-14 | Stop reason: SDUPTHER

## 2017-11-30 RX ORDER — WARFARIN SODIUM 5 MG/1
5 TABLET ORAL
Qty: 10 TABLET | Refills: 0 | Status: SHIPPED | OUTPATIENT
Start: 2017-11-30 | End: 2018-03-09 | Stop reason: SDUPTHER

## 2017-11-30 RX ORDER — WARFARIN SODIUM 5 MG/1
5 TABLET ORAL
Qty: 15 TABLET | Refills: 0 | Status: SHIPPED | OUTPATIENT
Start: 2017-11-30 | End: 2017-11-30 | Stop reason: HOSPADM

## 2017-11-30 RX ORDER — PREGABALIN 75 MG/1
75 CAPSULE ORAL 2 TIMES DAILY
Qty: 20 CAPSULE | Refills: 0 | Status: SHIPPED | OUTPATIENT
Start: 2017-11-30 | End: 2018-04-19

## 2017-11-30 RX ORDER — LEVOFLOXACIN 750 MG/1
750 TABLET ORAL EVERY 24 HOURS
Qty: 13 TABLET | Refills: 0 | Status: SHIPPED | OUTPATIENT
Start: 2017-11-30 | End: 2017-12-13

## 2017-11-30 RX ORDER — OXYCODONE HYDROCHLORIDE 5 MG/1
5 TABLET ORAL EVERY 4 HOURS PRN
Qty: 10 TABLET | Refills: 0 | Status: SHIPPED | OUTPATIENT
Start: 2017-11-30 | End: 2018-04-19

## 2017-11-30 RX ORDER — POLYETHYLENE GLYCOL 3350 17 G/17G
17 POWDER, FOR SOLUTION ORAL DAILY
Qty: 14 EACH | Refills: 0 | Status: SHIPPED | OUTPATIENT
Start: 2017-12-01 | End: 2020-12-16 | Stop reason: ALTCHOICE

## 2017-11-30 RX ORDER — OXYCODONE HYDROCHLORIDE 10 MG/1
10 TABLET ORAL EVERY 6 HOURS PRN
Qty: 10 TABLET | Refills: 0 | Status: SHIPPED | OUTPATIENT
Start: 2017-11-30 | End: 2018-04-19

## 2017-11-30 RX ORDER — WARFARIN SODIUM 5 MG/1
5 TABLET ORAL
Status: DISCONTINUED | OUTPATIENT
Start: 2017-11-30 | End: 2017-11-30 | Stop reason: HOSPADM

## 2017-11-30 RX ORDER — LIDOCAINE 50 MG/G
2 PATCH TOPICAL DAILY
Qty: 30 PATCH | Refills: 0 | Status: SHIPPED | OUTPATIENT
Start: 2017-12-01 | End: 2020-03-26 | Stop reason: ALTCHOICE

## 2017-11-30 RX ADMIN — DIVALPROEX SODIUM 1000 MG: 500 TABLET, DELAYED RELEASE ORAL at 12:38

## 2017-11-30 RX ADMIN — CLONAZEPAM 0.5 MG: 0.5 TABLET ORAL at 08:42

## 2017-11-30 RX ADMIN — BENZTROPINE MESYLATE 1 MG: 1 TABLET ORAL at 08:43

## 2017-11-30 RX ADMIN — LIDOCAINE 2 PATCH: 50 PATCH CUTANEOUS at 08:43

## 2017-11-30 RX ADMIN — RISPERIDONE 2 MG: 1 TABLET ORAL at 08:43

## 2017-11-30 RX ADMIN — DIVALPROEX SODIUM 500 MG: 500 TABLET, DELAYED RELEASE ORAL at 08:43

## 2017-11-30 RX ADMIN — WARFARIN SODIUM 5 MG: 5 TABLET ORAL at 16:18

## 2017-11-30 RX ADMIN — ACETAMINOPHEN 650 MG: 325 TABLET, FILM COATED ORAL at 08:42

## 2017-11-30 RX ADMIN — LEVOTHYROXINE SODIUM 25 MCG: 25 TABLET ORAL at 05:20

## 2017-11-30 RX ADMIN — FENOFIBRATE 145 MG: 145 TABLET ORAL at 08:43

## 2017-11-30 RX ADMIN — RISPERIDONE 4 MG: 1 TABLET ORAL at 08:42

## 2017-11-30 RX ADMIN — LEVOFLOXACIN 750 MG: 750 TABLET, FILM COATED ORAL at 14:43

## 2017-11-30 RX ADMIN — PREGABALIN 75 MG: 75 CAPSULE ORAL at 08:42

## 2017-11-30 RX ADMIN — METHOCARBAMOL 500 MG: 500 TABLET ORAL at 03:56

## 2017-11-30 NOTE — DISCHARGE INSTRUCTIONS
Dear Taylor Valentine,  Your been discharged from the hospital today  Please note the following:  - take all your medications as instructed  - diffuse keep the dose of antibiotic, please do not take an extra dose but just continue with daily dosage as instructed  - take Coumadin everyday, urine INR should be between 2 and 3, this is an nurse will repeat INR tomorrow for you and communicate the value to your doctor that you will receive instruction for the Coumadin dose  - follow up with your physicians as instructed  - continue to work with physical therapy        Deep Venous Thrombosis   WHAT YOU NEED TO KNOW:   Deep venous thrombosis (DVT) is a blood clot that forms in a deep vein of the body  The deep veins in the legs, thighs, and hips are the most common sites for DVT  DVT can also occur in a deep vein within your arms  The clot prevents the normal flow of blood in the vein  The blood backs up and causes pain and swelling  The DVT can break into smaller pieces and travel to your lungs and cause a blockage called a pulmonary embolism  A pulmonary embolism can become life-threatening  DISCHARGE INSTRUCTIONS:   Call 911 for any of the following:   · You feel lightheaded, short of breath, and have chest pain  · You cough up blood  Return to the emergency department if:   · Your symptoms get worse  · You develop new DVT symptoms in another leg or arm  Contact your healthcare provider if:   · Your gums or nose bleed  · You see blood in your urine or bowel movements  · Your bowel movements are black or darker than normal     · You have questions or concerns about your conditions or care  Medicines:   · Blood thinners  help prevent the DVT from getting bigger and prevent new clots from forming  Examples of blood thinners include heparin, rivaroxaban, apixiban, and warfarin  The following are general safety guidelines to follow while you are taking a blood thinner:     ¨ Watch for bleeding and bruising  Watch for bleeding from your gums or nose  Watch for blood in your urine and bowel movements  Use a soft washcloth on your skin, and a soft toothbrush to brush your teeth  This can keep your skin and gums from bleeding  If you shave, use an electric shaver  Do not play contact sports  ¨ Tell your dentist and other healthcare providers that you take a blood thinner  Wear a bracelet or necklace that says you take this medicine  ¨ Do not start or stop any medicines unless your healthcare provider tells you to  Many medicines cannot be used with blood thinners  ¨ Tell your healthcare provider right away if you forget to take the blood thinner , or if you take too much  ¨ Warfarin  is a blood thinner that you may need to take  The following are additional things you should be aware of if you take warfarin:    § Foods and medicines can affect the amount of warfarin in your blood  Do not make major changes to your diet  Warfarin works best when you eat about the same amount of vitamin K every day  Vitamin K is found in green leafy vegetables and certain other foods  Ask for more information about what to eat or not to eat  § You will need to see your healthcare provider for follow-up visits  You will need regular blood tests to decide how much warfarin you need  · Take your medicine as directed  Contact your healthcare provider if you think your medicine is not helping or if you have side effects  Tell him or her if you are allergic to any medicine  Keep a list of the medicines, vitamins, and herbs you take  Include the amounts, and when and why you take them  Bring the list or the pill bottles to follow-up visits  Carry your medicine list with you in case of an emergency  Manage your DVT:   · Wear pressure stockings  The stockings are tight and put pressure on your legs  This improves blood flow and helps prevent clots  Wear the stockings during the day  Do not wear them when you sleep  · Elevate your legs  above the level of your heart as often as you can  This will help decrease swelling and pain  Prop your legs on pillows or blankets to keep them elevated comfortably  · Exercise regularly  to help increase your blood flow  Walking is a good low-impact exercise  Talk to your healthcare provider about the best exercise plan for you  · Change body positions often  If you travel by car or work at a desk, move and stretch in your seat several times each hour  In an airplane, get up and walk every hour  If you are bedridden, ask for help to change your position every 1 to 2 hours  · Maintain a healthy weight  Ask your healthcare provider how much you should weigh  Ask him to help you create a weight loss plan if you are overweight  · Do not smoke  Nicotine and other chemicals in cigarettes and cigars can damage blood vessels and make it more difficult to manage your DVT  Ask your healthcare provider for information if you currently smoke and need help to quit  E-cigarettes or smokeless tobacco still contain nicotine  Talk to your healthcare provider before you use these products  Follow up with your healthcare provider as directed:  Write down your questions so you remember to ask them during your visits  © 2017 [address]  Information is for End User's use only and may not be sold, redistributed or otherwise used for commercial purposes  All illustrations and images included in CareNotes® are the copyrighted property of [employer]  or [employer]  The above information is an  only  It is not intended as medical advice for individual conditions or treatments  Talk to your doctor, nurse or pharmacist before following any medical regimen to see if it is safe and effective for you  Deep Venous Thrombosis   AMBULATORY CARE:   Deep venous thrombosis  (DVT) is a blood clot that forms in a deep vein of the body   The deep veins in the legs, thighs, and hips are the most common sites for DVT  DVT can also occur in a deep vein within your arms  The clot prevents the normal flow of blood in the vein  The blood backs up and causes pain and swelling  The DVT can break into smaller pieces and travel to your lungs and cause a blockage called a pulmonary embolism  A pulmonary embolism can become life-threatening  Common symptoms include the following:   · Swelling    · Redness    · Warmth, pain, or tenderness  Call 911 for any of the following:   · You feel lightheaded, short of breath, and have chest pain  · You cough up blood  Seek care immediately if:   · Your symptoms get worse  · You develop new DVT symptoms in another leg or arm  Contact your healthcare provider if:   · Your gums or nose bleed  · You see blood in your urine or bowel movements  · Your bowel movements are black or darker than normal     · You have questions or concerns about your conditions or care  Treatment for a DVT:   · Blood thinners  help prevent the DVT from getting bigger and prevent new clots from forming  Examples of blood thinners include heparin, rivaroxaban, apixiban, and warfarin  The following are general safety guidelines to follow while you are taking a blood thinner:     ¨ Watch for bleeding and bruising  Watch for bleeding from your gums or nose  Watch for blood in your urine and bowel movements  Use a soft washcloth on your skin, and a soft toothbrush to brush your teeth  This can keep your skin and gums from bleeding  If you shave, use an electric shaver  Do not play contact sports  ¨ Tell your dentist and other healthcare providers that you take a blood thinner  Wear a bracelet or necklace that says you take this medicine  ¨ Do not start or stop any medicines unless your healthcare provider tells you to  Many medicines cannot be used with blood thinners       ¨ Tell your healthcare provider right away if you forget to take the blood thinner , or if you take too much  ¨ Warfarin  is a blood thinner that you may need to take  The following are additional things you should be aware of if you take warfarin:    § Foods and medicines can affect the amount of warfarin in your blood  Do not make major changes to your diet  Warfarin works best when you eat about the same amount of vitamin K every day  Vitamin K is found in green leafy vegetables and certain other foods  Ask for more information about what to eat or not to eat  § You will need to see your healthcare provider for follow-up visits  You will need regular blood tests to decide how much warfarin you need  Manage your DVT:   · Wear pressure stockings  The stockings are tight and put pressure on your legs  This improves blood flow and helps prevent clots  Wear the stockings during the day  Do not wear them when you sleep  · Elevate your legs  above the level of your heart as often as you can  This will help decrease swelling and pain  Prop your legs on pillows or blankets to keep them elevated comfortably  · Exercise regularly  to help increase your blood flow  Walking is a good low-impact exercise  Talk to your healthcare provider about the best exercise plan for you  · Change body positions often  If you travel by car or work at a desk, move and stretch in your seat several times each hour  In an airplane, get up and walk every hour  If you are bedridden, ask for help to change your position every 1 to 2 hours  · Maintain a healthy weight  Ask your healthcare provider how much you should weigh  Ask him to help you create a weight loss plan if you are overweight  · Do not smoke  Nicotine and other chemicals in cigarettes and cigars can damage blood vessels and make it more difficult to manage your DVT  Ask your healthcare provider for information if you currently smoke and need help to quit  E-cigarettes or smokeless tobacco still contain nicotine  Talk to your healthcare provider before you use these products  Follow up with your healthcare provider as directed:  Write down your questions so you remember to ask them during your visits  © 2017 [address] [employer] Information is for End User's use only and may not be sold, redistributed or otherwise used for commercial purposes  All illustrations and images included in CareNotes® are the copyrighted property of A D A M [employer]  or [employer]  The above information is an  only  It is not intended as medical advice for individual conditions or treatments  Talk to your doctor, nurse or pharmacist before following any medical regimen to see if it is safe and effective for you  Deep Venous Thrombosis   WHAT YOU NEED TO KNOW:   Deep venous thrombosis (DVT) is a blood clot that forms in a deep vein of the body  The deep veins in the legs, thighs, and hips are the most common sites for DVT  DVT can also occur in a deep vein within your arms  The clot prevents the normal flow of blood in the vein  The blood backs up and causes pain and swelling  The DVT can break into smaller pieces and travel to your lungs and cause a blockage called a pulmonary embolism  A pulmonary embolism can become life-threatening  DISCHARGE INSTRUCTIONS:   Call 911 for any of the following:   · You feel lightheaded, short of breath, and have chest pain  · You cough up blood  Seek care immediately if:   · Your symptoms get worse  · You develop new DVT symptoms in another leg or arm  Contact your healthcare provider if:   · Your gums or nose bleed  · You see blood in your urine or bowel movements  · Your bowel movements are black or darker than normal     · You have questions or concerns about your conditions or care  Medicines:   · Blood thinners  help prevent the DVT from getting bigger and prevent new clots from forming   Examples of blood thinners include heparin, rivaroxaban, apixiban, and warfarin  The following are general safety guidelines to follow while you are taking a blood thinner:     ¨ Watch for bleeding and bruising  Watch for bleeding from your gums or nose  Watch for blood in your urine and bowel movements  Use a soft washcloth on your skin, and a soft toothbrush to brush your teeth  This can keep your skin and gums from bleeding  If you shave, use an electric shaver  Do not play contact sports  ¨ Tell your dentist and other healthcare providers that you take a blood thinner  Wear a bracelet or necklace that says you take this medicine  ¨ Do not start or stop any medicines unless your healthcare provider tells you to  Many medicines cannot be used with blood thinners  ¨ Tell your healthcare provider right away if you forget to take the blood thinner , or if you take too much  ¨ Warfarin  is a blood thinner that you may need to take  The following are additional things you should be aware of if you take warfarin:    § Foods and medicines can affect the amount of warfarin in your blood  Do not make major changes to your diet  Warfarin works best when you eat about the same amount of vitamin K every day  Vitamin K is found in green leafy vegetables and certain other foods  Ask for more information about what to eat or not to eat  § You will need to see your healthcare provider for follow-up visits  You will need regular blood tests to decide how much warfarin you need  · Take your medicine as directed  Contact your healthcare provider if you think your medicine is not helping or if you have side effects  Tell him or her if you are allergic to any medicine  Keep a list of the medicines, vitamins, and herbs you take  Include the amounts, and when and why you take them  Bring the list or the pill bottles to follow-up visits  Carry your medicine list with you in case of an emergency  Manage your DVT:   · Wear pressure stockings    The stockings are tight and put pressure on your legs  This improves blood flow and helps prevent clots  Wear the stockings during the day  Do not wear them when you sleep  · Elevate your legs  above the level of your heart as often as you can  This will help decrease swelling and pain  Prop your legs on pillows or blankets to keep them elevated comfortably  · Exercise regularly  to help increase your blood flow  Walking is a good low-impact exercise  Talk to your healthcare provider about the best exercise plan for you  · Change body positions often  If you travel by car or work at a desk, move and stretch in your seat several times each hour  In an airplane, get up and walk every hour  If you are bedridden, ask for help to change your position every 1 to 2 hours  · Maintain a healthy weight  Ask your healthcare provider how much you should weigh  Ask him to help you create a weight loss plan if you are overweight  · Do not smoke  Nicotine and other chemicals in cigarettes and cigars can damage blood vessels and make it more difficult to manage your DVT  Ask your healthcare provider for information if you currently smoke and need help to quit  E-cigarettes or smokeless tobacco still contain nicotine  Talk to your healthcare provider before you use these products  Follow up with your healthcare provider as directed:  Write down your questions so you remember to ask them during your visits  © 2017 [address] Information is for End User's use only and may not be sold, redistributed or otherwise used for commercial purposes  All illustrations and images included in CareNotes® are the copyrighted property of A D [employer] M [employer]  or [employer]  The above information is an  only  It is not intended as medical advice for individual conditions or treatments   Talk to your doctor, nurse or pharmacist before following any medical regimen to see if it is safe and effective for you     Surgical Site Infections   WHAT YOU NEED TO KNOW:   A surgical site infection (SSI) is often caused by bacteria  It may develop 10 days to several weeks after surgery  Without treatment, the infection may spread to deeper tissues or to organs close to the surgical site  DISCHARGE INSTRUCTIONS:   Seek care immediately if:   · You feel short of breath  · Your heart is beating faster than usual      · You are confused  · Blood soaks through your bandages  · Your wound comes apart or feels like it is ripping  · You have severe pain  · You see red streaks coming from the infected area  Contact your healthcare provider if:   · You have a fever or chills  · You have more pain, redness, or swelling near your wound  · Your symptoms do not improve  · The skin around your wound feels numb  · You have questions or concerns about your condition or care  Medicines: You may need any of the following:  · NSAIDs , such as ibuprofen, help decrease swelling, pain, and fever  This medicine is available with or without a doctor's order  NSAIDs can cause stomach bleeding or kidney problems in certain people  If you take blood thinner medicine, always ask your healthcare provider if NSAIDs are safe for you  Always read the medicine label and follow directions  · Antibiotics  help treat a bacterial infection  · Take your medicine as directed  Contact your healthcare provider if you think your medicine is not helping or if you have side effects  Tell him or her if you are allergic to any medicine  Keep a list of the medicines, vitamins, and herbs you take  Include the amounts, and when and why you take them  Bring the list or the pill bottles to follow-up visits  Carry your medicine list with you in case of an emergency  Care for your wound as directed:  Keep your wound clean and dry  You may need to cover your wound when you bathe so it does not get wet   Clean your wound as directed with soap and water or wound   Put on new, clean bandages as directed  Change your bandages when they get wet or dirty  Help your wound heal:   · Eat a variety of healthy foods  Examples include fruits, vegetables, whole-grain breads, low-fat dairy products, beans, lean meats, and fish  Healthy foods may help you heal faster  You may also need to take vitamins and minerals  Ask if you need to be on a special diet  · Manage other health conditions  Follow your healthcare provider's directions to manage health conditions that can cause slow wound healing  Examples are high blood pressure and diabetes  · Do not smoke  Nicotine and other chemicals in cigarettes and cigars can cause slow wound healing  Ask your healthcare provider for information if you currently smoke and need help to quit  E-cigarettes or smokeless tobacco still contain nicotine  Talk to your healthcare provider before you use these products  Follow up with your healthcare provider in 1 to 2 days:  Write down your questions so you remember to ask them during your visits  © 2017 [address] [employer] Information is for End User's use only and may not be sold, redistributed or otherwise used for commercial purposes  All illustrations and images included in CareNotes® are the copyrighted property of A D [employer] M [employer]  or [employer]  The above information is an  only  It is not intended as medical advice for individual conditions or treatments  Talk to your doctor, nurse or pharmacist before following any medical regimen to see if it is safe and effective for you  Blood Thinners   WHAT YOU NEED TO KNOW:   Blood thinners are medicines that prevent blood clots from forming in an artery, vein, or the heart  These medicines may also prevent a blood clot from getting bigger  Blood clots prevent the flow of blood to organs and tissues such as the heart or a leg   The two main types of blood thinners are antiplatelet medicine and anticoagulant medicine  Antiplatelet medicine prevents platelets from sticking together and forming a clot  Anticoagulant medicine prevents the blood from clotting too much  DISCHARGE INSTRUCTIONS:   Call 911 for any of the following:   · You have any of the following signs of a heart attack:      ¨ Squeezing, pressure, or pain in your chest that lasts longer than 5 minutes or returns    ¨ Discomfort or pain in your back, neck, jaw, stomach, or arm     ¨ Trouble breathing    ¨ Nausea or vomiting    ¨ Lightheadedness or a sudden cold sweat, especially with chest pain or trouble breathing    · You have any of the following signs of a stroke:      ¨ Numbness or drooping on one side of your face     ¨ Weakness in an arm or leg    ¨ Confusion or difficulty speaking    ¨ Dizziness, a severe headache, or vision loss    · You feel lightheaded, short of breath, and have chest pain  · You cough up blood  · You have trouble breathing  · You are bleeding from a wound or skin injury and it won't stop after holding pressure for 10 minutes  · You have a fall or injury to the head  Seek care immediately if:   · You have a bad stomachache or headache that does not go away  · You feel weak, faint, or dizzy  · You vomit blood  · You have a fall or injury to any part of your body other than your head  · You are pregnant or think you may be pregnant  Contact your healthcare provider if:   · Your urine is red or dark brown  · Your bowel movements are red, dark brown, or black  · You bleed more than usual during your period  · You find bruises or blood blisters on your skin  · Your skin is itchy, swollen, or you have a rash  · You have questions or concerns about your condition or care  Foods and medicines to avoid:  Do not start any new medicines, vitamins, or herbal supplements before you talk to your healthcare provider   Do not make changes to your diet without talking to your healthcare provider  There are many medicines, vitamins, and herbal supplements that may prevent blood thinners from working correctly  Ask your healthcare provider for a full list of foods and medicines that can prevent anticoagulants from working correctly  The following may cause severe bleeding, or prevent anticoagulants from working correctly:  · Alcohol  may increase your risk for bleeding when taken with anticoagulants  Do not drink alcohol unless your healthcare provider says it is okay  · Changes in your regular vitamin K intake  can prevent anticoagulants, such as warfarin, from working correctly  Anticoagulants work best if you eat the same amount of vitamin K every day  Some foods that contain vitamin K include spinach, kale, broccoli, krzysztof lettuce, jeison greens, and kiwi  Ask your healthcare provider for more information about foods that contain vitamin K      · NSAIDs  may increase your risk for bleeding  Examples include ibuprofen, aspirin, and naproxen  Do not take these medicines unless your healthcare provider says it is okay  · Some antibiotics  may increase your risk for bleeding  Talk to your healthcare provider before you take antibiotics  · Alternative medicines  such as ginkgo biloba, garlic, chamomile, and St  Klever's wort, should not be taken with anticoagulants  Some may prevent anticoagulants from working, and others may increase your risk for bleeding  Self-care:   · Do not play contact sports  This may increase your risk for bleeding if you get injured or hit  Walk, swim, or do yoga to get exercise  Ask your healthcare provider about other exercises that are safe  · Use an electric razor to shave  This may prevent cuts that could bleed  · Use a soft bristled tooth brush and wax floss  This may prevent bleeding from your gums  · Prevent falls in and out of your home  Wear non-slip shoes or slippers when you are out of bed   Keep walkways clear  Remove throw rugs and other objects that can cause you to trip and fall  Use assistive devices as directed  · Be careful with sharp objects  Wear gloves when you work outside with sharp tools or in the garden  · Use contraception to prevent a pregnancy  Anticoagulants can cause problems with your baby, and dangerous bleeding during pregnancy  · Carry your medicine with you when you travel  This will prevent you from missing a dose of medicine if your bag gets lost  Talk to your healthcare provider before you travel  · Wear or carry a medical alert bracelet or necklace at all times  This will alert healthcare providers that you take an anticoagulant if you are unconscious or need emergency medical treatment  Other important information:   · Tell all of your healthcare providers and dentist that you take a blood thinner  This will help them plan for procedures and surgeries  It will also prevent them from giving you medicine that may interact with your blood thinner  · You should not take antiplatelet medicine  if you have liver or kidney disease, a peptic ulcer, or gastrointestinal disease  You should also not take this medicine if you have a bleeding disorder, uncontrolled asthma, or uncontrolled high blood pressure  These conditions may increase your risk for bleeding  · Take anticoagulants exactly as prescribed  Do not double up on a dose if you have missed a dose  Some anticoagulants should be taken at the same time every day  · Keep appointments for blood tests  if you are taking warfarin  Blood tests will help your healthcare provider make changes to your dose of anticoagulants so that they work correctly  The blood test will measure the amount of time it takes for your blood to clot  This is called the international normalized ratio (INR)  If your INR is too high, you may be at an increased risk for bleeding   If your INR is too low, you may be at an increased risk for blood clots  Your healthcare provider may change the dose of your anticoagulant medicine depending on the results of your blood test   Follow up with your healthcare provider as directed:  Write down your questions so you remember to ask them during your visits  © 2017 [employer] Information is for End User's use only and may not be sold, redistributed or otherwise used for commercial purposes  All illustrations and images included in CareNotes® are the copyrighted property of [employer]  or [employer]  The above information is an  only  It is not intended as medical advice for individual conditions or treatments  Talk to your doctor, nurse or pharmacist before following any medical regimen to see if it is safe and effective for you  Safe Use of Anticoagulants   WHAT YOU NEED TO KNOW:   Anticoagulants are medicines used to treat or prevent blood clots by thinning your blood  These medicines can be used to treat conditions, such as pulmonary embolism or venous thrombosis  The medicines may be used to prevent deep vein thrombosis (DVT) or blood clots after surgery or long-term bedrest    DISCHARGE INSTRUCTIONS:   Seek care immediately if:   · You have a nose bleed that lasts longer than 10 minutes  · You cough up or vomit blood  · You have a sudden, severe headache, or you cannot move one side of your body  · You are dizzy and cannot keep your balance  · You are confused or have trouble speaking  · You have difficulty breathing or chest pain  Contact your healthcare provider if:   · You forget to take your medicine or you take too much  · You have nausea, and you are vomiting  · You bleed when you brush your teeth or blow your nose  · Your urine or bowel movements are dark in color or have blood in them  · You have excessive bruising or your legs have reddish-purple spots that look like a rash      · You have a sore throat, fever, and chills  · You become pregnant  · You have questions or concerns about your condition or care  Take your anticoagulants safely:   · Always  take your medicine exactly as prescribed by your healthcare provider  Take your medicine at the same time every day  This will help keep a steady level of medicine in your body  · Take 1 dose of medicine at a time  If you forget to take a dose, do not  take 2 doses  Too much anticoagulant in your body may increase your risk of bleeding and change medicine levels in your blood  · Do not stop taking your medicine  Follow your healthcare provider's directions  · Do not take aspirin or products containing aspirin unless prescribed by your healthcare provider  Many over-the-counter medicines contain aspirin  Aspirin may increase your risk of bleeding  · Do not start or stop taking any other medicines (prescribed or over the counter) or supplements  Ask your healthcare provider before changing any medicine or supplement  Certain medicines and supplements can cause excessive bleeding or cause your anticoagulant to not work properly  Safety measures:  Tell all of your healthcare providers that you are taking anticoagulants  Keep a current list of medicines and their dosages with you at all times  · Wear a bracelet or necklace that says you take this medicine  · You may need regular blood tests so your healthcare provider can decide how much medicine you need  The dose may need to be changed because of your test results  Write down changes to your dose of medicine  · Use a soft washcloth and a soft toothbrush  If you shave, use an electric razor  Avoid activities that can cause bruising or bleeding  · If you take warfarin, some foods can change how your blood clots  Do not make major changes to the foods you eat while you are taking warfarin  Warfarin works best when you eat about the same amount of vitamin K every day   Vitamin K is found in green leafy vegetables, broccoli, grapes, and other foods  Ask for more information about what to eat when you take warfarin  · Avoid drinking alcohol while taking anticoagulants  Alcohol can increase your risk of bleeding  © 2017 [employer] Information is for End User's use only and may not be sold, redistributed or otherwise used for commercial purposes  All illustrations and images included in CareNotes® are the copyrighted property of [employer]  or [employer]  The above information is an  only  It is not intended as medical advice for individual conditions or treatments  Talk to your doctor, nurse or pharmacist before following any medical regimen to see if it is safe and effective for you

## 2017-11-30 NOTE — PROGRESS NOTES
Progress Note - Infectious Disease   Dionne Bradley 33 y o  male MRN: [id number]  Unit/Bed#: [facility] 905-01 Encounter: [id number]      Impression/Plan:  1   SIRS versus sepsis-POA:  Fever and tachycardia  Urinalysis and blood cultures negative  Surgical site appears clean and intact without infection  CT A/P negative for abscess; SQ air likely due to recently removed SAÚL drain  Likely noninfectious due to severe constipation, DVT, ? PE, drug fever or viral process  Clinically stable and nontoxic  The fever has resolved   The patient was not bacteremic   The PICC line has been removed  Continue antibiotics as below  Monitor temperature  Continue supportive care as per the primary service     2   History of Proteus sepsis due to postoperative wound infection-High risk IV antibiotic candidate due to #5, now with readmission with fever  Sensitive to FQ and baseline QTC normal   Unclear allergy to ciprofloxacin  Tolerating levofloxacin   His follow-up QTC is in the normal range  Continue levofloxacin through 12/13/2017  No need for additional labs as the patient's CBC with diff and BMP have been stable on Levaquin  Monitor temperature  Supportive care as per the primary service     3   Severe constipation-Possibly due to narcotic pain medicine   Improved and now having bowel movements  Continue MiraLax per patient request  Monitor symptomatology     4   RLE subacute/chronic DVT-Does not appear complicated by pulmonary embolism based on CT findings   Could be the cause of the fever   The INR is now therapeutic   The patient still does have some leg swelling, but it is improved  Continue anticoagulation as per the primary service     5   Autism/BPD    6  Blurred vision-no primary ocular pathology as per Ophthalmology  No clear evidence of an infectious process  -monitor symptoms for primary  -outpatient ophthalmology follow-up  -no additional ID workup for now     7   Disposition-patient possibly going to rehab   Okay to discharge from infectious disease standpoint  As patient has less than 2 weeks of oral antibiotics remaining, no need for Infectious Disease follow-up  Follow up with Neurosurgery  Antibiotics:  Levaquin 13  Antibiotics 31  Postop day 29    Subjective:  Patient has no fever, chills, sweats; no nausea, vomiting, diarrhea; no cough, shortness of breath; no increase pain  No new symptoms  He apparently developed some blurred vision and was seen by Ophthalmology yesterday  Objective:  Vitals:  HR:  [] 92  Resp:  [18] 18  BP: (119-131)/(61-71) 131/61  SpO2:  [96 %-98 %] 96 %  Temp (24hrs), Av 7 °F (36 5 °C), Min:97 6 °F (36 4 °C), Max:97 7 °F (36 5 °C)  Current: Temperature: 97 6 °F (36 4 °C)    Physical Exam:   General Appearance:  Somnolent, arousable, nontoxic, no acute distress  Throat: Oropharynx moist without lesions  Lungs:   Clear to auscultation anteriorly; respirations unlabored   Heart:  RRR; no murmur, rub or gallop   Abdomen:   Soft, non-tender, non-distended, positive bowel sounds  Extremities: No clubbing, cyanosis or edema   Skin: No new rashes or lesions  No draining wounds noted         Labs, Imaging, & Other studies:   All pertinent labs and imaging studies were personally reviewed    Results from last 7 days  Lab Units 17  0446 17  0440   WBC Thousand/uL 8 45 10 12   HEMOGLOBIN g/dL 11 4* 10 8*   PLATELETS Thousands/uL 182 173       Results from last 7 days  Lab Units 17  0446 17  1330   SODIUM mmol/L 135* 135*   POTASSIUM mmol/L 4 2 4 0   CHLORIDE mmol/L 101 100   CO2 mmol/L 27 27   ANION GAP mmol/L 7 8   BUN mg/dL 13 11   CREATININE mg/dL 0 59* 0 50*   EGFR ml/min/1 73sq m 140 150   GLUCOSE RANDOM mg/dL 78 93   CALCIUM mg/dL 9 3 9 2   AST U/L  --  21   ALT U/L  --  48   ALK PHOS U/L  --  37*   TOTAL PROTEIN g/dL  --  7 1   ALBUMIN g/dL  --  2 7*   BILIRUBIN TOTAL mg/dL  --  0 26         CT head-no acute abnormality

## 2017-11-30 NOTE — SOCIAL WORK
Cm reviewed patient during care coordination rounds  Patient is medically stable for d/c today  Cm confirmed that medical equipment, hospital bed, WC, sliding board, and bedside commode  Cm confirmed with [facility] that patient will leave the hospital today  Patient to receive home PT, home OT, HA, RN, and SW  Cm informed at first that patient wanted prescriptions to be sent to pharmacy to be filled, but after discussion with patient's mother, she would like scripts sent home with patient to be filled at their home pharmacy  Cm also informed that patient's mother was able to establish transport with [facility]  Cm spoke with transport company that explained that patient will be transported in a [facility] and that patient's mother is in agreement with OOP cost of transport  Patient to be transported at [address] by [facility]  CM reviewed d/c planning process including the following: identifying help at home, patient preference for d/c planning needs, Discharge Lounge, [facility] Meds to Bed program, availability of treatment team to discuss questions or concerns patient and/or family may have regarding understanding medications and recognizing signs and symptoms once discharged  CM also encouraged patient to follow up with all recommended appointments after discharge  Patient advised of importance for patient and family to participate in managing patients medical well being

## 2017-11-30 NOTE — DISCHARGE SUMMARY
Discharge Summary - [facility] Internal Medicine    Patient Information: Cornelius Lopez 33 y o  male MRN: [id number]  Unit/Bed#: [facility] 905-01 Encounter: [id number]    Discharging Physician / Practitioner: Morro Robles MD  PCP: Mitali Armando MD  Admission Date: 11/17/2017  Discharge Date: 11/30/17    Reason for Admission:  See history and physical    Discharge Diagnoses:     Principal Problem:    Wound infection  Active Problems:    Hypothyroid    Bipolar disorder ([facility] [address] )    Autism    Morbid obesity due to excess calories ([facility] [address] [facility] )    Deep vein thrombosis (DVT) of tibial vein (HCC)  Resolved Problems:    SIRS - Recent Spinal Wound Infection with Proteus     High serum lactate    Wound infection after surgery, subsequent encounter    Constipation    Sinus tachycardia      Hospital Course:     Cornelius Lopez is a 33 y o  male patient who originally presented to the hospital on 11/17/2017 due to postsurgical infection after spinal surgery, infection was thought to be secondary to Proteus, patient was already admitted for this mother no longer ago and discharged to home with prolonged course antibiotic with Ancef but during this admission he was switched to p o  Levaquin as there is about that the patient might be compliant with therapy and specially declined to be removed as patient not be hydrated for infection and complication given the fact that he has autistic trait and has shown during the stay that he is noncompliant with this therapy and treatment  Patient will be continue Levaquin until December 13, 2017 and has his blood work remained stable while on this medication during the stay as per ID physician there is no need to repeat blood work in the next 13 days  Also patient was found to have a subacute/acute DVT of right lower extremity for which he will be continue on Coumadin 5 mg daily, visiting nurse will recheck INR tomorrow and dose of Coumadin will be adjusted accordingly    Last INR available 2 12 as per November 29th  Complaint of blurry vision after he physical therapy and related to the tachycardia, this was thought to be amaurosis fugax related to tachycardia as per ophthalmologist who evaluated the patient, CT scan of the head negative, patient was offered MRI but he refused  Of note, patient was suggested to go to rehabilitation center for severe deconditioning, but he has refused, with the supportive mom and stepdad he will be discharged to home with medical equipment including electric hospital bed, wheelchair and he will receive visiting nurse, PT, OT, and  will be assigned to him as well  Patient, mother and stepfather had been instructed to return immediately to the ER for any sign of infection, and follow up as instructed with Neurosurgery, been instructed to stop Levaquin on December 13, not to take double dose of medication if doses keep, had been instructed to check INR, and had been given instruction for safe use of anticoagulation at discharge  Condition at Discharge: good       Discharge instructions/Information to patient and family:   See after visit summary for information provided to patient and family  Provisions for Follow-Up Care:  See after visit summary for information related to follow-up care and any pertinent home health orders  Disposition: Home    Planned Readmission: no    Discharge Statement:  I spent > 30 minutes discharging the patient  This time was spent on the day of discharge  I had direct contact with the patient on the day of discharge  Greater than 50% of the total time was spent examining patient, answering all patient questions, arranging and discussing plan of care with patient as well as directly providing post-discharge instructions  Additional time then spent on discharge activities  Discharge Medications:  See after visit summary for reconciled discharge medications provided to patient and family        ** Please Note: Dragon 360 Dictation voice to text software may have been used in the creation of this document   **

## 2017-11-30 NOTE — CONSULTS
Janice Todd is a 78-year-old white male admitted for wound infection  He complained earlier today of blurry vision associated with dizziness  He wears spectacles and has no other ocular history  He is very resistent to being examined  Without correction at near his acuity is 20/50 both eyes, pupils are normal with no afferent defect  the external examination is unremarkable  The media is clear  I was able to visualize the disc macula and vessels and found them unremarkable  I elected to defer attempting a dilated fundus examination due to the lack of cooperation  My impression is amaurosis associated with tachycardia  I see no acute ophthalmic pathology  I see no reason for ophthalmic intervention of any kind at the moment  Please re-consult me for new symptoms

## 2017-12-01 ENCOUNTER — GENERIC CONVERSION - ENCOUNTER (OUTPATIENT)
Dept: OTHER | Facility: OTHER | Age: 27
End: 2017-12-01

## 2017-12-01 DIAGNOSIS — E78.5 HYPERLIPIDEMIA: ICD-10-CM

## 2017-12-01 DIAGNOSIS — R73.03 PREDIABETES: ICD-10-CM

## 2017-12-01 DIAGNOSIS — Z98.890 OTHER SPECIFIED POSTPROCEDURAL STATES: ICD-10-CM

## 2017-12-01 DIAGNOSIS — E03.9 HYPOTHYROIDISM: ICD-10-CM

## 2017-12-04 LAB
FUNGUS SPEC CULT: NORMAL

## 2017-12-05 ENCOUNTER — GENERIC CONVERSION - ENCOUNTER (OUTPATIENT)
Dept: OTHER | Facility: OTHER | Age: 27
End: 2017-12-05

## 2017-12-11 ENCOUNTER — GENERIC CONVERSION - ENCOUNTER (OUTPATIENT)
Dept: OTHER | Facility: OTHER | Age: 27
End: 2017-12-11

## 2017-12-12 ENCOUNTER — GENERIC CONVERSION - ENCOUNTER (OUTPATIENT)
Dept: OTHER | Facility: OTHER | Age: 27
End: 2017-12-12

## 2017-12-13 ENCOUNTER — GENERIC CONVERSION - ENCOUNTER (OUTPATIENT)
Dept: OTHER | Facility: OTHER | Age: 27
End: 2017-12-13

## 2017-12-15 ENCOUNTER — GENERIC CONVERSION - ENCOUNTER (OUTPATIENT)
Dept: OTHER | Facility: OTHER | Age: 27
End: 2017-12-15

## 2017-12-19 ENCOUNTER — GENERIC CONVERSION - ENCOUNTER (OUTPATIENT)
Dept: OTHER | Facility: OTHER | Age: 27
End: 2017-12-19

## 2017-12-20 ENCOUNTER — GENERIC CONVERSION - ENCOUNTER (OUTPATIENT)
Dept: OTHER | Facility: OTHER | Age: 27
End: 2017-12-20

## 2017-12-20 LAB
MYCOBACTERIUM SPEC CULT: NORMAL
RHODAMINE-AURAMINE STN SPEC: NORMAL

## 2018-01-09 NOTE — RESULT NOTES
Verified Results  (1) T4, FREE [id number] 01:47PM Lucille LIMA Order Number: [id number]     Test Name Result Flag Reference   T4,FREE 1 04 ng/dL  0 76-1 46     (1) TSH 17Apr2017 01:47PM Justin Sosa Order Number: [id number]     Test Name Result Flag Reference   TSH 2 600 uIU/mL  0 358-3 740   - Patient Instructions: This bloodwork is non-fasting  Please drink two glasses of water morning of bloodwork  - Patient Instructions: This bloodwork is non-fasting  Please drink two glasses of water morning of bloodwork  Patients undergoing fluorescein dye angiography may retain small amounts of fluorescein in the body for 48-72 hours post procedure  Samples containing fluorescein can produce falsely depressed TSH values  If the patient had this procedure,a specimen should be resubmitted post fluorescein clearance

## 2018-01-09 NOTE — MISCELLANEOUS
Message   Recorded as Task   Date: 10/18/2017 02:02 PM, Created By: Jake Marin   Task Name: Follow Up   Assigned To: Jake Marin   Regarding Patient: Marlin Green, Status: In Progress   CommentRee Rile - 18 Oct 2017 2:02 PM     TASK CREATED  LMOM for c/b to see how patient is doing after surgery  Gabriela Dye - 18 Oct 2017 3:13 PM     TASK EDITED  PT'S MOTHER CALLED YOU BACK AND ADVISED TEMP IS 97 8  PLEASE CALL HER AT Gayleen Osler - 19 Oct 2017 11:16 AM     TASK EDITED  Spoke with patient's mom this morning around 8am   She said that Tatum Edmonds is doing well overall  His dressing is still C/D/I and he has not been running a temperature  She is happy to report that his leg pain has resolved  He has some mild back soreness around the incision which she expected and the pain medication has been relieving his symptoms  I went over incision care with mom as well as s/s infection to keep an eye out for  Verified date/time/location of his upcoming POV and advised her to call the office with any further questions or concerns or if any incisional issues or fevers would arise  She was appreciative for the call          Signatures   Electronically signed by : Esha Apple RN; Oct 19 2017 11:16AM EST                       (Author)

## 2018-01-10 ENCOUNTER — TRANSCRIBE ORDERS (OUTPATIENT)
Dept: LAB | Facility: CLINIC | Age: 28
End: 2018-01-10

## 2018-01-10 ENCOUNTER — LAB (OUTPATIENT)
Dept: LAB | Facility: CLINIC | Age: 28
End: 2018-01-10
Payer: MEDICARE

## 2018-01-10 ENCOUNTER — GENERIC CONVERSION - ENCOUNTER (OUTPATIENT)
Dept: OTHER | Facility: OTHER | Age: 28
End: 2018-01-10

## 2018-01-10 DIAGNOSIS — O22.30 DEEP PHLEBOTHROMBOSIS, ANTEPARTUM, WITH DELIVERY (HCC): Primary | ICD-10-CM

## 2018-01-10 DIAGNOSIS — E78.5 HYPERLIPIDEMIA: ICD-10-CM

## 2018-01-10 DIAGNOSIS — M21.371 RIGHT FOOT DROP: ICD-10-CM

## 2018-01-10 DIAGNOSIS — E03.9 HYPOTHYROIDISM: ICD-10-CM

## 2018-01-10 DIAGNOSIS — R00.0 TACHYCARDIA: ICD-10-CM

## 2018-01-10 DIAGNOSIS — Z98.890 OTHER SPECIFIED POSTPROCEDURAL STATES: ICD-10-CM

## 2018-01-10 DIAGNOSIS — I82.409 DEEP PHLEBOTHROMBOSIS, ANTEPARTUM, WITH DELIVERY (HCC): Primary | ICD-10-CM

## 2018-01-10 DIAGNOSIS — I82.409 DEEP PHLEBOTHROMBOSIS, ANTEPARTUM, WITH DELIVERY (HCC): ICD-10-CM

## 2018-01-10 DIAGNOSIS — O22.30 DEEP PHLEBOTHROMBOSIS, ANTEPARTUM, WITH DELIVERY (HCC): ICD-10-CM

## 2018-01-10 DIAGNOSIS — R73.03 PREDIABETES: ICD-10-CM

## 2018-01-10 LAB
ALBUMIN SERPL BCP-MCNC: 3.6 G/DL (ref 3.5–5)
ALP SERPL-CCNC: 49 U/L (ref 46–116)
ALT SERPL W P-5'-P-CCNC: 35 U/L (ref 12–78)
ANION GAP SERPL CALCULATED.3IONS-SCNC: 11 MMOL/L (ref 4–13)
AST SERPL W P-5'-P-CCNC: 21 U/L (ref 5–45)
BASOPHILS # BLD MANUAL: 0 THOUSAND/UL (ref 0–0.1)
BASOPHILS NFR MAR MANUAL: 0 % (ref 0–1)
BILIRUB SERPL-MCNC: 0.2 MG/DL (ref 0.2–1)
BUN SERPL-MCNC: 10 MG/DL (ref 5–25)
CALCIUM SERPL-MCNC: 9.4 MG/DL (ref 8.3–10.1)
CHLORIDE SERPL-SCNC: 104 MMOL/L (ref 100–108)
CO2 SERPL-SCNC: 25 MMOL/L (ref 21–32)
CREAT SERPL-MCNC: 0.64 MG/DL (ref 0.6–1.3)
EOSINOPHIL # BLD MANUAL: 0.18 THOUSAND/UL (ref 0–0.4)
EOSINOPHIL NFR BLD MANUAL: 3 % (ref 0–6)
ERYTHROCYTE [DISTWIDTH] IN BLOOD BY AUTOMATED COUNT: 15.3 % (ref 11.6–15.1)
EST. AVERAGE GLUCOSE BLD GHB EST-MCNC: 94 MG/DL
GFR SERPL CREATININE-BSD FRML MDRD: 135 ML/MIN/1.73SQ M
GIANT PLATELETS BLD QL SMEAR: PRESENT
GLUCOSE SERPL-MCNC: 87 MG/DL (ref 65–140)
HBA1C MFR BLD: 4.9 % (ref 4.2–6.3)
HCT VFR BLD AUTO: 36.6 % (ref 36.5–49.3)
HGB BLD-MCNC: 12 G/DL (ref 12–17)
INR PPP: 2.85 (ref 0.86–1.16)
LG PLATELETS BLD QL SMEAR: PRESENT
LYMPHOCYTES # BLD AUTO: 2.28 THOUSAND/UL (ref 0.6–4.47)
LYMPHOCYTES # BLD AUTO: 39 % (ref 14–44)
MCH RBC QN AUTO: 25.2 PG (ref 26.8–34.3)
MCHC RBC AUTO-ENTMCNC: 32.8 G/DL (ref 31.4–37.4)
MCV RBC AUTO: 77 FL (ref 82–98)
MONOCYTES # BLD AUTO: 0.47 THOUSAND/UL (ref 0–1.22)
MONOCYTES NFR BLD: 8 % (ref 4–12)
NEUTROPHILS # BLD MANUAL: 2.63 THOUSAND/UL (ref 1.85–7.62)
NEUTS SEG NFR BLD AUTO: 45 % (ref 43–75)
PLATELET # BLD AUTO: 273 THOUSANDS/UL (ref 149–390)
PLATELET BLD QL SMEAR: ADEQUATE
PMV BLD AUTO: 9.4 FL (ref 8.9–12.7)
POTASSIUM SERPL-SCNC: 3.6 MMOL/L (ref 3.5–5.3)
PROT SERPL-MCNC: 7 G/DL (ref 6.4–8.2)
PROTHROMBIN TIME: 31 SECONDS (ref 12.1–14.4)
RBC # BLD AUTO: 4.77 MILLION/UL (ref 3.88–5.62)
SODIUM SERPL-SCNC: 140 MMOL/L (ref 136–145)
T4 FREE SERPL-MCNC: 1 NG/DL (ref 0.76–1.46)
TOTAL CELLS COUNTED SPEC: 100
TSH SERPL DL<=0.05 MIU/L-ACNC: 4.42 UIU/ML (ref 0.36–3.74)
VARIANT LYMPHS # BLD AUTO: 5 %
WBC # BLD AUTO: 5.84 THOUSAND/UL (ref 4.31–10.16)

## 2018-01-10 PROCEDURE — 84439 ASSAY OF FREE THYROXINE: CPT

## 2018-01-10 PROCEDURE — 85027 COMPLETE CBC AUTOMATED: CPT

## 2018-01-10 PROCEDURE — 85007 BL SMEAR W/DIFF WBC COUNT: CPT

## 2018-01-10 PROCEDURE — 36415 COLL VENOUS BLD VENIPUNCTURE: CPT

## 2018-01-10 PROCEDURE — 84443 ASSAY THYROID STIM HORMONE: CPT

## 2018-01-10 PROCEDURE — 83036 HEMOGLOBIN GLYCOSYLATED A1C: CPT

## 2018-01-10 PROCEDURE — 85610 PROTHROMBIN TIME: CPT

## 2018-01-10 PROCEDURE — 80053 COMPREHEN METABOLIC PANEL: CPT

## 2018-01-10 NOTE — RESULT NOTES
Verified Results  (1) URINALYSIS w URINE C/S REFLEX (will reflex a microscopy if leukocytes, occult blood, or nitrites are not within normal limits) [id number] 09:24AM Grace Hands    Order Number: [id number]     Test Name Result Flag Reference   COLOR Yellow     CLARITY Clear     PH UA 7 0  4 5-8 0   LEUKOCYTE ESTERASE UA Negative  Negative   NITRITE UA Negative  Negative   PROTEIN UA Negative mg/dl  Negative   GLUCOSE UA Negative mg/dl  Negative   KETONES UA Negative mg/dl  Negative   UROBILINOGEN UA 0 2 E U /dl  0 2, 1 0 E U /dl   BILIRUBIN UA Negative  Negative   BLOOD UA Negative  Negative   SPECIFIC GRAVITY UA 1 015  1 003-1 030

## 2018-01-10 NOTE — CONSULTS
Assessment   1  Bipolar mood disorder (296 80) (F31 9)  2  Autism (299 00) (F84 0)  3  ADHD (attention deficit hyperactivity disorder), combined type (314 01) (F90 2)  4  Iliotibial band syndrome of right side (728 89) (M76 31)  5  Right-sided back pain (724 5) (M54 9)  6  Lumbar disc disease (722 93) (M51 9)    Plan  Iliotibial band syndrome of right side, Lumbar disc disease, Right-sided back pain    · 1 - Horacio NEFF, Agatha MARTELL (Pain Management) Co-Management  *  Status: Active   Requested for: 73JJL3584  Ordered; For: Iliotibial band syndrome of right side, Lumbar disc disease, Right-sided   back pain;  Ordered By: Michael Lora  Performed:   Due: 40PNZ3489  () Care Summary provided  : Yes   · Follow-up visit in 2 months Evaluation and Treatment  Follow-up  Status: Complete   Done: 42QPL0186  Ordered; For: Iliotibial band syndrome of right side, Lumbar disc disease, Right-sided   back pain;  Ordered By: Michael Lora  Performed:   Due: 88KVW6900;   Last Updated By: Mary Roberts; 8/9/2017 11:18:04 AM    Discussion/Summary    28-year-old gentleman with lower back pain and right-sided leg pain  I reviewed his history, physical examination and imaging in detail with him and his mother today  There are a number of nonsurgical pain management strategies available to him  I would like him to continue with Tylenol and we'll refer him to Dr Jayson Howell to discuss ongoing pain management strategies  While epidural steroid injection may be helpful, is difficult to ascertain whether or not the patient would be able to participate with this  While CT does demonstrate likely disc herniation within the lumbar spine, I expect that surgery is generally less rewarding for back pain and usually better for radicular symptoms  An MRI would be required for more surgical planning and would likely need to be organized under general anesthetic  All their questions were answered to their satisfaction   I will see him again in 2 months time to check on his clinical progress  If he is not having significant improvement in his symptoms or should he develop signs or symptoms concerning for progressive radiculopathy or cauda equina syndrome, which I reviewed in detail with him today, the and they will contact my office and arrangements can be made for an MRI under general anesthetic prior to his next visit  Both he and his mother were in agreement with this course of action  The patient has the current Goals: Improved pain control  The patent has the current Barriers: Obesity  Obstructive sleep apnea  ADHD/bipolar disorder/autism  Patient is able to Self-Care  Self Referrals: No      Chief Complaint  New patient referred by Dr Lisa Perry back pain with radiculopathy      History of Present Illness  67-year-old gentleman accompanied by his mother today  Past medical history is significant for ADHD, bipolar disorder and autism  In May 2017, without inciting event, he developed lower back pain and right-sided leg pain  While this initially improved, it has recurred more recently  He currently describes pain across his lower back which seems to be worse with standing and walking  The pain can radiate into his outer right thigh  He denies any inner thigh pain or pain below the right knee or into his foot  He denies any pain, weakness or numbness in the left leg  He denies any numbness in the right leg but does describe some weakness in his right leg when walking  He has had long-standing nocturia but he and his mother deny any urinary or fecal incontinence during the day, though he does wear an adult diaper  He denies any change in perineal sensation  His mother notes that he seems to be walking with his posture tilted towards the left more recently  He takes Tylenol which is quite helpful with the pain  He has not tried any other pain medications or seen a pain specialist to discuss epidural steroid injections   He started physical therapy which was quite helpful, but apparently, was not able to continue as his prescription ran out  He is trying to contact his PCP for an additional prescription  He tries to perform stretching activities daily  Review of Systems    Constitutional: feeling tired, but no fever, not feeling poorly, no recent weight gain, no chills and no recent weight loss  Eyes: No complaints of eye pain, no red eyes, no discharge from eyes, no itchy eyes  ENT: no complaints of earache, no hearing loss, no nosebleeds, no nasal discharge, no sore throat, no hoarseness  Cardiovascular: chest pain (Due to acid reflux (gas pain)), but the heart rate was not slow, no intermittent leg claudication, the heart rate was not fast, no palpitations and no extremity edema  Respiratory: No complaints of shortness of breath, no wheezing, no cough, no SOB on exertion, no orthopnea or PND  Gastrointestinal: No complaints of abdominal pain, no constipation, no nausea or vomiting, no diarrhea or bloody stools  Genitourinary: incontinence, but no dysuria, no urinary hesitancy, no genital lesions, no nocturia and no testicular pain  Musculoskeletal: limb pain (Right leg), but no joint swelling, no myalgias, no joint stiffness and no limb swelling    The patient presents with complaints of arthralgias (Across lower back radiates into mid back (across), radiates into right hip, right leg)  Integumentary: No complaints of skin rash or skin lesions, no itching, no skin wound, no dry skin  Neurological: headache, numbness (Rigth leg), limb weakness and difficulty walking, but no tingling, no confusion, no dizziness, no convulsions and no fainting  Psychiatric: sleep disturbances (Due to pain    Sleep apnea), but not suicidal, no personality change and no emotional problems  Endocrine: No complaints of proptosis, no hot flashes, no muscle weakness, no erectile dysfunction, no deepening of the voice, no feelings of weakness  Hematologic/Lymphatic: No complaints of swollen glands, no swollen glands in the neck, does not bleed easily, no easy bruising  ROS reviewed  Active Problems   1  ADHD (attention deficit hyperactivity disorder), combined type (314 01) (F90 2)  2  Allergy (995 3) (T78 40XA)  3  Autism (299 00) (F84 0)  4  Bipolar mood disorder (296 80) (F31 9)  5  Bowel incontinence (787 60) (R15 9)  6  Constipation (564 00) (K59 00)  7  Depression (311) (F32 9)  8  Encounter for therapeutic drug monitoring (V58 83) (Z51 81)  9  External hemorrhoids (455 3) (K64 4)  10  Herniated lumbar intervertebral disc (722 10) (M51 26)  11  Hip pain (719 45) (M25 559)  12  Hyperlipidemia (272 4) (E78 5)  13  Hypertriglyceridemia (272 1) (E78 1)  14  Hypothyroidism (244 9) (E03 9)  15  Iliotibial band syndrome of right side (728 89) (M76 31)  16  Muscle spasm (728 85) (M62 838)  17  Nocturnal enuresis (788 36) (N39 44)  18  Obesity (278 00) (E66 9)  19  COLIN (obstructive sleep apnea) (327 23) (G47 33)  20  Prediabetes (790 29) (R73 03)  21  Right-sided back pain (724 5) (M54 9)  22  Skin lesion (709 9) (L98 9)  23  Snoring (786 09) (R06 83)  24  Urinary incontinence (788 30) (R32)    Past Medical History   1  History of Blood in stool (578 1) (K92 1)  2  History of Costochondritis (733 6) (M94 0)  3  History of hyperlipidemia (V12 29) (Z86 39)  4  History of thyroid disease (V12 29) (Z86 39)  5  History of viral gastroenteritis (V12 09) (Z86 19)  6  History of Urinary problem (V47 4) (R39 89)    The active problems and past medical history were reviewed and updated today  Surgical History   1  History of Colonoscopy (Fiberoptic)  2  History of Oral Surgery Tooth Extraction    The surgical history was reviewed and updated today  Family History  Mother   1  Denied: Family history of Alcoholism and drug addiction in family  2  Denied: Family history of Anxiety and depression  3  Denied: Family history of Cancer, colon  4  Denied: Family history of Crohn's disease  5  Family history of hypertension (V17 49) (Z82 49)  6  Denied: Family history of liver disease  Father   9  Denied: Family history of Alcoholism and drug addiction in family  6  Denied: Family history of Anxiety and depression  9  Denied: Family history of Cancer, colon  10  Family history of cardiac disorder (V17 49) (Z82 49)  11  Denied: Family history of Crohn's disease  12  Denied: Family history of liver disease  15  Family history of substance abuse (V17 0) (Z81 4)  Child   15  Denied: Family history of Alcoholism and drug addiction in family  13  Denied: Family history of Anxiety and depression  Sibling   12  Denied: Family history of Alcoholism and drug addiction in family  16  Denied: Family history of Anxiety and depression  Grandmother   25  Family history of colonic polyps (V18 51) (Z83 71)  Maternal Grandmother   23  Family history of cardiac disorder (V17 49) (Z82 49)  Grandfather   20  Family history of colonic polyps (V18 51) (Z83 71)  21  Family history of malignant neoplasm (V16 9) (Z80 9)  Paternal Grandfather   25  Family history of cardiac disorder (V17 49) (Z82 49)    The family history was reviewed and updated today  Social History    · Drinks coffee   · Never a smoker   · No alcohol use   · Single  The social history was reviewed and updated today  Current Meds  1  Atorvastatin Calcium 20 MG Oral Tablet; take 1 tablet by mouth every day; Therapy: 58JSI4173 to (Edil Bey)  Requested for: 67UKV7529; Last   KH:90ILG3452 Ordered  2  Benztropine Mesylate 1 MG Oral Tablet; Take 1 tablet twice daily as needed; Therapy: 06Wml1315 to (Evaluate:01Jan2017)  Requested for: 34Yrq8903; Last   Rx:08Evj9038 Ordered  3  ClonazePAM 1 MG Oral Tablet; Take 1 tablet twice daily; Last Rx:14Nys6109 Ordered  4  Divalproex Sodium 500 MG Oral Tablet Delayed Release; Take 1 tablet 4 times daily; Therapy: (Divya Alarcon) to Recorded  5  EPINEPHrine 0 3 MG/0 3ML Injection Solution Auto-injector; INJECT 0 3ML   INTRAMUSCULARLY AS DIRECTED; Therapy: 99Uim8844 to (Last Rx:84Fbt9821)  Requested for: 57Xfq3568 Ordered  6  Fenofibrate 160 MG Oral Tablet; take 1 tablet by mouth every day; Therapy: 03ETE8966 to (Last Rx:33Ifq2799)  Requested for: 05TED1589 Ordered  7  Hydrocortisone 1 % Rectal Cream; USE AS DIRECTED; Therapy: 84AMU2338 to (Last Rx:53Pdj3765)  Requested for: 81Cah2411 Ordered  8  Hydrocortisone Acetate 25 MG Rectal Suppository; INSERT 1 SUPPOSITORY RECTALLY   AT BEDTIME AS NEEDED; Therapy: 41CZP3178 to (Evaluate:42Uyo0801)  Requested for: 59NYW3339; Last   Rx:30Nov2016 Ordered  9  Levothyroxine Sodium 25 MCG Oral Tablet; TAKE 1 TABLET BY MOUTH MONDAY THRU   FRIDAY AND 2 TABLETS ON SATURDAY AND SUNDAY; Therapy: 95OIM4073 to (Evaluate:71Rrh6503)  Requested for: 95XEP8124; Last   Rx:02Aje5011 Ordered  10  RisperiDONE 2 MG Oral Tablet; TAKE 1 TABLET TWICE DAILY; Therapy: 01Nva6175 to (Evaluate:98Ghk0380)  Requested for: 93Hdz8388; Last    Rx:35Hxj3190 Ordered  11  RisperiDONE 4 MG Oral Tablet; TAKE 1 TABLET TWICE DAILY; Therapy: 15QPL8915 to (Evaluate:11Mba8408)  Requested for: 12Oct2016; Last    Rx:82Qrz3434; Status: ACTIVE - Renewal Voided Ordered    The medication list was reviewed and updated today  Allergies   1  CIPRO  2  Cipro TABS  3  Erythromycin Derivatives  4  Erythromycin TABS  5  Motrin TABS  6  Motrin TABS  7  Penicillins  8  Penicillins    Vitals  Vital Signs    Recorded: 09Aug2017 10:33AM   Temperature 97 7 F   Heart Rate 88   Respiration 20   Systolic 584   Diastolic 64   Height 6 ft 3 in   Weight 340 lb    BMI Calculated 42 5   BSA Calculated 2 75   Pain Scale 9     Physical Exam     Constitutional   General appearance: Abnormal   comfortable and obese  Musculo: Spine Lumbar/Sacral Spine: Normal    Skin warm and dry  No rashes, lesions or ecchymosis  Neurologic - Mental Status: Alert and Oriented x3  Mood and affect: Abnormal   Memory is intact  Motor System - Lower Extremities: 5/5 power and lower extremities  Muscle tone: Normal bilaterally  Muscle Bulk: Normal bilaterally  Reflexes:   Reflexes: Abnormal   Deep tendon reflexes: 0 right patella, 0 left patella, 0 right ankle jerk and 0 left ankle jerkno ankle clonus on the right and no ankle clonus on the left  Superficial/Primitive Reflexes: Babinski reflex absent on the right and Babinski reflex absent on the left  Sensory: Sensation grossly intact to light touch  Gait and Station:   Gait and station: Abnormal   [facility] with a steady gait, though tends to lean towards the left  He is able to) Gait evaluation demonstrated stooping and antalgia on the right  Results/Data    I personally reviewed the ct in detail with the patient  CT Scan Review CT scan of the lumbar spine without contrast dated May 17, 2017  Normal lumbar lordosis  Soft tissue detail is somewhat scared by patient's body habitus  Apparent right paracentral disc herniation which is partially calcified at L4-5  Possible central and left paracentral disc herniation at L3-4  No other areas of significant neural compression  No bony lesions  No evidence of fracture or malalignment        Future Appointments    Date/Time Provider Specialty Site   12/12/2017 04:30 PM Brent Marcos MD Internal Medicine [address]     Signatures   Electronically signed by : YARI Mix ; Aug  9 2017 11:23AM EST                       (Author)    Electronically signed by : YARI Mix ; Aug  9 2017 11:43AM EST

## 2018-01-10 NOTE — PROGRESS NOTES
Received call from answering service regarding request to call back mother of Samuel Quezada at 21:46 on 10/29  Spoke with patient's mother, Jono Degroot,  regarding several complaints concerning post-operative recovery s/p  LEFT L3/4 AND RIGHT L4/5 MICRODISCECTOMIES, HEMILAMINECTOMIES; POSSIBLE EPIDURAL STEROID INJECTIONS on 10/17 by Dr Ruma Pham  She expressed concern about possible incisional dehiscence described as "upside down lightbulb shape" at the inferior 1  inch of approximately 5 inch incision  She estimates that it is seperated approximately 1/4 inch  This has progressed in the past 24 hours  States that at the top of the incision there is a "divit" and it is sunken  Roxannemichelle Degroot has noted thin bloody  drainage from incision  Scant and noted on shirt or bandages when removed  This is not persistent or purulent  Drainage is not able to be expressed She reports trying to keep the incision open to air with exception of covering with a bandaid overnight  or when laying down  She admits that patient has been touching the incision frequently  She denies fluctuance or erythema surrounding incision but does state that there is patchy redness on the superior portion of incision several mm-cm away from the  incision itself  Patient is afebrile  Advised Madiha to keep a clean dry bandage on at all times  Patient has been experiencing increased post-operative pain in the last 24 hours  Unidentified exacerbating event  Patient expresses pain as superficial  and incsional  Has been taking tramadol and tylenol PRN  Robaxin was not covered by [employer] and therefor it was not filled  She requests the muscle relaxer agent to be switched  Tramadol has not alleviated pain  Patient last took tramadol at  6pm and tylenol around 3pm (2 tablets)  Patient takes klonopin for ADHD QAM and QHS  He has previously prescribed percocet from prior to the surgery, prescribed in his name   Advised mother that it is OK for him to take 1 5mg-325mg PO Q6H PRN until seen  in office  Tramadol to be discontinued if taking oxycodone  Attempt to space timing of klonopin and percocet if possible  However, patient has tolerated this combination before without difficulty  Will have office follow-up in AM to see if patient  can be evaluated monday for incision check/in office address of concerns if symptomatically patient is not improved  Electronically signed Eleazar Shin   Oct 29 2017 10:34PM EST Author       Electronically signed Timo CROWE    Oct 30 2017  9:16AM EST

## 2018-01-10 NOTE — MISCELLANEOUS
Message  I spoke to returns mom today  We discussed the concerns of the visiting nurse and physical therapy  We discussed that he is not progressing well and their concern regarding his safety  She states that his main complaint is pain  He has been mobilizing with her and her  but needs time  He has been sleeping in a reclining chair  However his ambulation is significantly decreased  We discussed how he would be optimally placed in a rehab facility  She is adamantly opposed to this  She is working with the touch base with his pain management doctor to get better pain control so he can work with therapies  Additionally he has a follow-up appointment in 1 week with General surgery for a wound check  He sees me the week after that  She understands the risks of DVTs, PEs, and wound infection  She states that she is closely monitoring him and encouraging his mobilization  I asked her to call the office in case of any deterioration        Signatures   Electronically signed by : YARI Diaz ; Nov 15 2017 12:24PM EST                       (Author)

## 2018-01-10 NOTE — PROGRESS NOTES
Mother called at 10:40pm stating he had small amount of discharge, she put a bandaid over the area and appreciated some discharge through the bandaid  She states the area is not saturated nor is it a lot of discharge  Discussed  how some discharge can be normal, reviewed signs of infection, and proper incision care  Advised mother to prompt patient to sleep on side or propped up to avoid skin break down  Electronically signed Leonora [facility]  Oct 26 2017  8:38AM EST Author       Electronically signed Rick CROWE    Oct 26 2017  7:41PM EST

## 2018-01-10 NOTE — MISCELLANEOUS
Message  Spoke with Mother Tera Neumann, who reports the pt has almost completed his Oxycodone and questions further refills or some other non opioid medication  His activity level was also discussed  Her perception of his activity level and that of home Therapy does differ somewhat, and this was documented in another task  Discussed with Dr Tyra Best, and suggested either Tylenol or Advil  She reports he has difficulty with ASA products  Informed her Dr Tyra Best would be in contact with her later today  She was pleased        Signatures   Electronically signed by : Kyle Tierney, ; Nov 15 2017  9:59AM EST                       (Author)

## 2018-01-12 ENCOUNTER — GENERIC CONVERSION - ENCOUNTER (OUTPATIENT)
Dept: OTHER | Facility: OTHER | Age: 28
End: 2018-01-12

## 2018-01-12 VITALS
DIASTOLIC BLOOD PRESSURE: 80 MMHG | HEIGHT: 75 IN | BODY MASS INDEX: 39.17 KG/M2 | SYSTOLIC BLOOD PRESSURE: 128 MMHG | TEMPERATURE: 98 F | WEIGHT: 315 LBS | HEART RATE: 110 BPM | RESPIRATION RATE: 18 BRPM | OXYGEN SATURATION: 98 %

## 2018-01-13 VITALS
TEMPERATURE: 97.7 F | SYSTOLIC BLOOD PRESSURE: 110 MMHG | WEIGHT: 315 LBS | DIASTOLIC BLOOD PRESSURE: 64 MMHG | RESPIRATION RATE: 20 BRPM | BODY MASS INDEX: 39.17 KG/M2 | HEART RATE: 88 BPM | HEIGHT: 75 IN

## 2018-01-13 VITALS — SYSTOLIC BLOOD PRESSURE: 104 MMHG | TEMPERATURE: 101.9 F | DIASTOLIC BLOOD PRESSURE: 60 MMHG

## 2018-01-13 VITALS
DIASTOLIC BLOOD PRESSURE: 86 MMHG | SYSTOLIC BLOOD PRESSURE: 128 MMHG | BODY MASS INDEX: 39.17 KG/M2 | WEIGHT: 315 LBS | RESPIRATION RATE: 20 BRPM | HEIGHT: 75 IN | HEART RATE: 120 BPM | OXYGEN SATURATION: 97 %

## 2018-01-13 NOTE — MISCELLANEOUS
Message  Message Free Text Note Form: rec'd page from pt's mom who c/o Sindy Guerra having severe bsck pain and leg numbness    had trouble getting thru to pt' s contact phone # due to dropping of call several times but finally spoke to pt's mother who said rafael voided and is feeling much better  mother states rafael has to have s BM and is feeling better after taking an oxycodone tablet      plan - advised if pain or symptoms are severe or urgent he should go to ER now to be seen   pt's mother would rather wait at this time as he is feeling much better already      Signatures   Electronically signed by : Nazia Long DO; Aug 12 2017 12:55AM EST                       (Author)

## 2018-01-14 VITALS
BODY MASS INDEX: 43.87 KG/M2 | WEIGHT: 315 LBS | TEMPERATURE: 98.2 F | OXYGEN SATURATION: 98 % | HEART RATE: 106 BPM | DIASTOLIC BLOOD PRESSURE: 80 MMHG | SYSTOLIC BLOOD PRESSURE: 118 MMHG

## 2018-01-14 VITALS
BODY MASS INDEX: 40.5 KG/M2 | DIASTOLIC BLOOD PRESSURE: 76 MMHG | WEIGHT: 315 LBS | TEMPERATURE: 97.8 F | HEART RATE: 82 BPM | SYSTOLIC BLOOD PRESSURE: 118 MMHG

## 2018-01-15 NOTE — MISCELLANEOUS
Message   Recorded as Task   Date: 10/16/2017 01:33 PM, Created By: Shayna Ku   Task Name: Care Coordination   Assigned To: Shayna Ku   Regarding Patient: Dino Huang, Status: Active   CommentRhea Ruts - 16 Oct 2017 1:33 PM     TASK CREATED  LMOM for patient to call back to go over pre-op information   Shayna Ku - 16 Oct 2017 1:44 PM     TASK EDITED  S/w patient that is scheduled for surgery  tomorrow, 10/17/17  Verified allergies and went over pre-op instructions, including bathing and NPO status  Patient currently denies taking any blood thinning medications  Answered any questions she may have had at this time  Patient states that he has an allergy to ASA, which was already a part of EPIC chart    added to [employer]          Signatures   Electronically signed by : Kayy Helms RN; Oct 16 2017  1:44PM EST                       (Author)

## 2018-01-16 NOTE — RESULT NOTES
Verified Results  (1) CBC/PLT/DIFF [id number] 10:41AM Maribell Lipa   REPORT COMMENT:  FASTING:YES     Test Name Result Flag Reference   WHITE BLOOD CELL COUNT 6 1 Thousand/uL  3 8-10 8   RED BLOOD CELL COUNT 5 06 Million/uL  4 20-5 80   HEMOGLOBIN 13 7 g/dL  13 2-17 1   HEMATOCRIT 41 4 %  38 5-50 0   MCV 81 7 fL  80 0-100 0   MCH 27 0 pg  27 0-33 0   MCHC 33 1 g/dL  32 0-36 0   RDW 14 5 %  11 0-15 0   PLATELET COUNT 283 Thousand/uL  140-400   MPV 7 3 fL L 7 5-11 5   ABSOLUTE NEUTROPHILS 2635 cells/uL  9292-9329   ABSOLUTE LYMPHOCYTES 2660 cells/uL  850-3900   ABSOLUTE MONOCYTES 555 cells/uL  200-950   ABSOLUTE EOSINOPHILS 226 cells/uL     ABSOLUTE BASOPHILS 24 cells/uL  0-200   NEUTROPHILS 43 2 %     LYMPHOCYTES 43 6 %     MONOCYTES 9 1 %     EOSINOPHILS 3 7 %     BASOPHILS 0 4 %       (1) LIPID PANEL, FASTING [id number] 10:41AM Loura Lipa     Test Name Result Flag Reference   CHOLESTEROL, TOTAL 189 mg/dL  125-200   HDL CHOLESTEROL 34 mg/dL L > OR = 40   TRIGLICERIDES 009 mg/dL H <150   LDL-CHOLESTEROL mg/dL (calc)  <130   LDL cholesterol not calculated  Triglyceride levels  greater than 400 mg/dL invalidate calculated LDL results  Desirable range <100 mg/dL for patients with CHD or  diabetes and <70 mg/dL for diabetic patients with  known heart disease  CHOL/HDLC RATIO 5 6 (calc) H < OR = 5 0   NON HDL CHOLESTEROL 155 mg/dL (calc)     Target for non-HDL cholesterol is 30 mg/dL higher than   LDL cholesterol target  (1) COMPREHENSIVE METABOLIC PANEL [id number] 36:35ZO Loura Lipa     Test Name Result Flag Reference   GLUCOSE 103 mg/dL H 65-99   Fasting reference interval   UREA NITROGEN (BUN) 18 mg/dL  7-25   CREATININE 0 82 mg/dL  0 60-1 35   eGFR NON-AFR   AMERICAN 124 mL/min/1 73m2  > OR = 60   eGFR AFRICAN AMERICAN 143 mL/min/1 73m2  > OR = 60   BUN/CREATININE RATIO   6-71   NOT APPLICABLE (calc)   SODIUM 137 mmol/L  135-146   POTASSIUM 4 4 mmol/L  3 5-5 3   CHLORIDE 103 mmol/L     CARBON DIOXIDE 20 mmol/L  19-30   CALCIUM 9 7 mg/dL  8 6-10 3   PROTEIN, TOTAL 7 0 g/dL  6 1-8 1   ALBUMIN 4 4 g/dL  3 6-5 1   GLOBULIN 2 6 g/dL (calc)  1 9-3 7   ALBUMIN/GLOBULIN RATIO 1 7 (calc)  1 0-2 5   BILIRUBIN, TOTAL 0 3 mg/dL  0 2-1 2   ALKALINE PHOSPHATASE 52 U/L     AST 16 U/L  10-40   ALT 18 U/L  9-46     (1) LIPID PANEL, FASTING [id number] 10:41AM Maxine Lopez     Test Name Result Flag Reference   CHOLESTEROL, TOTAL 189 mg/dL  125-200   HDL CHOLESTEROL 34 mg/dL L > OR = 40   TRIGLICERIDES 612 mg/dL H <150   LDL-CHOLESTEROL mg/dL (calc)  <130   LDL cholesterol not calculated  Triglyceride levels  greater than 400 mg/dL invalidate calculated LDL results  Desirable range <100 mg/dL for patients with CHD or  diabetes and <70 mg/dL for diabetic patients with  known heart disease  CHOL/HDLC RATIO 5 6 (calc) H < OR = 5 0   NON HDL CHOLESTEROL 155 mg/dL (calc)     Target for non-HDL cholesterol is 30 mg/dL higher than   LDL cholesterol target  (1) COMPREHENSIVE METABOLIC PANEL [id number] 42:16HO Willy Hurtado     Test Name Result Flag Reference   GLUCOSE 103 mg/dL H 65-99   Fasting reference interval   UREA NITROGEN (BUN) 18 mg/dL  7-25   CREATININE 0 82 mg/dL  0 60-1 35   eGFR NON-AFR   AMERICAN 124 mL/min/1 73m2  > OR = 60   eGFR AFRICAN AMERICAN 143 mL/min/1 73m2  > OR = 60   BUN/CREATININE RATIO   1-16   NOT APPLICABLE (calc)   SODIUM 137 mmol/L  135-146   POTASSIUM 4 4 mmol/L  3 5-5 3   CHLORIDE 103 mmol/L     CARBON DIOXIDE 20 mmol/L  19-30   CALCIUM 9 7 mg/dL  8 6-10 3   PROTEIN, TOTAL 7 0 g/dL  6 1-8 1   ALBUMIN 4 4 g/dL  3 6-5 1   GLOBULIN 2 6 g/dL (calc)  1 9-3 7   ALBUMIN/GLOBULIN RATIO 1 7 (calc)  1 0-2 5   BILIRUBIN, TOTAL 0 3 mg/dL  0 2-1 2   ALKALINE PHOSPHATASE 52 U/L     AST 16 U/L  10-40   ALT 18 U/L  9-46     (1) CBC/PLT/DIFF [id number] 10:41AM Maxine Lopez [facility]     Test Name Result Flag Reference   WHITE BLOOD CELL COUNT 6 1 Thousand/uL  3 8-10 8   RED BLOOD CELL COUNT 5 06 Million/uL  4 20-5 80   HEMOGLOBIN 13 7 g/dL  13 2-17 1   HEMATOCRIT 41 4 %  38 5-50 0   MCV 81 7 fL  80 0-100 0   MCH 27 0 pg  27 0-33 0   MCHC 33 1 g/dL  32 0-36 0   RDW 14 5 %  11 0-15 0   PLATELET COUNT 519 Thousand/uL  140-400   MPV 7 3 fL L 7 5-11 5   ABSOLUTE NEUTROPHILS 2635 cells/uL  4029-3898   ABSOLUTE LYMPHOCYTES 2660 cells/uL  850-3900   ABSOLUTE MONOCYTES 555 cells/uL  200-950   ABSOLUTE EOSINOPHILS 226 cells/uL     ABSOLUTE BASOPHILS 24 cells/uL  0-200   NEUTROPHILS 43 2 %     LYMPHOCYTES 43 6 %     MONOCYTES 9 1 %     EOSINOPHILS 3 7 %     BASOPHILS 0 4 %       (1) T4, FREE [id number] 10:41AM [facility]     Test Name Result Flag Reference   T4, FREE 1 0 ng/dL  0 8-1 8     (Q) TSH, 3RD GENERATION [id number] 10:41AM [facility]     Test Name Result Flag Reference   TSH 2 48 mIU/L  0 40-4 50     (Q) HEMOGLOBIN A1c [id number] 10:41AM [facility]   REPORT COMMENT:  FASTING:YES     Test Name Result Flag Reference   HEMOGLOBIN A1c 5 5 % of total Hgb  <5 7   According to ADA guidelines, hemoglobin A1c <7 0%  represents optimal control in non-pregnant diabetic  patients  Different metrics may apply to specific  patient populations  Standards of Medical Care in    Diabetes Care  2013;36:s11-s66     For the purpose of screening for the presence of  diabetes  <5 7%       Consistent with the absence of diabetes  5 7-6 4%    Consistent with increased risk for diabetes              (prediabetes)  >or=6 5%    Consistent with diabetes     This assay result is consistent with a decreased risk  of diabetes  Currently, no consensus exists for use of hemoglobin  A1c for diagnosis of diabetes for children

## 2018-01-16 NOTE — MISCELLANEOUS
Message  Mother left a message questioning if he could take his pills the morning of surgery  Message left informing her he may with just a sip of water  She was encouraged to call back with questions        Signatures   Electronically signed by : Lois Harada, ; Oct 13 2017  2:30PM EST                       (Author)

## 2018-01-16 NOTE — RESULT NOTES
Verified Results  * XR HIPS BILATERAL 2 VW W PELVIS IF PERFORMED 17Apr2017 01:48PM [facility] Order Number: [id number]     Test Name Result Flag Reference   XR HIPS BILATERAL WITH AP PELVIS (Report)     BILATERAL HIPS AND PELVIS     INDICATION: Right hip pain  Left foot numbness  COMPARISON: None     VIEWS: AP pelvis and coned down views of each hip     IMAGES: 5      FINDINGS:     No acute pelvic fracture or pathologic bone lesions  Visualized bony pelvis appears intact  LEFT HIP:   No significant degenerative changes  Bony alignment is maintained  Soft tissues are unremarkable  RIGHT HIP:   No significant degenerative changes  Bony alignment is maintained  Soft tissues are unremarkable  IMPRESSION:     Unremarkable hips and pelvis  Workstation performed: [id number]     Signed by:   Ronnie Alvarez DO   4/19/17     * XR SPINE LUMBAR MINIMUM 4 VIEWS NON INJURY 17Apr2017 01:48PM [facility] Order Number: [id number]     Test Name Result Flag Reference   XR SPINE LUMBAR MINIMUM 4 VIEWS (Report)     LUMBAR SPINE     INDICATION: Right hip pain radiating to the calf  Left foot numbness  COMPARISON: None     VIEWS: AP, lateral, bilateral oblique and coned down projections     IMAGES: 5     FINDINGS:     Alignment is unremarkable  There is no radiographic evidence of acute fracture or destructive osseous lesion  No significant lumbar degenerative change noted  Visualized soft tissues appear unremarkable  IMPRESSION:     Normal examination         Workstation performed: [id number]     Signed by:   Ronnie Alvarez DO   4/19/17

## 2018-01-16 NOTE — PROCEDURES
Procedures by Tisha Rivera RN at 11/6/2017 12:54 PM      Author:  Tisha Rivera RN Service:  (none) Author Type:  Registered Nurse    Filed:  11/6/2017 12:56 PM Date of Service:  11/6/2017 12:54 PM Status:  Signed    :  Tisha Rivera RN (Registered Nurse)        Procedure Orders:       1  Insert PICC line [[id number]] ordered by Geneva Pradhan MD at 11/05/17 1333                  Insert PICC  line  Date/Time: 11/6/2017 12:54 PM  Performed by: Oliver Deshpande by: Vasiliy Sevilla     Patient location:  Bedside  Other Assisting Provider:  Yes (comment) (Nikita hood)    Consent:     Consent obtained:  Written    Consent given by:  Patient    Procedural risks discussed: by md     Alternatives discussed: by md   Universal protocol:     Procedure explained and questions answered to patient or proxy's satisfaction: yes      Relevant documents present and verified: yes      Test results available and properly labeled: yes       Imaging studies available: yes      Required blood products, implants, devices, and special equipment available: yes      Site/side marked: yes      Immediately prior to procedure, a time out was called: yes      Patient identity confirmed:  Verbally with patient and arm band  Pre-procedure details:     Hand hygiene: Hand hygiene performed prior to insertion      Sterile barrier technique: All elements of maximal sterile technique followed      Skin preparation:  2% chlorhexidine    Skin preparation agent: Skin preparation agent completely dried prior to procedure    Indications:     PICC line indications: long term antibiotics    Anesthesia (see MAR for exact dosages):      Anesthesia method:  Local infiltration    Local anesthetic:  Lidocaine 1% w/o epi  Procedure details:     Location:  Basilic    Vessel type: vein      Laterality:  Right    Approach: percutaneous technique used      Patient position:  Flat    Procedural supplies:  Double lumen    Catheter size:  5 Fr    Landmarks identified: yes      Ultrasound guidance: yes      Sterile ultrasound techniques: Sterile gel and sterile probe covers were used      Number of attempts:  1    Successful placement: yes      Vessel of catheter tip end:  Sherlock 3CG confirmed    Total catheter length (cm):  48    Catheter out on skin (cm):  0    Max flow rate:  999    Arm circumference:  41  Post-procedure details:     Post-procedure:  Dressing applied and securement device placed    Assessment:  Blood return through all ports    Post-procedure complications: none      Patient tolerance of procedure:   Tolerated well, no immediate complications    Observer name:  Pts mother was in room for entire procedure                     Received for:Provider  EPIC   Nov 6 2017 12:56PM Stephanie Clay Standard Time

## 2018-01-16 NOTE — MISCELLANEOUS
Message  Spoke with Db Kirkpatrick, (home health nurse), who is currently with this pt  She reports he is refusing to let her see his incision, and he is febrile at 101 6  Discussed that if he will not cooperate with the nurse, and given the temp, he should present to the ED  The patient could be heard yelling in the background that no one can make him be seen or go to the ED  Db Kirkpatrick will attempt to control the situation  She was encouraged to call back if needed  Dr Eileen Baca was made aware of the situation and suggestions        Signatures   Electronically signed by : Nichole Griggs, ; Nov 17 2017  9:57AM EST                       (Author)

## 2018-01-16 NOTE — RESULT NOTES
Verified Results  (1) T4, FREE [id number] 09:03AM [facility] Order Number: [id number]     Test Name Result Flag Reference   T4,FREE 0 93 ng/dL  0 76-1 46     (1) TSH [id number] 09:03AM [facility]   TW Order Number: [id number]     Test Name Result Flag Reference   TSH 5 497 uIU/mL H 0 358-3 740   - Patient Instructions: This bloodwork is non-fasting  Please drink two glasses of water morning of bloodwork  - Patient Instructions: This bloodwork is non-fasting  Please drink two glasses of water morning of bloodwork  Patients undergoing fluorescein dye angiography may retain small amounts of fluorescein in the body for 48-72 hours post procedure  Samples containing fluorescein can produce falsely depressed TSH values  If the patient had this procedure,a specimen should be resubmitted post fluorescein clearance

## 2018-01-17 NOTE — MISCELLANEOUS
Message  Message Free Text Note Form: The patient has called regarding -constipation, he reports me he was recently discharged from the hospital and has not had a bowel movement in the last 2-3 days he has been using Colace   I have recommended that he can try MiraLax as directed but if this does not work for his symptoms worsen he should go immediately to the ER      Signatures   Electronically signed by : Jose Bobby DO; Nov 12 2017  9:50PM EST                       (Author)

## 2018-01-18 NOTE — RESULT NOTES
Verified Results  (1) COMPREHENSIVE METABOLIC PANEL [id number] 81:98CC Chriss Closs   TW Order Number: [id number]     Test Name Result Flag Reference   SODIUM 139 mmol/L  136-145   POTASSIUM 4 0 mmol/L  3 5-5 3   CHLORIDE 104 mmol/L  100-108   CARBON DIOXIDE 25 mmol/L  21-32   ANION GAP (CALC) 10 mmol/L  4-13   BLOOD UREA NITROGEN 7 mg/dL  5-25   CREATININE 0 82 mg/dL  0 60-1 30   Standardized to IDMS reference method   CALCIUM 9 6 mg/dL  8 3-10 1   BILI, TOTAL 0 30 mg/dL  0 20-1 00   ALK PHOSPHATAS 56 U/L     ALT (SGPT) 40 U/L  12-78   AST(SGOT) 21 U/L  5-45   ALBUMIN 4 0 g/dL  3 5-5 0   TOTAL PROTEIN 7 5 g/dL  6 4-8 2   eGFR Non-African American      >60 0 ml/min/1 73sq [facility] Disease Education Program recommendations are as follows:  GFR calculation is accurate only with a steady state creatinine  Chronic Kidney disease less than 60 ml/min/1 73 sq  meters  Kidney failure less than 15 ml/min/1 73 sq  meters  GLUCOSE FASTING 91 mg/dL  65-99     (1) LIPID PANEL, FASTING 21Jun2017 12:00PM Chriss Closs    Order Number: [id number]     Test Name Result Flag Reference   CHOLESTEROL 184 mg/dL     HDL,DIRECT 27 mg/dL L 40-60   Specimen collection should occur prior to Metamizole administration due to the potential for falsely depressed results  LDL CHOLESTEROL CALCULATED 114 mg/dL H 0-100   - Patient Instructions:  This is a fasting blood test  Water,black tea or black  coffee only after 9:00pm the night before test   Drink 2 glasses of water the morning of test       Triglyceride:         Normal              <150 mg/dl       Borderline High    150-199 mg/dl       High               200-499 mg/dl       Very High          >499 mg/dl  Cholesterol:         Desirable        <200 mg/dl      Borderline High  200-239 mg/dl      High             >239 mg/dl  HDL Cholesterol:        High    >59 mg/dL      Low     <41 mg/dL  LDL CALCULATED:    This screening LDL is a calculated result  It does not have the accuracy of the Direct Measured LDL in the monitoring of patients with hyperlipidemia and/or statin therapy  Direct Measure LDL (TYM292) must be ordered separately in these patients  TRIGLYCERIDES 213 mg/dL H <=150   Specimen collection should occur prior to N-Acetylcysteine or Metamizole administration due to the potential for falsely depressed results  (1) CBC/PLT/DIFF 21Jun2017 12:00PM [facility] Order Number: [id number]     Test Name Result Flag Reference   WBC COUNT 4 75 Thousand/uL  4 31-10 16   RBC COUNT 5 41 Million/uL  3 88-5 62   HEMOGLOBIN 14 1 g/dL  12 0-17 0   HEMATOCRIT 41 9 %  36 5-49 3   MCV 77 fL L 82-98   MCH 26 1 pg L 26 8-34 3   MCHC 33 7 g/dL  31 4-37 4   RDW 13 4 %  11 6-15 1   MPV 10 4 fL  8 9-12 7   PLATELET COUNT 226 Thousands/uL  149-390   NEUTROPHILS RELATIVE PERCENT 42 % L 43-75   LYMPHOCYTES RELATIVE PERCENT 44 %  14-44   MONOCYTES RELATIVE PERCENT 9 %  4-12   EOSINOPHILS RELATIVE PERCENT 3 %  0-6   BASOPHILS RELATIVE PERCENT 2 % H 0-1   NEUTROPHILS ABSOLUTE COUNT 1 97 Thousands/? ??L  1 85-7 62   LYMPHOCYTES ABSOLUTE COUNT 2 16 Thousands/? ??L  0 60-4 47   MONOCYTES ABSOLUTE COUNT 0 42 Thousand/? ??L  0 17-1 22   EOSINOPHILS ABSOLUTE COUNT 0 12 Thousand/? ??L  0 00-0 61   BASOPHILS ABSOLUTE COUNT 0 08 Thousands/? ??L  0 00-0 10   - Patient Instructions: This bloodwork is non-fasting  Please drink two glasses of water morning of bloodwork  (1) HEMOGLOBIN A1C 21Jun2017 12:00PM [facility] Order Number: [id number]     Test Name Result Flag Reference   HEMOGLOBIN A1C 5 9 %  4 2-6 3   EST  AVG   GLUCOSE 123 mg/dl

## 2018-01-18 NOTE — MISCELLANEOUS
Discussion/Summary  Discussion Summary:   Patient cancelled appt today  [facility] started seeing him last week, having issues with mobility and pain control  will refer for possible admission for rehab since not doing well at home  History of Present Illness  TCM Communication [facility]: The patient is being contacted for follow-up after hospitalization  He was hospitalized at [facility]  The dates of hospitalization: 10/31-11/8, date of admission: 10/31, date of discharge: 11/8  Diagnosis: Fever and wound drainage  He was discharged to home, With [facility] services  Medications were not reviewed today  He scheduled a follow up appointment  Symptoms: wound drainage  Counseling was provided to patient's family  Patient's mother Madiha  Communication performed and completed by LENORE11/9/17      Active Problems    1  ADHD (attention deficit hyperactivity disorder), combined type (314 01) (F90 2)   2  Allergy (995 3) (T78 40XA)   3  Autism (299 00) (F84 0)   4  Bipolar mood disorder (296 80) (F31 9)   5  Bowel incontinence (787 60) (R15 9)   6  Claustrophobia (300 29) (F40 240)   7  Constipation (564 00) (K59 00)   8  Delayed surgical wound healing, initial encounter (998 83) (T81 89XA)   9  Depression (311) (F32 9)   10  Encounter for therapeutic drug monitoring (V58 83) (Z51 81)   11  External hemorrhoids (455 3) (K64 4)   12  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)   13  Hip pain (719 45) (M25 559)   14  Hyperlipidemia (272 4) (E78 5)   15  Hypertriglyceridemia (272 1) (E78 1)   16  Hypothyroidism (244 9) (E03 9)   17  Iliotibial band syndrome of right side (728 89) (M76 31)   18  Lumbar disc disease (722 93) (M51 9)   19  Lumbar stenosis (724 02) (M48 061)   20  Muscle spasm (728 85) (M62 838)   21  Nocturnal enuresis (788 36) (N39 44)   22  Obesity (278 00) (E66 9)   23  COLIN (obstructive sleep apnea) (327 23) (G47 33)   24  Prediabetes (790 29) (R73 03)   25   Psychiatric disorder (300 9) (F99)   26  Right foot drop (736 79) (M21 371)   27  Right-sided back pain (724 5) (M54 9)   28  Skin lesion (709 9) (L98 9)   29  Sleep apnea (780 57) (G47 30)   30  Sleep disturbances (780 50) (G47 9)   31  Snoring (786 09) (R06 83)   32  Urinary incontinence (788 30) (R32)   33  Wears glasses (V49 89) (Z97 3)    Past Medical History    1  History of Blood in stool (578 1) (K92 1)   2  History of Costochondritis (733 6) (M94 0)   3  History of being hospitalized (V13 9) (Z92 89)   4  History of hyperlipidemia (V12 29) (Z86 39)   5  History of thyroid disease (V12 29) (Z86 39)   6  History of viral gastroenteritis (V12 09) (Z86 19)   7  History of Urinary problem (V47 4) (R39 89)   8  History of [place] teeth removed (525 50,525 10) (U26 177)    Surgical History    1  History of Colonoscopy (Fiberoptic)   2  History of Oral Surgery Tooth Extraction    Family History  Mother    1  Denied: Family history of Alcoholism and drug addiction in family   2  Denied: Family history of Anxiety and depression   3  Denied: Family history of Cancer, colon   4  Denied: Family history of Crohn's disease   5  Family history of hypertension (V17 49) (Z82 49)   6  Denied: Family history of liver disease  Father    9  Denied: Family history of Alcoholism and drug addiction in family   6  Denied: Family history of Anxiety and depression   9  Denied: Family history of Cancer, colon   10  Family history of cardiac disorder (V17 49) (Z82 49)   11  Denied: Family history of Crohn's disease   12  Denied: Family history of liver disease   15  Family history of substance abuse (V17 0) (Z81 4)  Child    15  Denied: Family history of Alcoholism and drug addiction in family   13  Denied: Family history of Anxiety and depression  Sibling    12  Denied: Family history of Alcoholism and drug addiction in family   16  Denied: Family history of Anxiety and depression  Grandmother    25   Family history of colonic polyps (V18 51) (Z83 71)  Maternal Grandmother    19  Family history of cardiac disorder (V17 49) (Z82 49)  Paternal Grandmother    21  Family history of    24  Family history of cerebrovascular accident (CVA) (V17 1) (Z82 3)  Grandfather    25  Family history of colonic polyps (V18 51) (Z83 71)   23  Family history of malignant neoplasm (V16 9) (Z80 9)  Paternal Grandfather    25  Family history of cardiac disorder (V17 49) (Z80 55)    Social History    · Disabled   · Drinks coffee   · High school or GED   · Never a smoker   · No alcohol use   · Single    Current Meds   1  Benztropine Mesylate 1 MG Oral Tablet; Take 1 tablet twice daily as needed; Therapy: 41Hun2502 to (Evaluate:2017)  Requested for: 80Ewd8762; Last   Rx:35Dyc1166 Ordered   2  Clindamycin HCl - 300 MG Oral Capsule; TAKE 1 CAPSULE EVERY 6 HOURS DAILY; Therapy: 68HZX7665 to (Evaluate:2017); Last Rx:2017 Ordered   3  ClonazePAM 1 MG Oral Tablet; Take 1 tablet twice daily; Last Rx:87Qux9541 Ordered   4  Cyclobenzaprine HCl - 10 MG Oral Tablet; TAKE 1 TABLET 3 TIMES DAILY AS NEEDED; Therapy: 47OBC1375 to (Evaluate:2017); Last Rx:2017 Ordered   5  Divalproex Sodium 500 MG Oral Tablet Delayed Release; Take 1 tablet 4 times daily; Therapy: [phone]) to Recorded   6  EPINEPHrine 0 3 MG/0 3ML Injection Solution Auto-injector; INJECT 0 3ML   INTRAMUSCULARLY AS DIRECTED; Therapy: 51Vco3902 to (Last Rx:00Wrc7614)  Requested for: 07Slk6680 Ordered   7  Fenofibrate 160 MG Oral Tablet; take 1 tablet by mouth every day; Therapy: 64QKD5299 to (Last Rx:10Cvh6651)  Requested for: 09GQM8474 Ordered   8  Levothyroxine Sodium 25 MCG Oral Tablet; TAKE 1 TABLET BY MOUTH MONDAY THRU   FRIDAY AND 2 TABLETS ON SATURDAY AND ; Therapy: 34DXG0082 to (Evaluate:53Krx6553)  Requested for: 66BZA7257; Last   Rx:2017 Ordered   9  Methocarbamol 500 MG Oral Tablet; TAKE 1 TABLET Every 6 hours;    Therapy: 01OUM3448 to (Evaluate:93Zls7102)  Requested for: 29LCI9712; Last   Rx:09Nov2017 Ordered   10  Multiple Vitamin TABS; TAKE 1 TABLET TAKEN OCCASIONAL; Therapy: ([phone]) to Recorded   11  RisperiDONE 2 MG Oral Tablet; TAKE 1 TABLET TWICE DAILY; Therapy: 88Zmq6900 to (Evaluate:19Hcw2412)  Requested for: 90Eea3706; Last    Rx:32Awx4557 Ordered   12  RisperiDONE 4 MG Oral Tablet; TAKE 1 TABLET TWICE DAILY; Therapy: 21WUV7790 to (Evaluate:52Qje2863)  Requested for: 12Oct2016; Last    Rx:11Aug2016; Status: ACTIVE - Renewal Voided Ordered    Allergies    1  Pentothal   2  Sulfa Drugs   3  Allegra   4  aspirin   5  CIPRO   6  Cipro TABS   7  Erythromycin Derivatives   8  Erythromycin TABS   9  Motrin TABS   10  Motrin TABS   11  Penicillins    Future Appointments    Date/Time Provider Specialty Site   01/16/2018 02:00 PM Sherrie Sims MD Neurology [facility] NEUROLOGY [facility]   11/30/2017 02:30 PM Andres Burrell MD Infectious Disease [facility] INFECTIOUS DISEASE   12/12/2017 04:30 PM Edwardo Lopez MD Internal Medicine [facility] INTERNAL MED   11/21/2017 09:30 AM Araceli Ramos MD General Surgery [facility]   11/29/2017 01:00 PM YARI Kelly   Neurosurgery [facility] NEUROSURGICAL [facility]     Signatures   Electronically signed by : Caleb Lima MD; Nov 13 2017 12:36PM EST                       (Author)

## 2018-01-18 NOTE — MISCELLANEOUS
Message  Mother called to report pt had his MRI done and questioned results  Informed her if there was anything urgent she would be notified, other wise it will be reviewed at his f/u  she stated an understanding        Signatures   Electronically signed by : Evan Tierney, ; Sep 20 2017  4:26PM EST                       (Author)

## 2018-01-22 VITALS
BODY MASS INDEX: 38.36 KG/M2 | DIASTOLIC BLOOD PRESSURE: 80 MMHG | HEART RATE: 120 BPM | WEIGHT: 315 LBS | TEMPERATURE: 98.5 F | RESPIRATION RATE: 20 BRPM | HEIGHT: 76 IN | SYSTOLIC BLOOD PRESSURE: 104 MMHG

## 2018-01-22 VITALS
HEIGHT: 76 IN | WEIGHT: 315 LBS | TEMPERATURE: 99.9 F | SYSTOLIC BLOOD PRESSURE: 118 MMHG | RESPIRATION RATE: 18 BRPM | BODY MASS INDEX: 38.36 KG/M2 | DIASTOLIC BLOOD PRESSURE: 88 MMHG

## 2018-01-23 NOTE — MISCELLANEOUS
Message  Pt's Mother reports at d/c pt was provided with a script for 5% Lidoderm patches  She reports insurance does not cover, and out of pocket cost is 200 00  She questions being able to get the OTC 4% instead,  discussed she may        Signatures   Electronically signed by : Mandi Starkey, ; Dec  1 2017 10:34AM EST                       (Author)

## 2018-01-23 NOTE — MISCELLANEOUS
Message  Office received a call from Mrs Cody Armstrong requesting evaluation by another physician  They are distraught and upset by my request to inspect incision prior to removing the sutures  However, given his history of dehiscence and infection I believe that the wound is at high risk for further complications  I believe this needs to be inspected  I have spoken to the VNA who states that the wound is healing well, however, I have not received any images of the wound to confirm  Additionally she states that he has not mobilized out of bed and is uncomforthable removing the sutures prior to a physician inspection  His immobility is concerning for possible future wound breakdown  The office has referred him to Dr Candelario Back who assisted with the surgery for further evaluation  Last time I spoke to the family I emphasized the importance of Julia Cruzar following up not only with us, but also with Infectious Disease appointment, and his a Hematology appointment for management of his DVT  Overall, I am concerned about future deterioration  We had recommended inpatient rehabilitation while he was here but the family and him adamantly refused  The office will call to schedule his appointment with Dr Candelario Back  Should there be any further questions or concerns the family can feel free to call me or the office and we will address these as best as possible        Signatures   Electronically signed by : YARI Russo ; Dec 15 2017 11:03AM EST                       (Author)

## 2018-01-23 NOTE — MISCELLANEOUS
Message  I spoke to Ms Emy Castillo this morning  She has called to cancel his follow-up appointment with me  She wants to know if a verbal order to remove the stitches could be given to the visiting nurse  Upon speaking to her, she states that Iban Elizabeth has been mobilized minimally  He attempts to sit in the bed 3 times a day and uses a transfer board  They are attempting to look for wheelchair accessible housing  She does not feel that he can sit in the car ride for his follow-up appointment today  Whenever he sits up he becomes very shaky  I emphasized that someone needs to look at the wound prior to stitches being removed  This can either be me or the wound team from Dr Mee Weaver office  She said she would call and schedule this follow-up as well  In addition, she states that he has finished his course of oral antibiotics  He has not had any follow-up with Infectious Disease  I once again re-emphasized the importance of this  Otherwise she says the patient is doing well  His pain is better controlled  He is working with physical therapy in a slow manner  I will have my office call and reschedule his appointment        Signatures   Electronically signed by : YARI Subramanian ; Dec 11 2017 10:05AM EST                       (Author)

## 2018-01-23 NOTE — MISCELLANEOUS
Message  I spoke to Jazmine Sloan from the VNA today and the patient's mom  Upon speaking to the mom, Ms Porfirio Mercedes, he is not mobilizing because he is in pain or tachycardic  Faisal Escoto has not gotten out of bed with therapies and family has cancelled appointments  I told her that either myself or the wound clinic needs to inspect the wound before stitches can be removed  In addition she needs to follow up with ID for the infection and hematology for the DVT  Faisal Escoto has not had any follow ups  Mrs Porfirio Mercedes verbalizes understanding  Per VNA, there has been poor compliance with PT and mobilzation  I will have my office call to reschedule appointments        Signatures   Electronically signed by : YARI Camp ; Dec 12 2017  4:39PM EST                       (Author)

## 2018-01-23 NOTE — MISCELLANEOUS
Per Dr Yolie Hope, no one is to remove the patient's staples/sutures until his incision is assessed by an MD   Please disregard previous order to have VNA remove patient's staples/sutures that was sent earlier today  Feel free to call the office with any further questions or concerns  [phone]  Thank you!       Electronically signed Jocelyne Marinelli RN  Dec 13 2017  2:32PM EST Author

## 2018-01-23 NOTE — MISCELLANEOUS
Message  Received voicemail from pts mother at approx [phone] stating she had to cancel the appointment for today being that Jes Viveros was still unable to handle the long car ride  She stated that he shakes every time he stands up  She also mentioned that their home visiting nurses could remove his sutures, as long as Dr Ayesha Brown was in agreement with that plan  I spoke with Dr Ayesha Brown and passed on the messages and he informed myself he would speak with patients mother later  Active Problems    1  ADHD (attention deficit hyperactivity disorder), combined type (314 01) (F90 2)   2  Allergy (995 3) (T78 40XA)   3  Autism (299 00) (F84 0)   4  Bipolar mood disorder (296 80) (F31 9)   5  Bowel incontinence (787 60) (R15 9)   6  Claustrophobia (300 29) (F40 240)   7  Constipation (564 00) (K59 00)   8  Delayed surgical wound healing, initial encounter (998 83) (T81 89XA)   9  Depression (311) (F32 9)   10  DVT (deep venous thrombosis) (453 40) (I82 409)   11  Encounter for therapeutic drug monitoring (V58 83) (Z51 81)   12  External hemorrhoids (455 3) (K64 4)   13  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)   14  Hip pain (719 45) (M25 559)   15  Hyperlipidemia (272 4) (E78 5)   16  Hypertriglyceridemia (272 1) (E78 1)   17  Hypothyroidism (244 9) (E03 9)   18  Iliotibial band syndrome of right side (728 89) (M76 31)   19  Lumbar disc disease (722 93) (M51 9)   20  Lumbar stenosis (724 02) (M48 061)   21  Muscle spasm (728 85) (M62 838)   22  Nocturnal enuresis (788 36) (N39 44)   23  Obesity (278 00) (E66 9)   24  COLIN (obstructive sleep apnea) (327 23) (G47 33)   25  Prediabetes (790 29) (R73 03)   26  Psychiatric disorder (300 9) (F99)   27  Right foot drop (736 79) (M21 371)   28  Right-sided back pain (724 5) (M54 9)   29  Skin lesion (709 9) (L98 9)   30  Sleep apnea (780 57) (G47 30)   31  Sleep disturbances (780 50) (G47 9)   32  Snoring (786 09) (R06 83)   33   Urinary incontinence (788 30) (R32)   34  Wears glasses (V46 89) (Z97 3)    Current Meds   1  Benztropine Mesylate 1 MG Oral Tablet; Take 1 tablet twice daily as needed; Therapy: 44Apj9307 to (Evaluate:01Jan2017)  Requested for: 62Dax7016; Last   Rx:76Brn0088 Ordered   2  Clindamycin HCl - 300 MG Oral Capsule; TAKE 1 CAPSULE EVERY 6 HOURS DAILY; Therapy: 08HNI0788 to (Evaluate:06Nov2017); Last Rx:30Oct2017 Ordered   3  ClonazePAM 1 MG Oral Tablet; Take 1 tablet twice daily; Last Rx:58Eet9899 Ordered   4  Cyclobenzaprine HCl - 10 MG Oral Tablet; TAKE 1 TABLET 3 TIMES DAILY AS NEEDED; Therapy: 54LKW5386 to (Evaluate:09Nov2017); Last Rx:30Oct2017 Ordered   5  Divalproex Sodium 500 MG Oral Tablet Delayed Release; Take 1 tablet 4 times daily; Therapy: [phone]) to Recorded   6  EPINEPHrine 0 3 MG/0 3ML Injection Solution Auto-injector; INJECT 0 3ML   INTRAMUSCULARLY AS DIRECTED; Therapy: 12Apr2016 to (Last Rx:12Apr2016)  Requested for: 12Apr2016 Ordered   7  Fenofibrate 160 MG Oral Tablet; take 1 tablet by mouth every day; Therapy: 31NVH0168 to (Last Rx:29Sep2016)  Requested for: 63XPK8400 Ordered   8  Levothyroxine Sodium 25 MCG Oral Tablet; TAKE 1 TABLET BY MOUTH MONDAY THRU   FRIDAY AND 2 TABLETS ON SATURDAY AND SUNDAY; Therapy: 77YTG0037 to (Evaluate:42Jiv6425)  Requested for: 22NOZ9893; Last   Rx:19Jun2017 Ordered   9  Lyrica 75 MG Oral Capsule; TAKE 1 CAPSULE TWICE DAILY; Therapy: 38KGF9665 to (Evaluate:22Yuu9319) Recorded   10  Methocarbamol 500 MG Oral Tablet; TAKE 1 TABLET Every 6 hours; Therapy: 66RDU2062 to (Loreli Bracket)  Requested for: 17SJM4303; Last    Rx:09Nov2017 Ordered   11  Multiple Vitamin TABS; TAKE 1 TABLET TAKEN OCCASIONAL; Therapy: [phone] to Recorded   12  RisperiDONE 2 MG Oral Tablet; TAKE 1 TABLET TWICE DAILY; Therapy: 35Gcy1999 to (Evaluate:10Sep2016)  Requested for: 11Aug2016; Last    Rx:11Aug2016 Ordered   13   RisperiDONE 4 MG Oral Tablet; TAKE 1 TABLET TWICE DAILY; Therapy: 76LCS9165 to (Evaluate:74Yad5680)  Requested for: 12Oct2016; Last    Rx:11Aug2016; Status: ACTIVE - Renewal Voided Ordered    Allergies    1  Pentothal   2  Sulfa Drugs   3  Allegra   4  aspirin   5  CIPRO   6  Cipro TABS   7  Erythromycin Derivatives   8  Erythromycin TABS   9  Motrin TABS   10  Motrin TABS   11   Penicillins    Signatures   Electronically signed by : Fátima Martin, ; Dec 14 2017  9:38AM EST                       (Author)

## 2018-01-23 NOTE — MISCELLANEOUS
Discussion/Summary  Discussion Summary:   Patient canceled appointment due to difficulty with transportation and mobility  History of Present Illness  TCM Communication [facility]: The patient is being contacted for follow-up after hospitalization  He was hospitalized at [facility]  The date of admission: 11/17, date of discharge: 11/30  Diagnosis: Wound infection  He was discharged to home  Medications were not reviewed today  He scheduled a follow up appointment  Symptoms: rash: Yajaira Joseph Counseling was provided to patient's family  Patient's Mother Girma Mckinney  Communication performed and completed by [facility] 12/1/17      Active Problems    1  ADHD (attention deficit hyperactivity disorder), combined type (314 01) (F90 2)   2  Allergy (995 3) (T78 40XA)   3  Autism (299 00) (F84 0)   4  Bipolar mood disorder (296 80) (F31 9)   5  Bowel incontinence (787 60) (R15 9)   6  Claustrophobia (300 29) (F40 240)   7  Constipation (564 00) (K59 00)   8  Delayed surgical wound healing, initial encounter (998 83) (T81 89XA)   9  Depression (311) (F32 9)   10  Encounter for therapeutic drug monitoring (V58 83) (Z51 81)   11  External hemorrhoids (455 3) (K64 4)   12  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)   13  Hip pain (719 45) (M25 559)   14  Hyperlipidemia (272 4) (E78 5)   15  Hypertriglyceridemia (272 1) (E78 1)   16  Hypothyroidism (244 9) (E03 9)   17  Iliotibial band syndrome of right side (728 89) (M76 31)   18  Lumbar disc disease (722 93) (M51 9)   19  Lumbar stenosis (724 02) (M48 061)   20  Muscle spasm (728 85) (M62 838)   21  Nocturnal enuresis (788 36) (N39 44)   22  Obesity (278 00) (E66 9)   23  COLIN (obstructive sleep apnea) (327 23) (G47 33)   24  Prediabetes (790 29) (R73 03)   25  Psychiatric disorder (300 9) (F99)   26  Right foot drop (736 79) (M21 371)   27  Right-sided back pain (724 5) (M54 9)   28  Skin lesion (709 9) (L98 9)   29  Sleep apnea (780 57) (G47 30)   30  Sleep disturbances (780 50) (G47 9)   31  Snoring (786 09) (R06 83)   32  Urinary incontinence (788 30) (R32)   33  Wears glasses (V49 89) (Z97 3)    Past Medical History    1  History of Blood in stool (578 1) (K92 1)   2  History of Costochondritis (733 6) (M94 0)   3  History of being hospitalized (V13 9) (Z92 89)   4  History of hyperlipidemia (V12 29) (Z86 39)   5  History of thyroid disease (V12 29) (Z86 39)   6  History of viral gastroenteritis (V12 09) (Z86 19)   7  History of Urinary problem (V47 4) (R39 89)   8  History of [place] teeth removed (525 50,525 10) (U86 752)    Surgical History    1  History of Colonoscopy (Fiberoptic)   2  History of Oral Surgery Tooth Extraction    Family History  Mother    1  Denied: Family history of Alcoholism and drug addiction in family   2  Denied: Family history of Anxiety and depression   3  Denied: Family history of Cancer, colon   4  Denied: Family history of Crohn's disease   5  Family history of hypertension (V17 49) (Z82 49)   6  Denied: Family history of liver disease  Father    9  Denied: Family history of Alcoholism and drug addiction in family   6  Denied: Family history of Anxiety and depression   9  Denied: Family history of Cancer, colon   10  Family history of cardiac disorder (V17 49) (Z82 49)   11  Denied: Family history of Crohn's disease   12  Denied: Family history of liver disease   15  Family history of substance abuse (V17 0) (Z81 4)  Child    15  Denied: Family history of Alcoholism and drug addiction in family   13  Denied: Family history of Anxiety and depression  Sibling    12  Denied: Family history of Alcoholism and drug addiction in family   16  Denied: Family history of Anxiety and depression  Grandmother    25  Family history of colonic polyps (V18 51) (Z83 71)  Maternal Grandmother    23  Family history of cardiac disorder (V17 49) (Z82 49)  Paternal Grandmother    21  Family history of    24   Family history of cerebrovascular accident (CVA) (V17 1) (Z82 3)  Grandfather    25  Family history of colonic polyps (V18 51) (Z83 71)   23  Family history of malignant neoplasm (V16 9) (Z80 9)  Paternal Grandfather    25  Family history of cardiac disorder (V17 49) (Z80 55)    Social History    · Disabled   · Drinks coffee   · High school or GED   · Never a smoker   · No alcohol use   · Single    Current Meds   1  Benztropine Mesylate 1 MG Oral Tablet; Take 1 tablet twice daily as needed; Therapy: 89Xtj1587 to (Evaluate:01Jan2017)  Requested for: 23Mkt8420; Last   Rx:18Prb6530 Ordered   2  Clindamycin HCl - 300 MG Oral Capsule; TAKE 1 CAPSULE EVERY 6 HOURS DAILY; Therapy: 73KJQ4416 to (Evaluate:06Nov2017); Last Rx:30Oct2017 Ordered   3  ClonazePAM 1 MG Oral Tablet; Take 1 tablet twice daily; Last Rx:60Frr9103 Ordered   4  Cyclobenzaprine HCl - 10 MG Oral Tablet; TAKE 1 TABLET 3 TIMES DAILY AS NEEDED; Therapy: 86RHJ0789 to (Evaluate:09Nov2017); Last Rx:30Oct2017 Ordered   5  Divalproex Sodium 500 MG Oral Tablet Delayed Release; Take 1 tablet 4 times daily; Therapy: [phone]) to Recorded   6  EPINEPHrine 0 3 MG/0 3ML Injection Solution Auto-injector; INJECT 0 3ML   INTRAMUSCULARLY AS DIRECTED; Therapy: 12Apr2016 to (Last Rx:12Apr2016)  Requested for: 03Ied8977 Ordered   7  Fenofibrate 160 MG Oral Tablet; take 1 tablet by mouth every day; Therapy: 15OXY4764 to (Last Rx:63Sag8942)  Requested for: 15ARW3598 Ordered   8  Levothyroxine Sodium 25 MCG Oral Tablet; TAKE 1 TABLET BY MOUTH MONDAY THRU   FRIDAY AND 2 TABLETS ON SATURDAY AND SUNDAY; Therapy: 87UXV3864 to (Evaluate:29Rwt6775)  Requested for: 97SNY7289; Last   Rx:19Jun2017 Ordered   9  Lyrica 75 MG Oral Capsule; TAKE 1 CAPSULE TWICE DAILY; Therapy: 92DWK3176 to (Evaluate:28Mbx9252) Recorded   10  Methocarbamol 500 MG Oral Tablet; TAKE 1 TABLET Every 6 hours; Therapy: 28CYZ3713 to (Jose Alberto Escudero)  Requested for: 71RVJ0954; Last    Rx:09Nov2017 Ordered   11  Multiple Vitamin TABS; TAKE 1 TABLET TAKEN OCCASIONAL; Therapy: [phone] to Recorded   12  RisperiDONE 2 MG Oral Tablet; TAKE 1 TABLET TWICE DAILY; Therapy: 02Vea7907 to (Evaluate:30Sxh3576)  Requested for: 11Aug2016; Last    Rx:11Aug2016 Ordered   13  RisperiDONE 4 MG Oral Tablet; TAKE 1 TABLET TWICE DAILY; Therapy: 12BSR9638 to (Evaluate:71Kkn0474)  Requested for: 12Oct2016; Last    Rx:11Aug2016; Status: ACTIVE - Renewal Voided Ordered    Allergies    1  Pentothal   2  Sulfa Drugs   3  Allegra   4  aspirin   5  CIPRO   6  Cipro TABS   7  Erythromycin Derivatives   8  Erythromycin TABS   9  Motrin TABS   10  Motrin TABS   11  Penicillins    Future Appointments    Date/Time Provider Specialty Site   01/16/2018 02:00 PM [facility] [address]   01/09/2018 04:00 PM Wojciech Rosenthal MD Internal Medicine [facility] INTERNAL MED   12/11/2017 03:45 PM YARI Reyes   Neurosurgery [facility]     Signatures   Electronically signed by : Amna Garcia MD; Dec  8 2017 12:00PM EST                       (Author)

## 2018-01-23 NOTE — RESULT NOTES
Verified Results  (1) PT WITH INR [id number] 01:52PM Jordon Ge     Test Name Result Flag Reference   INR 2 85 H 0 86-1 16   PT 31 0 seconds H 12 1-14 4

## 2018-01-24 VITALS
HEART RATE: 110 BPM | BODY MASS INDEX: 38.36 KG/M2 | HEIGHT: 76 IN | TEMPERATURE: 97.9 F | RESPIRATION RATE: 18 BRPM | SYSTOLIC BLOOD PRESSURE: 128 MMHG | WEIGHT: 315 LBS | DIASTOLIC BLOOD PRESSURE: 88 MMHG | OXYGEN SATURATION: 98 %

## 2018-01-24 VITALS
HEIGHT: 76 IN | HEART RATE: 142 BPM | TEMPERATURE: 95.5 F | RESPIRATION RATE: 14 BRPM | DIASTOLIC BLOOD PRESSURE: 78 MMHG | OXYGEN SATURATION: 98 % | SYSTOLIC BLOOD PRESSURE: 100 MMHG

## 2018-01-30 ENCOUNTER — TELEPHONE (OUTPATIENT)
Dept: INTERNAL MEDICINE CLINIC | Facility: CLINIC | Age: 28
End: 2018-01-30

## 2018-01-31 ENCOUNTER — TELEPHONE (OUTPATIENT)
Dept: INTERNAL MEDICINE CLINIC | Facility: CLINIC | Age: 28
End: 2018-01-31

## 2018-02-01 ENCOUNTER — TELEPHONE (OUTPATIENT)
Dept: INTERNAL MEDICINE CLINIC | Facility: CLINIC | Age: 28
End: 2018-02-01

## 2018-02-01 DIAGNOSIS — I82.441 ACUTE DEEP VEIN THROMBOSIS (DVT) OF TIBIAL VEIN OF RIGHT LOWER EXTREMITY (HCC): Primary | ICD-10-CM

## 2018-02-01 NOTE — TELEPHONE ENCOUNTER
Madiha called back and states that he is experiencing numbness in rt leg where DVT but not pain  Can stand and walk  Pt did have sepsis recently  Concerned about having flu  Patient c/o sore throat but he has not had anything to drink yet  No fever  Seems fine otherwise  Eating and sleeping normally  Does not wish to schedule appt  Advised patient (spoke with Leila Garcia) to call us back if he wants to be seen

## 2018-02-01 NOTE — TELEPHONE ENCOUNTER
Please call Dario and ask how he is feeling    Can offer appt today or if easier for them, get checked for the flu at nearest urgent care center

## 2018-02-01 NOTE — TELEPHONE ENCOUNTER
rec'd text message, pt having flu-like symptoms    Called pt's provided home/cell phone # as above & rec'd VM and LM on VM for CB to on-call  or to office

## 2018-02-06 RX ORDER — CYCLOBENZAPRINE HCL 10 MG
1 TABLET ORAL 3 TIMES DAILY PRN
COMMUNITY
Start: 2017-10-30 | End: 2018-03-09 | Stop reason: ALTCHOICE

## 2018-02-06 RX ORDER — CLINDAMYCIN HYDROCHLORIDE 300 MG/1
1 CAPSULE ORAL EVERY 6 HOURS
COMMUNITY
Start: 2017-10-30 | End: 2021-07-20 | Stop reason: ALTCHOICE

## 2018-02-06 RX ORDER — EPINEPHRINE 0.3 MG/.3ML
0.3 INJECTION SUBCUTANEOUS
COMMUNITY
Start: 2016-04-12 | End: 2018-06-14 | Stop reason: SDUPTHER

## 2018-02-06 RX ORDER — MULTIVITAMIN
TABLET ORAL
COMMUNITY

## 2018-02-06 RX ORDER — GABAPENTIN 100 MG/1
100 CAPSULE ORAL 3 TIMES DAILY
Refills: 1 | COMMUNITY
Start: 2018-01-18 | End: 2018-04-19

## 2018-02-07 ENCOUNTER — TELEPHONE (OUTPATIENT)
Dept: INTERNAL MEDICINE CLINIC | Facility: CLINIC | Age: 28
End: 2018-02-07

## 2018-02-09 ENCOUNTER — TELEPHONE (OUTPATIENT)
Dept: INTERNAL MEDICINE CLINIC | Facility: CLINIC | Age: 28
End: 2018-02-09

## 2018-02-09 ENCOUNTER — APPOINTMENT (OUTPATIENT)
Dept: LAB | Facility: CLINIC | Age: 28
End: 2018-02-09
Payer: MEDICARE

## 2018-02-09 DIAGNOSIS — E03.9 HYPOTHYROIDISM: ICD-10-CM

## 2018-02-09 LAB
INR PPP: 1.87 (ref 0.86–1.16)
T4 FREE SERPL-MCNC: 0.83 NG/DL (ref 0.76–1.46)
TSH SERPL DL<=0.05 MIU/L-ACNC: 2.77 UIU/ML (ref 0.36–3.74)

## 2018-02-09 PROCEDURE — 84439 ASSAY OF FREE THYROXINE: CPT

## 2018-02-09 PROCEDURE — 85610 PROTHROMBIN TIME: CPT | Performed by: INTERNAL MEDICINE

## 2018-02-09 PROCEDURE — 36415 COLL VENOUS BLD VENIPUNCTURE: CPT | Performed by: INTERNAL MEDICINE

## 2018-02-09 PROCEDURE — 84443 ASSAY THYROID STIM HORMONE: CPT

## 2018-02-09 NOTE — TELEPHONE ENCOUNTER
Do not put any drops  Rinse with warm or cold water  Can apply warm moist compress over it    Did you do the INR?

## 2018-02-09 NOTE — TELEPHONE ENCOUNTER
Madiha called again and states that there is clear slick liquid coming out of the pores  There is a white thick patch of dead skin that if he scratches it flakes off  At times, there is waxy buildup that he digs out it causes him pain  It still burns and itches

## 2018-02-09 NOTE — TELEPHONE ENCOUNTER
Madiha called stating that Iban Elizabeth has a red spot on his eye (left)  It is along the cheek and lower eye lid on the edge  It looks like dry skin that has peeled away  It itches and burns  It does feel warm  Madiha tried zinc oxide  He gets this every winter but Neema Alonso has never seen this before

## 2018-02-12 ENCOUNTER — TELEPHONE (OUTPATIENT)
Dept: INTERNAL MEDICINE CLINIC | Facility: CLINIC | Age: 28
End: 2018-02-12

## 2018-02-12 ENCOUNTER — ANTICOAG VISIT (OUTPATIENT)
Dept: INTERNAL MEDICINE CLINIC | Facility: CLINIC | Age: 28
End: 2018-02-12

## 2018-02-12 DIAGNOSIS — E03.9 HYPOTHYROIDISM: ICD-10-CM

## 2018-02-12 NOTE — TELEPHONE ENCOUNTER
----- Message from Connee Rinne, MD sent at 2/12/2018  7:59 AM EST -----  Repeat Thyroid labs are normal  See other task on INR

## 2018-02-12 NOTE — TELEPHONE ENCOUNTER
----- Message from Dianna Pérez MD sent at 2/12/2018  7:57 AM EST -----  Take 1 5 tablets of warfarin today then 5 mg daily  Recheck in 2 weeks

## 2018-02-13 NOTE — RESULT NOTES
Message   Recorded as Task   Date: 11/15/2017 12:36 PM, Created By: Ronit Beal   Task Name: Call Back   Assigned To: [facility] radha clinical,Team   Regarding Patient: Olimpia Longo, Status: Active   CommentAdolfo Gold - 15 Nov 2017 12:36 PM     TASK CREATED  [address] Call center- patients mother  Ruthy Collins called and have some questions on medication recommendations c/b [phone]   Armida Diaz - 15 Nov 2017 1:25 PM     TASK IN PROGRESS   Armida Diaz - 15 Nov 2017 1:42 PM     TASK EDITED  S/w pt's mother Ruthy Collins, who states pt is s/p L3-4, L4-5 diskectomies and hemilaminectomies on 10/17  States developed sepsis and was discharged to home with homecare services 11/8  States post-op pain not controlled at this time and unable to participate in home PT  Neurosurgery advised tylenol at this time  Madiha then called PCP who advised tramadol that pt has leftover in home and she is unsure what to do  Advised Madiha to contact surgeon again with her concerns and make aware that pt unable to participate in home PT due to severe pain at surgical site and have them advise her at this time  Advised Madiha I would make FQ aware for any additional recommendations  Rafael Qiu - 15 Nov 2017 2:01 PM     TASK REPLIED TO: Previously Assigned To Rafael Matthew had him on gabapentin   is he still taking that? Carole Batres - 15 Nov 2017 2:11 PM     TASK EDITED  No, pt's mother states that was discontinued some time ago  States patient was having nausea and vomiting that stopped when med was discontinued  Rafael Qiu - 15 Nov 2017 5:10 PM     TASK REPLIED TO: Previously Assigned To Rafael Qiu  he needs to be on some sort of neuropathic medication so will start lyrica    please call in lyrica 75mg BID # 60 with no refills and schedule SOVS with Rohit Cain or Katy Dunn - 16 Nov 2017 10:06 AM     TASK EDITED  Attempted to call the pt  and LMOM to Maddi Mata - 16 Nov 2017 10:06 AM     TASK IN PROGRESS   Nery Moss - 2017 11:01 AM     TASK EDITED  Pt's mother Issa Bazzi returned call and can be reached at [phone]  Maddi Wyatt - 2017 11:46 AM     TASK EDITED  Attempted to call Issa Bazzi in regards to her son and left a detailed mom in regards to the previous task  Maddi Wyatt - 2017 11:46 AM     TASK IN PROGRESS   Lizeth Solitario - 2017 12:57 PM     TASK EDITED  Given to:             [facility] [address]    Reason: Mustapha Prime   Pt's Dr: Abhijit Rosas       For: OFFICE     2nd Call: NO        From: Kian Aceers     Phone: [phone]   Ext:     Pharmacy: [facility]  Pharmacy#: [phone]    Pt Name: Bin Bhatia     Pt : 1990    Pt In Facility: NO     Message: CB TO PROVIDE INFO FOR THE PT FOR NEW MEDICATION            PT IS TAKING TRAMADOL ACL 50MG 1 EVERY 6HR  -----------------------------------------------------------------   Maddi Wyatt - 2017 1:10 PM     TASK EDITED  Can he take the tramadol with the Lyrica? Becka Vega -  2:37 PM     TASK REPLIED TO: Previously Assigned To Becka Vega  sure   Maddi Wyatt - 2017 2:59 PM     TASK EDITED  S/w mother Madiha and called into [facility] pharmacy per FQ          Signatures   Electronically signed by : Mary Jane Garcia, ; 2017  2:59PM EST                       (Author)

## 2018-02-26 ENCOUNTER — TELEPHONE (OUTPATIENT)
Dept: INTERNAL MEDICINE CLINIC | Facility: CLINIC | Age: 28
End: 2018-02-26

## 2018-02-26 NOTE — RESULT NOTES
Verified Results  (1) TSH [id number] 01:52PM [facility]     Test Name Result Flag Reference   TSH 4 420 uIU/mL H 0 358-3 740   Patients undergoing fluorescein dye angiography may retain small amounts of fluorescein in the body for 48-72 hours post procedure  Samples containing fluorescein can produce falsely depressed TSH values  If the patient had this procedure,a specimen should be resubmitted post fluorescein clearance  (1) T4, FREE [id number] 01:52PM [facility]     Test Name Result Flag Reference   T4,FREE 1 00 ng/dL  0 76-1 46   Specimen collection should occur prior to Sulfasalazine administration due to the potential for falsely elevated results  (1) HEMOGLOBIN A1C [id number] 01:52PM [facility]     Test Name Result Flag Reference   HEMOGLOBIN A1C 4 9 %  4 2-6 3   EST  AVG  GLUCOSE 94 mg/dl       (1) CBC/PLT/DIFF [id number] 01:52PM [facility]     Test Name Result Flag Reference   WBC COUNT 5 84 Thousand/uL  4 31-10 16   RBC COUNT 4 77 Million/uL  3 88-5 62   HEMOGLOBIN 12 0 g/dL  12 0-17 0   HEMATOCRIT 36 6 %  36 5-49 3   MCV 77 fL L 82-98   MCH 25 2 pg L 26 8-34 3   MCHC 32 8 g/dL  31 4-37 4   RDW 15 3 % H 11 6-15 1   MPV 9 4 fL  8 9-12 7   PLATELET COUNT 488 Thousands/uL  149-390   This is an appended report  These results have been appended to a previously verified report     NEUTROPHILS - REL 45 %  43-75   LYMPHOCYTES - REL 39 %  14-44   MONOCYTES - REL 8 %  4-12   EOSINOPHILS - REL 3 %  0-6   BASOPHILS - REL 0 %  0-1   ATYPICAL LYMPH 5 % H <=0   NEUTROPHILS ABS 2 63 Thousand/uL  1 85-7 62   LYMPHOTCYTES ABS 2 28 Thousand/uL  0 60-4 47   MONOCYTES ABS 0 47 Thousand/uL  0 00-1 22   EOSINOPHILS ABS 0 18 Thousand/uL  0 00-0 40   BASOPHILS ABS 0 00 Thousand/uL  0 00-0 10   TOTAL COUNTED 100     PLT ESTIMATE Adequate  Adequate   Giant Platelets Present     Large Platelets Present       (1) COMPREHENSIVE METABOLIC PANEL [id number] 28:27LC [facility]     Test Name Result Flag Reference   GLUCOSE,RANDM 87 mg/dL     If the patient is fasting, the ADA then defines impaired fasting glucose as > 100 mg/dL and diabetes as > or equal to 123 mg/dL  Specimen collection should occur prior to Sulfasalazine administration due to the potential for falsely depressed results  Specimen collection should occur prior to Sulfapyridine administration due to the potential for falsely elevated results  SODIUM 140 mmol/L  136-145   POTASSIUM 3 6 mmol/L  3 5-5 3   CHLORIDE 104 mmol/L  100-108   CARBON DIOXIDE 25 mmol/L  21-32   ANION GAP (CALC) 11 mmol/L  4-13   BLOOD UREA NITROGEN 10 mg/dL  5-25   CREATININE 0 64 mg/dL  0 60-1 30   Standardized to IDMS reference method   CALCIUM 9 4 mg/dL  8 3-10 1   BILI, TOTAL 0 20 mg/dL  0 20-1 00   ALK PHOSPHATAS 49 U/L     ALT (SGPT) 35 U/L  12-78   Specimen collection should occur prior to Sulfasalazine administration due to the potential for falsely depressed results  AST(SGOT) 21 U/L  5-45   Specimen collection should occur prior to Sulfasalazine administration due to the potential for falsely depressed results  ALBUMIN 3 6 g/dL  3 5-5 0   TOTAL PROTEIN 7 0 g/dL  6 4-8 2   eGFR 135 ml/min/1 73sq m     [facility] Disease Education Program recommendations are as follows:  GFR calculation is accurate only with a steady state creatinine  Chronic Kidney disease less than 60 ml/min/1 73 sq  meters  Kidney failure less than 15 ml/min/1 73 sq  meters

## 2018-02-27 ENCOUNTER — HOSPITAL ENCOUNTER (OUTPATIENT)
Dept: NON INVASIVE DIAGNOSTICS | Facility: CLINIC | Age: 28
Discharge: HOME | End: 2018-02-27
Payer: MEDICARE

## 2018-02-27 ENCOUNTER — TELEPHONE (OUTPATIENT)
Dept: INTERNAL MEDICINE CLINIC | Facility: CLINIC | Age: 28
End: 2018-02-27

## 2018-02-27 DIAGNOSIS — R00.0 TACHYCARDIA: ICD-10-CM

## 2018-02-27 PROCEDURE — 93226 XTRNL ECG REC<48 HR SCAN A/R: CPT

## 2018-02-27 PROCEDURE — 93225 XTRNL ECG REC<48 HRS REC: CPT

## 2018-02-27 PROCEDURE — 93306 TTE W/DOPPLER COMPLETE: CPT | Performed by: INTERNAL MEDICINE

## 2018-02-27 PROCEDURE — 93306 TTE W/DOPPLER COMPLETE: CPT

## 2018-03-01 ENCOUNTER — TELEPHONE (OUTPATIENT)
Dept: INTERNAL MEDICINE CLINIC | Facility: CLINIC | Age: 28
End: 2018-03-01

## 2018-03-01 NOTE — TELEPHONE ENCOUNTER
pts mom called  lenka pt finished his holter moniter  Returned to cardiology yesterday  Results should be in by Friday   Call alexander if needed at [phone]

## 2018-03-02 ENCOUNTER — TELEPHONE (OUTPATIENT)
Dept: INTERNAL MEDICINE CLINIC | Facility: CLINIC | Age: 28
End: 2018-03-02

## 2018-03-02 PROCEDURE — 93227 XTRNL ECG REC<48 HR R&I: CPT | Performed by: INTERNAL MEDICINE

## 2018-03-02 NOTE — TELEPHONE ENCOUNTER
----- Message from Stephanie Mejia MD sent at 3/2/2018 12:31 PM EST -----  Holter was ok, no arrhtymia

## 2018-03-09 ENCOUNTER — TRANSCRIBE ORDERS (OUTPATIENT)
Dept: LAB | Facility: CLINIC | Age: 28
End: 2018-03-09

## 2018-03-09 ENCOUNTER — ANTICOAG VISIT (OUTPATIENT)
Dept: INTERNAL MEDICINE CLINIC | Facility: CLINIC | Age: 28
End: 2018-03-09

## 2018-03-09 ENCOUNTER — APPOINTMENT (OUTPATIENT)
Dept: LAB | Facility: CLINIC | Age: 28
End: 2018-03-09
Payer: MEDICARE

## 2018-03-09 DIAGNOSIS — I82.4Y1 ACUTE DEEP VEIN THROMBOSIS (DVT) OF PROXIMAL VEIN OF RIGHT LOWER EXTREMITY (HCC): Primary | ICD-10-CM

## 2018-03-09 DIAGNOSIS — M54.50 CHRONIC BILATERAL LOW BACK PAIN WITHOUT SCIATICA: ICD-10-CM

## 2018-03-09 DIAGNOSIS — G89.29 CHRONIC BILATERAL LOW BACK PAIN WITHOUT SCIATICA: ICD-10-CM

## 2018-03-09 PROCEDURE — 36415 COLL VENOUS BLD VENIPUNCTURE: CPT | Performed by: INTERNAL MEDICINE

## 2018-03-09 PROCEDURE — 85610 PROTHROMBIN TIME: CPT | Performed by: INTERNAL MEDICINE

## 2018-03-09 RX ORDER — WARFARIN SODIUM 5 MG/1
5 TABLET ORAL
Qty: 90 TABLET | Refills: 0 | Status: SHIPPED | OUTPATIENT
Start: 2018-03-09 | End: 2018-03-19 | Stop reason: SDUPTHER

## 2018-03-09 RX ORDER — METHOCARBAMOL 500 MG/1
500 TABLET, FILM COATED ORAL 2 TIMES DAILY PRN
Qty: 60 TABLET | Refills: 1 | Status: SHIPPED | OUTPATIENT
Start: 2018-03-09 | End: 2018-03-19 | Stop reason: SDUPTHER

## 2018-03-09 NOTE — TELEPHONE ENCOUNTER
----- Message from Molly Calixto MD sent at 3/9/2018  1:01 PM EST -----  Continue current dosing (please clarify, 5 mg daily?) repeat in 1 month

## 2018-03-19 DIAGNOSIS — I82.4Y1 ACUTE DEEP VEIN THROMBOSIS (DVT) OF PROXIMAL VEIN OF RIGHT LOWER EXTREMITY (HCC): ICD-10-CM

## 2018-03-19 DIAGNOSIS — M54.50 CHRONIC BILATERAL LOW BACK PAIN WITHOUT SCIATICA: ICD-10-CM

## 2018-03-19 DIAGNOSIS — G89.29 CHRONIC BILATERAL LOW BACK PAIN WITHOUT SCIATICA: ICD-10-CM

## 2018-03-20 DIAGNOSIS — I82.4Y1 ACUTE DEEP VEIN THROMBOSIS (DVT) OF PROXIMAL VEIN OF RIGHT LOWER EXTREMITY (HCC): ICD-10-CM

## 2018-03-20 RX ORDER — METHOCARBAMOL 500 MG/1
500 TABLET, FILM COATED ORAL 2 TIMES DAILY PRN
Qty: 60 TABLET | Refills: 0 | Status: SHIPPED | OUTPATIENT
Start: 2018-03-20 | End: 2018-06-14 | Stop reason: SDUPTHER

## 2018-03-20 RX ORDER — WARFARIN SODIUM 5 MG/1
5 TABLET ORAL
Qty: 90 TABLET | Refills: 0 | Status: SHIPPED | OUTPATIENT
Start: 2018-03-20 | End: 2018-06-14 | Stop reason: ALTCHOICE

## 2018-03-20 RX ORDER — WARFARIN SODIUM 5 MG/1
5 TABLET ORAL
Qty: 90 TABLET | Refills: 0 | Status: SHIPPED | OUTPATIENT
Start: 2018-03-20 | End: 2018-03-20 | Stop reason: SDUPTHER

## 2018-04-05 PROBLEM — I82.441 ACUTE DEEP VEIN THROMBOSIS (DVT) OF TIBIAL VEIN OF RIGHT LOWER EXTREMITY (HCC): Status: ACTIVE | Noted: 2018-04-05

## 2018-04-10 ENCOUNTER — APPOINTMENT (OUTPATIENT)
Dept: LAB | Facility: CLINIC | Age: 28
End: 2018-04-10
Payer: MEDICARE

## 2018-04-10 PROCEDURE — 85610 PROTHROMBIN TIME: CPT | Performed by: INTERNAL MEDICINE

## 2018-04-10 PROCEDURE — 36415 COLL VENOUS BLD VENIPUNCTURE: CPT | Performed by: INTERNAL MEDICINE

## 2018-04-11 ENCOUNTER — TELEPHONE (OUTPATIENT)
Dept: INTERNAL MEDICINE CLINIC | Facility: CLINIC | Age: 28
End: 2018-04-11

## 2018-04-11 ENCOUNTER — ANTICOAG VISIT (OUTPATIENT)
Dept: INTERNAL MEDICINE CLINIC | Facility: CLINIC | Age: 28
End: 2018-04-11

## 2018-04-11 DIAGNOSIS — I82.441 ACUTE DEEP VEIN THROMBOSIS (DVT) OF TIBIAL VEIN OF RIGHT LOWER EXTREMITY (HCC): ICD-10-CM

## 2018-04-11 NOTE — TELEPHONE ENCOUNTER
----- Message from Tia Singh MD sent at 4/10/2018  7:09 PM EDT -----  Continue current dosing of warfarin, repeat in 1 month

## 2018-04-19 ENCOUNTER — OFFICE VISIT (OUTPATIENT)
Dept: CARDIOLOGY CLINIC | Facility: CLINIC | Age: 28
End: 2018-04-19
Payer: MEDICARE

## 2018-04-19 VITALS
SYSTOLIC BLOOD PRESSURE: 122 MMHG | BODY MASS INDEX: 45.1 KG/M2 | DIASTOLIC BLOOD PRESSURE: 76 MMHG | HEART RATE: 112 BPM | WEIGHT: 315 LBS | OXYGEN SATURATION: 95 % | HEIGHT: 70 IN

## 2018-04-19 DIAGNOSIS — E03.9 HYPOTHYROIDISM, UNSPECIFIED TYPE: Chronic | ICD-10-CM

## 2018-04-19 DIAGNOSIS — R00.0 SINUS TACHYCARDIA: Primary | ICD-10-CM

## 2018-04-19 DIAGNOSIS — I82.442 DEEP VEIN THROMBOSIS (DVT) OF TIBIAL VEIN OF LEFT LOWER EXTREMITY, UNSPECIFIED CHRONICITY (HCC): ICD-10-CM

## 2018-04-19 PROBLEM — E78.5 DYSLIPIDEMIA: Status: ACTIVE | Noted: 2018-04-19

## 2018-04-19 PROCEDURE — 99204 OFFICE O/P NEW MOD 45 MIN: CPT | Performed by: INTERNAL MEDICINE

## 2018-04-19 PROCEDURE — 93000 ELECTROCARDIOGRAM COMPLETE: CPT | Performed by: INTERNAL MEDICINE

## 2018-04-19 NOTE — PROGRESS NOTES
Cardiology Consultation     Tiffany Latif  [id number]  1990  [place] [facility] [address] [address]    1  Sinus tachycardia  POCT ECG   2  Deep vein thrombosis (DVT) of tibial vein of left lower extremity, unspecified chronicity (HCC)     3  Hypothyroidism, unspecified type       HPI:      Addis Taylor comes in for consultation to discuss persistent sinus tachycardia that he has had over the last few months  Addis Taylor has history that includes morbid obesity, with obstructive sleep apnea untreated, dyslipidemia, hypothyroidism and lumbar disc disease  In November patient had an elective surgery secondary to herniated discs  He had complications of a spinal wound infection and developed sepsis  He also developed a lower extremity DVT  In the setting of this he had persistent sinus tachycardia  He had an echocardiogram which was unremarkable  Since then he has been persistently tachycardic  However, he has had a significant amount of deconditioning and pain, requiring rehabilitation  His PCP repeated echocardiogram which was unremarkable and he wore a Holter monitor that showed sinus rhythm with an average heart rate of 93 beats per minute  Addis Taylor for the most part does not feel his tachycardia  He has occasional palpitations however most of his tachycardia is detected by checking his pulse  He denies lightheadedness or any presyncope  He does have chronic shortness of breath with exertion, worse since his surgery, and chronic fatigue  As stated he does have obstructive sleep apnea, and he has not yet found a mask that fits well          Patient Active Problem List   Diagnosis    Wound dehiscence    Herniation of lumbar intervertebral disc    Hypothyroid    Bipolar disorder ([address] )    Autism    Morbid obesity due to excess calories ([address] )    Deep vein thrombosis [facility]DVT) [facility] vein ([facility] )    Wound infection    Acute deep vein thrombosis (DVT) of tibial vein of right lower extremity (HCC)    Dyslipidemia     Past Medical History:   Diagnosis Date    Autism     [facility]     Disease of thyroid gland     Hyperlipidemia     Lumbar disc disease     Psychiatric disorder     Compulsive Disorder    Sleep apnea     Urinary incontinence      Social History     Social History    Marital status: Single     Spouse name: N/A    Number of children: N/A    Years of education: HS or GED     Occupational History    Disabled      Social History Main Topics    Smoking status: Never Smoker    Smokeless tobacco: Never Used    Alcohol use No    Drug use: No    Sexual activity: Not on file     Other Topics Concern    Not on file     Social History Narrative    Drinks coffee      Family History   Problem Relation Age of Onset    Heart disease Father      Cardiac disorder    Substance Abuse Father     Heart attack Father     Heart disease Paternal Grandmother     Hypertension Mother     Stroke Maternal Grandmother      CVA    Heart disease Maternal Grandmother      pacemaker    Heart failure Maternal Grandmother     Heart disease Paternal Grandfather     Colonic polyp Family     Colonic polyp Family     Cancer Family     Heart failure Maternal Grandfather     Heart disease Maternal Grandfather     Drug abuse Neg Hx     Alcohol abuse Neg Hx     Colon cancer Neg Hx     Anxiety disorder Neg Hx     Depression Neg Hx      Past Surgical History:   Procedure Laterality Date    BACK SURGERY  11/2017    Lower   Last assessed: 1/18/18    COLONOSCOPY      Fiberoptic    INCISION AND DRAINAGE POSTERIOR SPINE N/A 11/1/2017    Procedure: INCISION AND DRAINAGE (I&D) SPINE;  Surgeon: Augustina Cameron MD;  [facility];  Service: Neurosurgery    WA LAMNOTMY INCL W/DCMPRSN NRV ROOT 1 INTRSPC LUMBR Bilateral 10/17/2017    Procedure: LEFT L3/4 AND RIGHT L4/5 MICRODISCECTOMIES, HEMILAMINECTOMIES; POSSIBLE EPIDURAL STEROID INJECTIONS;  Surgeon: Benjie Grossman MD;  [facility];  Service: Neurosurgery    [address] N/A 11/3/2017    Procedure: DEBRIDEMENT WOUND [facility] OUT), wound vac placement back;  Surgeon: Magaly Ashford DO;  [facility];  Service: General    WOUND DEBRIDEMENT N/A 11/6/2017    Procedure: Sharrie Osler ([address] [facility];  Surgeon: Lisa Fraga MD;  [facility];  Service: General       Current Outpatient Prescriptions:     acetaminophen (TYLENOL) 500 mg tablet, Take 500 mg by mouth every 6 (six) hours as needed for mild pain, Disp: , Rfl:     benztropine (COGENTIN) 1 mg tablet, Take 1 mg by mouth 2 (two) times a day, Disp: , Rfl:     clonazePAM (KlonoPIN) 1 mg tablet, Take 0 5 mg by mouth 2 (two) times a day, Disp: , Rfl:     divalproex sodium (DEPAKOTE) 500 mg EC tablet, Take 1,000 mg by mouth daily Mid day , Disp: , Rfl:     docusate sodium (COLACE) 100 mg capsule, Take 1 capsule by mouth 2 (two) times a day, Disp: 10 capsule, Rfl: 0    fenofibrate (TRIGLIDE) 160 MG tablet, Take 160 mg by mouth daily, Disp: , Rfl:     levothyroxine 25 mcg tablet, Take 1 tablet by mouth daily, Disp: 30 tablet, Rfl: 0    levothyroxine 50 mcg tablet, Take 50 mcg by mouth see administration instructions Saturday and Sunday, Disp: , Rfl:     lidocaine (LIDODERM) 5 %, Place 2 patches on the skin daily Remove & Discard patch within 12 hours or as directed by MD, Disp: 30 patch, Rfl: 0    methocarbamol (ROBAXIN) 500 mg tablet, Take 1 tablet (500 mg total) by mouth 2 (two) times a day as needed for muscle spasms, Disp: 60 tablet, Rfl: 0    Multiple Vitamin tablet, Take by mouth, Disp: , Rfl:     polyethylene glycol (MIRALAX) 17 g packet, Take 17 g by mouth daily, Disp: 14 each, Rfl: 0    risperiDONE (RisperDAL) 2 mg tablet, Take 2 mg by mouth 2 (two) times a day, Disp: , Rfl:     risperiDONE (RisperDAL) 4 mg tablet, Take 4 mg by mouth 2 (two) times a day, Disp: , Rfl:     senna (SENOKOT) 8 6 mg, Take 1 tablet by mouth daily at bedtime, Disp: 120 each, Rfl: 0    warfarin (COUMADIN) 5 mg tablet, Take 1 tablet (5 mg total) by mouth daily, Disp: 90 tablet, Rfl: 0    clindamycin (CLEOCIN) 300 MG capsule, Take 1 capsule by mouth every 6 (six) hours, Disp: , Rfl:     EPINEPHrine (EPIPEN) 0 3 mg/0 3 mL SOAJ, Inject 0 3 mL as directed, Disp: , Rfl:   Allergies   Allergen Reactions    Sulfa Antibiotics Anaphylaxis    Gabapentin Vomiting    Mercury     Pentothal [Thiopental]      Other reaction(s): Anaphylaxis    Allegra [Fexofenadine] Hives and Rash    Aspirin Rash     Category: Allergy;     Ciprofloxacin Rash     Reaction Date: 10Feb2012; Annotation - [id number]: swelling    Erythromycin Hives and Rash     Reaction Date: 10Feb2012;     Motrin [Ibuprofen] Rash     Reaction Date: 10Feb2012; Annotation - [id number]: rash, wheeze    Penicillins Hives and Rash     Reaction Date: 10Feb2012;  Annotation - [id number]: rash     Vitals:    04/19/18 1448   BP: 122/76   BP Location: Left arm   Patient Position: Sitting   Cuff Size: Adult   Pulse: (!) 112   SpO2: 95%   Weight: (!) 144 kg (316 lb 6 4 oz)   Height: 5' 10" (1 778 m)       Labs:  Lab Results   Component Value Date     01/10/2018     05/20/2016     05/20/2016    K 3 6 01/10/2018    K 4 4 05/20/2016    K 4 4 05/20/2016     01/10/2018     05/20/2016     05/20/2016    CO2 25 01/10/2018    CO2 20 05/20/2016    CO2 20 05/20/2016    BUN 10 01/10/2018    BUN 18 05/20/2016    BUN 18 05/20/2016    CREATININE 0 64 01/10/2018    CREATININE 0 82 05/20/2016    CREATININE 0 82 05/20/2016    GLUCOSE 87 01/10/2018    GLUCOSE 81 06/02/2015    CALCIUM 9 4 01/10/2018    CALCIUM 9 7 05/20/2016    CALCIUM 9 7 05/20/2016     Lab Results   Component Value Date    WBC 5 84 01/10/2018    WBC 6 1 05/20/2016    WBC 6 1 05/20/2016    HGB 12 0 01/10/2018 HGB 13 7 05/20/2016    HGB 13 7 05/20/2016    HCT 36 6 01/10/2018    HCT 41 4 05/20/2016    HCT 41 4 05/20/2016    MCV 77 (L) 01/10/2018    MCV 81 7 05/20/2016    MCV 81 7 05/20/2016     01/10/2018     05/20/2016     05/20/2016     Lab Results   Component Value Date    CHOL 184 06/21/2017    CHOL 189 05/20/2016    CHOL 189 05/20/2016    TRIG 213 (H) 06/21/2017    TRIG 444 (H) 05/20/2016    TRIG 444 (H) 05/20/2016    HDL 27 (L) 06/21/2017    HDL 34 (L) 05/20/2016    HDL 34 (L) 05/20/2016     Imaging:     ECHO:  LEFT VENTRICLE:  Systolic function was normal by visual assessment  Ejection fraction was estimated to be 55 %  There were no regional wall motion abnormalities  Wall thickness was mildly increased  Doppler parameters were consistent with abnormal left ventricular relaxation (grade 1 diastolic dysfunction)      RIGHT VENTRICLE:  The ventricle was mildly dilated      TRICUSPID VALVE:  There was mild regurgitation  Review of Systems:  Review of Systems   Constitutional: Positive for fatigue  HENT: Negative  Eyes: Negative  Respiratory: Positive for shortness of breath  Cardiovascular: Positive for palpitations  Gastrointestinal: Negative  Musculoskeletal: Positive for back pain  Skin: Negative  Allergic/Immunologic: Negative  Neurological: Negative  Hematological: Negative  Psychiatric/Behavioral: Negative  All other systems reviewed and are negative  Physical Exam:  Physical Exam   Constitutional: He is oriented to person, place, and time  He appears well-developed and well-nourished  HENT:   Head: Normocephalic and atraumatic  Eyes: EOM are normal  Pupils are equal, round, and reactive to light  Right eye exhibits no discharge  Left eye exhibits no discharge  No scleral icterus  Neck: Normal range of motion  Neck supple  No JVD present  No thyromegaly present     Cardiovascular: Regular rhythm, S1 normal, S2 normal, normal heart sounds, intact distal pulses and normal pulses  No extrasystoles are present  Tachycardia present  Exam reveals no gallop and no friction rub  No murmur heard  Pulmonary/Chest: Effort normal and breath sounds normal  No respiratory distress  He has no wheezes  He has no rales  Abdominal: Soft  Bowel sounds are normal  He exhibits no distension  There is no tenderness  Musculoskeletal: Normal range of motion  He exhibits no edema, tenderness or deformity  Neurological: He is alert and oriented to person, place, and time  No cranial nerve deficit  Skin: Skin is warm and dry  No rash noted  Psychiatric: He has a normal mood and affect  Judgment and thought content normal    Nursing note and vitals reviewed  Discussion/Summary:    1  Sinus tachycardia -  This is reactive to his chronic conditions  These include the extensive hospitalization he went through after his back surgery that resulted in sepsis, wound infection and a DVT  He is still deconditioned  Obesity and obstructive sleep apnea which is untreated is also contributing to this  I went over with him and his mother his cardiovascular testing which was unremarkable  Even his Holter monitor had an average heart rate of 93 beats per minute  No further testing is needed  2   Obstructive sleep apnea -  I instructed him to go back to Pulmonary and Sleep Medicine discuss further consultation regarding mask fitting  3    Dyslipidemia -  He has a history of significantly elevated triglycerides  He is on fenofibrate  He will continue to have blood work followed by his PCP  He only needs to see us back if needed  Counseling / Coordination of Care  Total floor / unit time spent today 40 minutes  Greater than 50% of total time was spent with the patient and / or family counseling and / or coordination of care

## 2018-05-10 ENCOUNTER — APPOINTMENT (OUTPATIENT)
Dept: LAB | Facility: CLINIC | Age: 28
End: 2018-05-10
Payer: MEDICARE

## 2018-05-11 ENCOUNTER — ANTICOAG VISIT (OUTPATIENT)
Dept: INTERNAL MEDICINE CLINIC | Facility: CLINIC | Age: 28
End: 2018-05-11

## 2018-05-11 ENCOUNTER — TELEPHONE (OUTPATIENT)
Dept: INTERNAL MEDICINE CLINIC | Facility: CLINIC | Age: 28
End: 2018-05-11

## 2018-05-11 DIAGNOSIS — I82.441 ACUTE DEEP VEIN THROMBOSIS (DVT) OF TIBIAL VEIN OF RIGHT LOWER EXTREMITY (HCC): ICD-10-CM

## 2018-05-11 NOTE — TELEPHONE ENCOUNTER
INR at goal   Should be completing warfarin at the end of the month  No need to recheck INR in [place]

## 2018-06-03 PROBLEM — M51.16 HERNIATION OF LUMBAR INTERVERTEBRAL DISC WITH RADICULOPATHY: Chronic | Status: ACTIVE | Noted: 2017-10-31

## 2018-06-03 PROBLEM — F40.240 CLAUSTROPHOBIA: Status: ACTIVE | Noted: 2017-09-07

## 2018-06-03 PROBLEM — M76.31 ILIOTIBIAL BAND SYNDROME OF RIGHT SIDE: Status: ACTIVE | Noted: 2017-01-25

## 2018-06-03 PROBLEM — I82.409 DVT (DEEP VENOUS THROMBOSIS) (HCC): Status: ACTIVE | Noted: 2017-12-01

## 2018-06-03 PROBLEM — G47.33 OSA (OBSTRUCTIVE SLEEP APNEA): Status: ACTIVE | Noted: 2017-02-03

## 2018-06-03 PROBLEM — M48.061 LUMBAR STENOSIS: Status: ACTIVE | Noted: 2017-09-25

## 2018-06-14 ENCOUNTER — OFFICE VISIT (OUTPATIENT)
Dept: INTERNAL MEDICINE CLINIC | Facility: CLINIC | Age: 28
End: 2018-06-14
Payer: MEDICARE

## 2018-06-14 VITALS
HEIGHT: 70 IN | TEMPERATURE: 98 F | BODY MASS INDEX: 45.1 KG/M2 | OXYGEN SATURATION: 96 % | WEIGHT: 315 LBS | SYSTOLIC BLOOD PRESSURE: 118 MMHG | HEART RATE: 115 BPM | DIASTOLIC BLOOD PRESSURE: 70 MMHG | RESPIRATION RATE: 18 BRPM

## 2018-06-14 DIAGNOSIS — E78.49 OTHER HYPERLIPIDEMIA: ICD-10-CM

## 2018-06-14 DIAGNOSIS — F31.60 BIPOLAR AFFECTIVE DISORDER, CURRENT EPISODE MIXED, CURRENT EPISODE SEVERITY UNSPECIFIED (HCC): Chronic | ICD-10-CM

## 2018-06-14 DIAGNOSIS — R73.03 PREDIABETES: ICD-10-CM

## 2018-06-14 DIAGNOSIS — E03.9 ACQUIRED HYPOTHYROIDISM: Chronic | ICD-10-CM

## 2018-06-14 DIAGNOSIS — E66.01 MORBID OBESITY DUE TO EXCESS CALORIES (HCC): Chronic | ICD-10-CM

## 2018-06-14 DIAGNOSIS — T78.40XS ALLERGIC STATE, SEQUELA: ICD-10-CM

## 2018-06-14 DIAGNOSIS — G89.29 CHRONIC BILATERAL LOW BACK PAIN WITHOUT SCIATICA: ICD-10-CM

## 2018-06-14 DIAGNOSIS — M54.50 CHRONIC BILATERAL LOW BACK PAIN WITHOUT SCIATICA: ICD-10-CM

## 2018-06-14 DIAGNOSIS — E78.5 DYSLIPIDEMIA: Primary | ICD-10-CM

## 2018-06-14 PROBLEM — I82.449 DEEP VEIN THROMBOSIS (DVT) OF TIBIAL VEIN (HCC): Status: RESOLVED | Noted: 2017-11-18 | Resolved: 2018-06-14

## 2018-06-14 PROBLEM — L82.1 SEBORRHEIC KERATOSIS: Status: ACTIVE | Noted: 2018-06-14

## 2018-06-14 PROBLEM — T81.30XA WOUND DEHISCENCE: Status: RESOLVED | Noted: 2017-10-31 | Resolved: 2018-06-14

## 2018-06-14 PROBLEM — L08.9 WOUND INFECTION: Status: RESOLVED | Noted: 2017-11-30 | Resolved: 2018-06-14

## 2018-06-14 PROBLEM — T14.8XXA WOUND INFECTION: Status: RESOLVED | Noted: 2017-11-30 | Resolved: 2018-06-14

## 2018-06-14 PROCEDURE — 99214 OFFICE O/P EST MOD 30 MIN: CPT | Performed by: INTERNAL MEDICINE

## 2018-06-14 RX ORDER — METHOCARBAMOL 500 MG/1
500 TABLET, FILM COATED ORAL 2 TIMES DAILY PRN
Qty: 60 TABLET | Refills: 0 | Status: SHIPPED | OUTPATIENT
Start: 2018-06-14 | End: 2020-05-13 | Stop reason: SDUPTHER

## 2018-06-14 RX ORDER — FENOFIBRATE 160 MG/1
160 TABLET ORAL DAILY
Qty: 90 TABLET | Refills: 1 | Status: SHIPPED | OUTPATIENT
Start: 2018-06-14 | End: 2019-01-16 | Stop reason: SDUPTHER

## 2018-06-14 RX ORDER — LEVOTHYROXINE SODIUM 0.05 MG/1
TABLET ORAL
Qty: 35 TABLET | Refills: 1 | Status: SHIPPED | OUTPATIENT
Start: 2018-06-14 | End: 2018-07-09 | Stop reason: SDUPTHER

## 2018-06-14 RX ORDER — EPINEPHRINE 0.3 MG/.3ML
0.3 INJECTION SUBCUTANEOUS ONCE
Qty: 2 EACH | Refills: 0 | Status: SHIPPED | OUTPATIENT
Start: 2018-06-14 | End: 2018-06-14

## 2018-06-14 RX ORDER — LEVOTHYROXINE SODIUM 0.03 MG/1
TABLET ORAL
Qty: 80 TABLET | Refills: 1 | Status: SHIPPED | OUTPATIENT
Start: 2018-06-14 | End: 2018-07-09 | Stop reason: SDUPTHER

## 2018-06-14 NOTE — PROGRESS NOTES
Assessment/Plan: Morbid obesity due to excess calories (HCC)  Gained 22 lb since last visit  Discussed diet and importance of regular aerobic exercise  Hyperlipidemia  Lipids due  On fibrate  Seborrheic keratosis  Instructed to use Head and Shoulders shampoo once a day, may use as facial wash also  Bipolar disorder ([address] )  On Depakote and Cogentin  Due for labs  Sees Psychiatry every 2 months  Hypothyroidism  Adequately replaced  Sinus tachycardia  Stable, saw cardiology  Encouraged to lose weight  DVT (deep venous thrombosis) ([facility])  Completed 6 months of warfarin  Diagnoses and all orders for this visit:    Dyslipidemia  -     Lipid panel  -     Comprehensive metabolic panel; Future    Chronic bilateral low back pain without sciatica  -     methocarbamol (ROBAXIN) 500 mg tablet; Take 1 tablet (500 mg total) by mouth 2 (two) times a day as needed for muscle spasms    Acquired hypothyroidism  -     levothyroxine 25 mcg tablet; Take 1 tablet Mon to Friday  -     levothyroxine 50 mcg tablet; Take on Saturday and Sunday  -     CBC and differential; Future  -     TSH, 3rd generation with Free T4 reflex; Future    Other hyperlipidemia  -     fenofibrate (TRIGLIDE) 160 MG tablet; Take 1 tablet (160 mg total) by mouth daily    Bipolar affective disorder, current episode mixed, current episode severity unspecified (HCC)  -     CBC and differential  -     Comprehensive metabolic panel    Morbid obesity due to excess calories (HCC)    Allergic state, sequela  -     EPINEPHrine (EPIPEN) 0 3 mg/0 3 mL SOAJ; Inject 0 3 mL (0 3 mg total) into the shoulder, thigh, or buttocks once for 1 dose    Prediabetes  -     Hemoglobin A1C; Future      Follow up in 6 months or as needed  Subjective:      Patient ID: Luis Enrique Aburto is a 33 y o  male  Rich complains of a rash on his cheeks  He has had this for several months, not better    He it was initially diagnosed as eczema, he has not applied any special creams  Area is flaky, red sometimes itchy  He has occasional flakiness on his forehead and eyebrow area  He recalls being diagnosed with seborrheic  He has completed warfarin, happy that his diet is not restricted anymore  He denies any chest pain, shortness of breath or palpitations  He is eating healthier, has gained weight since his last visit  The following portions of the patient's history were reviewed and updated as appropriate: allergies, current medications, past medical history, past social history and problem list     Review of Systems   Constitutional: Negative for appetite change and fatigue  HENT: Negative for congestion, hearing loss and postnasal drip  Eyes: Negative  Respiratory: Negative for cough, chest tightness and shortness of breath  Cardiovascular: Negative for chest pain, palpitations and leg swelling  Gastrointestinal: Negative for abdominal pain and constipation  Genitourinary: Negative for dysuria, frequency and urgency  Musculoskeletal: Negative for arthralgias  Skin: Negative for rash and wound  Neurological: Negative for dizziness, numbness and headaches  Hematological: Negative for adenopathy  Does not bruise/bleed easily  Psychiatric/Behavioral: Negative for sleep disturbance  The patient is not nervous/anxious  Objective:      /70   Pulse (!) 115   Temp 98 °F (36 7 °C)   Resp 18   Ht 5' 10" (1 778 m)   Wt (!) 154 kg (340 lb)   SpO2 96%   BMI 48 78 kg/m²          Physical Exam   Constitutional: He is oriented to person, place, and time  He appears well-developed and well-nourished  HENT:   Head: Normocephalic and atraumatic  Mouth/Throat: Mucous membranes are normal    Eyes: Conjunctivae are normal  Pupils are equal, round, and reactive to light  Neck: Neck supple  Cardiovascular: Normal rate, regular rhythm and normal heart sounds  No edema     Pulmonary/Chest: Effort normal  He has no wheezes  He has no rhonchi  Abdominal: Soft  Bowel sounds are normal    Neurological: He is alert and oriented to person, place, and time  Skin: Skin is warm  No rash noted  Psychiatric: He has a normal mood and affect  His behavior is normal    Nursing note and vitals reviewed  Lab results reviewed with patient

## 2018-07-09 DIAGNOSIS — E03.9 ACQUIRED HYPOTHYROIDISM: Chronic | ICD-10-CM

## 2018-07-09 RX ORDER — LEVOTHYROXINE SODIUM 0.05 MG/1
TABLET ORAL
Qty: 30 TABLET | Refills: 1 | Status: SHIPPED | OUTPATIENT
Start: 2018-07-09 | End: 2020-07-21

## 2018-07-09 RX ORDER — LEVOTHYROXINE SODIUM 0.03 MG/1
TABLET ORAL
Qty: 80 TABLET | Refills: 1 | Status: SHIPPED | OUTPATIENT
Start: 2018-07-09 | End: 2019-01-16 | Stop reason: SDUPTHER

## 2018-07-09 NOTE — TELEPHONE ENCOUNTER
Please clarify amount  Did he want all 25 mcg tablets?   Was going to give 25 mcg 5 days a wee, 50 mcg 2 days a wee, 3 month supply

## 2018-07-09 NOTE — TELEPHONE ENCOUNTER
Patient mother, Ramirez Suazo states pt takes Levothyroxine 25 for 5 days/ week and Levothyroxine 50 mg for Sat and Sun  States 3 month supply is good

## 2018-10-16 ENCOUNTER — OFFICE VISIT (OUTPATIENT)
Dept: URGENT CARE | Age: 28
End: 2018-10-16
Payer: MEDICARE

## 2018-10-16 ENCOUNTER — TELEPHONE (OUTPATIENT)
Dept: INTERNAL MEDICINE CLINIC | Facility: CLINIC | Age: 28
End: 2018-10-16

## 2018-10-16 VITALS
SYSTOLIC BLOOD PRESSURE: 140 MMHG | TEMPERATURE: 96.9 F | WEIGHT: 315 LBS | BODY MASS INDEX: 38.36 KG/M2 | DIASTOLIC BLOOD PRESSURE: 93 MMHG | OXYGEN SATURATION: 98 % | RESPIRATION RATE: 18 BRPM | HEIGHT: 76 IN

## 2018-10-16 DIAGNOSIS — L03.114 CELLULITIS OF LEFT UPPER EXTREMITY: Primary | ICD-10-CM
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
## 2018-10-16 PROCEDURE — 99213 OFFICE O/P EST LOW 20 MIN: CPT | Performed by: PHYSICIAN ASSISTANT

## 2018-10-16 PROCEDURE — G0463 HOSPITAL OUTPT CLINIC VISIT: HCPCS | Performed by: PHYSICIAN ASSISTANT

## 2018-10-16 RX ORDER — CEPHALEXIN 500 MG/1
500 CAPSULE ORAL EVERY 12 HOURS SCHEDULED
Qty: 10 CAPSULE | Refills: 0 | Status: SHIPPED | OUTPATIENT
Start: 2018-10-16 | End: 2018-10-21

## 2018-10-16 NOTE — TELEPHONE ENCOUNTER
Pt has a light cough, bringing up a little bit of yellow mucus  Does not want to get a flu shot, mom does not want to give him cough drops that have alcohol in them  PT has skin lesions About 5 on one arm and 2 on other  only on arms eighth of an inch wide, itch, burn and in pain, looked sealed and now open, clear liquid, scrapped around edges, perfectly round not clustered spaced apart  Pt's mom just started putting triple antibiotic on it today  No other symptoms such as fever, nausea, vomiting or diarrhea  Pt and family just moved into new house  Whole family had a cold prior to the move

## 2018-10-17 NOTE — PATIENT INSTRUCTIONS
Take all antibiotics as prescribed  OTC symptomatic treatment for itching if needed  Tylenol/Motrin for pain and swelling if needed  Call PCP to schedule a follow-up appt in the next few days for reevaluation and to ensure resolution of symptoms  Go to the nearest ER for evaluation if any fevers, redness, warmth, discharge, excessive bleeding, pain, signs of infection, new or worsening symptoms, or other concerning symptoms

## 2018-10-17 NOTE — PROGRESS NOTES
[facility]        NAME: Cheko White is a 33 y o  male  : 1990    MRN: [id number]  DATE: 2018  TIME: 9:42 PM    Assessment and Plan   Cellulitis of left upper extremity [L03 114]  1  Cellulitis of left upper extremity  cephalexin (KEFLEX) 500 mg capsule   2  Bug bite, initial encounter  cephalexin (KEFLEX) 500 mg capsule         Patient Instructions       Take all antibiotics as prescribed  OTC symptomatic treatment for itching if needed  Tylenol/Motrin for pain and swelling if needed  Call PCP to schedule a follow-up appt in the next few days for reevaluation and to ensure resolution of symptoms  Go to the nearest ER for evaluation if any fevers, redness, warmth, discharge, excessive bleeding, pain, signs of infection, new or worsening symptoms, or other concerning symptoms  Chief Complaint     Chief Complaint   Patient presents with    Insect Bite     bites on arm and leg  Itchy  Scratched open  Applied neosporin         History of Present Illness       Insect Bite   This is a new problem  The current episode started in the past 7 days  Episode frequency: States he got bit by some bugs about 1 week ago  Progression since onset: States he rates about 1 week ago, he scratched them open, the past 2 days there has been some redness, some pain, some discharge from the bite  Associated symptoms include a rash  Pertinent negatives include no abdominal pain, chest pain, chills, congestion, coughing, diaphoresis, fatigue, fever, headaches, joint swelling, nausea, neck pain, numbness, sore throat, urinary symptoms, vomiting or weakness  Nothing aggravates the symptoms  Treatments tried: neosporin  The treatment provided mild relief  State UTD on tetanus vaccine  Denies any other complaints at this time  No numbness, tingling  No new laundry detergents, soaps or other inciting factors  No shortness of breath, difficulty swallowing or breathing, swelling      Review of Systems   Review of Systems   Constitutional: Negative for chills, diaphoresis, fatigue and fever  HENT: Negative for congestion, ear pain, rhinorrhea, sore throat and trouble swallowing  Eyes: Negative for itching and visual disturbance  Respiratory: Negative for cough, shortness of breath and wheezing  Cardiovascular: Negative for chest pain and leg swelling  Gastrointestinal: Negative for abdominal pain, diarrhea, nausea and vomiting  Musculoskeletal: Negative for back pain, joint swelling, neck pain and neck stiffness  Skin: Positive for rash and wound (bug bite)  Neurological: Negative for dizziness, seizures, weakness, numbness and headaches  All other systems reviewed and are negative          Current Medications       Current Outpatient Prescriptions:     acetaminophen (TYLENOL) 500 mg tablet, Take 325 mg by mouth every 6 (six) hours as needed for mild pain  , Disp: , Rfl:     benztropine (COGENTIN) 1 mg tablet, Take 1 mg by mouth 2 (two) times a day, Disp: , Rfl:     clonazePAM (KlonoPIN) 1 mg tablet, Take 0 5 mg by mouth 2 (two) times a day, Disp: , Rfl:     divalproex sodium (DEPAKOTE) 500 mg EC tablet, Take 1,000 mg by mouth daily Mid day , Disp: , Rfl:     docusate sodium (COLACE) 100 mg capsule, Take 1 capsule by mouth 2 (two) times a day, Disp: 10 capsule, Rfl: 0    fenofibrate (TRIGLIDE) 160 MG tablet, Take 1 tablet (160 mg total) by mouth daily, Disp: 90 tablet, Rfl: 1    levothyroxine 25 mcg tablet, Take 1 tablet Mon to Friday, Disp: 80 tablet, Rfl: 1    levothyroxine 50 mcg tablet, Take on Saturday and Sunday, Disp: 30 tablet, Rfl: 1    methocarbamol (ROBAXIN) 500 mg tablet, Take 1 tablet (500 mg total) by mouth 2 (two) times a day as needed for muscle spasms, Disp: 60 tablet, Rfl: 0    Multiple Vitamin tablet, Take by mouth, Disp: , Rfl:     polyethylene glycol (MIRALAX) 17 g packet, Take 17 g by mouth daily, Disp: 14 each, Rfl: 0    risperiDONE (RisperDAL) 2 mg tablet, Take 2 mg by mouth 2 (two) times a day, Disp: , Rfl:     risperiDONE (RisperDAL) 4 mg tablet, Take 4 mg by mouth 2 (two) times a day, Disp: , Rfl:     senna (SENOKOT) 8 6 mg, Take 1 tablet by mouth daily at bedtime, Disp: 120 each, Rfl: 0    cephalexin (KEFLEX) 500 mg capsule, Take 1 capsule (500 mg total) by mouth every 12 (twelve) hours for 5 days, Disp: 10 capsule, Rfl: 0    clindamycin (CLEOCIN) 300 MG capsule, Take 1 capsule by mouth every 6 (six) hours, Disp: , Rfl:     EPINEPHrine (EPIPEN) 0 3 mg/0 3 mL SOAJ, Inject 0 3 mL (0 3 mg total) into the shoulder, thigh, or buttocks once for 1 dose, Disp: 2 each, Rfl: 0    lidocaine (LIDODERM) 5 %, Place 2 patches on the skin daily Remove & Discard patch within 12 hours or as directed by MD (Patient not taking: Reported on 10/16/2018 ), Disp: 30 patch, Rfl: 0    Current Allergies     Allergies as of 10/16/2018 - Reviewed 10/16/2018   Allergen Reaction Noted    Sulfa antibiotics Anaphylaxis 05/17/2017    Gabapentin Vomiting 04/19/2018    Mercury  11/17/2017    Pentothal [thiopental]  10/02/2017    Allegra [fexofenadine] Hives and Rash 05/17/2017    Aspirin Rash 10/16/2017    Ciprofloxacin Rash 02/10/2012    Erythromycin Hives and Rash 02/10/2012    Motrin [ibuprofen] Rash 02/10/2012    Penicillins Hives and Rash 02/10/2012            The following portions of the patient's history were reviewed and updated as appropriate: allergies, current medications, past family history, past medical history, past social history, past surgical history and problem list      Past Medical History:   Diagnosis Date    Autism     Bipolar 1 disorder ([address] )     Disease of thyroid gland     Hyperlipidemia     Lumbar disc disease     Psychiatric disorder     Compulsive Disorder    Sleep apnea     Urinary incontinence        Past Surgical History:   Procedure Laterality Date    BACK SURGERY  11/2017    Lower   Last assessed: 1/18/18    COLONOSCOPY Fiberoptic    INCISION AND DRAINAGE POSTERIOR SPINE N/A 11/1/2017    Procedure: INCISION AND DRAINAGE (I&D) SPINE;  Surgeon: Douglas Olivares MD;  [facility];  Service: Neurosurgery    NC LAMNOTMY INCL W/DCMPRSN NRV ROOT 1 INTRSPC LUMBR Bilateral 10/17/2017    Procedure: LEFT L3/4 AND RIGHT L4/5 MICRODISCECTOMIES, HEMILAMINECTOMIES; POSSIBLE EPIDURAL STEROID INJECTIONS;  Surgeon: Douglas Olivares MD;  [facility];  Service: Neurosurgery    WISDOM TOOTH EXTRACTION     [address] [place] [address] N/A 11/3/2017    Procedure: DEBRIDEMENT WOUND [facility] OUT), wound vac placement back;  Surgeon: Hayde Lanza DO;  [facility];  Service: General    WOUND DEBRIDEMENT N/A 11/6/2017    Procedure: Rochelle Lima ([address] [facility];  Surgeon: Joana Whitmore MD;  [facility];  Service: General       Family History   Problem Relation Age of Onset    Heart disease Father         Cardiac disorder    Substance Abuse Father     Heart attack Father     Heart disease Paternal Grandmother     Hypertension Mother     Stroke Maternal Grandmother         CVA    Heart disease Maternal Grandmother         pacemaker    Heart failure Maternal Grandmother     Heart disease Paternal Grandfather     Colonic polyp Family     Colonic polyp Family     Cancer Family     Heart failure Maternal Grandfather     Heart disease Maternal Grandfather     Drug abuse Neg Hx     Alcohol abuse Neg Hx     Colon cancer Neg Hx     Anxiety disorder Neg Hx     Depression Neg Hx          Medications have been verified  Objective   /93 (BP Location: Right arm, Patient Position: Sitting)   Temp (!) 96 9 °F (36 1 °C)   Resp 18   Ht 6' 4" (1 93 m)   Wt (!) 159 kg (350 lb)   SpO2 98%   BMI 42 60 kg/m²        Physical Exam     Physical Exam   Constitutional: He is oriented to person, place, and time  He appears well-developed and well-nourished  No distress     HENT:   Head: Normocephalic and atraumatic  Mouth/Throat: Oropharynx is clear and moist    No oral pharyngeal swelling   Eyes: Pupils are equal, round, and reactive to light  Conjunctivae are normal    Neck: Normal range of motion  Neck supple  Cardiovascular: Normal rate, regular rhythm and normal heart sounds  Pulmonary/Chest: Effort normal and breath sounds normal  No respiratory distress  He has no wheezes  Musculoskeletal: Normal range of motion  He exhibits no edema or tenderness  Neurological: He is alert and oriented to person, place, and time  Skin: Skin is warm and dry  Rash (A few scattered bug bites on left forearm, mild blanchable erythema with mild warmth underneath the puncture wound  No current drainage or bleeding  Nontender to palpation  some scabbing and excoriations) noted  Psychiatric: He has a normal mood and affect  His behavior is normal    Nursing note and vitals reviewed

## 2018-10-22 ENCOUNTER — TELEPHONE (OUTPATIENT)
Dept: INTERNAL MEDICINE CLINIC | Facility: CLINIC | Age: 28
End: 2018-10-22

## 2018-10-22 NOTE — TELEPHONE ENCOUNTER
Pt  Haydee Ellison to [facility] last Tuesday night  Doctor prescribed Keflex and it cleared up his infection on his arm  No fever anymore  Will still keep the Dermatologist appt  To have moles checked on his legs

## 2019-01-14 DIAGNOSIS — E78.49 OTHER HYPERLIPIDEMIA: ICD-10-CM

## 2019-01-15 RX ORDER — FENOFIBRATE 160 MG/1
160 TABLET ORAL DAILY
Qty: 90 TABLET | Refills: 0 | OUTPATIENT
Start: 2019-01-15

## 2019-01-16 DIAGNOSIS — E03.9 ACQUIRED HYPOTHYROIDISM: Chronic | ICD-10-CM

## 2019-01-16 DIAGNOSIS — E78.49 OTHER HYPERLIPIDEMIA: ICD-10-CM

## 2019-01-16 RX ORDER — LEVOTHYROXINE SODIUM 0.03 MG/1
TABLET ORAL
Qty: 80 TABLET | Refills: 0 | Status: SHIPPED | OUTPATIENT
Start: 2019-01-16 | End: 2020-09-28 | Stop reason: SDUPTHER

## 2019-01-16 RX ORDER — FENOFIBRATE 160 MG/1
160 TABLET ORAL DAILY
Qty: 90 TABLET | Refills: 0 | Status: SHIPPED | OUTPATIENT
Start: 2019-01-16 | End: 2020-08-19

## 2019-01-16 NOTE — TELEPHONE ENCOUNTER
PT  HAS APPT  WITH NEW PCP END OF FEB  MAXX IS ASKING IF YOU CAN REFILL THESE MEDS ONE LAST TIME FOR PT  SHE WOULD APPRECIATE IT

## 2019-06-12 DIAGNOSIS — E03.9 ACQUIRED HYPOTHYROIDISM: Chronic | ICD-10-CM

## 2019-06-12 RX ORDER — LEVOTHYROXINE SODIUM 0.03 MG/1
TABLET ORAL
Qty: 80 TABLET | Refills: 0 | OUTPATIENT
Start: 2019-06-12

## 2020-03-15 ENCOUNTER — NURSE TRIAGE (OUTPATIENT)
Dept: OTHER | Facility: OTHER | Age: 30
End: 2020-03-15

## 2020-03-15 NOTE — TELEPHONE ENCOUNTER
Called and spoke to mother of the patient  Patient is doing much today  No longer vomiting or having diarrhea  Tolerating fluids and BRAT diet well

## 2020-03-15 NOTE — TELEPHONE ENCOUNTER
Regarding: Diarrhea, vomiting    ----- Message from Roxy Mtz sent at 3/14/2020  7:25 PM EDT -----  My son has diarrhea, he been vomiting, his esophagus hurts

## 2020-03-25 NOTE — PROGRESS NOTES
Assessment and Plan:     Problem List Items Addressed This Visit     None           Preventive health issues were discussed with patient, and age appropriate screening tests were ordered as noted in patient's After Visit Summary  Personalized health advice and appropriate referrals for health education or preventive services given if needed, as noted in patient's After Visit Summary  History of Present Illness:     Patient presents for Medicare Annual Wellness visit    Patient Care Team:  Stacie Downs MD as PCP - General (Internal Medicine)  MD Raul Marin MD Merrill Collard, MD Enedina Artis, MD     Problem List:     Patient Active Problem List   Diagnosis    Herniation of lumbar intervertebral disc with radiculopathy    Hypothyroidism    Bipolar disorder ([facility] [address] )    Autism    Morbid obesity due to excess calories ([facility] [address] )    Sinus tachycardia    Acute deep vein thrombosis (DVT) of tibial vein of right lower extremity ([facility] [address] )    Dyslipidemia    ADHD (attention deficit hyperactivity disorder), combined type    Claustrophobia    Depression    Hypertriglyceridemia    Hyperlipidemia    DVT (deep venous thrombosis) (HCC)    External hemorrhoids    Prediabetes    COLIN (obstructive sleep apnea)    Nocturnal enuresis    Lumbar stenosis    Iliotibial band syndrome of right side    Urinary incontinence    Allergy    Seborrheic keratosis      Past Medical and Surgical History:     Past Medical History:   Diagnosis Date    Autism     Bipolar 1 disorder ([facility] [address] )     Disease of thyroid gland     Hyperlipidemia     Lumbar disc disease     Psychiatric disorder     Compulsive Disorder    Sleep apnea     Urinary incontinence      Past Surgical History:   Procedure Laterality Date    BACK SURGERY  11/2017    Lower   Last assessed: 1/18/18    COLONOSCOPY      Fiberoptic    INCISION AND DRAINAGE POSTERIOR SPINE N/A 11/1/2017    Procedure: INCISION AND DRAINAGE (I&D) SPINE; Surgeon: Gary Espinoza MD;  [facility];  Service: Neurosurgery    NV LAMNOTMY INCL W/DCMPRSN NRV ROOT 1 INTRSPC LUMBR Bilateral 10/17/2017    Procedure: LEFT L3/4 AND RIGHT L4/5 MICRODISCECTOMIES, HEMILAMINECTOMIES; POSSIBLE EPIDURAL STEROID INJECTIONS;  Surgeon: Gary Espinoza MD;  [facility];  Service: Neurosurgery    WISDOM TOOTH EXTRACTION     [address] [place] [address] N/A 11/3/2017    Procedure: DEBRIDEMENT WOUND [facility] OUT), wound vac placement back;  Surgeon: Eve Robertson DO;  [facility];  Service: General    WOUND DEBRIDEMENT N/A 11/6/2017    Procedure: DEBRIDEMENT WOUND ([address];  Surgeon: Twan Cazares MD;  [facility];  Service: General      Family History:     Family History   Problem Relation Age of Onset    Heart disease Father         Cardiac disorder    Substance Abuse Father     Heart attack Father     Heart disease Paternal Grandmother     Hypertension Mother     Stroke Maternal Grandmother         CVA    Heart disease Maternal Grandmother         pacemaker    Heart failure Maternal Grandmother     Heart disease Paternal Grandfather     Colonic polyp Family     Colonic polyp Family     Cancer Family     Heart failure Maternal Grandfather     Heart disease Maternal Grandfather     Drug abuse Neg Hx     Alcohol abuse Neg Hx     Colon cancer Neg Hx     Anxiety disorder Neg Hx     Depression Neg Hx       Social History:        Social History     Socioeconomic History    Marital status: Single     Spouse name: None    Number of children: None    Years of education: HS or GED    Highest education level: None   Occupational History    Occupation: Disabled   Social Needs    Financial resource strain: None    Food insecurity:     Worry: None     Inability: None    Transportation needs:     Medical: None     Non-medical: None   Tobacco Use    Smoking status: Never Smoker    Smokeless tobacco: Never Used   Substance and Sexual Activity    Alcohol use: No    Drug use: No    Sexual activity: None   Lifestyle    Physical activity:     Days per week: None     Minutes per session: None    Stress: None   Relationships    Social connections:     Talks on phone: None     Gets together: None     Attends Gnosticism service: None     Active member of club or organization: None     Attends meetings of clubs or organizations: None     Relationship status: None    Intimate partner violence:     Fear of current or ex partner: None     Emotionally abused: None     Physically abused: None     Forced sexual activity: None   Other Topics Concern    None   Social History Narrative    Drinks coffee      Medications and Allergies:     Current Outpatient Medications   Medication Sig Dispense Refill    acetaminophen (TYLENOL) 500 mg tablet Take 325 mg by mouth every 6 (six) hours as needed for mild pain        benztropine (COGENTIN) 1 mg tablet Take 1 mg by mouth 2 (two) times a day      clonazePAM (KlonoPIN) 1 mg tablet Take 0 5 mg by mouth 2 (two) times a day      divalproex sodium (DEPAKOTE) 500 mg EC tablet Take 1,000 mg by mouth daily Mid day       docusate sodium (COLACE) 100 mg capsule Take 1 capsule by mouth 2 (two) times a day 10 capsule 0    fenofibrate (TRIGLIDE) 160 MG tablet Take 1 tablet (160 mg total) by mouth daily 90 tablet 0    levothyroxine 25 mcg tablet Take 1 tablet Mon to Friday 80 tablet 0    levothyroxine 50 mcg tablet Take on Saturday and Sunday 30 tablet 1    methocarbamol (ROBAXIN) 500 mg tablet Take 1 tablet (500 mg total) by mouth 2 (two) times a day as needed for muscle spasms 60 tablet 0    Multiple Vitamin tablet Take by mouth      polyethylene glycol (MIRALAX) 17 g packet Take 17 g by mouth daily 14 each 0    risperiDONE (RisperDAL) 2 mg tablet Take 2 mg by mouth 2 (two) times a day      risperiDONE (RisperDAL) 4 mg tablet Take 4 mg by mouth 2 (two) times a day      clindamycin (CLEOCIN) 300 MG capsule Take 1 capsule by mouth every 6 (six) hours      EPINEPHrine (EPIPEN) 0 3 mg/0 3 mL SOAJ Inject 0 3 mL (0 3 mg total) into the shoulder, thigh, or buttocks once for 1 dose 2 each 0    lidocaine (LIDODERM) 5 % Place 2 patches on the skin daily Remove & Discard patch within 12 hours or as directed by MD (Patient not taking: Reported on 10/16/2018 ) 30 patch 0    senna (SENOKOT) 8 6 mg Take 1 tablet by mouth daily at bedtime (Patient not taking: Reported on 3/25/2020) 120 each 0     No current facility-administered medications for this visit  Allergies   Allergen Reactions    Sulfa Antibiotics Anaphylaxis    Bee Venom     Coriandrum Sativum     Gabapentin Vomiting    Mercury     Parsley Seed     Pentothal [Thiopental]      Other reaction(s): Anaphylaxis    Allegra [Fexofenadine] Hives and Rash    Aspirin Rash     Category: Allergy;     Ciprofloxacin Rash     Reaction Date: 10Feb2012; Annotation - [id number]: swelling    Erythromycin Hives and Rash     Reaction Date: 10Feb2012;     Motrin [Ibuprofen] Rash     Reaction Date: 10Feb2012; Annotation - [id number]: rash, wheeze    Penicillins Hives and Rash     Reaction Date: 10Feb2012; Annotation - [id number]: rash      Immunizations:     Immunization History   Administered Date(s) Administered    INFLUENZA 11/08/2017      Health Maintenance: There are no preventive care reminders to display for this patient  Topic Date Due    Pneumococcal Vaccine: Pediatrics (0 to 5 Years) and At-Risk Patients (6 to 59 Years) (1 of 3 - PCV13) 11/11/1997    DTaP,Tdap,and Td Vaccines (1 - Tdap) 11/11/2002    Influenza Vaccine  07/01/2019      Medicare Health Risk Assessment:     There were no vitals taken for this visit  Eliana Valentine is here for his Subsequent Wellness visit  Last Medicare Wellness visit information reviewed, patient interviewed and updates made to the record today  Health Risk Assessment:   Patient rates overall health as good   Patient feels that their physical health rating is same  Eyesight was rated as same  Hearing was rated as same  Patient feels that their emotional and mental health rating is slightly worse  Pain experienced in the last 7 days has been none  Patient states that he has experienced no weight loss or gain in last 6 months  Fall Risk Screening: In the past year, patient has experienced: no history of falling in past year      Home Safety:  Patient has trouble with stairs inside or outside of their home  Patient has working smoke alarms and has working carbon monoxide detector  Home safety hazards include: none  Nutrition:   Current diet is Regular  Medications:   Patient is currently taking over-the-counter supplements  OTC medications include: see medication list  Patient is not able to manage medications  Activities of Daily Living (ADLs)/Instrumental Activities of Daily Living (IADLs):   Walk and transfer into and out of bed and chair?: Yes  Dress and groom yourself?: Yes    Bathe or shower yourself?: Yes    Feed yourself?  Yes  Do your laundry/housekeeping?: No  Manage your money, pay your bills and track your expenses?: No  Make your own meals?: Yes    Do your own shopping?: Yes    Previous Hospitalizations:   Any hospitalizations or ED visits within the last 12 months?: No      Advance Care Planning:   Living will: No    Durable POA for healthcare: No      PREVENTIVE SCREENINGS      Cardiovascular Screening:    General: Screening Not Indicated and History Lipid Disorder      Diabetes Screening:     General: Screening Current      Prostate Cancer Screening:    General: Screening Not Indicated      Lung Cancer Screening:     General: Screening Not Indicated      Becki Cano MD

## 2020-03-26 ENCOUNTER — TELEMEDICINE (OUTPATIENT)
Dept: INTERNAL MEDICINE CLINIC | Facility: CLINIC | Age: 30
End: 2020-03-26
Payer: MEDICARE

## 2020-03-26 DIAGNOSIS — M51.16 HERNIATION OF LUMBAR INTERVERTEBRAL DISC WITH RADICULOPATHY: Chronic | ICD-10-CM

## 2020-03-26 DIAGNOSIS — M48.061 SPINAL STENOSIS OF LUMBAR REGION, UNSPECIFIED WHETHER NEUROGENIC CLAUDICATION PRESENT: ICD-10-CM

## 2020-03-26 DIAGNOSIS — E03.9 ACQUIRED HYPOTHYROIDISM: Chronic | ICD-10-CM

## 2020-03-26 DIAGNOSIS — Z71.9 HEALTH COUNSELING: ICD-10-CM

## 2020-03-26 DIAGNOSIS — R73.03 PREDIABETES: ICD-10-CM

## 2020-03-26 DIAGNOSIS — F31.60 BIPOLAR AFFECTIVE DISORDER, CURRENT EPISODE MIXED, CURRENT EPISODE SEVERITY UNSPECIFIED (HCC): Chronic | ICD-10-CM

## 2020-03-26 DIAGNOSIS — E78.49 OTHER HYPERLIPIDEMIA: ICD-10-CM

## 2020-03-26 DIAGNOSIS — E66.01 MORBID OBESITY DUE TO EXCESS CALORIES (HCC): Chronic | ICD-10-CM

## 2020-03-26 DIAGNOSIS — M79.674 PAIN OF TOE OF RIGHT FOOT: ICD-10-CM

## 2020-03-26 DIAGNOSIS — N39.498 OTHER URINARY INCONTINENCE: ICD-10-CM

## 2020-03-26 PROBLEM — M76.31 ILIOTIBIAL BAND SYNDROME OF RIGHT SIDE: Status: RESOLVED | Noted: 2017-01-25 | Resolved: 2020-03-26

## 2020-03-26 PROBLEM — I82.441 ACUTE DEEP VEIN THROMBOSIS (DVT) OF TIBIAL VEIN OF RIGHT LOWER EXTREMITY (HCC): Status: RESOLVED | Noted: 2018-04-05 | Resolved: 2020-03-26

## 2020-03-26 PROBLEM — E78.5 DYSLIPIDEMIA: Status: RESOLVED | Noted: 2018-04-19 | Resolved: 2020-03-26

## 2020-03-26 PROBLEM — I82.409 DVT (DEEP VENOUS THROMBOSIS) (HCC): Status: RESOLVED | Noted: 2017-12-01 | Resolved: 2020-03-26

## 2020-03-26 PROCEDURE — 99214 OFFICE O/P EST MOD 30 MIN: CPT | Performed by: INTERNAL MEDICINE

## 2020-03-26 NOTE — PROGRESS NOTES
Virtual Regular Visit    Problem List Items Addressed This Visit        Endocrine    Hypothyroidism (Chronic)     Labs due  Taking medication appropriately  Relevant Orders    CBC and differential    TSH, 3rd generation with Free T4 reflex       Nervous and Auditory    Herniation of lumbar intervertebral disc with radiculopathy (Chronic)       Other    Bipolar disorder (HCC) (Chronic)     Sees psychiatry q3 months  On Depakote, risperidal          Hyperlipidemia     Lipids due, on fenofibrate  Relevant Orders    Comprehensive metabolic panel    Lipid panel    Lumbar stenosis     Stable, takes muscle relaxant prn  Morbid obesity due to excess calories (HCC) (Chronic)     No regular exercise  Prediabetes     Repeat A1c, not on medication  Relevant Orders    Hemoglobin A1C    Urinary incontinence     Requests for XL diapers  Instructed to use A&D ointment daily  Other Visit Diagnoses     Pain of toe of right foot        Start Epsom salt soaks, avoid manipulation  Will need to see podiatry, ? infection  Health counseling        Eye exam due this year  Follow up in 6 months or as needed  Reason for visit is follow up  Encounter provider Jose Antonio Gonsalves MD    Provider located at [address] [place] [id number]      Recent Visits  No visits were found meeting these conditions     Showing recent visits within past 7 days and meeting all other requirements     Today's Visits  Date Type Provider Dept   03/26/20 Telemedicine Jose Antonio Gonsalves [facility] Internal Med   Showing today's visits and meeting all other requirements     Future Appointments  Date Type Provider Dept   03/26/20 Telemedicine Jose Antonio Gonsalves MD [facility] Internal Med   Showing future appointments within next 150 days and meeting all other requirements        After connecting through [employer], the patient was identified by name and date of birth  Irish Holden was informed that this is a telemedicine visit and that the visit is being conducted through [employer] ([employer]) and patient was informed that this is not a secure, HIPAA-complaint platform  he agrees to proceed  which may not be secure and therefore, might not be HIPAA-compliant  My office door was closed  No one else was in the room  He acknowledged consent and understanding of privacy and security of the video platform  The patient has agreed to participate and understands they can discontinue the visit at any time  Subjective  Irish Holden is a 30 y o  male for follow up  [place] complains of a painful toe  He reports the big toe in his right foot feels sore, would sometimes wake him up  He has been putting a solution on it, washes it with soap and water once a day  He denies any bleeding or discharge from the area  He is able to walk, denies any injury  He reports more frequent urinary and fecal incontinence  He would experience an occasional rash in the buttock area  He is requesting a prescription for XL depends  He takes a muscle relaxant as needed for low back pain  He reports spasm may be severe sometimes  He he last took it about 3 weeks ago  He continues to see a psychiatrist regularly  Past Medical History:   Diagnosis Date    Autism     Bipolar 1 disorder ([address] )     Disease of thyroid gland     Hyperlipidemia     Lumbar disc disease     Psychiatric disorder     Compulsive Disorder    Sleep apnea     Urinary incontinence        Past Surgical History:   Procedure Laterality Date    BACK SURGERY  11/2017    Lower   Last assessed: 1/18/18    COLONOSCOPY      Fiberoptic    INCISION AND DRAINAGE POSTERIOR SPINE N/A 11/1/2017    Procedure: INCISION AND DRAINAGE (I&D) SPINE;  Surgeon: Perez Solis MD;  [facility];  Service: Neurosurgery    [place] LAMNOTMY INCL W/DCMPRSN NRV ROOT 1 INTRSPC LUMBR Bilateral 10/17/2017    Procedure: LEFT L3/4 AND RIGHT L4/5 MICRODISCECTOMIES, HEMILAMINECTOMIES; POSSIBLE EPIDURAL STEROID INJECTIONS;  Surgeon: Grecia Stewart MD;  [facility];  Service: Neurosurgery    [address] N/A 11/3/2017    Procedure: [facility]), wound vac placement back;  Surgeon: Michelle Rivas DO;  [facility];  Service: General    WOUND DEBRIDEMENT N/A 11/6/2017    Procedure: [address] [facility] ([address] [facility];  Surgeon: Rodrick Boggs MD;  [facility];  Service: General       Current Outpatient Medications   Medication Sig Dispense Refill    acetaminophen (TYLENOL) 500 mg tablet Take 325 mg by mouth every 6 (six) hours as needed for mild pain        clonazePAM (KlonoPIN) 1 mg tablet Take 0 5 mg by mouth 2 (two) times a day      divalproex sodium (DEPAKOTE) 500 mg EC tablet Take 1,000 mg by mouth daily Mid day       docusate sodium (COLACE) 100 mg capsule Take 1 capsule by mouth 2 (two) times a day 10 capsule 0    fenofibrate (TRIGLIDE) 160 MG tablet Take 1 tablet (160 mg total) by mouth daily 90 tablet 0    levothyroxine 25 mcg tablet Take 1 tablet Mon to Friday 80 tablet 0    levothyroxine 50 mcg tablet Take on Saturday and Sunday 30 tablet 1    methocarbamol (ROBAXIN) 500 mg tablet Take 1 tablet (500 mg total) by mouth 2 (two) times a day as needed for muscle spasms 60 tablet 0    Multiple Vitamin tablet Take by mouth      polyethylene glycol (MIRALAX) 17 g packet Take 17 g by mouth daily 14 each 0    risperiDONE (RisperDAL) 2 mg tablet Take 2 mg by mouth 2 (two) times a day      risperiDONE (RisperDAL) 4 mg tablet Take 4 mg by mouth 2 (two) times a day      clindamycin (CLEOCIN) 300 MG capsule Take 1 capsule by mouth every 6 (six) hours      EPINEPHrine (EPIPEN) 0 3 mg/0 3 mL SOAJ Inject 0 3 mL (0 3 mg total) into the shoulder, thigh, or buttocks once for 1 dose 2 each 0     No current facility-administered medications for this visit  Allergies   Allergen Reactions    Sulfa Antibiotics Anaphylaxis    Bee Venom     Coriandrum Sativum     Gabapentin Vomiting    Mercury     Parsley Seed     Pentothal [Thiopental]      Other reaction(s): Anaphylaxis    Allegra [Fexofenadine] Hives and Rash    Aspirin Rash     Category: Allergy;     Ciprofloxacin Rash     Reaction Date: 10Feb2012; Annotation - [id number]: swelling    Erythromycin Hives and Rash     Reaction Date: 10Feb2012;     Motrin [Ibuprofen] Rash     Reaction Date: 10Feb2012; Annotation - [id number]: rash, wheeze    Penicillins Hives and Rash     Reaction Date: 10Feb2012; Annotation - [id number]: rash       Review of Systems   Constitutional: Negative for activity change and appetite change  HENT: Negative for congestion and postnasal drip  Respiratory: Negative for cough and shortness of breath  Gastrointestinal: Negative for blood in stool, constipation and rectal pain  Genitourinary: Negative for dysuria  Skin: Positive for wound  Negative for rash  Neurological: Negative for headaches  Psychiatric/Behavioral: Negative for agitation  Physical Exam   Constitutional: He is oriented to person, place, and time  He appears well-developed and well-nourished  HENT:   Head: Normocephalic  Eyes: Pupils are equal, round, and reactive to light  Pulmonary/Chest: Effort normal    Neurological: He is alert and oriented to person, place, and time  Skin:        Psychiatric: He has a normal mood and affect  His behavior is normal       Lab results reviewed with patient

## 2020-05-13 ENCOUNTER — TELEPHONE (OUTPATIENT)
Dept: INTERNAL MEDICINE CLINIC | Facility: CLINIC | Age: 30
End: 2020-05-13

## 2020-05-13 DIAGNOSIS — G89.29 CHRONIC BILATERAL LOW BACK PAIN WITHOUT SCIATICA: ICD-10-CM

## 2020-05-13 DIAGNOSIS — M54.50 CHRONIC BILATERAL LOW BACK PAIN WITHOUT SCIATICA: ICD-10-CM

## 2020-05-13 RX ORDER — METHOCARBAMOL 500 MG/1
500 TABLET, FILM COATED ORAL 2 TIMES DAILY PRN
Qty: 60 TABLET | Refills: 1 | Status: SHIPPED | OUTPATIENT
Start: 2020-05-13 | End: 2020-07-21

## 2020-07-08 ENCOUNTER — TELEPHONE (OUTPATIENT)
Dept: CARDIOLOGY CLINIC | Facility: CLINIC | Age: 30
End: 2020-07-08

## 2020-07-21 DIAGNOSIS — M54.50 CHRONIC BILATERAL LOW BACK PAIN WITHOUT SCIATICA: ICD-10-CM

## 2020-07-21 DIAGNOSIS — G89.29 CHRONIC BILATERAL LOW BACK PAIN WITHOUT SCIATICA: ICD-10-CM

## 2020-07-21 DIAGNOSIS — E03.9 ACQUIRED HYPOTHYROIDISM: Chronic | ICD-10-CM

## 2020-07-21 RX ORDER — METHOCARBAMOL 500 MG/1
TABLET, FILM COATED ORAL
Qty: 60 TABLET | Refills: 1 | Status: SHIPPED | OUTPATIENT
Start: 2020-07-21 | End: 2021-08-09 | Stop reason: SDUPTHER

## 2020-07-21 RX ORDER — LEVOTHYROXINE SODIUM 0.05 MG/1
TABLET ORAL
Qty: 30 TABLET | Refills: 5 | Status: SHIPPED | OUTPATIENT
Start: 2020-07-21 | End: 2020-09-28 | Stop reason: SDUPTHER

## 2020-08-19 DIAGNOSIS — E78.49 OTHER HYPERLIPIDEMIA: ICD-10-CM

## 2020-08-19 RX ORDER — FENOFIBRATE 160 MG/1
TABLET ORAL
Qty: 90 TABLET | Refills: 1 | Status: SHIPPED | OUTPATIENT
Start: 2020-08-19 | End: 2021-02-10 | Stop reason: SDUPTHER

## 2020-09-25 ENCOUNTER — TRANSCRIBE ORDERS (OUTPATIENT)
Dept: LAB | Facility: CLINIC | Age: 30
End: 2020-09-25

## 2020-09-25 ENCOUNTER — APPOINTMENT (OUTPATIENT)
Dept: LAB | Facility: CLINIC | Age: 30
End: 2020-09-25
Payer: MEDICARE

## 2020-09-25 LAB
ALBUMIN SERPL BCP-MCNC: 4 G/DL (ref 3.5–5)
ALP SERPL-CCNC: 43 U/L (ref 46–116)
ALT SERPL W P-5'-P-CCNC: 51 U/L (ref 12–78)
ANION GAP SERPL CALCULATED.3IONS-SCNC: 10 MMOL/L (ref 4–13)
AST SERPL W P-5'-P-CCNC: 20 U/L (ref 5–45)
BASOPHILS # BLD AUTO: 0.08 THOUSANDS/ΜL (ref 0–0.1)
BASOPHILS NFR BLD AUTO: 2 % (ref 0–1)
BILIRUB SERPL-MCNC: 0.33 MG/DL (ref 0.2–1)
BUN SERPL-MCNC: 10 MG/DL (ref 5–25)
CALCIUM SERPL-MCNC: 9.4 MG/DL (ref 8.3–10.1)
CHLORIDE SERPL-SCNC: 101 MMOL/L (ref 100–108)
CHOLEST SERPL-MCNC: 220 MG/DL (ref 50–200)
CO2 SERPL-SCNC: 31 MMOL/L (ref 21–32)
CREAT SERPL-MCNC: 0.75 MG/DL (ref 0.6–1.3)
EOSINOPHIL # BLD AUTO: 0.17 THOUSAND/ΜL (ref 0–0.61)
EOSINOPHIL NFR BLD AUTO: 3 % (ref 0–6)
ERYTHROCYTE [DISTWIDTH] IN BLOOD BY AUTOMATED COUNT: 12.9 % (ref 11.6–15.1)
EST. AVERAGE GLUCOSE BLD GHB EST-MCNC: 131 MG/DL
GFR SERPL CREATININE-BSD FRML MDRD: 125 ML/MIN/1.73SQ M
GLUCOSE P FAST SERPL-MCNC: 106 MG/DL (ref 65–99)
HBA1C MFR BLD: 6.2 %
HCT VFR BLD AUTO: 46.7 % (ref 36.5–49.3)
HDLC SERPL-MCNC: 26 MG/DL
HGB BLD-MCNC: 15.3 G/DL (ref 12–17)
IMM GRANULOCYTES # BLD AUTO: 0.05 THOUSAND/UL (ref 0–0.2)
IMM GRANULOCYTES NFR BLD AUTO: 1 % (ref 0–2)
LDLC SERPL CALC-MCNC: 123 MG/DL (ref 0–100)
LYMPHOCYTES # BLD AUTO: 2.22 THOUSANDS/ΜL (ref 0.6–4.47)
LYMPHOCYTES NFR BLD AUTO: 43 % (ref 14–44)
MCH RBC QN AUTO: 27.6 PG (ref 26.8–34.3)
MCHC RBC AUTO-ENTMCNC: 32.8 G/DL (ref 31.4–37.4)
MCV RBC AUTO: 84 FL (ref 82–98)
MONOCYTES # BLD AUTO: 0.39 THOUSAND/ΜL (ref 0.17–1.22)
MONOCYTES NFR BLD AUTO: 8 % (ref 4–12)
NEUTROPHILS # BLD AUTO: 2.2 THOUSANDS/ΜL (ref 1.85–7.62)
NEUTS SEG NFR BLD AUTO: 43 % (ref 43–75)
NONHDLC SERPL-MCNC: 194 MG/DL
NRBC BLD AUTO-RTO: 0 /100 WBCS
PLATELET # BLD AUTO: 298 THOUSANDS/UL (ref 149–390)
PMV BLD AUTO: 10.1 FL (ref 8.9–12.7)
POTASSIUM SERPL-SCNC: 4.6 MMOL/L (ref 3.5–5.3)
PROT SERPL-MCNC: 7.3 G/DL (ref 6.4–8.2)
RBC # BLD AUTO: 5.54 MILLION/UL (ref 3.88–5.62)
SODIUM SERPL-SCNC: 142 MMOL/L (ref 136–145)
TRIGL SERPL-MCNC: 357 MG/DL
TSH SERPL DL<=0.05 MIU/L-ACNC: 2.71 UIU/ML (ref 0.36–3.74)
WBC # BLD AUTO: 5.11 THOUSAND/UL (ref 4.31–10.16)

## 2020-09-25 PROCEDURE — 84443 ASSAY THYROID STIM HORMONE: CPT | Performed by: INTERNAL MEDICINE

## 2020-09-25 PROCEDURE — 85025 COMPLETE CBC W/AUTO DIFF WBC: CPT | Performed by: INTERNAL MEDICINE

## 2020-09-25 PROCEDURE — 36415 COLL VENOUS BLD VENIPUNCTURE: CPT | Performed by: INTERNAL MEDICINE

## 2020-09-25 PROCEDURE — 80053 COMPREHEN METABOLIC PANEL: CPT | Performed by: INTERNAL MEDICINE

## 2020-09-25 PROCEDURE — 80061 LIPID PANEL: CPT | Performed by: INTERNAL MEDICINE

## 2020-09-25 PROCEDURE — 83036 HEMOGLOBIN GLYCOSYLATED A1C: CPT | Performed by: INTERNAL MEDICINE

## 2020-09-28 ENCOUNTER — TELEMEDICINE (OUTPATIENT)
Dept: INTERNAL MEDICINE CLINIC | Facility: CLINIC | Age: 30
End: 2020-09-28
Payer: MEDICARE

## 2020-09-28 DIAGNOSIS — F31.60 BIPOLAR AFFECTIVE DISORDER, CURRENT EPISODE MIXED, CURRENT EPISODE SEVERITY UNSPECIFIED (HCC): Chronic | ICD-10-CM

## 2020-09-28 DIAGNOSIS — M25.471 ANKLE SWELLING, RIGHT: Primary | ICD-10-CM

## 2020-09-28 DIAGNOSIS — G47.33 OSA (OBSTRUCTIVE SLEEP APNEA): ICD-10-CM

## 2020-09-28 DIAGNOSIS — Z00.00 HEALTH MAINTENANCE EXAMINATION: ICD-10-CM

## 2020-09-28 DIAGNOSIS — M51.16 HERNIATION OF LUMBAR INTERVERTEBRAL DISC WITH RADICULOPATHY: Chronic | ICD-10-CM

## 2020-09-28 DIAGNOSIS — E78.2 MIXED HYPERLIPIDEMIA: ICD-10-CM

## 2020-09-28 DIAGNOSIS — E03.9 ACQUIRED HYPOTHYROIDISM: Chronic | ICD-10-CM

## 2020-09-28 DIAGNOSIS — N39.498 OTHER URINARY INCONTINENCE: ICD-10-CM

## 2020-09-28 PROCEDURE — 99214 OFFICE O/P EST MOD 30 MIN: CPT | Performed by: INTERNAL MEDICINE

## 2020-09-28 PROCEDURE — G0438 PPPS, INITIAL VISIT: HCPCS | Performed by: INTERNAL MEDICINE

## 2020-09-28 RX ORDER — BENZTROPINE MESYLATE 1 MG/1
1 TABLET ORAL EVERY 12 HOURS
COMMUNITY
Start: 2020-08-31

## 2020-09-28 RX ORDER — LEVOTHYROXINE SODIUM 0.03 MG/1
TABLET ORAL
Qty: 80 TABLET | Refills: 1 | Status: SHIPPED | OUTPATIENT
Start: 2020-09-28 | End: 2021-05-18

## 2020-09-28 RX ORDER — DIVALPROEX SODIUM 500 MG/1
500 TABLET, EXTENDED RELEASE ORAL 2 TIMES DAILY
Qty: 60 TABLET | Refills: 0
Start: 2020-09-28

## 2020-09-28 RX ORDER — LEVOTHYROXINE SODIUM 0.05 MG/1
TABLET ORAL
Qty: 30 TABLET | Refills: 0 | Status: SHIPPED | OUTPATIENT
Start: 2020-09-28 | End: 2020-12-22

## 2020-09-28 RX ORDER — DIVALPROEX SODIUM 500 MG/1
TABLET, EXTENDED RELEASE ORAL
COMMUNITY
Start: 2020-08-31 | End: 2020-09-28
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
## 2020-09-28 NOTE — ASSESSMENT & PLAN NOTE
Lipids worse  Discussed diet  Help myalgias with statin  Continue fenofibrate  May try taking fish oil capsules daily

## 2020-09-28 NOTE — PROGRESS NOTES
Virtual AWV Consent    Reason for visit is f/u, AWV    Encounter provider Rahul Lee MD    Provider located at [address] [id number]      Recent Visits  No visits were found meeting these conditions  Showing recent visits within past 7 days and meeting all other requirements     Today's Visits  Date Type Provider Dept   09/28/20 Telemedicine Rahul Lee, [address] [facility] Internal [facility]   Showing today's visits and meeting all other requirements     Future Appointments  No visits were found meeting these conditions  Showing future appointments within next 150 days and meeting all other requirements        After connecting through [employer], the patient was identified by name and date of birth  Anamika Quiros was informed that this is a telemedicine visit and that the visit is being conducted through [facility] and patient was informed that this is a secure, HIPAA-compliant platform  He agrees to proceed  My office door was closed  No one else was in the room  He acknowledged consent and understanding of privacy and security of the video platform  The patient has agreed to participate and understands they can discontinue the visit at any time    Patient is aware this is a billable service  Assessment and Plan:     Problem List Items Addressed This Visit        Endocrine    Hypothyroidism (Chronic)     Adequately replaced  Relevant Medications    levothyroxine 50 mcg tablet    levothyroxine 25 mcg tablet       Respiratory    COLIN (obstructive sleep apnea)       Nervous and Auditory    Herniation of lumbar intervertebral disc with radiculopathy (Chronic)       Other    Bipolar disorder (HCC) (Chronic)     No medication changes  Sees psych  Relevant Medications    divalproex sodium (DEPAKOTE ER) 500 mg 24 hr tablet    Mixed hyperlipidemia     Lipids worse  Discussed diet    Help myalgias with statin  Continue fenofibrate  May try taking fish oil capsules daily  Urinary incontinence     More frequent incontinence during the day  Other Visit Diagnoses     Ankle swelling, right    -  Primary    Differential Dx: localized edema, tendon injury  Symptoms intermittent  Relevant Orders    XR ankle 3+ vw right    Health maintenance examination        Eye and dental exam due  Preventive health issues were discussed with patient, and age appropriate screening tests were ordered as noted in patient's After Visit Summary  Personalized health advice and appropriate referrals for health education or preventive services given if needed, as noted in patient's After Visit Summary  History of Present Illness:     Patient presents for Medicare Annual Wellness visit    Patient Care Team:  Tiara Dwyer MD as PCP - General (Internal Medicine)  MD Rodolfo Gibbs MD Jonnie Lamer, MD Wilma Ingle, MD     Problem List:     Patient Active Problem List   Diagnosis    Herniation of lumbar intervertebral disc with radiculopathy    Hypothyroidism    Bipolar disorder ([facility] )    Autism    Morbid obesity due to excess calories ([address] )    Sinus tachycardia    ADHD (attention deficit hyperactivity disorder), combined type    Claustrophobia    Depression    Mixed hyperlipidemia    External hemorrhoids    Prediabetes    COLIN (obstructive sleep apnea)    Nocturnal enuresis    Lumbar stenosis    Urinary incontinence    Allergy    Seborrheic keratosis      Past Medical and Surgical History:     Past Medical History:   Diagnosis Date    Autism     Bipolar 1 disorder ([address] )     Disease of thyroid gland     Hyperlipidemia     Lumbar disc disease     Psychiatric disorder     Compulsive Disorder    Sleep apnea     Urinary incontinence      Past Surgical History:   Procedure Laterality Date    BACK SURGERY  11/2017    Lower   Last assessed: 1/18/18    COLONOSCOPY Fiberoptic    INCISION AND DRAINAGE POSTERIOR SPINE N/A 11/1/2017    Procedure: INCISION AND DRAINAGE (I&D) SPINE;  Surgeon: Abbe Espinosa MD;  [facility];  Service: Neurosurgery    [place] LAMNOTMY INCL W/DCMPRSN NRV ROOT 1 INTRSPC LUMBR Bilateral 10/17/2017    Procedure: LEFT L3/4 AND RIGHT L4/5 MICRODISCECTOMIES, HEMILAMINECTOMIES; POSSIBLE EPIDURAL STEROID INJECTIONS;  Surgeon: Abbe Espinosa MD;  [facility];  Service: Neurosurgery    WISDOM TOOTH EXTRACTION     [address] [place] [address] N/A 11/3/2017    Procedure: DEBRIDEMENT WOUND [facility] OUT), wound vac placement back;  Surgeon: Cleveland Agosto DO;  [facility];  Service: General    WOUND DEBRIDEMENT N/A 11/6/2017    Procedure: [place] ([address] [facility];  Surgeon: Peter Wells MD;  [facility];  Service: General      Family History:     Family History   Problem Relation Age of Onset    Heart disease Father         Cardiac disorder    Substance Abuse Father     Heart attack Father     Heart disease Paternal Grandmother     Hypertension Mother     Stroke Maternal Grandmother         CVA    Heart disease Maternal Grandmother         pacemaker    Heart failure Maternal Grandmother     Heart disease Paternal Grandfather     Colonic polyp Family     Colonic polyp Family     Cancer Family     Heart failure Maternal Grandfather     Heart disease Maternal Grandfather     Drug abuse Neg Hx     Alcohol abuse Neg Hx     Colon cancer Neg Hx     Anxiety disorder Neg Hx     Depression Neg Hx       Social History:        Social History     Socioeconomic History    Marital status: Single     Spouse name: None    Number of children: None    Years of education: HS or GED    Highest education level: None   Occupational History    Occupation: Disabled   Social Needs    Financial resource strain: None    Food insecurity     Worry: None     Inability: None    Transportation needs     Medical: None Non-medical: None   Tobacco Use    Smoking status: Never Smoker    Smokeless tobacco: Never Used   Substance and Sexual Activity    Alcohol use: No    Drug use: No    Sexual activity: None   Lifestyle    Physical activity     Days per week: None     Minutes per session: None    Stress: None   Relationships    Social connections     Talks on phone: None     Gets together: None     Attends [employer] service: None     Active member of club or organization: None     Attends meetings of clubs or organizations: None     Relationship status: None    Intimate partner violence     Fear of current or ex partner: None     Emotionally abused: None     Physically abused: None     Forced sexual activity: None   Other Topics Concern    None   Social History Narrative    Drinks coffee      Medications and Allergies:     Current Outpatient Medications   Medication Sig Dispense Refill    acetaminophen (TYLENOL) 500 mg tablet Take 325 mg by mouth every 6 (six) hours as needed for mild pain        benztropine (COGENTIN) 1 mg tablet Take 1 mg by mouth every 12 (twelve) hours      clindamycin (CLEOCIN) 300 MG capsule Take 1 capsule by mouth every 6 (six) hours      clonazePAM (KlonoPIN) 1 mg tablet Take 0 5 mg by mouth 2 (two) times a day      divalproex sodium (DEPAKOTE ER) 500 mg 24 hr tablet Take 1 tablet (500 mg total) by mouth 2 (two) times a day 60 tablet 0    divalproex sodium (DEPAKOTE) 500 mg EC tablet Take 1,000 mg by mouth daily Mid day       docusate sodium (COLACE) 100 mg capsule Take 1 capsule by mouth 2 (two) times a day 10 capsule 0    fenofibrate (TRIGLIDE) 160 MG tablet TAKE 1 TABLET BY MOUTH EVERY DAY 90 tablet 1    levothyroxine 25 mcg tablet Take 1 tablet Mon to Friday 80 tablet 1    levothyroxine 50 mcg tablet TAKE 1 TABLET PO during the weekends (Sat & Sun) 30 tablet 0    methocarbamol (ROBAXIN) 500 mg tablet TAKE 1 TABLET BY MOUTH TWICE A DAY AS NEEDED FOR MUSCLE SPASMS 60 tablet 1    Multiple Vitamin tablet Take by mouth      polyethylene glycol (MIRALAX) 17 g packet Take 17 g by mouth daily 14 each 0    risperiDONE (RisperDAL) 2 mg tablet Take 2 mg by mouth 2 (two) times a day      risperiDONE (RisperDAL) 4 mg tablet Take 4 mg by mouth 2 (two) times a day      EPINEPHrine (EPIPEN) 0 3 mg/0 3 mL SOAJ Inject 0 3 mL (0 3 mg total) into the shoulder, thigh, or buttocks once for 1 dose 2 each 0     No current facility-administered medications for this visit  Allergies   Allergen Reactions    Sulfa Antibiotics Anaphylaxis    Bee Venom     Coriandrum Sativum     Gabapentin Vomiting    Mercury     Parsley Seed     Pentothal [Thiopental]      Other reaction(s): Anaphylaxis    Soybean Oil Vomiting    Allegra [Fexofenadine] Hives and Rash    Aspirin Rash     Category: Allergy;     Ciprofloxacin Rash     Reaction Date: 10Feb2012; Annotation - [id number]: swelling    Erythromycin Hives and Rash     Reaction Date: 10Feb2012;     Motrin [Ibuprofen] Rash     Reaction Date: 10Feb2012; Annotation - [id number]: rash, wheeze    Penicillins Hives and Rash     Reaction Date: 10Feb2012; Annotation - [id number]: rash      Immunizations:     Immunization History   Administered Date(s) Administered    INFLUENZA 11/08/2017      Health Maintenance: There are no preventive care reminders to display for this patient  Topic Date Due    DTaP,Tdap,and Td Vaccines (1 - Tdap) 11/11/2012    Influenza Vaccine  07/01/2020      Medicare Health Risk Assessment:     There were no vitals taken for this visit  Anastacia Enriquez is here for his Subsequent Wellness visit  Last Medicare Wellness visit information reviewed, patient interviewed and updates made to the record today  Health Risk Assessment:   Patient rates overall health as fair  Patient feels that their physical health rating is same  Eyesight was rated as same  Hearing was rated as same  Pain experienced in the last 7 days has been a lot  Patient's pain rating has been 10/10  Patient states that he has experienced no weight loss or gain in last 6 months  Depression Screening:   PHQ-2 Score: 2  PHQ-9 Score: 2      Fall Risk Screening: In the past year, patient has experienced: no history of falling in past year      Home Safety:  Patient has trouble with stairs inside or outside of their home  Patient has working smoke alarms and has working carbon monoxide detector  Home safety hazards include: none  Nutrition:   Current diet is Regular  Medications:   Patient is currently taking over-the-counter supplements  OTC medications include: see medication list  Patient is not able to manage medications  Mother manages    Activities of Daily Living (ADLs)/Instrumental Activities of Daily Living (IADLs):   Walk and transfer into and out of bed and chair?: Yes  Dress and groom yourself?: Yes    Bathe or shower yourself?: Yes    Feed yourself?  Yes  Do your laundry/housekeeping?: Yes  Manage your money, pay your bills and track your expenses?: Yes  Make your own meals?: Yes    Do your own shopping?: Yes    Previous Hospitalizations:   Any hospitalizations or ED visits within the last 12 months?: No      Advance Care Planning:   Living will: No    Durable POA for healthcare: No    Advanced directive: No    End of Life Decisions reviewed with patient: No      Cognitive Screening:   Provider or family/friend/caregiver concerned regarding cognition?: No    PREVENTIVE SCREENINGS      Cardiovascular Screening:    General: History Lipid Disorder and Screening Current      Diabetes Screening:     General: Screening Current      Colorectal Cancer Screening:     General: Screening Not Indicated      Prostate Cancer Screening:    General: Screening Not Indicated      Abdominal Aortic Aneurysm (AAA) Screening:        General: Screening Not Indicated      Lung Cancer Screening:     General: Screening Not Indicated      Hepatitis C Screening:    General: Screening Not Indicated    Other Counseling Topics:   Regular weightbearing exercise  Brandonelo Juarez MD     Total time spent with patient 15 minutes  BMI Counseling: There is no height or weight on file to calculate BMI  The BMI is above normal  Nutrition recommendations include consuming healthier snacks, decreasing soda and/or juice intake and moderation in carbohydrate intake  Exercise recommendations include moderate aerobic physical activity for 150 minutes/week

## 2020-09-30 ENCOUNTER — TELEPHONE (OUTPATIENT)
Dept: INTERNAL MEDICINE CLINIC | Facility: CLINIC | Age: 30
End: 2020-09-30

## 2020-10-01 ENCOUNTER — HOSPITAL ENCOUNTER (OUTPATIENT)
Dept: RADIOLOGY | Facility: HOSPITAL | Age: 30
Discharge: HOME | End: 2020-10-01
Payer: MEDICARE

## 2020-10-01 DIAGNOSIS — M25.471 ANKLE SWELLING, RIGHT: ICD-10-CM

## 2020-10-01 PROCEDURE — 73610 X-RAY EXAM OF ANKLE: CPT

## 2020-10-08 ENCOUNTER — TELEPHONE (OUTPATIENT)
Dept: INTERNAL MEDICINE CLINIC | Facility: CLINIC | Age: 30
End: 2020-10-08

## 2020-10-08 DIAGNOSIS — M25.471 ANKLE SWELLING, RIGHT: Primary | ICD-10-CM

## 2020-12-07 ENCOUNTER — TELEPHONE (OUTPATIENT)
Dept: INTERNAL MEDICINE CLINIC | Facility: CLINIC | Age: 30
End: 2020-12-07

## 2020-12-07 ENCOUNTER — HOSPITAL ENCOUNTER (EMERGENCY)
Facility: HOSPITAL | Age: 30
Discharge: HOME | End: 2020-12-07
Attending: EMERGENCY MEDICINE
Payer: MEDICARE

## 2020-12-07 VITALS
DIASTOLIC BLOOD PRESSURE: 92 MMHG | TEMPERATURE: 98.3 F | HEART RATE: 111 BPM | HEIGHT: 76 IN | RESPIRATION RATE: 20 BRPM | SYSTOLIC BLOOD PRESSURE: 158 MMHG | OXYGEN SATURATION: 95 % | BODY MASS INDEX: 42.6 KG/M2

## 2020-12-07 DIAGNOSIS — R58 ECCHYMOSIS: Primary | ICD-10-CM

## 2020-12-07 PROCEDURE — 99282 EMERGENCY DEPT VISIT SF MDM: CPT | Performed by: EMERGENCY MEDICINE

## 2020-12-07 PROCEDURE — 99283 EMERGENCY DEPT VISIT LOW MDM: CPT

## 2020-12-16 ENCOUNTER — TELEMEDICINE (OUTPATIENT)
Dept: INTERNAL MEDICINE CLINIC | Facility: CLINIC | Age: 30
End: 2020-12-16
Payer: MEDICARE

## 2020-12-16 DIAGNOSIS — S81.001A OPEN WOUND OF RIGHT KNEE, INITIAL ENCOUNTER: ICD-10-CM

## 2020-12-16 DIAGNOSIS — M25.471 ANKLE SWELLING, RIGHT: ICD-10-CM

## 2020-12-16 DIAGNOSIS — E03.9 ACQUIRED HYPOTHYROIDISM: Primary | Chronic | ICD-10-CM

## 2020-12-16 DIAGNOSIS — E66.01 MORBID OBESITY DUE TO EXCESS CALORIES (HCC): Chronic | ICD-10-CM

## 2020-12-16 DIAGNOSIS — R73.03 PREDIABETES: ICD-10-CM

## 2020-12-16 DIAGNOSIS — E78.2 MIXED HYPERLIPIDEMIA: ICD-10-CM

## 2020-12-16 DIAGNOSIS — F31.60 BIPOLAR AFFECTIVE DISORDER, CURRENT EPISODE MIXED, CURRENT EPISODE SEVERITY UNSPECIFIED (HCC): Chronic | ICD-10-CM

## 2020-12-16 DIAGNOSIS — M48.061 SPINAL STENOSIS OF LUMBAR REGION, UNSPECIFIED WHETHER NEUROGENIC CLAUDICATION PRESENT: ICD-10-CM

## 2020-12-16 DIAGNOSIS — N39.498 OTHER URINARY INCONTINENCE: ICD-10-CM

## 2020-12-16 DIAGNOSIS — Z79.899 ENCOUNTER FOR LONG-TERM CURRENT USE OF MEDICATION: ICD-10-CM

## 2020-12-16 PROCEDURE — 99214 OFFICE O/P EST MOD 30 MIN: CPT | Performed by: INTERNAL MEDICINE

## 2020-12-22 DIAGNOSIS — E03.9 ACQUIRED HYPOTHYROIDISM: Chronic | ICD-10-CM

## 2020-12-22 RX ORDER — LEVOTHYROXINE SODIUM 0.05 MG/1
TABLET ORAL
Qty: 30 TABLET | Refills: 0 | Status: SHIPPED | OUTPATIENT
Start: 2020-12-22 | End: 2021-05-14 | Stop reason: SDUPTHER

## 2021-02-10 DIAGNOSIS — E78.49 OTHER HYPERLIPIDEMIA: ICD-10-CM

## 2021-02-10 RX ORDER — FENOFIBRATE 160 MG/1
160 TABLET ORAL DAILY
Qty: 90 TABLET | Refills: 1 | Status: SHIPPED | OUTPATIENT
Start: 2021-02-10 | End: 2021-10-27 | Stop reason: SDUPTHER

## 2021-04-12 ENCOUNTER — TELEPHONE (OUTPATIENT)
Dept: INTERNAL MEDICINE CLINIC | Facility: CLINIC | Age: 31
End: 2021-04-12

## 2021-04-12 NOTE — TELEPHONE ENCOUNTER
Stop taking the melatonin and call your psychiatrist about the swelling and high BP you experienced with it  Keep appointment with me later this week

## 2021-04-12 NOTE — TELEPHONE ENCOUNTER
Pt 's feet were swollen-doesn't know if its Melatonin which he started last Fri  His psychiatrist prescribed it  Also, his bp was 151/111 and then 141/93 over the weekend  He does have an appt  This week with Dr Roger Yung

## 2021-04-12 NOTE — TELEPHONE ENCOUNTER
Patient clarified after taking melatonin for 3 days for trouble sleeping, he has been off of the medication for 2 days and swelling went down almost completely and BP readings are continuing to lower  Patient was upset and feels he should not be on this medication  Please advise  Thank you

## 2021-04-13 ENCOUNTER — APPOINTMENT (OUTPATIENT)
Dept: LAB | Facility: CLINIC | Age: 31
End: 2021-04-13
Payer: MEDICARE

## 2021-04-13 ENCOUNTER — TRANSCRIBE ORDERS (OUTPATIENT)
Dept: LAB | Facility: CLINIC | Age: 31
End: 2021-04-13

## 2021-04-13 LAB
ALBUMIN SERPL BCP-MCNC: 3.9 G/DL (ref 3.5–5)
ALP SERPL-CCNC: 50 U/L (ref 46–116)
ALT SERPL W P-5'-P-CCNC: 48 U/L (ref 12–78)
ANION GAP SERPL CALCULATED.3IONS-SCNC: 12 MMOL/L (ref 4–13)
AST SERPL W P-5'-P-CCNC: 22 U/L (ref 5–45)
BASOPHILS # BLD AUTO: 0.07 THOUSANDS/ΜL (ref 0–0.1)
BASOPHILS NFR BLD AUTO: 1 % (ref 0–1)
BILIRUB SERPL-MCNC: 0.32 MG/DL (ref 0.2–1)
BUN SERPL-MCNC: 18 MG/DL (ref 5–25)
CALCIUM SERPL-MCNC: 10.1 MG/DL (ref 8.3–10.1)
CHLORIDE SERPL-SCNC: 101 MMOL/L (ref 100–108)
CHOLEST SERPL-MCNC: 195 MG/DL (ref 50–200)
CO2 SERPL-SCNC: 27 MMOL/L (ref 21–32)
CREAT SERPL-MCNC: 0.85 MG/DL (ref 0.6–1.3)
EOSINOPHIL # BLD AUTO: 0.2 THOUSAND/ΜL (ref 0–0.61)
EOSINOPHIL NFR BLD AUTO: 3 % (ref 0–6)
ERYTHROCYTE [DISTWIDTH] IN BLOOD BY AUTOMATED COUNT: 12.8 % (ref 11.6–15.1)
EST. AVERAGE GLUCOSE BLD GHB EST-MCNC: 128 MG/DL
GFR SERPL CREATININE-BSD FRML MDRD: 118 ML/MIN/1.73SQ M
GLUCOSE P FAST SERPL-MCNC: 97 MG/DL (ref 65–99)
HBA1C MFR BLD: 6.1 %
HCT VFR BLD AUTO: 42.9 % (ref 36.5–49.3)
HDLC SERPL-MCNC: 31 MG/DL
HGB BLD-MCNC: 14.4 G/DL (ref 12–17)
IMM GRANULOCYTES # BLD AUTO: 0.05 THOUSAND/UL (ref 0–0.2)
IMM GRANULOCYTES NFR BLD AUTO: 1 % (ref 0–2)
LDLC SERPL CALC-MCNC: 105 MG/DL (ref 0–100)
LYMPHOCYTES # BLD AUTO: 2.99 THOUSANDS/ΜL (ref 0.6–4.47)
LYMPHOCYTES NFR BLD AUTO: 40 % (ref 14–44)
MCH RBC QN AUTO: 27.2 PG (ref 26.8–34.3)
MCHC RBC AUTO-ENTMCNC: 33.6 G/DL (ref 31.4–37.4)
MCV RBC AUTO: 81 FL (ref 82–98)
MONOCYTES # BLD AUTO: 0.58 THOUSAND/ΜL (ref 0.17–1.22)
MONOCYTES NFR BLD AUTO: 8 % (ref 4–12)
NEUTROPHILS # BLD AUTO: 3.59 THOUSANDS/ΜL (ref 1.85–7.62)
NEUTS SEG NFR BLD AUTO: 47 % (ref 43–75)
NONHDLC SERPL-MCNC: 164 MG/DL
NRBC BLD AUTO-RTO: 0 /100 WBCS
PLATELET # BLD AUTO: 350 THOUSANDS/UL (ref 149–390)
PMV BLD AUTO: 9.9 FL (ref 8.9–12.7)
POTASSIUM SERPL-SCNC: 4.4 MMOL/L (ref 3.5–5.3)
PROT SERPL-MCNC: 7.3 G/DL (ref 6.4–8.2)
RBC # BLD AUTO: 5.29 MILLION/UL (ref 3.88–5.62)
SODIUM SERPL-SCNC: 140 MMOL/L (ref 136–145)
TRIGL SERPL-MCNC: 296 MG/DL
TSH SERPL DL<=0.05 MIU/L-ACNC: 3.65 UIU/ML (ref 0.36–3.74)
VIT B12 SERPL-MCNC: 427 PG/ML (ref 100–900)
WBC # BLD AUTO: 7.48 THOUSAND/UL (ref 4.31–10.16)

## 2021-04-13 PROCEDURE — 84443 ASSAY THYROID STIM HORMONE: CPT | Performed by: INTERNAL MEDICINE

## 2021-04-13 PROCEDURE — 83036 HEMOGLOBIN GLYCOSYLATED A1C: CPT | Performed by: INTERNAL MEDICINE

## 2021-04-13 PROCEDURE — 80061 LIPID PANEL: CPT | Performed by: INTERNAL MEDICINE

## 2021-04-13 PROCEDURE — 82607 VITAMIN B-12: CPT | Performed by: INTERNAL MEDICINE

## 2021-04-13 PROCEDURE — 85025 COMPLETE CBC W/AUTO DIFF WBC: CPT | Performed by: INTERNAL MEDICINE

## 2021-04-13 PROCEDURE — 36415 COLL VENOUS BLD VENIPUNCTURE: CPT | Performed by: INTERNAL MEDICINE

## 2021-04-13 PROCEDURE — 80053 COMPREHEN METABOLIC PANEL: CPT | Performed by: INTERNAL MEDICINE

## 2021-04-15 ENCOUNTER — TELEMEDICINE (OUTPATIENT)
Dept: INTERNAL MEDICINE CLINIC | Facility: CLINIC | Age: 31
End: 2021-04-15
Payer: MEDICARE

## 2021-04-15 DIAGNOSIS — N39.498 OTHER URINARY INCONTINENCE: ICD-10-CM

## 2021-04-15 DIAGNOSIS — Z71.9 HEALTH COUNSELING: ICD-10-CM

## 2021-04-15 DIAGNOSIS — E66.01 MORBID OBESITY DUE TO EXCESS CALORIES (HCC): Chronic | ICD-10-CM

## 2021-04-15 DIAGNOSIS — R73.03 PREDIABETES: ICD-10-CM

## 2021-04-15 DIAGNOSIS — R03.0 ELEVATED BLOOD PRESSURE READING: Primary | ICD-10-CM

## 2021-04-15 DIAGNOSIS — E78.2 MIXED HYPERLIPIDEMIA: ICD-10-CM

## 2021-04-15 DIAGNOSIS — E03.9 ACQUIRED HYPOTHYROIDISM: Chronic | ICD-10-CM

## 2021-04-15 DIAGNOSIS — F31.60 BIPOLAR AFFECTIVE DISORDER, CURRENT EPISODE MIXED, CURRENT EPISODE SEVERITY UNSPECIFIED (HCC): Chronic | ICD-10-CM

## 2021-04-15 PROCEDURE — 99214 OFFICE O/P EST MOD 30 MIN: CPT | Performed by: INTERNAL MEDICINE

## 2021-04-15 NOTE — PROGRESS NOTES
Virtual Regular Visit      Assessment/Plan:    Problem List Items Addressed This Visit        Endocrine    Hypothyroidism (Chronic)     Adequately replaced            Other    Bipolar disorder ([address] ) (Chronic)     Sees psych  Stopped melatonin due to high BP and edema  Other medications have not changed  Mixed hyperlipidemia     Lipids improved, on fenofibrate  Morbid obesity due to excess calories (HCC) (Chronic)     Weight check in office next week  Prediabetes     A1c at 6 1%  Discussed starting metformin if no improvement next visit  Urinary incontinence     No change  Other Visit Diagnoses     Elevated blood pressure reading    -  Primary    BP check next week  Briefly discussed starting medication  Already on low salt diet  Health counseling        Waiting for [employer] COVID vaccine to become available again  Follow up in 4 months or as needed  BP check next week  Reason for visit is f/u  Chief Complaint   Patient presents with    Virtual Regular Visit        Encounter provider Gonzalez Nguyen MD    Provider located at [address] [place] [id number]      Recent Visits  Date Type Provider Dept   04/12/21 Telephone [facility] Internal Med   Showing recent visits within past 7 days and meeting all other requirements     Today's Visits  Date Type Provider Dept   04/15/21 Telemedicine Gonzalez Nguyen MD [facility] Internal Med   Showing today's visits and meeting all other requirements     Future Appointments  No visits were found meeting these conditions  Showing future appointments within next 150 days and meeting all other requirements        The patient was identified by name and date of birth   Ramon Sterlinga was informed that this is a telemedicine visit and that the visit is being conducted through [facility] and patient was informed that this is a secure, HIPAA-compliant platform  He agrees to proceed     My office door was closed  No one else was in the room  He acknowledged consent and understanding of privacy and security of the video platform  The patient has agreed to participate and understands they can discontinue the visit at any time  Patient is aware this is a billable service  Subjective  Daniel Jackson is a 35 y o  male f/u    He started melatonin a few days ago, developed leg swelling  He also did not feel well and check his blood pressure and it was high  Denies any headache, chest pain or other symptoms  He took it for 2 or 3 days then stopped  Since stopping it, swelling has resolved  He has not checked his blood pressure again until today, reports it is 150/94  He has been eating better, no junk food  His mom has been cooking more frequently  He continues to experience frequent urination, also at night  He has been trying to contact his psychiatrist, mother feels frustrated about this  Past Medical History:   Diagnosis Date    Autism     Bipolar 1 disorder ([address] )     Disease of thyroid gland     Hyperlipidemia     Lumbar disc disease     Psychiatric disorder     Compulsive Disorder    Sleep apnea     Urinary incontinence        Past Surgical History:   Procedure Laterality Date    BACK SURGERY  11/2017    Lower   Last assessed: 1/18/18    COLONOSCOPY      Fiberoptic    INCISION AND DRAINAGE POSTERIOR SPINE N/A 11/1/2017    Procedure: INCISION AND DRAINAGE (I&D) SPINE;  Surgeon: Jackeline Lyman MD;  [facility];  Service: Neurosurgery    MD LAMNOTMY INCL W/DCMPRSN NRV ROOT 1 INTRSPC LUMBR Bilateral 10/17/2017    Procedure: LEFT L3/4 AND RIGHT L4/5 MICRODISCECTOMIES, HEMILAMINECTOMIES; POSSIBLE EPIDURAL STEROID INJECTIONS;  Surgeon: Jackeline Lyman MD;  [facility];  Service: Neurosurgery    WISDOM TOOTH EXTRACTION      WOUND DEBRIDEMENT N/A 11/3/2017    Procedure: DEBRIDEMENT WOUND [facility] OUT), wound vac placement back;  Surgeon: Nickolas Zazueta DO;  [facility];  Service: General    WOUND DEBRIDEMENT N/A 11/6/2017    Procedure: Geovany Whalen ([address] [facility];  Surgeon: Aravind Cordero MD;  [facility];  Service: General       Current Outpatient Medications   Medication Sig Dispense Refill    acetaminophen (TYLENOL) 500 mg tablet Take 325 mg by mouth every 6 (six) hours as needed for mild pain        benztropine (COGENTIN) 1 mg tablet Take 1 mg by mouth every 12 (twelve) hours      clindamycin (CLEOCIN) 300 MG capsule Take 1 capsule by mouth every 6 (six) hours      clonazePAM (KlonoPIN) 1 mg tablet Take 0 5 mg by mouth 2 (two) times a day      divalproex sodium (DEPAKOTE ER) 500 mg 24 hr tablet Take 1 tablet (500 mg total) by mouth 2 (two) times a day 60 tablet 0    divalproex sodium (DEPAKOTE) 500 mg EC tablet Take 1,000 mg by mouth daily Mid day       docusate sodium (COLACE) 100 mg capsule Take 1 capsule by mouth 2 (two) times a day 10 capsule 0    EPINEPHrine (EPIPEN) 0 3 mg/0 3 mL SOAJ Inject 0 3 mL (0 3 mg total) into the shoulder, thigh, or buttocks once for 1 dose 2 each 0    fenofibrate (TRIGLIDE) 160 MG tablet Take 1 tablet (160 mg total) by mouth daily 90 tablet 1    levothyroxine 25 mcg tablet Take 1 tablet Mon to Friday 80 tablet 1    levothyroxine 50 mcg tablet Take 1 tablet PO during weekend 30 tablet 0    methocarbamol (ROBAXIN) 500 mg tablet TAKE 1 TABLET BY MOUTH TWICE A DAY AS NEEDED FOR MUSCLE SPASMS 60 tablet 1    Multiple Vitamin tablet Take by mouth      risperiDONE (RisperDAL) 2 mg tablet Take 2 mg by mouth 2 (two) times a day      risperiDONE (RisperDAL) 4 mg tablet Take 4 mg by mouth 2 (two) times a day       No current facility-administered medications for this visit           Allergies   Allergen Reactions    Sulfa Antibiotics Anaphylaxis    Bee Venom     Coriandrum Sativum     Gabapentin Vomiting  Mercury     Parsley Seed - Food Allergy     Pentothal [Thiopental]      Other reaction(s): Anaphylaxis    Soy Allergy - Food Allergy GI Intolerance    Soybean Oil - Food Allergy Vomiting    Allegra [Fexofenadine] Hives and Rash    Aspirin Rash     Category: Allergy;     Ciprofloxacin Rash     Reaction Date: 10Feb2012; Annotation - [id number]: swelling    Erythromycin Hives and Rash     Reaction Date: 10Feb2012;     Motrin [Ibuprofen] Rash     Reaction Date: 10Feb2012; Annotation - [id number]: rash, wheeze    Penicillins Hives and Rash     Reaction Date: 10Feb2012; Annotation - [id number]: rash       Review of Systems   Constitutional: Negative for activity change, appetite change and fatigue  HENT: Negative for congestion  Eyes: Positive for visual disturbance  Respiratory: Negative for cough and shortness of breath  Cardiovascular: Negative for chest pain and palpitations  Gastrointestinal: Negative for abdominal pain and constipation  Genitourinary: Negative for dysuria, frequency and urgency  Musculoskeletal: Negative for arthralgias and joint swelling  Skin: Negative for rash and wound  Neurological: Negative for dizziness and headaches  Psychiatric/Behavioral: Negative for sleep disturbance  Video Exam    There were no vitals filed for this visit  Physical Exam  Constitutional:       General: He is not in acute distress  Appearance: Normal appearance  He is not ill-appearing  HENT:      Head: Normocephalic and atraumatic  Mouth/Throat:      Mouth: Mucous membranes are moist    Eyes:      Pupils: Pupils are equal, round, and reactive to light  Pulmonary:      Effort: Pulmonary effort is normal    Neurological:      General: No focal deficit present  Mental Status: He is alert and oriented to person, place, and time     Psychiatric:         Mood and Affect: Mood normal          Behavior: Behavior normal           I spent 11 minutes directly with the patient during this visit      VIRTUAL VISIT DISCLAIMER    Hasmukh Tomas acknowledges that he has consented to an online visit or consultation  He understands that the online visit is based solely on information provided by him, and that, in the absence of a face-to-face physical evaluation by the physician, the diagnosis he receives is both limited and provisional in terms of accuracy and completeness  This is not intended to replace a full medical face-to-face evaluation by the physician  Hank Yoon understands and accepts these terms  BMI Counseling: There is no height or weight on file to calculate BMI  The BMI is above normal  Nutrition recommendations include reducing portion sizes, decreasing overall calorie intake, 3-5 servings of fruits/vegetables daily, reducing fast food intake and consuming healthier snacks  Exercise recommendations include moderate aerobic physical activity for 150 minutes/week

## 2021-04-16 ENCOUNTER — TELEPHONE (OUTPATIENT)
Dept: INTERNAL MEDICINE CLINIC | Facility: CLINIC | Age: 31
End: 2021-04-16

## 2021-04-16 NOTE — TELEPHONE ENCOUNTER
Pt  still has puffiness on top of lt  Said he adressed this with Dr Luciano Gannon at [facility]   Pt  hasn't taken Melatonin for 7 days  Both feet were swollen last week, now it is just the left

## 2021-04-16 NOTE — TELEPHONE ENCOUNTER
Any redness or pain? I do not think it is a clot since he had it in both legs  You can try to elevate it more often  If not better, we can do an ultrasound

## 2021-04-20 NOTE — TELEPHONE ENCOUNTER
Madiha notified     No more swelling, no redness or pain     No need to call back, they will both be in for BP check tomorrow

## 2021-04-21 ENCOUNTER — CLINICAL SUPPORT (OUTPATIENT)
Dept: INTERNAL MEDICINE CLINIC | Facility: CLINIC | Age: 31
End: 2021-04-21

## 2021-04-21 VITALS
HEIGHT: 76 IN | DIASTOLIC BLOOD PRESSURE: 72 MMHG | OXYGEN SATURATION: 96 % | SYSTOLIC BLOOD PRESSURE: 126 MMHG | BODY MASS INDEX: 38.36 KG/M2 | WEIGHT: 315 LBS | HEART RATE: 66 BPM

## 2021-04-21 DIAGNOSIS — I10 HYPERTENSION, UNSPECIFIED TYPE: Primary | ICD-10-CM

## 2021-05-14 DIAGNOSIS — E03.9 ACQUIRED HYPOTHYROIDISM: Chronic | ICD-10-CM

## 2021-05-14 RX ORDER — LEVOTHYROXINE SODIUM 0.05 MG/1
TABLET ORAL
Qty: 30 TABLET | Refills: 0 | Status: SHIPPED | OUTPATIENT
Start: 2021-05-14 | End: 2021-10-27 | Stop reason: SDUPTHER

## 2021-05-17 ENCOUNTER — IMMUNIZATIONS (OUTPATIENT)
Dept: FAMILY MEDICINE CLINIC | Facility: HOSPITAL | Age: 31
End: 2021-05-17

## 2021-05-17 PROCEDURE — 91303: CPT

## 2021-05-17 PROCEDURE — 0031A: CPT

## 2021-05-18 DIAGNOSIS — E03.9 ACQUIRED HYPOTHYROIDISM: Chronic | ICD-10-CM

## 2021-05-18 RX ORDER — LEVOTHYROXINE SODIUM 0.03 MG/1
TABLET ORAL
Qty: 80 TABLET | Refills: 1 | Status: SHIPPED | OUTPATIENT
Start: 2021-05-18 | End: 2021-07-20 | Stop reason: SDUPTHER

## 2021-07-20 ENCOUNTER — TELEPHONE (OUTPATIENT)
Dept: CT IMAGING | Facility: HOSPITAL | Age: 31
End: 2021-07-20

## 2021-07-20 ENCOUNTER — APPOINTMENT (EMERGENCY)
Dept: CT IMAGING | Facility: HOSPITAL | Age: 31
End: 2021-07-20
Payer: MEDICARE

## 2021-07-20 ENCOUNTER — HOSPITAL ENCOUNTER (EMERGENCY)
Facility: HOSPITAL | Age: 31
Discharge: HOME | End: 2021-07-20
Attending: EMERGENCY MEDICINE | Admitting: EMERGENCY MEDICINE
Payer: MEDICARE

## 2021-07-20 VITALS
SYSTOLIC BLOOD PRESSURE: 138 MMHG | OXYGEN SATURATION: 92 % | TEMPERATURE: 98.1 F | HEART RATE: 100 BPM | RESPIRATION RATE: 18 BRPM | DIASTOLIC BLOOD PRESSURE: 80 MMHG

## 2021-07-20 DIAGNOSIS — K52.9 COLITIS: Primary | ICD-10-CM

## 2021-07-20 DIAGNOSIS — E03.9 ACQUIRED HYPOTHYROIDISM: Chronic | ICD-10-CM

## 2021-07-20 LAB
ALBUMIN SERPL BCP-MCNC: 4 G/DL (ref 3.5–5)
ALP SERPL-CCNC: 45 U/L (ref 46–116)
ALT SERPL W P-5'-P-CCNC: 37 U/L (ref 12–78)
ANION GAP SERPL CALCULATED.3IONS-SCNC: 12 MMOL/L (ref 4–13)
AST SERPL W P-5'-P-CCNC: 17 U/L (ref 5–45)
BASOPHILS # BLD AUTO: 0.06 THOUSANDS/ΜL (ref 0–0.1)
BASOPHILS NFR BLD AUTO: 1 % (ref 0–1)
BILIRUB SERPL-MCNC: 0.37 MG/DL (ref 0.2–1)
BUN SERPL-MCNC: 11 MG/DL (ref 5–25)
CALCIUM SERPL-MCNC: 9.2 MG/DL (ref 8.3–10.1)
CHLORIDE SERPL-SCNC: 100 MMOL/L (ref 100–108)
CO2 SERPL-SCNC: 26 MMOL/L (ref 21–32)
CREAT SERPL-MCNC: 0.94 MG/DL (ref 0.6–1.3)
EOSINOPHIL # BLD AUTO: 0.1 THOUSAND/ΜL (ref 0–0.61)
EOSINOPHIL NFR BLD AUTO: 1 % (ref 0–6)
ERYTHROCYTE [DISTWIDTH] IN BLOOD BY AUTOMATED COUNT: 12.7 % (ref 11.6–15.1)
GFR SERPL CREATININE-BSD FRML MDRD: 109 ML/MIN/1.73SQ M
GLUCOSE SERPL-MCNC: 91 MG/DL (ref 65–140)
HCT VFR BLD AUTO: 46.6 % (ref 36.5–49.3)
HGB BLD-MCNC: 15.4 G/DL (ref 12–17)
HOLD SPECIMEN: NORMAL
IMM GRANULOCYTES # BLD AUTO: 0.03 THOUSAND/UL (ref 0–0.2)
IMM GRANULOCYTES NFR BLD AUTO: 0 % (ref 0–2)
LIPASE SERPL-CCNC: 174 U/L (ref 73–393)
LYMPHOCYTES # BLD AUTO: 2.26 THOUSANDS/ΜL (ref 0.6–4.47)
LYMPHOCYTES NFR BLD AUTO: 25 % (ref 14–44)
MCH RBC QN AUTO: 27.1 PG (ref 26.8–34.3)
MCHC RBC AUTO-ENTMCNC: 33 G/DL (ref 31.4–37.4)
MCV RBC AUTO: 82 FL (ref 82–98)
MONOCYTES # BLD AUTO: 0.92 THOUSAND/ΜL (ref 0.17–1.22)
MONOCYTES NFR BLD AUTO: 10 % (ref 4–12)
NEUTROPHILS # BLD AUTO: 5.74 THOUSANDS/ΜL (ref 1.85–7.62)
NEUTS SEG NFR BLD AUTO: 63 % (ref 43–75)
NRBC BLD AUTO-RTO: 0 /100 WBCS
PLATELET # BLD AUTO: 359 THOUSANDS/UL (ref 149–390)
PMV BLD AUTO: 9.9 FL (ref 8.9–12.7)
POTASSIUM SERPL-SCNC: 4.1 MMOL/L (ref 3.5–5.3)
PROT SERPL-MCNC: 7.9 G/DL (ref 6.4–8.2)
RBC # BLD AUTO: 5.69 MILLION/UL (ref 3.88–5.62)
SODIUM SERPL-SCNC: 138 MMOL/L (ref 136–145)
TSH SERPL DL<=0.05 MIU/L-ACNC: 1.57 UIU/ML (ref 0.36–3.74)
WBC # BLD AUTO: 9.11 THOUSAND/UL (ref 4.31–10.16)

## 2021-07-20 PROCEDURE — 99284 EMERGENCY DEPT VISIT MOD MDM: CPT | Performed by: EMERGENCY MEDICINE

## 2021-07-20 PROCEDURE — 74177 CT ABD & PELVIS W/CONTRAST: CPT

## 2021-07-20 PROCEDURE — 96374 THER/PROPH/DIAG INJ IV PUSH: CPT

## 2021-07-20 PROCEDURE — 96361 HYDRATE IV INFUSION ADD-ON: CPT

## 2021-07-20 PROCEDURE — 99284 EMERGENCY DEPT VISIT MOD MDM: CPT

## 2021-07-20 PROCEDURE — 36415 COLL VENOUS BLD VENIPUNCTURE: CPT

## 2021-07-20 PROCEDURE — 80053 COMPREHEN METABOLIC PANEL: CPT | Performed by: EMERGENCY MEDICINE

## 2021-07-20 PROCEDURE — 83690 ASSAY OF LIPASE: CPT | Performed by: EMERGENCY MEDICINE

## 2021-07-20 PROCEDURE — 85025 COMPLETE CBC W/AUTO DIFF WBC: CPT | Performed by: EMERGENCY MEDICINE

## 2021-07-20 PROCEDURE — G1004 CDSM NDSC: HCPCS

## 2021-07-20 PROCEDURE — 84443 ASSAY THYROID STIM HORMONE: CPT | Performed by: EMERGENCY MEDICINE

## 2021-07-20 RX ORDER — ONDANSETRON 2 MG/ML
4 INJECTION INTRAMUSCULAR; INTRAVENOUS ONCE
Status: COMPLETED | OUTPATIENT
Start: 2021-07-20 | End: 2021-07-20

## 2021-07-20 RX ORDER — LEVOTHYROXINE SODIUM 0.03 MG/1
TABLET ORAL
Qty: 80 TABLET | Refills: 1 | Status: SHIPPED | OUTPATIENT
Start: 2021-07-20 | End: 2021-10-27 | Stop reason: SDUPTHER

## 2021-07-20 RX ORDER — AMOXICILLIN AND CLAVULANATE POTASSIUM 875; 125 MG/1; MG/1
1 TABLET, FILM COATED ORAL EVERY 12 HOURS
Qty: 14 TABLET | Refills: 0 | Status: SHIPPED | OUTPATIENT
Start: 2021-07-20 | End: 2021-07-27

## 2021-07-20 RX ADMIN — ONDANSETRON 4 MG: 2 INJECTION INTRAMUSCULAR; INTRAVENOUS at 16:07

## 2021-07-20 RX ADMIN — IOHEXOL 100 ML: 350 INJECTION, SOLUTION INTRAVENOUS at 16:35

## 2021-07-20 RX ADMIN — SODIUM CHLORIDE 1000 ML: 0.9 INJECTION, SOLUTION INTRAVENOUS at 16:09

## 2021-07-20 NOTE — TELEPHONE ENCOUNTER
Too much thyroid medication can cause the diarrhea  How long were you taking the wrong dose? You can stop taking your thyroid medication for 1-2 weeks then resume your usual dose  I can also check blood work  Let me know

## 2021-07-20 NOTE — TELEPHONE ENCOUNTER
Pt's mom advised  He's been taking the wrong dose for about 2-3 weeks  Pain got worse and they are now in the ER at [facility]

## 2021-07-20 NOTE — TELEPHONE ENCOUNTER
Every time pt  eats he either has vomiting or diarrhea  Has had diarrhea for two or three weeks  Today he vomited a  yellowish liquid  After vomiting his jaw was chattering and he had chills  He also has stomach cramping for two to three weeks

## 2021-07-20 NOTE — TELEPHONE ENCOUNTER
Can not tell how many times in a day he goes, has stomach pain on and off states its about a 8/10  Used to only happen when he had to use bathroom just keeps happening  Patient took tylenol took edge off    He states he is able to drinks and stay hydrated     Patient states he has been taking too much thyroid medication because there was a mess up, mom just realized today he has been taking 50's cause he was out of 25's

## 2021-07-20 NOTE — DISCHARGE INSTRUCTIONS
Your CT scan reveald a mild enlargement of the Bosniak type I cyst (generally benign kidney cysts) since the previous study of November 18, 2017  The radiologist is recommending a that a follow-up ultrasound at one year could be ordered if you and your primary care physician deem it necessary

## 2021-07-20 NOTE — TELEPHONE ENCOUNTER
How often have you been moving your bowel in a day? 5, 10 times? Is stool all water or with solids? Are you able to keep hydrated and drink fluids after loose stools or vomiting?

## 2021-07-20 NOTE — ED PROVIDER NOTES
History  Chief Complaint   Patient presents with    Abdominal Cramping     Pt c/o generalized abdominal cramping for several weeks  35 y o  male presents with chief complaint of generalized abdominal pain, nausea and diarrhea for the past 2 weeks  He reports this morning the abdominal pain became unbearable  He reports he has these symptoms (minus the unbearable abdominal pain) often and he attributes it to his diet which he describes as "less than healthy"  No fevers or chills  Over the past two weeks he also had some pain in his jaw and right ankle that resolved overnight  Additionally he was taking more of his synthroid than prescribed  Finally he reports that during a bowel movement this past week he noted a small white oval that appeared to "wiggle"  He states this was the size and shape of a grain of rice  History provided by:  Patient and parent   used: No    Abdominal Cramping  Pain location:  Generalized  Pain quality: aching and cramping    Pain radiates to:  Does not radiate  Pain severity:  Moderate  Onset quality:  Gradual  Duration:  2 weeks  Timing:  Intermittent  Progression:  Waxing and waning  Chronicity:  New  Relieved by:  Nothing  Worsened by:  Nothing  Associated symptoms: diarrhea, nausea and vomiting    Associated symptoms: no chest pain, no chills, no constipation, no dysuria, no fever and no shortness of breath    Risk factors: obesity    Risk factors: has not had multiple surgeries        Prior to Admission Medications   Prescriptions Last Dose Informant Patient Reported? Taking?    EPINEPHrine (EPIPEN) 0 3 mg/0 3 mL SOAJ   No No   Sig: Inject 0 3 mL (0 3 mg total) into the shoulder, thigh, or buttocks once for 1 dose   Multiple Vitamin tablet  Mother Yes No   Sig: Take by mouth   acetaminophen (TYLENOL) 500 mg tablet  Mother Yes No   Sig: Take 325 mg by mouth every 6 (six) hours as needed for mild pain     benztropine (COGENTIN) 1 mg tablet   Yes No Sig: Take 1 mg by mouth every 12 (twelve) hours   clonazePAM (KlonoPIN) 1 mg tablet  Mother Yes No   Sig: Take 0 5 mg by mouth 2 (two) times a day   divalproex sodium (DEPAKOTE ER) 500 mg 24 hr tablet   No No   Sig: Take 1 tablet (500 mg total) by mouth 2 (two) times a day   divalproex sodium (DEPAKOTE) 500 mg EC tablet  Mother Yes No   Sig: Take 1,000 mg by mouth daily Mid day    docusate sodium (COLACE) 100 mg capsule  Mother No No   Sig: Take 1 capsule by mouth 2 (two) times a day   fenofibrate (TRIGLIDE) 160 MG tablet   No No   Sig: Take 1 tablet (160 mg total) by mouth daily   levothyroxine 25 mcg tablet   No No   Sig: TAKE 1 TABLET BY MOUTH EVERY DAY MON THROUGH FRIDAY   levothyroxine 50 mcg tablet   No No   Sig: Take 1 tablet PO during weekend   methocarbamol (ROBAXIN) 500 mg tablet  Mother No No   Sig: TAKE 1 TABLET BY MOUTH TWICE A DAY AS NEEDED FOR MUSCLE SPASMS   risperiDONE (RisperDAL) 2 mg tablet  Mother Yes No   Sig: Take 2 mg by mouth 2 (two) times a day   risperiDONE (RisperDAL) 4 mg tablet  Mother Yes No   Sig: Take 4 mg by mouth 2 (two) times a day      Facility-Administered Medications: None       Past Medical History:   Diagnosis Date    Autism     Bipolar 1 disorder (HCC)     Disease of thyroid gland     Hyperlipidemia     Lumbar disc disease     Psychiatric disorder     Compulsive Disorder    Sleep apnea     Urinary incontinence        Past Surgical History:   Procedure Laterality Date    BACK SURGERY  11/2017    Lower   Last assessed: 1/18/18    COLONOSCOPY      Fiberoptic    INCISION AND DRAINAGE POSTERIOR SPINE N/A 11/1/2017    Procedure: INCISION AND DRAINAGE (I&D) SPINE;  Surgeon: Chase Perez MD;  [facility];  Service: Neurosurgery    RI LAMNOTMY INCL W/DCMPRSN NRV ROOT 1 INTRSPC LUMBR Bilateral 10/17/2017    Procedure: LEFT L3/4 AND RIGHT L4/5 MICRODISCECTOMIES, HEMILAMINECTOMIES; POSSIBLE EPIDURAL STEROID INJECTIONS;  Surgeon: Chase Perez MD;  [facility];  Service: Neurosurgery    WISDOM TOOTH EXTRACTION      WOUND DEBRIDEMENT N/A 11/3/2017    Procedure: DEBRIDEMENT WOUND [facility] OUT), wound vac placement back;  Surgeon: Vanessa Louis DO;  [facility];  Service: General    WOUND DEBRIDEMENT N/A 11/6/2017    Procedure: [place] ([address] [facility];  Surgeon: Eleonora Quiroz MD;  [facility];  Service: General       Family History   Problem Relation Age of Onset    Heart disease Father         Cardiac disorder    Substance Abuse Father     Heart attack Father     Heart disease Paternal Grandmother     Hypertension Mother     Stroke Maternal Grandmother         CVA    Heart disease Maternal Grandmother         pacemaker    Heart failure Maternal Grandmother     Heart disease Paternal Grandfather     Colonic polyp Family     Colonic polyp Family     Cancer Family     Heart failure Maternal Grandfather     Heart disease Maternal Grandfather     Drug abuse Neg Hx     Alcohol abuse Neg Hx     Colon cancer Neg Hx     Anxiety disorder Neg Hx     Depression Neg Hx      I have reviewed and agree with the history as documented  E-Cigarette/Vaping     E-Cigarette/Vaping Substances     Social History     Tobacco Use    Smoking status: Never Smoker    Smokeless tobacco: Never Used   Substance Use Topics    Alcohol use: No    Drug use: No       Review of Systems   Constitutional: Negative for chills, diaphoresis and fever  Respiratory: Negative for shortness of breath  Cardiovascular: Negative for chest pain and palpitations  Gastrointestinal: Positive for diarrhea, nausea and vomiting  Negative for constipation  Genitourinary: Negative for dysuria and frequency  Skin: Negative for rash  All other systems reviewed and are negative  Physical Exam  Physical Exam  Vitals and nursing note reviewed  Constitutional:       General: He is in acute distress  Appearance: He is well-developed   He is obese    HENT:      Head: Normocephalic and atraumatic  Eyes:      Pupils: Pupils are equal, round, and reactive to light  Neck:      Vascular: No JVD  Cardiovascular:      Rate and Rhythm: Normal rate and regular rhythm  Heart sounds: Normal heart sounds  No murmur heard  No friction rub  No gallop  Pulmonary:      Effort: Pulmonary effort is normal  No respiratory distress  Breath sounds: Normal breath sounds  No wheezing or rales  Chest:      Chest wall: No tenderness  Abdominal:      Palpations: There is no mass  Tenderness: There is abdominal tenderness (mild epigastric)  There is no guarding  Hernia: No hernia is present  Musculoskeletal:         General: No tenderness  Normal range of motion  Cervical back: Normal range of motion  Skin:     General: Skin is warm and dry  Neurological:      General: No focal deficit present  Mental Status: He is alert and oriented to person, place, and time  Psychiatric:         Behavior: Behavior normal          Thought Content:  Thought content normal          Judgment: Judgment normal          Vital Signs  ED Triage Vitals [07/20/21 1447]   Temperature Pulse Respirations Blood Pressure SpO2   98 1 °F (36 7 °C) 100 18 138/80 92 %      Temp Source Heart Rate Source Patient Position - Orthostatic VS BP Location FiO2 (%)   Oral Monitor Sitting Left arm --      Pain Score       --           Vitals:    07/20/21 1447   BP: 138/80   Pulse: 100   Patient Position - Orthostatic VS: Sitting         Visual Acuity      ED Medications  Medications   sodium chloride 0 9 % bolus 1,000 mL (1,000 mL Intravenous New Bag 7/20/21 1609)   ondansetron (ZOFRAN) injection 4 mg (4 mg Intravenous Given 7/20/21 1607)   iohexol (OMNIPAQUE) 350 MG/ML injection (SINGLE-DOSE) 100 mL (100 mL Intravenous Given 7/20/21 1635)       Diagnostic Studies  Results Reviewed     Procedure Component Value Units Date/Time    TSH [[id number]]  (Normal) Collected: 07/20/21 1606    Lab Status: Final result Specimen: Blood from Arm, Left Updated: 07/20/21 1651     TSH 3RD GENERATON 1 575 uIU/mL     Narrative:      Patients undergoing fluorescein dye angiography may retain small amounts of fluorescein in the body for 48-72 hours post procedure  Samples containing fluorescein can produce falsely depressed TSH values  If the patient had this procedure,a specimen should be resubmitted post fluorescein clearance  Trauma tubes on hold [[id number]] Collected: 07/20/21 1449    Lab Status: Final result Specimen: Blood from Arm, Right Updated: 07/20/21 1601    Narrative: The following orders were created for panel order Trauma tubes on hold  Procedure                               Abnormality         Status                     ---------                               -----------         ------                     Corry [place] on [facility][[id number]]                           Final result               Green / Black tube on [facility][[id number]]                       Final result                 Please view results for these tests on the individual orders      CBC and differential [[id number]]  (Abnormal) Collected: 07/20/21 1449    Lab Status: Final result Specimen: Blood from Arm, Right Updated: 07/20/21 1535     WBC 9 11 Thousand/uL      RBC 5 69 Million/uL      Hemoglobin 15 4 g/dL      Hematocrit 46 6 %      MCV 82 fL      MCH 27 1 pg      MCHC 33 0 g/dL      RDW 12 7 %      MPV 9 9 fL      Platelets 434 Thousands/uL      nRBC 0 /100 WBCs      Neutrophils Relative 63 %      Immat GRANS % 0 %      Lymphocytes Relative 25 %      Monocytes Relative 10 %      Eosinophils Relative 1 %      Basophils Relative 1 %      Neutrophils Absolute 5 74 Thousands/µL      Immature Grans Absolute 0 03 Thousand/uL      Lymphocytes Absolute 2 26 Thousands/µL      Monocytes Absolute 0 92 Thousand/µL      Eosinophils Absolute 0 10 Thousand/µL      Basophils Absolute 0 06 Thousands/µL     Comprehensive metabolic panel [[id number]]  (Abnormal) Collected: 07/20/21 1449    Lab Status: Final result Specimen: Blood from Arm, Right Updated: 07/20/21 1518     Sodium 138 mmol/L      Potassium 4 1 mmol/L      Chloride 100 mmol/L      CO2 26 mmol/L      ANION GAP 12 mmol/L      BUN 11 mg/dL      Creatinine 0 94 mg/dL      Glucose 91 mg/dL      Calcium 9 2 mg/dL      AST 17 U/L      ALT 37 U/L      Alkaline Phosphatase 45 U/L      Total Protein 7 9 g/dL      Albumin 4 0 g/dL      Total Bilirubin 0 37 mg/dL      eGFR 109 ml/min/1 73sq m     Narrative:      [place] guidelines for Chronic Kidney Disease (CKD):     Stage 1 with normal or high GFR (GFR > 90 mL/min/1 73 square meters)    Stage 2 Mild CKD (GFR = 60-89 mL/min/1 73 square meters)    Stage 3A Moderate CKD (GFR = 45-59 mL/min/1 73 square meters)    Stage 3B Moderate CKD (GFR = 30-44 mL/min/1 73 square meters)    Stage 4 Severe CKD (GFR = 15-29 mL/min/1 73 square meters)    Stage 5 End Stage CKD (GFR <15 mL/min/1 73 square meters)  Note: GFR calculation is accurate only with a steady state creatinine    Lipase [[id number]]  (Normal) Collected: 07/20/21 1449    Lab Status: Final result Specimen: Blood from Arm, Right Updated: 07/20/21 1518     Lipase 174 u/L                  CT abdomen pelvis with contrast   Final Result by Agustina Calderón MD (07/20 1709)    IMPRESSION:        Fluid-filled colonic loops may be due to mild colitis      No free air      No pneumatosis      A 9 cm x 4 3 cm focal encapsulated area in the posterior back extending from the L2-S1 vertebra, chronic , postoperative in nature      Hepatic steatosis      Mild enlargement of the Bosniak type I cyst since the previous study of November 18, 2017  Follow-up ultrasound at one year can be considered      The study was marked in [facility] for significant notification        Workstation performed: [id number]                    Procedures  Procedures         ED Course SBIRT 22yo+      Most Recent Value   SBIRT (24 yo +)   In order to provide better care to our patients, we are screening all of our patients for alcohol and drug use  Would it be okay to ask you these screening questions? No Filed at: 07/20/2021 1618                    [facility]  Number of Diagnoses or Management Options  Colitis: new and requires workup  Diagnosis management comments: Background: 35 y o  male presents with generalized abdominal pain, nausea and diarrhea    Differential DX includes but is not limited to: irritable bowel syndrome, less likely IBD, gastroenteritis, colitis     Plan: cbc, cmp, lipase, TSH, ct abdomen and pelvis          Amount and/or Complexity of Data Reviewed  Clinical lab tests: ordered and reviewed  Tests in the radiology section of CPT®: ordered and reviewed    Risk of Complications, Morbidity, and/or Mortality  Presenting problems: high  Diagnostic procedures: high  Management options: high    Patient Progress  Patient progress: stable      Disposition  Final diagnoses:   Colitis     Time reflects when diagnosis was documented in both MDM as applicable and the Disposition within this note     Time User Action Codes Description Comment    7/20/2021  5:14 PM Graciela MATTHEW Add [R10 9] Abdominal pain     7/20/2021  5:14 PM Cleotis De Witt Add [R19 7] Diarrhea     7/20/2021  5:14 PM Cleotis Isabela Remove [R19 7] Diarrhea     7/20/2021  5:14 PM Cleotis De Witt Remove [R10 9] Abdominal pain     7/20/2021  5:15 PM Cleotis De Witt Add [K52 9] Colitis       ED Disposition     ED Disposition Condition Date/Time Comment    Discharge Stable Tue Jul 20, 2021  5:14 PM Hasmukh Bradley discharge to home/self care              Follow-up Information     Follow up With Specialties Details Why Contact Info    Milton Mary MD Internal Medicine Schedule an appointment as soon as possible for a visit in 1 week  [address] [place] [address],[address]  [id number] [place] [address]  [phone]            Patient's Medications   Discharge Prescriptions    AMOXICILLIN-CLAVULANATE (AUGMENTIN) 875-125 MG PER TABLET    Take 1 tablet by mouth every 12 (twelve) hours for 7 days       Start Date: 7/20/2021 End Date: 7/27/2021       Order Dose: 1 tablet       Quantity: 14 tablet    Refills: 0     No discharge procedures on file      PDMP Review     None          ED Provider  Electronically Signed by           Otilia Fernandez MD  07/20/21 1157

## 2021-07-21 ENCOUNTER — TELEPHONE (OUTPATIENT)
Dept: INTERNAL MEDICINE CLINIC | Facility: CLINIC | Age: 31
End: 2021-07-21

## 2021-07-21 NOTE — TELEPHONE ENCOUNTER
No antibiotics for now then  Just have a liquid diet for at least 24 hours until abdominal pain has subsided (clear liquids - water, ginger ale, broth)  After, he can start soft diet (bananas, mashed potato, jello etc)  Start eating regular foods only if no more abdominal pain

## 2021-07-21 NOTE — TELEPHONE ENCOUNTER
Please continue giving his thyroid medication, they way he is supposed to  Refill sent yesterday  He is allergic to penicillin, Cipro and erythromycin  Has he tried azithromycin (Z-alexey) before, that is an option for the colitis  You can schedule a f/u visit this week with Ethan Brooks, or with me next week

## 2021-07-21 NOTE — TELEPHONE ENCOUNTER
Madiha called to let us know that pt  Went to the ER last night  They checked his thyroid levels and it was normal, so Madiha had wants to know if he should d/c Levothyroxine  Also, she didn't fill the script for Amoxicillin because pt  is allergic  Do you want to prescribe a different antbx? Pt  has an appt  sched'd with Dr Sue Aguilar on 8/16  Does he need to be seen sooner?

## 2021-07-27 ENCOUNTER — OFFICE VISIT (OUTPATIENT)
Dept: INTERNAL MEDICINE CLINIC | Facility: CLINIC | Age: 31
End: 2021-07-27
Payer: MEDICARE

## 2021-07-27 VITALS
BODY MASS INDEX: 38.36 KG/M2 | TEMPERATURE: 96.4 F | DIASTOLIC BLOOD PRESSURE: 84 MMHG | SYSTOLIC BLOOD PRESSURE: 116 MMHG | WEIGHT: 315 LBS | HEIGHT: 76 IN | OXYGEN SATURATION: 98 % | HEART RATE: 108 BPM

## 2021-07-27 DIAGNOSIS — R03.0 ELEVATED BP WITHOUT DIAGNOSIS OF HYPERTENSION: ICD-10-CM

## 2021-07-27 DIAGNOSIS — E03.9 ACQUIRED HYPOTHYROIDISM: ICD-10-CM

## 2021-07-27 DIAGNOSIS — E78.2 MIXED HYPERLIPIDEMIA: ICD-10-CM

## 2021-07-27 DIAGNOSIS — B82.9 PARASITES IN STOOL: ICD-10-CM

## 2021-07-27 DIAGNOSIS — R73.03 PREDIABETES: ICD-10-CM

## 2021-07-27 DIAGNOSIS — K52.9 COLITIS: Primary | ICD-10-CM

## 2021-07-27 DIAGNOSIS — Z71.9 HEALTH COUNSELING: ICD-10-CM

## 2021-07-27 DIAGNOSIS — R19.5 CHANGE IN CONSISTENCY OF STOOL: ICD-10-CM

## 2021-07-27 DIAGNOSIS — E66.01 MORBID OBESITY DUE TO EXCESS CALORIES (HCC): Chronic | ICD-10-CM

## 2021-07-27 DIAGNOSIS — E53.8 VITAMIN B12 DEFICIENCY: ICD-10-CM

## 2021-07-27 PROCEDURE — 99214 OFFICE O/P EST MOD 30 MIN: CPT | Performed by: INTERNAL MEDICINE

## 2021-07-27 NOTE — PROGRESS NOTES
Assessment/Plan:    Hypothyroidism  Now taking thyroid medication appropriately  Mixed hyperlipidemia  Repeat lipids, on fenofibrate  Morbid obesity due to excess calories (HCC)  No weight change  Eating habits have improved, not eating junk food  Prediabetes  Low carb diet  Repeat A1c, may benefit starting metformin  Bipolar disorder [facility])  Per psych  Vitamin B12 deficiency  Restart daily supplement  Lumbar stenosis  No recent issues  Diagnoses and all orders for this visit:    Colitis  Comments:  Resolved  Eating regular diet  Orders:  -     Ova and parasite examination; Future  -     Giardia antigen; Future    Change in consistency of stool  -     Giardia antigen; Future    Parasites in stool  Comments:  He reports ?worm in stool  Orders:  -     Ova and parasite examination; Future    Mixed hyperlipidemia  -     Lipid panel; Future  -     Comprehensive metabolic panel; Future    Prediabetes  -     Hemoglobin A1C; Future    Vitamin B12 deficiency  -     Vitamin B12; Future    Acquired hypothyroidism  -     CBC and differential; Future    Elevated BP without diagnosis of hypertension  Comments:  Repeat BP improved  Morbid obesity due to excess calories [facility])    Health counseling  Comments:  Received COVID vaccine  Other orders  -     Cancel: Stool Enteric Bacterial Panel by PCR; Future      Follow up in 3 months or as needed  Subjective:      Patient ID: Jerrica Ours is a 35 y o  male here for ER follow up  He reports stomach pains and cramping have resolved  He reports occasional symptoms if he feels the urge to move his bowels  He has been moving his bowels 1 to 2 times a day  He noticed small squiggly things in his stool and picks it out  He reports it occurred 2 or 3 times in the past week, since his ER visit  He reports that it has occurred a few times the past few years  Stool is soft, no blood or mucus, not watery    He did not take any antibiotics for his diagnosed colitis due to allergies to antibiotics  He has been eating a regular diet  He denies any nausea or vomiting, no urinary symptoms  He is otherwise feeling well  He has stopped drinking coffee and has cut down on junk food  His mother says he has lost weight with dietary changes  He is taking his thyroid medication, as prescribed  The following portions of the patient's history were reviewed and updated as appropriate: allergies, current medications, past medical history, past social history and problem list     Review of Systems   Constitutional: Negative for activity change, appetite change and fatigue  HENT: Negative for congestion  Eyes: Negative  Respiratory: Negative for cough, chest tightness and shortness of breath  Cardiovascular: Negative for chest pain and palpitations  Gastrointestinal: Negative for abdominal distention, abdominal pain, anal bleeding, blood in stool, constipation, diarrhea, nausea and vomiting  Genitourinary: Negative for dysuria, frequency and urgency  Musculoskeletal: Negative for arthralgias  Neurological: Negative for dizziness and headaches  Psychiatric/Behavioral: Negative for sleep disturbance  Objective:      /84 Comment: recheck  Pulse (!) 108   Temp (!) 96 4 °F (35 8 °C)   Ht 6' 4" (1 93 m)   Wt (!) 156 kg (344 lb)   SpO2 98%   BMI 41 87 kg/m²          Physical Exam  Vitals and nursing note reviewed  Constitutional:       Appearance: He is well-developed  HENT:      Head: Normocephalic and atraumatic  Eyes:      Conjunctiva/sclera: Conjunctivae normal       Pupils: Pupils are equal, round, and reactive to light  Cardiovascular:      Rate and Rhythm: Normal rate and regular rhythm  Heart sounds: Normal heart sounds  Pulmonary:      Effort: Pulmonary effort is normal       Breath sounds: No wheezing or rhonchi  Abdominal:      General: Abdomen is protuberant   Bowel sounds are normal  Palpations: Abdomen is soft  Tenderness: There is no abdominal tenderness  Musculoskeletal:      Cervical back: Neck supple  Skin:     General: Skin is warm  Findings: No rash  Neurological:      Mental Status: He is alert and oriented to person, place, and time  Psychiatric:         Behavior: Behavior normal            Labs & imaging results reviewed with patient

## 2021-08-02 ENCOUNTER — APPOINTMENT (OUTPATIENT)
Dept: LAB | Facility: CLINIC | Age: 31
End: 2021-08-02
Payer: MEDICARE

## 2021-08-02 DIAGNOSIS — K52.9 COLITIS: ICD-10-CM

## 2021-08-02 DIAGNOSIS — R19.5 CHANGE IN CONSISTENCY OF STOOL: ICD-10-CM

## 2021-08-02 DIAGNOSIS — B82.9 PARASITES IN STOOL: ICD-10-CM

## 2021-08-02 PROCEDURE — 87177 OVA AND PARASITES SMEARS: CPT

## 2021-08-02 PROCEDURE — 87209 SMEAR COMPLEX STAIN: CPT

## 2021-08-02 PROCEDURE — 87329 GIARDIA AG IA: CPT

## 2021-08-03 ENCOUNTER — TELEPHONE (OUTPATIENT)
Dept: INTERNAL MEDICINE CLINIC | Facility: CLINIC | Age: 31
End: 2021-08-03

## 2021-08-03 LAB — G LAMBLIA AG STL QL IA: NEGATIVE

## 2021-08-07 LAB — O+P STL CONC: NORMAL

## 2021-08-09 ENCOUNTER — NURSE TRIAGE (OUTPATIENT)
Dept: OTHER | Facility: OTHER | Age: 31
End: 2021-08-09

## 2021-08-09 DIAGNOSIS — M54.50 CHRONIC BILATERAL LOW BACK PAIN WITHOUT SCIATICA: ICD-10-CM

## 2021-08-09 DIAGNOSIS — G89.29 CHRONIC BILATERAL LOW BACK PAIN WITHOUT SCIATICA: ICD-10-CM

## 2021-08-09 RX ORDER — METHOCARBAMOL 500 MG/1
500 TABLET, FILM COATED ORAL 2 TIMES DAILY PRN
Qty: 60 TABLET | Refills: 1 | Status: SHIPPED | OUTPATIENT
Start: 2021-08-09 | End: 2022-01-27 | Stop reason: SDUPTHER

## 2021-08-09 NOTE — TELEPHONE ENCOUNTER
Regarding: Back spasms rx    ----- Message from Maria A Perez sent at 2021  7:04 PM EDT -----  Mother has called back, she stepped away from her phone and missed the nurses number

## 2021-08-09 NOTE — TELEPHONE ENCOUNTER
Regarding: Back spasms rx    ----- Message from [facility] sent at 2021  5:06 PM EDT -----  "My son is calling because he is having back spasms and he had a medication refill for methocarbamol 500 mg but it has , I am wondering if someone can help him?"

## 2021-08-09 NOTE — TELEPHONE ENCOUNTER
Answer Assessment - Initial Assessment Questions  1  ONSET: "When did the pain begin?"       Month ago  2  LOCATION: "Where does it hurt?" (upper, mid or lower back)      Lower back  3  SEVERITY: "How bad is the pain?"  (e g , Scale 1-10; mild, moderate, or severe)    - MILD (1-3): doesn't interfere with normal activities     - MODERATE (4-7): interferes with normal activities or awakens from sleep     - SEVERE (8-10): excruciating pain, unable to do any normal activities       None when laying down and then a 5 when up  4  PATTERN: "Is the pain constant?" (e g , yes, no; constant, intermittent)       Comes and goes/spasms    6  CAUSE:  "What do you think is causing the back pain?"       Known back problems    8   MEDICATIONS: "What have you taken so far for the pain?" (e g , nothing, acetaminophen, NSAIDS)     Used tylenol    Protocols used: BACK PAIN-ADULT-

## 2021-10-27 ENCOUNTER — OFFICE VISIT (OUTPATIENT)
Dept: INTERNAL MEDICINE CLINIC | Facility: CLINIC | Age: 31
End: 2021-10-27
Payer: MEDICARE

## 2021-10-27 VITALS
TEMPERATURE: 96.9 F | HEIGHT: 76 IN | BODY MASS INDEX: 38.36 KG/M2 | SYSTOLIC BLOOD PRESSURE: 110 MMHG | WEIGHT: 315 LBS | HEART RATE: 121 BPM | DIASTOLIC BLOOD PRESSURE: 84 MMHG | OXYGEN SATURATION: 96 %

## 2021-10-27 DIAGNOSIS — B82.9 PARASITES IN STOOL: ICD-10-CM

## 2021-10-27 DIAGNOSIS — E66.01 MORBID OBESITY DUE TO EXCESS CALORIES (HCC): Chronic | ICD-10-CM

## 2021-10-27 DIAGNOSIS — E03.9 ACQUIRED HYPOTHYROIDISM: Chronic | ICD-10-CM

## 2021-10-27 DIAGNOSIS — F31.60 BIPOLAR AFFECTIVE DISORDER, CURRENT EPISODE MIXED, CURRENT EPISODE SEVERITY UNSPECIFIED (HCC): Chronic | ICD-10-CM

## 2021-10-27 DIAGNOSIS — E78.2 MIXED HYPERLIPIDEMIA: Primary | ICD-10-CM

## 2021-10-27 DIAGNOSIS — M48.061 SPINAL STENOSIS OF LUMBAR REGION, UNSPECIFIED WHETHER NEUROGENIC CLAUDICATION PRESENT: ICD-10-CM

## 2021-10-27 DIAGNOSIS — E78.49 OTHER HYPERLIPIDEMIA: ICD-10-CM

## 2021-10-27 DIAGNOSIS — Z00.00 HEALTH MAINTENANCE EXAMINATION: ICD-10-CM

## 2021-10-27 PROCEDURE — G0439 PPPS, SUBSEQ VISIT: HCPCS | Performed by: INTERNAL MEDICINE

## 2021-10-27 PROCEDURE — 99214 OFFICE O/P EST MOD 30 MIN: CPT | Performed by: INTERNAL MEDICINE

## 2021-10-27 RX ORDER — LEVOTHYROXINE SODIUM 0.05 MG/1
TABLET ORAL
Qty: 30 TABLET | Refills: 0 | Status: SHIPPED | OUTPATIENT
Start: 2021-10-27 | End: 2022-05-06 | Stop reason: SDUPTHER

## 2021-10-27 RX ORDER — FENOFIBRATE 160 MG/1
160 TABLET ORAL DAILY
Qty: 90 TABLET | Refills: 1 | Status: SHIPPED | OUTPATIENT
Start: 2021-10-27 | End: 2022-05-06 | Stop reason: SDUPTHER

## 2021-10-27 RX ORDER — LEVOTHYROXINE SODIUM 0.03 MG/1
TABLET ORAL
Qty: 80 TABLET | Refills: 1 | Status: SHIPPED | OUTPATIENT
Start: 2021-10-27 | End: 2022-05-06 | Stop reason: SDUPTHER

## 2021-11-01 ENCOUNTER — APPOINTMENT (OUTPATIENT)
Dept: LAB | Facility: CLINIC | Age: 31
End: 2021-11-01
Payer: MEDICARE

## 2021-11-01 ENCOUNTER — TELEPHONE (OUTPATIENT)
Dept: INTERNAL MEDICINE CLINIC | Facility: CLINIC | Age: 31
End: 2021-11-01

## 2021-11-01 DIAGNOSIS — E53.8 VITAMIN B12 DEFICIENCY: ICD-10-CM

## 2021-11-01 DIAGNOSIS — E78.2 MIXED HYPERLIPIDEMIA: ICD-10-CM

## 2021-11-01 DIAGNOSIS — R73.03 PREDIABETES: ICD-10-CM

## 2021-11-01 DIAGNOSIS — E03.9 ACQUIRED HYPOTHYROIDISM: ICD-10-CM

## 2021-11-01 LAB
ALBUMIN SERPL BCP-MCNC: 4.1 G/DL (ref 3.5–5)
ALP SERPL-CCNC: 35 U/L (ref 46–116)
ALT SERPL W P-5'-P-CCNC: 40 U/L (ref 12–78)
ANION GAP SERPL CALCULATED.3IONS-SCNC: 12 MMOL/L (ref 4–13)
AST SERPL W P-5'-P-CCNC: 28 U/L (ref 5–45)
BASOPHILS # BLD AUTO: 0.08 THOUSANDS/ΜL (ref 0–0.1)
BASOPHILS NFR BLD AUTO: 1 % (ref 0–1)
BILIRUB SERPL-MCNC: 0.4 MG/DL (ref 0.2–1)
BUN SERPL-MCNC: 12 MG/DL (ref 5–25)
CALCIUM SERPL-MCNC: 9.4 MG/DL (ref 8.3–10.1)
CHLORIDE SERPL-SCNC: 101 MMOL/L (ref 100–108)
CHOLEST SERPL-MCNC: 182 MG/DL (ref 50–200)
CO2 SERPL-SCNC: 26 MMOL/L (ref 21–32)
CREAT SERPL-MCNC: 0.98 MG/DL (ref 0.6–1.3)
EOSINOPHIL # BLD AUTO: 0.16 THOUSAND/ΜL (ref 0–0.61)
EOSINOPHIL NFR BLD AUTO: 2 % (ref 0–6)
ERYTHROCYTE [DISTWIDTH] IN BLOOD BY AUTOMATED COUNT: 13.1 % (ref 11.6–15.1)
EST. AVERAGE GLUCOSE BLD GHB EST-MCNC: 117 MG/DL
GFR SERPL CREATININE-BSD FRML MDRD: 104 ML/MIN/1.73SQ M
GLUCOSE P FAST SERPL-MCNC: 102 MG/DL (ref 65–99)
HBA1C MFR BLD: 5.7 %
HCT VFR BLD AUTO: 45.2 % (ref 36.5–49.3)
HDLC SERPL-MCNC: 29 MG/DL
HGB BLD-MCNC: 14.6 G/DL (ref 12–17)
IMM GRANULOCYTES # BLD AUTO: 0.05 THOUSAND/UL (ref 0–0.2)
IMM GRANULOCYTES NFR BLD AUTO: 1 % (ref 0–2)
LDLC SERPL CALC-MCNC: 90 MG/DL (ref 0–100)
LYMPHOCYTES # BLD AUTO: 3.31 THOUSANDS/ΜL (ref 0.6–4.47)
LYMPHOCYTES NFR BLD AUTO: 45 % (ref 14–44)
MCH RBC QN AUTO: 26.5 PG (ref 26.8–34.3)
MCHC RBC AUTO-ENTMCNC: 32.3 G/DL (ref 31.4–37.4)
MCV RBC AUTO: 82 FL (ref 82–98)
MONOCYTES # BLD AUTO: 0.6 THOUSAND/ΜL (ref 0.17–1.22)
MONOCYTES NFR BLD AUTO: 8 % (ref 4–12)
NEUTROPHILS # BLD AUTO: 3.2 THOUSANDS/ΜL (ref 1.85–7.62)
NEUTS SEG NFR BLD AUTO: 43 % (ref 43–75)
NONHDLC SERPL-MCNC: 153 MG/DL
NRBC BLD AUTO-RTO: 0 /100 WBCS
PLATELET # BLD AUTO: 347 THOUSANDS/UL (ref 149–390)
PMV BLD AUTO: 10 FL (ref 8.9–12.7)
POTASSIUM SERPL-SCNC: 4.2 MMOL/L (ref 3.5–5.3)
PROT SERPL-MCNC: 7.4 G/DL (ref 6.4–8.2)
RBC # BLD AUTO: 5.51 MILLION/UL (ref 3.88–5.62)
SODIUM SERPL-SCNC: 139 MMOL/L (ref 136–145)
T4 FREE SERPL-MCNC: 1.01 NG/DL (ref 0.76–1.46)
TRIGL SERPL-MCNC: 314 MG/DL
TSH SERPL DL<=0.05 MIU/L-ACNC: 4.17 UIU/ML (ref 0.36–3.74)
VIT B12 SERPL-MCNC: 465 PG/ML (ref 100–900)
WBC # BLD AUTO: 7.4 THOUSAND/UL (ref 4.31–10.16)

## 2021-11-01 PROCEDURE — 84439 ASSAY OF FREE THYROXINE: CPT

## 2021-11-01 PROCEDURE — 82607 VITAMIN B-12: CPT

## 2021-11-01 PROCEDURE — 36415 COLL VENOUS BLD VENIPUNCTURE: CPT

## 2021-11-01 PROCEDURE — 80061 LIPID PANEL: CPT

## 2021-11-01 PROCEDURE — 80053 COMPREHEN METABOLIC PANEL: CPT

## 2021-11-01 PROCEDURE — 85025 COMPLETE CBC W/AUTO DIFF WBC: CPT

## 2021-11-01 PROCEDURE — 84443 ASSAY THYROID STIM HORMONE: CPT

## 2021-11-01 PROCEDURE — 83036 HEMOGLOBIN GLYCOSYLATED A1C: CPT

## 2022-01-22 ENCOUNTER — NURSE TRIAGE (OUTPATIENT)
Dept: OTHER | Facility: OTHER | Age: 32
End: 2022-01-22

## 2022-01-22 DIAGNOSIS — B34.9 VIRAL INFECTION, UNSPECIFIED: Primary | ICD-10-CM

## 2022-01-22 NOTE — TELEPHONE ENCOUNTER
Regarding: upset stomach, nausea   ----- Message from Madan eLmons, [address] [place] sent at 1/22/2022  3:23 PM EST -----  "My son has an upset stomach, nausea and cough

## 2022-01-22 NOTE — TELEPHONE ENCOUNTER
Reason for Disposition   History of prior "blood clot" in leg or lungs (i e , deep vein thrombosis, pulmonary embolism)    Answer Assessment - Initial Assessment Questions  1  LOCATION: "Where does it hurt?"        Middle of chest     2  RADIATION: "Does the pain go anywhere else?" (e g , into neck, jaw, arms, back)      Denies    3  ONSET: "When did the chest pain begin?" (Minutes, hours or days)       Today    4  PATTERN "Does the pain come and go, or has it been constant since it started?"  "Does it get worse with exertion?"       Intermittent    5  DURATION: "How long does it last" (e g , seconds, minutes, hours)      Seconds to minutes, "mostly while coughing"    6  SEVERITY: "How bad is the pain?"  (e g , Scale 1-10; mild, moderate, or severe)     - MILD (1-3): doesn't interfere with normal activities      - MODERATE (4-7): interferes with normal activities or awakens from sleep     - SEVERE (8-10): excruciating pain, unable to do any normal activities        Mild     7  CARDIAC RISK FACTORS: "Do you have any history of heart problems or risk factors for heart disease?" (e g , angina, prior heart attack; diabetes, high blood pressure, high cholesterol, smoker, or strong family history of heart disease)      Denies, but has hx of DVT    8  PULMONARY RISK FACTORS: "Do you have any history of lung disease?"  (e g , blood clots in lung, asthma, emphysema, birth control pills)      Denies, but has hx of DVT    9  CAUSE: "What do you think is causing the chest pain?"      Unknown    10  OTHER SYMPTOMS: "Do you have any other symptoms?" (e g , dizziness, nausea, vomiting, sweating, fever, difficulty breathing, cough)        Coughing, nausea, "my chest is so congested that I feel like I sometimes have difficulty breathing without my humidifier "    Covid home test negative today      Protocols used: CHEST PAIN-ADULT-

## 2022-01-22 NOTE — TELEPHONE ENCOUNTER
Pt calling concerned about symptoms as noted in initial assessment  Protocol disposition was for pt to go to ED  Pt did not want to go to the ED because he said that "it is just gas pain and will go away "     He then asked what he can take for nasal and chest congestion  Recommended increased fluids and Mucinex as per instructions on package  He verbalized understanding  Risks of not being evaluated this evening discussed with pt and he continued to decline ED evaluation  Encouraged him to call back with any new and/or worsening symptoms  Also spoke to pts mom and made her aware (with pts permission)

## 2022-01-24 NOTE — TELEPHONE ENCOUNTER
COVID PCR test ordered, recommend to do it today, even if at home test is negative since still having symptoms  Any wheezing? Has the chest pains gone away?     *see task for mother

## 2022-01-24 NOTE — TELEPHONE ENCOUNTER
Pts mom states she spoke with him the day he called , he never actually had chest pain  Reports burping and gas in chest sometimes when he eats or has soda    Has deep cough but is improving with mucinex, no wheezing

## 2022-01-25 PROCEDURE — U0003 INFECTIOUS AGENT DETECTION BY NUCLEIC ACID (DNA OR RNA); SEVERE ACUTE RESPIRATORY SYNDROME CORONAVIRUS 2 (SARS-COV-2) (CORONAVIRUS DISEASE [COVID-19]), AMPLIFIED PROBE TECHNIQUE, MAKING USE OF HIGH THROUGHPUT TECHNOLOGIES AS DESCRIBED BY CMS-2020-01-R: HCPCS | Performed by: INTERNAL MEDICINE

## 2022-01-25 PROCEDURE — U0005 INFEC AGEN DETEC AMPLI PROBE: HCPCS | Performed by: INTERNAL MEDICINE

## 2022-01-27 DIAGNOSIS — G89.29 CHRONIC BILATERAL LOW BACK PAIN WITHOUT SCIATICA: ICD-10-CM

## 2022-01-27 DIAGNOSIS — M54.50 CHRONIC BILATERAL LOW BACK PAIN WITHOUT SCIATICA: ICD-10-CM

## 2022-01-27 RX ORDER — METHOCARBAMOL 500 MG/1
500 TABLET, FILM COATED ORAL 2 TIMES DAILY PRN
Qty: 60 TABLET | Refills: 1 | Status: SHIPPED | OUTPATIENT
Start: 2022-01-27

## 2022-02-17 ENCOUNTER — TELEPHONE (OUTPATIENT)
Dept: INTERNAL MEDICINE CLINIC | Facility: CLINIC | Age: 32
End: 2022-02-17

## 2022-02-17 NOTE — TELEPHONE ENCOUNTER
Please call Dario or speak to his mom  You can schedule an appt with me to discuss or I can refer you to neuropsych for additional testing  Let me know

## 2022-02-17 NOTE — TELEPHONE ENCOUNTER
You can schedule a sooner appointment with me, scheduled in April      Please call the follow for neuropsych new patient appointments:    [facility])    [address])              [address] [place] [phone]

## 2022-02-17 NOTE — TELEPHONE ENCOUNTER
Spoke w/ Madiha and adv'd  No sooner appt  available  Will call her if one becomes available  Madiha states that  Pt 's Psychiatrist put him on Sertraline 50mg q day

## 2022-02-17 NOTE — TELEPHONE ENCOUNTER
----- Message from Maico Gilliland sent at 2/17/2022  8:55 AM EST -----  Regarding: FW: Memory and awareness problems    ----- Message -----  From: Ishmael Jackson  Sent: 2/16/2022   6:13 PM EST  To: [facility]  Subject: Memory and awareness problems                    I have been noticing over the past couple months that my cognitive awareness and ability to remember details about my daily schedule such as when to take meds and when to eat  I don't always remember or notice  when I am hungry or tired cranky angry and am forgetting when the last time was that I took my meds  Mom helps with reminders  Not only that, I will forget when was the last time that I ate and will try to rely on my ability to feel hunger which does not always work the best   I will go up to 10 to 12 hours thinking that I ate something recently and then all of a sudden remember that I hadn't eaten  Mom offers meals regularly scheduled but I don't always realize when I am hungry  Memory and sharpness issues and the awareness in my brain make this difficult  I mentioned these issues today to my psychotherapist and was told that I should contact you for a neurological workup

## 2022-04-13 ENCOUNTER — TELEPHONE (OUTPATIENT)
Dept: INTERNAL MEDICINE CLINIC | Facility: CLINIC | Age: 32
End: 2022-04-13

## 2022-04-13 NOTE — TELEPHONE ENCOUNTER
Pt hasn't been feeling well for the past couple of days  He vomitted a couple of days ago and then again after breakfast this morning  This afternoon he has had diarrhea off and on  He has body aches, headache, raspy throat  He is only taking Tylenol and on a soft diet  Wants to know if he should be tested for Covid

## 2022-04-14 ENCOUNTER — OFFICE VISIT (OUTPATIENT)
Dept: INTERNAL MEDICINE CLINIC | Facility: CLINIC | Age: 32
End: 2022-04-14
Payer: MEDICARE

## 2022-04-14 VITALS
HEART RATE: 122 BPM | HEIGHT: 76 IN | OXYGEN SATURATION: 98 % | BODY MASS INDEX: 38.36 KG/M2 | DIASTOLIC BLOOD PRESSURE: 80 MMHG | TEMPERATURE: 98.1 F | WEIGHT: 315 LBS | SYSTOLIC BLOOD PRESSURE: 124 MMHG

## 2022-04-14 DIAGNOSIS — J06.9 ACUTE UPPER RESPIRATORY INFECTION, UNSPECIFIED: ICD-10-CM

## 2022-04-14 DIAGNOSIS — B34.9 VIRAL INFECTION: Primary | ICD-10-CM

## 2022-04-14 PROCEDURE — 87636 SARSCOV2 & INF A&B AMP PRB: CPT | Performed by: NURSE PRACTITIONER

## 2022-04-14 PROCEDURE — 99213 OFFICE O/P EST LOW 20 MIN: CPT | Performed by: NURSE PRACTITIONER

## 2022-04-14 NOTE — PROGRESS NOTES
Assessment/Plan:    Covid/flu pending  Continue symptomatic treatment with tylenol and mucinex as needed  Stay well hydrated  Diagnoses and all orders for this visit:    Viral infection  -     Covid/Flu- Office Collect    Acute upper respiratory infection, unspecified   -     Covid/Flu- Office Collect    Other orders  -     sertraline (ZOLOFT) 50 mg tablet; Take 50 mg by mouth every morning          Subjective:      Patient ID: Addis Valentino is a 28 y o  male  Here today with flu like symptoms  Symptoms started about 4-5 days ago  He has had nausea, vomiting X 2, diarrhea, cough, fatigue, body aches, and night sweats  Cough is mildly productive with white or clear sputum   Taking tylenol PRN               The following portions of the patient's history were reviewed and updated as appropriate: allergies, current medications, past family history, past medical history, past social history, past surgical history and problem list     Review of Systems   Constitutional: Positive for activity change, appetite change, chills, diaphoresis and fatigue  HENT: Positive for congestion  Respiratory: Positive for cough  Negative for shortness of breath  Cardiovascular: Negative for chest pain  Gastrointestinal: Positive for diarrhea, nausea and vomiting  Negative for abdominal pain  Genitourinary: Negative for dysuria  Musculoskeletal: Positive for myalgias  Neurological: Positive for headaches  Negative for dizziness and light-headedness  Objective:      /80   Pulse (!) 122   Temp 98 1 °F (36 7 °C)   Ht 6' 4" (1 93 m)   Wt (!) 156 kg (345 lb)   SpO2 98%   BMI 41 99 kg/m²          Physical Exam  Vitals reviewed  Constitutional:       Appearance: Normal appearance  HENT:      Head: Atraumatic        Right Ear: Tympanic membrane, ear canal and external ear normal       Left Ear: Tympanic membrane, ear canal and external ear normal    Eyes:      Conjunctiva/sclera: Conjunctivae normal    Cardiovascular:      Rate and Rhythm: Normal rate and regular rhythm  Heart sounds: Normal heart sounds  Pulmonary:      Effort: Pulmonary effort is normal       Breath sounds: Normal breath sounds  Abdominal:      General: Bowel sounds are normal       Palpations: Abdomen is soft  Tenderness: There is no abdominal tenderness  Skin:     General: Skin is warm and dry  Neurological:      Mental Status: He is alert and oriented to person, place, and time     Psychiatric:         Mood and Affect: Mood normal          Behavior: Behavior normal

## 2022-04-15 LAB
FLUAV RNA RESP QL NAA+PROBE: NEGATIVE
FLUBV RNA RESP QL NAA+PROBE: NEGATIVE
SARS-COV-2 RNA RESP QL NAA+PROBE: NEGATIVE

## 2022-05-02 ENCOUNTER — RA CDI HCC (OUTPATIENT)
Dept: OTHER | Facility: HOSPITAL | Age: 32
End: 2022-05-02

## 2022-05-02 NOTE — PROGRESS NOTES
[address]  coding opportunities       Chart reviewed, no opportunity found: CHART REVIEWED, NO OPPORTUNITY FOUND        Patients Insurance     Medicare Insurance: Medicare

## 2022-05-06 ENCOUNTER — TELEMEDICINE (OUTPATIENT)
Dept: INTERNAL MEDICINE CLINIC | Facility: CLINIC | Age: 32
End: 2022-05-06
Payer: MEDICARE

## 2022-05-06 DIAGNOSIS — E78.49 OTHER HYPERLIPIDEMIA: ICD-10-CM

## 2022-05-06 DIAGNOSIS — F31.60 BIPOLAR AFFECTIVE DISORDER, CURRENT EPISODE MIXED, CURRENT EPISODE SEVERITY UNSPECIFIED (HCC): Chronic | ICD-10-CM

## 2022-05-06 DIAGNOSIS — E03.9 ACQUIRED HYPOTHYROIDISM: Chronic | ICD-10-CM

## 2022-05-06 DIAGNOSIS — E78.2 MIXED HYPERLIPIDEMIA: ICD-10-CM

## 2022-05-06 DIAGNOSIS — M48.061 SPINAL STENOSIS OF LUMBAR REGION, UNSPECIFIED WHETHER NEUROGENIC CLAUDICATION PRESENT: ICD-10-CM

## 2022-05-06 DIAGNOSIS — R11.2 NAUSEA AND VOMITING, UNSPECIFIED VOMITING TYPE: Primary | ICD-10-CM

## 2022-05-06 DIAGNOSIS — R73.03 PREDIABETES: ICD-10-CM

## 2022-05-06 DIAGNOSIS — E66.01 MORBID OBESITY DUE TO EXCESS CALORIES (HCC): Chronic | ICD-10-CM

## 2022-05-06 DIAGNOSIS — R10.13 DYSPEPSIA: ICD-10-CM

## 2022-05-06 DIAGNOSIS — E53.8 VITAMIN B12 DEFICIENCY: ICD-10-CM

## 2022-05-06 PROCEDURE — 99214 OFFICE O/P EST MOD 30 MIN: CPT | Performed by: INTERNAL MEDICINE

## 2022-05-06 RX ORDER — OMEPRAZOLE 20 MG/1
20 CAPSULE, DELAYED RELEASE ORAL DAILY
Qty: 30 CAPSULE | Refills: 1 | Status: SHIPPED | OUTPATIENT
Start: 2022-05-06 | End: 2022-05-31

## 2022-05-06 RX ORDER — LEVOTHYROXINE SODIUM 0.05 MG/1
TABLET ORAL
Qty: 30 TABLET | Refills: 1 | Status: SHIPPED | OUTPATIENT
Start: 2022-05-06

## 2022-05-06 RX ORDER — FENOFIBRATE 160 MG/1
160 TABLET ORAL DAILY
Qty: 90 TABLET | Refills: 1 | Status: SHIPPED | OUTPATIENT
Start: 2022-05-06

## 2022-05-06 RX ORDER — LEVOTHYROXINE SODIUM 0.03 MG/1
TABLET ORAL
Qty: 80 TABLET | Refills: 1 | Status: SHIPPED | OUTPATIENT
Start: 2022-05-06

## 2022-05-06 NOTE — PROGRESS NOTES
Virtual Regular Visit    Verification of patient location:    Patient is located in the following state in which I hold an active license [place]      Assessment/Plan:    Problem List Items Addressed This Visit        Endocrine    Hypothyroidism (Chronic)     Taking medication with all other medications  TFTs due  Relevant Medications    levothyroxine 25 mcg tablet    levothyroxine 50 mcg tablet    Other Relevant Orders    TSH, 3rd generation with Free T4 reflex       Other    Bipolar disorder (HCC) (Chronic)     Sertraline recently added  Still on Depakote, Risperdal and Cogentin  Per psych  Lumbar stenosis     Stopped exercises due to recent pain  Declined physical therapy  Mixed hyperlipidemia     Lipids due, on fenofibrate  Relevant Medications    fenofibrate 160 MG tablet    Other Relevant Orders    Comprehensive metabolic panel    Morbid obesity due to excess calories (HCC) (Chronic)     He reports losing about 20 lbs since 1-2 months ago  Prediabetes     Repeat A1c  Relevant Orders    CBC and differential    Hemoglobin A1C    Vitamin B12 deficiency     Recheck levels, ?taking B12  Relevant Orders    Vitamin B12      Other Visit Diagnoses     Nausea and vomiting, unspecified vomiting type    -  Primary    Suspect gastritis  Start daily PPI  Discussed low acid diet  Limit dairy  Avoid laying down after meals  Relevant Orders    Ambulatory Referral to Gastroenterology    Dyspepsia        Relevant Medications    omeprazole (PriLOSEC) 20 mg delayed release capsule    Other Relevant Orders    Ambulatory Referral to Gastroenterology    Other hyperlipidemia        Relevant Medications    fenofibrate 160 MG tablet    Other Relevant Orders    Lipid panel        Follow up in 4 months or as needed           Reason for visit is   Chief Complaint   Patient presents with    Virtual Regular Visit        Encounter provider Jose Luis Woodward MD    Provider located at [address]  [address] [place] [address] [facility] [id number]      Recent Visits  No visits were found meeting these conditions  Showing recent visits within past 7 days and meeting all other requirements  Today's Visits  Date Type Provider Dept   05/06/22 Telemedicine Ted Bee, [facility]   Showing today's visits and meeting all other requirements  Future Appointments  No visits were found meeting these conditions  Showing future appointments within next 150 days and meeting all other requirements       The patient was identified by name and date of birth  Celeste Parry was informed that this is a telemedicine visit and that the visit is being conducted through [address] and patient was informed this is a secure, HIPAA-complaint platform  He agrees to proceed     My office door was closed  No one else was in the room  He acknowledged consent and understanding of privacy and security of the video platform  The patient has agreed to participate and understands they can discontinue the visit at any time  Patient is aware this is a billable service  Subjective  Celeste Parry is a 28 y o  male f/u   He complains intermittent nausea and vomiting for the past few months  In the past few weeks, symptoms seem to worsen  He noticed that if he eats a lot tomato based meals or coffee he would vomit  He also drinks a lot of milk and it does not seem to agree with stomach anymore  He reports the vomitus may be liquid or previously ingested food  He does eat regular meals, 3 to 4 times a day  He would experience hunger pangs and experience nausea then vomiting  He last vomited yesterday, twice  He has not been doing his back exercises since he reports pain has worsened in the past week  He is resting it right now, laying down frequently  He reports losing about 20 lb in the past 1-2 months    He is carefully watching his food intake  He was started on sertraline a few months ago  Past Medical History:   Diagnosis Date    Autism     Bipolar 1 disorder ([address] )     Disease of thyroid gland     Hyperlipidemia     Lumbar disc disease     Psychiatric disorder     Compulsive Disorder    Sleep apnea     Urinary incontinence        Past Surgical History:   Procedure Laterality Date    BACK SURGERY  11/2017    Lower   Last assessed: 1/18/18    COLONOSCOPY      Fiberoptic    INCISION AND DRAINAGE POSTERIOR SPINE N/A 11/1/2017    Procedure: INCISION AND DRAINAGE (I&D) SPINE;  Surgeon: Fuad Sánchez MD;  Location[facility];  Service: Neurosurgery    [place] LAMNOTMY INCL W/DCMPRSN NRV ROOT 1 INTRSPC LUMBR Bilateral 10/17/2017    Procedure: LEFT L3/4 AND RIGHT L4/5 MICRODISCECTOMIES, HEMILAMINECTOMIES; POSSIBLE EPIDURAL STEROID INJECTIONS;  Surgeon: Fuad Sánchez MD;  Location[facility];  Service: Neurosurgery    [facility] N/A 11/3/2017    Procedure: [address] [place] [address]), wound vac placement back;  Surgeon: Jordi Bautista DO;  Location[facility];  Service: General    WOUND DEBRIDEMENT N/A 11/6/2017    Procedure: [facility] ([address] [facility];  Surgeon: Reza Stevens MD;  Location: [facility];  Service: General       Current Outpatient Medications   Medication Sig Dispense Refill    acetaminophen (TYLENOL) 500 mg tablet Take 325 mg by mouth every 6 (six) hours as needed for mild pain        benztropine (COGENTIN) 1 mg tablet Take 1 mg by mouth every 12 (twelve) hours      clonazePAM (KlonoPIN) 1 mg tablet Take 0 5 mg by mouth 2 (two) times a day      divalproex sodium (DEPAKOTE ER) 500 mg 24 hr tablet Take 1 tablet (500 mg total) by mouth 2 (two) times a day 60 tablet 0    divalproex sodium (DEPAKOTE) 500 mg EC tablet Take 1,000 mg by mouth daily Mid day       docusate sodium (COLACE) 100 mg capsule Take 1 capsule by mouth 2 (two) times a day 10 capsule 0    EPINEPHrine (EPIPEN) 0 3 mg/0 3 mL SOAJ Inject 0 3 mL (0 3 mg total) into the shoulder, thigh, or buttocks once for 1 dose 2 each 0    fenofibrate 160 MG tablet Take 1 tablet (160 mg total) by mouth daily 90 tablet 1    levothyroxine 25 mcg tablet TAKE 1 TABLET BY MOUTH EVERY DAY MON THROUGH FRIDAY 80 tablet 1    levothyroxine 50 mcg tablet Take 1 tablet PO during weekend 30 tablet 1    methocarbamol (ROBAXIN) 500 mg tablet Take 1 tablet (500 mg total) by mouth 2 (two) times a day as needed for muscle spasms 60 tablet 1    Multiple Vitamin tablet Take by mouth      omeprazole (PriLOSEC) 20 mg delayed release capsule Take 1 capsule (20 mg total) by mouth daily 30 capsule 1    risperiDONE (RisperDAL) 2 mg tablet Take 2 mg by mouth 2 (two) times a day      risperiDONE (RisperDAL) 4 mg tablet Take 4 mg by mouth 2 (two) times a day      sertraline (ZOLOFT) 50 mg tablet Take 50 mg by mouth every morning       No current facility-administered medications for this visit  Allergies   Allergen Reactions    Sulfa Antibiotics Anaphylaxis    Bee Venom     Coriandrum Sativum     Gabapentin Vomiting    Mercury     Parsley Seed - Food Allergy     Pentothal [Thiopental]      Other reaction(s): Anaphylaxis    Soy Allergy - Food Allergy GI Intolerance    Soybean Oil - Food Allergy Vomiting    Allegra [Fexofenadine] Hives and Rash    Aspirin Rash     Category: Allergy;     Ciprofloxacin Rash     Reaction Date: 10Feb2012; Annotation - [id number]: swelling    Erythromycin Hives and Rash     Reaction Date: 10Feb2012;     Motrin [Ibuprofen] Rash     Reaction Date: 10Feb2012; Annotation - [id number]: rash, wheeze    Penicillins Hives and Rash     Reaction Date: 10Feb2012; Annotation - [id number]: rash       Review of Systems   Constitutional: Positive for appetite change  Respiratory: Negative for cough  Cardiovascular: Negative for chest pain     Gastrointestinal: Positive for nausea and vomiting  Negative for abdominal pain, constipation and diarrhea  Genitourinary: Negative for difficulty urinating  Musculoskeletal: Positive for back pain  Neurological: Negative for headaches  Video Exam    There were no vitals filed for this visit  Physical Exam  Constitutional:       General: He is not in acute distress  Appearance: Normal appearance  He is not ill-appearing  HENT:      Head: Normocephalic and atraumatic  Mouth/Throat:      Mouth: Mucous membranes are moist    Eyes:      Pupils: Pupils are equal, round, and reactive to light  Pulmonary:      Effort: Pulmonary effort is normal  No respiratory distress  Neurological:      General: No focal deficit present  Mental Status: He is alert and oriented to person, place, and time  Psychiatric:         Mood and Affect: Mood normal          Behavior: Behavior normal           I spent 12 minutes directly with the patient during this visit    VIRTUAL VISIT [address] [place] [address] verbally agrees to participate in [employer]  Pt is aware that [employer] could be limited without vital signs or the ability to perform a full hands-on physical exam  Hasmukh A Wilbert Basilio understands he or the provider may request at any time to terminate the video visit and request the patient to seek care or treatment in person  BMI Counseling: There is no height or weight on file to calculate BMI  The BMI is above normal  Nutrition recommendations include decreasing overall calorie intake, decreasing soda and/or juice intake and moderation in carbohydrate intake  Exercise recommendations include exercising 3-5 times per week and strength training exercises

## 2022-05-06 NOTE — PATIENT INSTRUCTIONS
Diet for Stomach Ulcers and Gastritis   WHAT YOU NEED TO KNOW:   A diet for stomach ulcers and gastritis is a meal plan that limits foods that irritate your stomach  Certain foods may worsen symptoms such as stomach pain, bloating, heartburn, or indigestion  Foods to limit or avoid:  You may need to avoid acidic, spicy, or high-fat foods  Not all foods affect everyone the same way  You will need to learn which foods worsen your symptoms and limit those foods  The following are some foods that may worsen ulcer or gastritis symptoms:  · Beverages:      ? Whole milk and chocolate milk    ? Hot cocoa and cola    ? Any beverage with caffeine    ? Regular and decaffeinated coffee    ? Peppermint and spearmint tea    ? Green and black tea, with or without caffeine    ? Orange and grapefruit juices    ? Drinks that contain alcohol    · Spices and seasonings:      ? Black and red pepper    ? Chili powder    ? Mustard seed and nutmeg    · Other foods:      ? Dairy foods made from whole milk or cream    ? Chocolate    ? Spicy or strongly flavored cheeses, such as jalapeno or black pepper    ? Highly seasoned, high-fat meats, such as sausage, salami, yeboah, ham, and cold cuts    ? Hot chiles and peppers    ? Tomato products, such as tomato paste, tomato sauce, or tomato juice    Foods to include:  Eat a variety of healthy foods from all the food groups  Eat fruits, vegetables, whole grains, and fat-free or low-fat dairy foods  Whole grains include whole-wheat breads, cereals, pasta, and brown rice  Choose lean meats, poultry (chicken and turkey), fish, beans, eggs, and nuts  A healthy meal plan is low in unhealthy fats, salt, and added sugar  Healthy fats include olive oil and canola oil  Ask your dietitian for more information about a healthy diet  Other helpful guidelines:   · Do not eat right before bedtime  Stop eating at least 2 hours before bedtime  · Eat small, frequent meals    Your stomach may tolerate small, frequent meals better than large meals  © Copyright [employer] 2022 Information is for End User's use only and may not be sold, redistributed or otherwise used for commercial purposes  All illustrations and images included in CareNotes® are the copyrighted property of A D A M , Inc  or Imani Vasquez  The above information is an  only  It is not intended as medical advice for individual conditions or treatments  Talk to your doctor, nurse or pharmacist before following any medical regimen to see if it is safe and effective for you

## 2022-05-28 DIAGNOSIS — R10.13 DYSPEPSIA: ICD-10-CM

## 2022-05-31 RX ORDER — OMEPRAZOLE 20 MG/1
CAPSULE, DELAYED RELEASE ORAL
Qty: 90 CAPSULE | Refills: 1 | Status: SHIPPED | OUTPATIENT
Start: 2022-05-31

## 2022-06-29 NOTE — EMTALA/ACUTE CARE TRANSFER
[address] [address] [address]  Dept: [phone]      EMTALA TRANSFER CONSENT    NAME Sigifredo Bradley                                         1990                              MRN [id number]    I have been informed of my rights regarding examination, treatment, and transfer   by Dr Can Rodrigues MD    Benefits: Specialized equipment and/or services available at the receiving facility (Include comment)________________________, Continuity of care (Neurosurgical care)    Risks: Potential for delay in receiving treatment, Potential deterioration of medical condition, Loss of IV, Increased discomfort during transfer, Possible worsening of condition or death during transfer      Transfer Request   I acknowledge that my medical condition has been evaluated and explained to me by the emergency department physician or other qualified medical person and/or my attending physician who has recommended and offered to me further medical examination and treatment  I understand the Hospital's obligation with respect to the treatment and stabilization of my emergency medical condition  I nevertheless request to be transferred  I release the Hospital, the doctor, and any other persons caring for me from all responsibility or liability for any injury or ill effects that may result from my transfer and agree to accept all responsibility for the consequences of my choice to transfer, rather than receive stabilizing treatment at the Hospital  I understand that because the transfer is my request, my insurance may not provide reimbursement for the services  The Hospital will assist and direct me and my family in how to make arrangements for transfer, but the hospital is not liable for any fees charged by the transport service    In spite of this understanding, I refuse to consent to further medical examination and treatment which has been offered to me, and request transfer to  [address] : [facility]  I authorize the performance of emergency medical procedures and treatments upon me in both transit and upon arrival at the receiving facility  Additionally, I authorize the release of any and all medical records to the receiving facility and request they be transported with me, if possible  I authorize the performance of emergency medical procedures and treatments upon me in both transit and upon arrival at the receiving facility  Additionally, I authorize the release of any and all medical records to the receiving facility and request they be transported with me, if possible  I understand that the safest mode of transportation during a medical emergency is an ambulance and that the Hospital advocates the use of this mode of transport  Risks of traveling to the receiving facility by car, including absence of medical control, life sustaining equipment, such as oxygen, and medical personnel has been explained to me and I fully understand them  (ARLET CORRECT BOX BELOW)  [  ]  I consent to the stated transfer and to be transported by ambulance/helicopter  [  ]  I consent to the stated transfer, but refuse transportation by ambulance and accept full responsibility for my transportation by car  I understand the risks of non-ambulance transfers and I exonerate the Hospital and its staff from any deterioration in my condition that results from this refusal     X___________________________________________    DATE  17  TIME________  Signature of patient or legally responsible individual signing on patient behalf           RELATIONSHIP TO PATIENT_________________________          Provider Certification    NAME Jamie Ayon                                         1990                              MRN [id number]    A medical screening exam was performed on the above named patient    Based on the examination:    Condition Necessitating Transfer The primary encounter diagnosis was Fever  A diagnosis of Dehydration was also pertinent to this visit  Patient Condition: The patient has been stabilized such that within reasonable medical probability, no material deterioration of the patient condition or the condition of the unborn child(rufino) is likely to result from the transfer    Reason for Transfer: Level of Care needed not available at this facility (Neurosurgical care)    Transfer Requirements: [facility]   · Space available and qualified personnel available for treatment as acknowledged by    · Agreed to accept transfer and to provide appropriate medical treatment as acknowledged by       Dr Damián Mehta  · Appropriate medical records of the examination and treatment of the patient are provided at the time of transfer   [facility] _______  · Transfer will be performed by qualified personnel from    and appropriate transfer equipment as required, including the use of necessary and appropriate life support measures      Provider Certification: I have examined the patient and explained the following risks and benefits of being transferred/refusing transfer to the patient/family:  General risk, such as traffic hazards, adverse weather conditions, rough terrain or turbulence, possible failure of equipment (including vehicle or aircraft), or consequences of actions of persons outside the control of the transport personnel      Based on these reasonable risks and benefits to the patient and/or the unborn child(rufino), and based upon the information available at the time of the patients examination, I certify that the medical benefits reasonably to be expected from the provision of appropriate medical treatments at another medical facility outweigh the increasing risks, if any, to the individuals medical condition, and in the case of labor to the unborn child, from effecting the transfer      X____________________________________________ DATE 11/17/17        TIME_______      ORIGINAL - SEND TO MEDICAL RECORDS   COPY - SEND WITH PATIENT DURING TRANSFER Consent (Near Eyelid Margin)/Introductory Paragraph: The rationale for Mohs was explained to the patient and consent was obtained. The risks, benefits and alternatives to therapy were discussed in detail. Specifically, the risks of ectropion or eyelid deformity, infection, scarring, bleeding, prolonged wound healing, incomplete removal, allergy to anesthesia, nerve injury and recurrence were addressed. Prior to the procedure, the treatment site was clearly identified and confirmed by the patient. All components of Universal Protocol/PAUSE Rule completed.

## 2022-09-07 ENCOUNTER — TELEPHONE (OUTPATIENT)
Dept: INTERNAL MEDICINE CLINIC | Facility: CLINIC | Age: 32
End: 2022-09-07

## 2022-09-07 NOTE — TELEPHONE ENCOUNTER
Pt has hoarse voice, sore throat, fatigues, dry cough, runny nose  Using Mucinex and vaporizer without relief  Symptoms started on Monday    Does not have a home covid test

## 2022-09-08 ENCOUNTER — TELEMEDICINE (OUTPATIENT)
Dept: INTERNAL MEDICINE CLINIC | Facility: CLINIC | Age: 32
End: 2022-09-08
Payer: MEDICARE

## 2022-09-08 DIAGNOSIS — U07.1 COVID-19: Primary | ICD-10-CM

## 2022-09-08 LAB — SARS-COV-2 AG UPPER RESP QL IA: ABNORMAL

## 2022-09-08 PROCEDURE — 99213 OFFICE O/P EST LOW 20 MIN: CPT | Performed by: INTERNAL MEDICINE

## 2022-09-08 RX ORDER — PRAZOSIN HYDROCHLORIDE 1 MG/1
1 CAPSULE ORAL
COMMUNITY
Start: 2022-06-14 | End: 2022-11-09

## 2022-09-08 RX ORDER — NIRMATRELVIR AND RITONAVIR 300-100 MG
3 KIT ORAL 2 TIMES DAILY
Qty: 30 TABLET | Refills: 0 | Status: SHIPPED | OUTPATIENT
Start: 2022-09-08 | End: 2022-09-13

## 2022-09-08 NOTE — PROGRESS NOTES
COVID-19 Outpatient Progress Note    Assessment/Plan:    Problem List Items Addressed This Visit        Other    COVID-19 - Primary    Relevant Medications    nirmatrelvir & ritonavir (Paxlovid, 300/100,) tablet therapy pack         Disposition:     Discussed symptom directed medication options with patient  Patient meets criteria for PAXLOVID and they have been counseled appropriately according to EUA documentation released by the FDA  After discussion, patient agrees to treatment  Luís Gonzalez is an investigational medicine used to treat mild-to-moderate COVID-19 in adults and children (15years of age and older weighing at least 80 pounds (40 kg)) with positive results of direct SARS-CoV-2 viral testing, and who are at high risk for progression to severe COVID-19, including hospitalization or death  PAXLOVID is investigational because it is still being studied  There is limited information about the safety and effectiveness of using PAXLOVID to treat people with mild-to-moderate COVID-19  The FDA has authorized the emergency use of PAXLOVID for the treatment of mild-tomoderate COVID-19 in adults and children (15years of age and older weighing at least 80 pounds (40 kg)) with a positive test for the virus that causes COVID-19, and who are at high risk for progression to severe COVID-19, including hospitalization or death, under an EUA  What should I tell my healthcare provider before I take PAXLOVID? Tell your healthcare provider if you:  - Have any allergies  - Have liver or kidney disease  - Are pregnant or plan to become pregnant  - Are breastfeeding a child  - Have any serious illnesses    Tell your healthcare provider about all the medicines you take, including prescription and over-the-counter medicines, vitamins, and herbal supplements  Some medicines may interact with PAXLOVID and may cause serious side effects   Keep a list of your medicines to show your healthcare provider and pharmacist when you get a new medicine  You can ask your healthcare provider or pharmacist for a list of medicines that interact with PAXLOVID  Do not start taking a new medicine without telling your healthcare provider  Your healthcare provider can tell you if it is safe to take PAXLOVID with other medicines  Tell your healthcare provider if you are taking combined hormonal contraceptive  PAXLOVID may affect how your birth control pills work  Females who are able to become pregnant should use another effective alternative form of contraception or an additional barrier method of contraception  Talk to your healthcare provider if you have any questions about contraceptive methods that might be right for you  How do I take PAXLOVID? PAXLOVID consists of 2 medicines: nirmatrelvir and ritonavir  - Take 2 pink tablets of nirmatrelvir with 1 white tablet of ritonavir by mouth 2 times each day (in the morning and in the evening) for 5 days  For each dose, take all 3 tablets at the same time  - If you have kidney disease, talk to your healthcare provider  You may need a different dose  - Swallow the tablets whole  Do not chew, break, or crush the tablets  - Take PAXLOVID with or without food  - Do not stop taking PAXLOVID without talking to your healthcare provider, even if you feel better  - If you miss a dose of PAXLOVID within 8 hours of the time it is usually taken, take it as soon as you remember  If you miss a dose by more than 8 hours, skip the missed dose and take the next dose at your regular time  Do not take 2 doses of PAXLOVID at the same time  - If you take too much PAXLOVID, call your healthcare provider or go to the nearest hospital emergency room right away    - If you are taking a ritonavir- or cobicistat-containing medicine to treat hepatitis C or Human Immunodeficiency Virus (HIV), you should continue to take your medicine as prescribed by your healthcare provider   - Talk to your healthcare provider if you do not feel better or if you feel worse after 5 days  Who should generally not take PAXLOVID? Do not take PAXLOVID if:  You are allergic to nirmatrelvir, ritonavir, or any of the ingredients in PAXLOVID  You are taking any of the following medicines:  - Alfuzosin  - Pethidine, piroxicam, propoxyphene  - Ranolazine  - Amiodarone, dronedarone, flecainide, propafenone, quinidine  - Colchicine  - Lurasidone, pimozide, clozapine  - Dihydroergotamine, ergotamine, methylergonovine  - Lovastatin, simvastatin  - Sildenafil (Revatio®) for pulmonary arterial hypertension (PAH)  - Triazolam, oral midazolam  - Apalutamide  - Carbamazepine, phenobarbital, phenytoin  - Rifampin  - St  Klevers Wort (hypericum perforatum)    What are the important possible side effects of PAXLOVID? Possible side effects of PAXLOVID are:  - Liver Problems  Tell your healthcare provider right away if you have any of these signs and symptoms of liver problems: loss of appetite, yellowing of your skin and the whites of eyes (jaundice), dark-colored urine, pale colored stools and itchy skin, stomach area (abdominal) pain  - Resistance to HIV Medicines  If you have untreated HIV infection, PAXLOVID may lead to some HIV medicines not working as well in the future  - Other possible side effects include: altered sense of taste, diarrhea, high blood pressure, or muscle aches    These are not all the possible side effects of PAXLOVID  Not many people have taken PAXLOVID  Serious and unexpected side effects may happen  Whit Brown is still being studied, so it is possible that all of the risks are not known at this time  What other treatment choices are there? Like Evon Winters may allow for the emergency use of other medicines to treat people with COVID-19   Go to https://[facility]/ for information on the emergency use of other medicines that are authorized by FDA to treat people with COVID-19  Your healthcare provider may talk with you about clinical trials for which you may be eligible  It is your choice to be treated or not to be treated with PAXLOVID  Should you decide not to receive it or for your child not to receive it, it will not change your standard medical care  What if I am pregnant or breastfeeding? There is no experience treating pregnant women or breastfeeding mothers with PAXLOVID  For a mother and unborn baby, the benefit of taking PAXLOVID may be greater than the risk from the treatment  If you are pregnant, discuss your options and specific situation with your healthcare provider  It is recommended that you use effective barrier contraception or do not have sexual activity while taking PAXLOVID  If you are breastfeeding, discuss your options and specific situation with your healthcare provider  How do I report side effects with PAXLOVID? Contact your healthcare provider if you have any side effects that bother you or do not go away  Report side effects to FDA MedWatch at www fda gov/medwatch or call 5-220-QQN7479 or you can report side effects to [facility]  at the contact information provided below  Website Fax number Telephone number   [employer] [phone]     How should I store Juani Smith? Store PAXLOVID tablets at room temperature between 68°F to 77°F (20°C to 25°C)  Full fact sheet for patients, parents, and caregivers can be found at: http://www [employer]/    I have spent 5 minutes directly with the patient  Greater than 50% of this time was spent in counseling/coordination of care regarding: risks and benefits of treatment options, instructions for management, patient and family education and impressions         Encounter provider: Dianna Pérez MD     Provider located at: [place] [address] [facility] INTERNAL MEDICINE  [address] [facility] [address] [facility] [address]     Recent Visits  Date Type Provider Dept   09/07/22 Telephone Marisel Samuel [facility] Internal Med   Showing recent visits within past 7 days and meeting all other requirements  Today's Visits  Date Type Provider Dept   09/08/22 Telemedicine Braeden Black MD [facility] Internal Med   Showing today's visits and meeting all other requirements  Future Appointments  No visits were found meeting these conditions  Showing future appointments within next 150 days and meeting all other requirements     This virtual check-in was done via [facility] and patient was informed that this is a secure, HIPAA-compliant platform  He agrees to proceed  Patient agrees to participate in a virtual check in via telephone or video visit instead of presenting to the office to address urgent/immediate medical needs  Patient is aware this is a billable service  He acknowledged consent and understanding of privacy and security of the video platform  The patient has agreed to participate and understands they can discontinue the visit at any time  After connecting through [facility], the patient was identified by name and date of birth  Anyi Cortez was informed that this was a telemedicine visit and that the exam was being conducted confidentially over secure lines  My office door was closed  No one else was in the room  Anyi Cortez acknowledged consent and understanding of privacy and security of the telemedicine visit  I informed the patient that I have reviewed his record in Epic and presented the opportunity for him to ask any questions regarding the visit today  The patient agreed to participate  Verification of patient location:  Patient is located in the following state in which I hold an active license: [place]    Subjective:   Anyi Cortez is a 28 y o  male who is concerned about COVID-19  Patient's symptoms include fatigue, malaise, nasal congestion, cough, chest tightness, myalgias and headache  Patient denies fever, chills, sore throat, shortness of breath, nausea, vomiting and diarrhea  - Date of symptom onset: 9/5/2022      COVID-19 vaccination status: Fully vaccinated (primary series)    Exposure:   Contact with a person who is under investigation (PUI) for or who is positive for COVID-19 within the last 14 days?: Yes    Symptoms started 3 days ago, later in the afternoon with a dry cough, fatigue and joint pains  He also reports an occasional headache, no GI symptoms  He has been taking Mucinex a needed  He tested (+) today, mother did also  Lab Results   Component Value Date    SARSCOV2 Negative 04/14/2022    [facility] Positive (Patient Reported) (A) 09/08/2022     Past Medical History:   Diagnosis Date    Autism     Bipolar 1 disorder ([facility] [address] )     Disease of thyroid gland     Hyperlipidemia     Lumbar disc disease     Psychiatric disorder     Compulsive Disorder    Sleep apnea     Urinary incontinence      Past Surgical History:   Procedure Laterality Date    BACK SURGERY  11/2017    Lower   Last assessed: 1/18/18    COLONOSCOPY      Fiberoptic    INCISION AND DRAINAGE POSTERIOR SPINE N/A 11/1/2017    Procedure: INCISION AND DRAINAGE (I&D) SPINE;  Surgeon: Sharath Lopes MD;  [facility];  Service: Neurosurgery    GA LAMNOTMY INCL W/DCMPRSN NRV ROOT 1 INTRSPC LUMBR Bilateral 10/17/2017    Procedure: LEFT L3/4 AND RIGHT L4/5 MICRODISCECTOMIES, HEMILAMINECTOMIES; POSSIBLE EPIDURAL STEROID INJECTIONS;  Surgeon: Sharath Lopes MD;  [facility];  Service: Neurosurgery    [address] [place] [address] N/A 11/3/2017    Procedure: DEBRIDEMENT WOUND [facility] OUT), wound vac placement back;  Surgeon: Leigh Waterman DO;  [facility];  Service: General    WOUND DEBRIDEMENT N/A 11/6/2017    Procedure: DEBRIDEMENT WOUND ([address] OUT),CLOSURE OF Damián Butcher;  Surgeon: Belle Figueroa MD;  [facility];  Service: General     Current Outpatient Medications   Medication Sig Dispense Refill    nirmatrelvir & ritonavir (Paxlovid, 300/100,) tablet therapy pack Take 3 tablets by mouth 2 (two) times a day for 5 days Take 2 nirmatrelvir tablets + 1 ritonavir tablet together per dose 30 tablet 0    acetaminophen (TYLENOL) 500 mg tablet Take 325 mg by mouth every 6 (six) hours as needed for mild pain        benztropine (COGENTIN) 1 mg tablet Take 1 mg by mouth every 12 (twelve) hours      clonazePAM (KlonoPIN) 1 mg tablet Take 0 5 mg by mouth 2 (two) times a day      divalproex sodium (DEPAKOTE ER) 500 mg 24 hr tablet Take 1 tablet (500 mg total) by mouth 2 (two) times a day 60 tablet 0    divalproex sodium (DEPAKOTE) 500 mg EC tablet Take 1,000 mg by mouth daily Mid day       docusate sodium (COLACE) 100 mg capsule Take 1 capsule by mouth 2 (two) times a day 10 capsule 0    EPINEPHrine (EPIPEN) 0 3 mg/0 3 mL SOAJ Inject 0 3 mL (0 3 mg total) into the shoulder, thigh, or buttocks once for 1 dose 2 each 0    fenofibrate 160 MG tablet Take 1 tablet (160 mg total) by mouth daily 90 tablet 1    levothyroxine 25 mcg tablet TAKE 1 TABLET BY MOUTH EVERY DAY MON THROUGH FRIDAY 80 tablet 1    levothyroxine 50 mcg tablet Take 1 tablet PO during weekend 30 tablet 1    methocarbamol (ROBAXIN) 500 mg tablet Take 1 tablet (500 mg total) by mouth 2 (two) times a day as needed for muscle spasms 60 tablet 1    Multiple Vitamin tablet Take by mouth      omeprazole (PriLOSEC) 20 mg delayed release capsule TAKE 1 CAPSULE BY MOUTH EVERY DAY 90 capsule 1    prazosin (MINIPRESS) 1 mg capsule Take 1 mg by mouth daily at bedtime      risperiDONE (RisperDAL) 2 mg tablet Take 2 mg by mouth 2 (two) times a day      risperiDONE (RisperDAL) 4 mg tablet Take 4 mg by mouth 2 (two) times a day      sertraline (ZOLOFT) 50 mg tablet Take 50 mg by mouth every morning       No current facility-administered medications for this visit  Allergies   Allergen Reactions    Sulfa Antibiotics Anaphylaxis    Bee Venom     Coriandrum Sativum     Gabapentin Vomiting    Mercury     Parsley Seed - Food Allergy     Pentothal [Thiopental]      Other reaction(s): Anaphylaxis    Soy Allergy - Food Allergy GI Intolerance    Soybean Oil - Food Allergy Vomiting    Allegra [Fexofenadine] Hives and Rash    Aspirin Rash     Category: Allergy;     Ciprofloxacin Rash     Reaction Date: 10Feb2012; Annotation - [id number]: swelling    Erythromycin Hives and Rash     Reaction Date: 10Feb2012;     Motrin [Ibuprofen] Rash     Reaction Date: 10Feb2012; Annotation - [id number]: rash, wheeze    Penicillins Hives and Rash     Reaction Date: 10Feb2012; Annotation - [id number]: rash       Review of Systems   Constitutional: Positive for fatigue  Negative for chills and fever  HENT: Positive for congestion  Negative for sore throat  Respiratory: Positive for cough and chest tightness  Negative for shortness of breath  Gastrointestinal: Negative for diarrhea, nausea and vomiting  Musculoskeletal: Positive for myalgias  Neurological: Positive for headaches  Objective: There were no vitals filed for this visit  Physical Exam  Constitutional:       General: He is not in acute distress  Appearance: Normal appearance  He is not ill-appearing  HENT:      Head: Normocephalic and atraumatic  Mouth/Throat:      Mouth: Mucous membranes are moist    Eyes:      Pupils: Pupils are equal, round, and reactive to light  Pulmonary:      Effort: Pulmonary effort is normal  No respiratory distress  Neurological:      General: No focal deficit present  Mental Status: He is oriented to person, place, and time     Psychiatric:         Mood and Affect: Mood normal          Behavior: Behavior normal

## 2022-10-13 DIAGNOSIS — E03.9 ACQUIRED HYPOTHYROIDISM: Chronic | ICD-10-CM

## 2022-10-13 RX ORDER — LEVOTHYROXINE SODIUM 0.05 MG/1
TABLET ORAL
Qty: 30 TABLET | Refills: 1 | Status: SHIPPED | OUTPATIENT
Start: 2022-10-13

## 2022-10-13 RX ORDER — LEVOTHYROXINE SODIUM 0.03 MG/1
TABLET ORAL
Qty: 80 TABLET | Refills: 1 | Status: SHIPPED | OUTPATIENT
Start: 2022-10-13

## 2022-11-04 NOTE — ANESTHESIA PREPROCEDURE EVALUATION
Review of Systems/Medical History  Patient summary reviewed  Chart reviewed  No history of anesthetic complications     Cardiovascular  EKG reviewed, Hyperlipidemia,    Pulmonary  Sleep apnea (Not able to tolerate CPAP) Sleep Study completed, ,        GI/Hepatic  Negative GI/hepatic ROS          Negative  ROS        Endo/Other  History of thyroid disease , hypothyroidism,   Comment: Impaired fasting glucose   GYN       Hematology  Negative hematology ROS      Musculoskeletal  Obesity (BMI 39 4 )  morbid obesity,        Neurology      Comment: ADHD  Autism    H/o disc herniations s/p L3-4 and L4-5 diskectomies and hemilaminectomies c/b wound dehiscence and wound infection  Psychology   Depression , bipolar disorder,            Physical Exam    Airway    Mallampati score: III  TM Distance: >3 FB  Neck ROM: full     Dental   No notable dental hx     Cardiovascular  Rhythm: regular, Rate: normal, Cardiovascular exam normal    Pulmonary  Pulmonary exam normal Breath sounds clear to auscultation,     Other Findings        Anesthesia Plan  ASA Score- 3       Anesthesia Type- general with ASA Monitors  Additional Monitors:   Airway Plan: ETT  Induction- intravenous  Informed Consent- Anesthetic plan and risks discussed with mother and patient  NPO and allergies verified  Plan: GETA    Anesthestic plan, risks, and benefits discussed with pt and pt's mother  Questions answered  Pt's mother consented consented 
100

## 2022-11-07 ENCOUNTER — APPOINTMENT (OUTPATIENT)
Dept: LAB | Facility: HOSPITAL | Age: 32
End: 2022-11-07

## 2022-11-07 LAB
ALBUMIN SERPL BCP-MCNC: 4.6 G/DL (ref 3.5–5)
ALP SERPL-CCNC: 39 U/L (ref 34–104)
ALT SERPL W P-5'-P-CCNC: 28 U/L (ref 7–52)
ANION GAP SERPL CALCULATED.3IONS-SCNC: 13 MMOL/L (ref 4–13)
AST SERPL W P-5'-P-CCNC: 20 U/L (ref 13–39)
BASOPHILS # BLD AUTO: 0.09 THOUSANDS/ÂΜL (ref 0–0.1)
BASOPHILS NFR BLD AUTO: 1 % (ref 0–1)
BILIRUB SERPL-MCNC: 0.39 MG/DL (ref 0.2–1)
BUN SERPL-MCNC: 15 MG/DL (ref 5–25)
CALCIUM SERPL-MCNC: 10.1 MG/DL (ref 8.4–10.2)
CHLORIDE SERPL-SCNC: 101 MMOL/L (ref 96–108)
CHOLEST SERPL-MCNC: 188 MG/DL
CO2 SERPL-SCNC: 27 MMOL/L (ref 21–32)
CREAT SERPL-MCNC: 0.9 MG/DL (ref 0.6–1.3)
EOSINOPHIL # BLD AUTO: 0.17 THOUSAND/ÂΜL (ref 0–0.61)
EOSINOPHIL NFR BLD AUTO: 2 % (ref 0–6)
ERYTHROCYTE [DISTWIDTH] IN BLOOD BY AUTOMATED COUNT: 13 % (ref 11.6–15.1)
EST. AVERAGE GLUCOSE BLD GHB EST-MCNC: 128 MG/DL
GFR SERPL CREATININE-BSD FRML MDRD: 114 ML/MIN/1.73SQ M
GLUCOSE P FAST SERPL-MCNC: 96 MG/DL (ref 65–99)
HBA1C MFR BLD: 6.1 %
HCT VFR BLD AUTO: 41.4 % (ref 36.5–49.3)
HDLC SERPL-MCNC: 34 MG/DL
HGB BLD-MCNC: 14 G/DL (ref 12–17)
IMM GRANULOCYTES # BLD AUTO: 0.03 THOUSAND/UL (ref 0–0.2)
IMM GRANULOCYTES NFR BLD AUTO: 0 % (ref 0–2)
LDLC SERPL CALC-MCNC: 99 MG/DL (ref 0–100)
LYMPHOCYTES # BLD AUTO: 3.18 THOUSANDS/ÂΜL (ref 0.6–4.47)
LYMPHOCYTES NFR BLD AUTO: 42 % (ref 14–44)
MCH RBC QN AUTO: 27.2 PG (ref 26.8–34.3)
MCHC RBC AUTO-ENTMCNC: 33.8 G/DL (ref 31.4–37.4)
MCV RBC AUTO: 81 FL (ref 82–98)
MONOCYTES # BLD AUTO: 0.48 THOUSAND/ÂΜL (ref 0.17–1.22)
MONOCYTES NFR BLD AUTO: 6 % (ref 4–12)
NEUTROPHILS # BLD AUTO: 3.58 THOUSANDS/ÂΜL (ref 1.85–7.62)
NEUTS SEG NFR BLD AUTO: 49 % (ref 43–75)
NONHDLC SERPL-MCNC: 154 MG/DL
NRBC BLD AUTO-RTO: 0 /100 WBCS
PLATELET # BLD AUTO: 320 THOUSANDS/UL (ref 149–390)
PMV BLD AUTO: 10.2 FL (ref 8.9–12.7)
POTASSIUM SERPL-SCNC: 4.1 MMOL/L (ref 3.5–5.3)
PROT SERPL-MCNC: 7.4 G/DL (ref 6.4–8.4)
RBC # BLD AUTO: 5.14 MILLION/UL (ref 3.88–5.62)
SODIUM SERPL-SCNC: 141 MMOL/L (ref 135–147)
TRIGL SERPL-MCNC: 273 MG/DL
TSH SERPL DL<=0.05 MIU/L-ACNC: 2.85 UIU/ML (ref 0.45–4.5)
VIT B12 SERPL-MCNC: 421 PG/ML (ref 100–900)
WBC # BLD AUTO: 7.53 THOUSAND/UL (ref 4.31–10.16)

## 2022-11-09 ENCOUNTER — OFFICE VISIT (OUTPATIENT)
Dept: INTERNAL MEDICINE CLINIC | Facility: CLINIC | Age: 32
End: 2022-11-09

## 2022-11-09 VITALS
TEMPERATURE: 96.5 F | SYSTOLIC BLOOD PRESSURE: 132 MMHG | DIASTOLIC BLOOD PRESSURE: 90 MMHG | HEIGHT: 76 IN | HEART RATE: 107 BPM | WEIGHT: 315 LBS | BODY MASS INDEX: 38.36 KG/M2 | OXYGEN SATURATION: 97 %

## 2022-11-09 DIAGNOSIS — R73.03 PREDIABETES: ICD-10-CM

## 2022-11-09 DIAGNOSIS — Z00.00 HEALTH MAINTENANCE EXAMINATION: ICD-10-CM

## 2022-11-09 DIAGNOSIS — E66.01 MORBID OBESITY DUE TO EXCESS CALORIES (HCC): Chronic | ICD-10-CM

## 2022-11-09 DIAGNOSIS — F31.60 BIPOLAR AFFECTIVE DISORDER, CURRENT EPISODE MIXED, CURRENT EPISODE SEVERITY UNSPECIFIED (HCC): Chronic | ICD-10-CM

## 2022-11-09 DIAGNOSIS — E78.2 MIXED HYPERLIPIDEMIA: Primary | ICD-10-CM

## 2022-11-09 DIAGNOSIS — B82.9 PARASITES IN STOOL: ICD-10-CM

## 2022-11-09 DIAGNOSIS — M48.061 SPINAL STENOSIS OF LUMBAR REGION, UNSPECIFIED WHETHER NEUROGENIC CLAUDICATION PRESENT: ICD-10-CM

## 2022-11-09 DIAGNOSIS — E53.8 VITAMIN B12 DEFICIENCY: ICD-10-CM

## 2022-11-09 DIAGNOSIS — E78.49 OTHER HYPERLIPIDEMIA: ICD-10-CM

## 2022-11-09 DIAGNOSIS — E03.9 ACQUIRED HYPOTHYROIDISM: Chronic | ICD-10-CM

## 2022-11-09 RX ORDER — FENOFIBRATE 160 MG/1
160 TABLET ORAL DAILY
Qty: 90 TABLET | Refills: 1 | Status: SHIPPED | OUTPATIENT
Start: 2022-11-09

## 2022-11-09 RX ORDER — LEVOTHYROXINE SODIUM 0.05 MG/1
TABLET ORAL
Qty: 30 TABLET | Refills: 1 | Status: SHIPPED | OUTPATIENT
Start: 2022-11-09

## 2022-11-09 RX ORDER — RISPERIDONE 2 MG/1
6 TABLET, FILM COATED ORAL 2 TIMES DAILY
Qty: 60 TABLET
Start: 2022-11-09

## 2022-11-09 RX ORDER — CHOLECALCIFEROL (VITAMIN D3) 125 MCG
500 CAPSULE ORAL DAILY
Qty: 90 TABLET | Refills: 1 | Status: SHIPPED | OUTPATIENT
Start: 2022-11-09

## 2022-11-09 RX ORDER — DIVALPROEX SODIUM 500 MG/1
TABLET, EXTENDED RELEASE ORAL
Qty: 60 TABLET | Refills: 0
Start: 2022-11-09

## 2022-11-09 RX ORDER — CLONAZEPAM 1 MG/1
TABLET ORAL
Qty: 30 TABLET | Refills: 0
Start: 2022-11-09

## 2022-11-09 RX ORDER — LEVOTHYROXINE SODIUM 0.03 MG/1
TABLET ORAL
Qty: 80 TABLET | Refills: 1 | Status: SHIPPED | OUTPATIENT
Start: 2022-11-09

## 2022-11-09 NOTE — ASSESSMENT & PLAN NOTE
Stable, per psych  On sertraline, Depakote, risepridone, Cogentin and clonazepam   Reviewed current dosing

## 2022-11-09 NOTE — PROGRESS NOTES
Assessment/Plan:    COVID-19  Resolved, no lingering symptoms  Hypothyroidism  Adequately replaced  Mixed hyperlipidemia  TG remains elevated, continue fenofibrate  Prediabetes  A1c at 6 1%  Recommend to start metformin  Bipolar disorder ([address] )  Stable, per psych  On sertraline, Depakote, risepridone, Cogentin and clonazepam     Lumbar stenosis  Intermittent symptoms  Reminded to do exercises at home  Morbid obesity due to excess calories (HCC)  Weight     Vitamin B12 deficiency  Instructed to take daily supplement         [facility]}      Subjective:      Patient ID: Adela Sauer is a 28 y o  male here for a follow up     25 lbs     klonipin 1 and 2     6 mg ris bid    500 am, dpeako 1000, dap 500      {Common ambulatory SmartLinks:17126}    Review of Systems      Objective:      /90   Pulse (!) 107   Temp (!) 96 5 °F (35 8 °C)   Ht 6' 4" (1 93 m)   Wt (!) 170 kg (374 lb)   SpO2 97%   BMI 45 52 kg/m²          Physical Exam

## 2022-11-09 NOTE — PROGRESS NOTES
Assessment and Plan:     Problem List Items Addressed This Visit        Endocrine    Hypothyroidism (Chronic)     Adequately replaced  Relevant Medications    levothyroxine 25 mcg tablet    levothyroxine 50 mcg tablet    Other Relevant Orders    TSH, 3rd generation with Free T4 reflex       Other    Bipolar disorder (HCC) (Chronic)     Stable, per psych  On sertraline, Depakote, risepridone, Cogentin and clonazepam   Reviewed current dosing  Relevant Medications    risperiDONE (RisperDAL) 2 mg tablet    clonazePAM (KlonoPIN) 1 mg tablet    divalproex sodium (DEPAKOTE ER) 500 mg 24 hr tablet    Morbid obesity due to excess calories (HCC) (Chronic)     Weight gain of 25 lbs since a year ago  Discussed diet and exercise, portion control  Lumbar stenosis     Intermittent symptoms  Reminded to do exercises at home  Mixed hyperlipidemia - Primary     TG improved but remains elevated, continue fenofibrate  Relevant Medications    fenofibrate 160 MG tablet    Other Relevant Orders    Comprehensive metabolic panel    Lipid panel    Prediabetes     A1c at 6 1%  May benefit with metformin  Relevant Orders    Hemoglobin A1C    Vitamin B12 deficiency     Instructed to take daily supplement  Relevant Medications    vitamin B-12 (VITAMIN B-12) 500 mcg tablet    Other Relevant Orders    Vitamin B12      Other Visit Diagnoses     Other hyperlipidemia        Relevant Medications    fenofibrate 160 MG tablet    Parasites in stool        Relevant Orders    Giardia antigen    Ova and parasite examination    Health maintenance examination               Preventive health issues were discussed with patient, and age appropriate screening tests were ordered as noted in patient's After Visit Summary  Personalized health advice and appropriate referrals for health education or preventive services given if needed, as noted in patient's After Visit Summary       History of Present Illness:     Patient presents for a Medicare Wellness Visit and follow up visit  He is here with his mother today  He reports no new symptoms  He continues to experience intermittent stomach symptoms  He would vomit sometimes, has occasional nausea  He has had appointments with GI which she needed to cancel, does have 1 later this month  He moves his bowels daily  He he describes small warm sometimes that he sees in his stool  He has never been able to give a sample of this  We reviewed current doses of his medications  He reports occasional low back pain, does take the muscle relaxant as needed which seems to help  He exercises regularly, eats fairly healthy but does not always watch his portions  He feels his belly fat is due to his autism  He needs to finish his root canal, has not rescheduled with the dentist      Patient Care Team:  Lupillo Philip MD as PCP - General (Internal Medicine)  MD Brianda Thompson MD Bettie Olp, MD Sharlotte Keep, MD     Review of Systems:     Review of Systems   Constitutional: Negative for activity change, appetite change and fatigue  HENT: Negative for congestion, hearing loss and postnasal drip  Eyes: Negative  Negative for visual disturbance  Respiratory: Negative for cough, chest tightness and shortness of breath  Cardiovascular: Negative for chest pain, palpitations and leg swelling  Gastrointestinal: Negative for abdominal pain, constipation, diarrhea and nausea  Genitourinary: Negative for dysuria and frequency  Musculoskeletal: Positive for back pain  Negative for arthralgias  Skin: Negative for rash and wound  Neurological: Negative for dizziness, numbness and headaches  Hematological: Does not bruise/bleed easily  Psychiatric/Behavioral: Negative for sleep disturbance  The patient is not nervous/anxious           Problem List:     Patient Active Problem List   Diagnosis   • Herniation of lumbar intervertebral disc with radiculopathy   • Hypothyroidism   • [facility]   • Autism   • Morbid obesity due to excess calories (HCC)   • Sinus tachycardia   • ADHD (attention deficit hyperactivity disorder), combined type   • Claustrophobia   • Depression   • Mixed hyperlipidemia   • External hemorrhoids   • Prediabetes   • COLIN (obstructive sleep apnea)   • Nocturnal enuresis   • Lumbar stenosis   • Urinary incontinence   • Allergy   • Seborrheic keratosis   • Vitamin B12 deficiency   • COVID-19      Past Medical and Surgical History:     Past Medical History:   Diagnosis Date   • Anxiety 2012   • Autism    • Bipolar 1 disorder ([facility] )    • Depression 2008   • Disease of thyroid gland    • Headache(784 0) six months ago   • Hyperlipidemia    • Lumbar disc disease    • Psychiatric disorder     Compulsive Disorder   • Sleep apnea    • Urinary incontinence      Past Surgical History:   Procedure Laterality Date   • BACK SURGERY  11/2017    Lower   Last assessed: 1/18/18   • COLONOSCOPY      Fiberoptic   • INCISION AND DRAINAGE POSTERIOR SPINE N/A 11/1/2017    Procedure: INCISION AND DRAINAGE (I&D) SPINE;  Surgeon: Taylor Long MD;  [facility]: [facility];  Service: Neurosurgery   • DE LAMNOTMY INCL W/DCMPRSN NRV ROOT 1 INTRSPC LUMBR Bilateral 10/17/2017    Procedure: LEFT L3/4 AND RIGHT L4/5 MICRODISCECTOMIES, HEMILAMINECTOMIES; POSSIBLE EPIDURAL STEROID INJECTIONS;  Surgeon: Taylor Long MD;  Location[facility];  Service: Neurosurgery   • WISDOM TOOTH EXTRACTION     • WOUND DEBRIDEMENT N/A 11/3/2017    Procedure: DEBRIDEMENT WOUND ([address]), wound vac placement back;  Surgeon: Jose Macias DO;  Location: [facility];  Service: General   • WOUND DEBRIDEMENT N/A 11/6/2017    Procedure: Yeimi Monae ([address];  Surgeon: Sam Herrera MD;  Location: [facility];  Service: General      Family History:     Family History   Problem Relation Age of Onset   • Heart disease Father alive 62   • Substance Abuse Father         booze drugs   • Heart attack Father    • Mental illness Father         psychotic rage   • Heart disease Paternal Grandmother    • Hypertension Mother    • Substance Abuse Mother         smoking   • Mental illness Mother         multiple diagnoses   • ADD / ADHD Mother    • Anxiety disorder Mother    • Schizoaffective Disorder  Mother    • Stroke Maternal Grandmother         CVA   • Heart disease Maternal Grandmother         dead 2008   • Heart failure Maternal Grandmother    • Self-Injury Maternal Grandmother         cured   • Heart disease Paternal Grandfather         dead 2021   • Substance Abuse Paternal Grandfather         booze drugs smoking   • Colonic polyp Family    • Colonic polyp Family    • Cancer Family    • Heart failure Maternal Grandfather    • Heart disease Maternal Grandfather         alive 80   • Drug abuse Neg Hx    • Alcohol abuse Neg Hx    • Colon cancer Neg Hx    • Depression Neg Hx       Social History:     Social History     Socioeconomic History   • Marital status: Single     Spouse name: None   • Number of children: None   • Years of education: HS or GED   • Highest education level: None   Occupational History   • Occupation: Disabled   Tobacco Use   • Smoking status: Never Smoker   • Smokeless tobacco: Never Used   Substance and Sexual Activity   • Alcohol use: No   • Drug use: No   • Sexual activity: None   Other Topics Concern   • None   Social History Narrative    Drinks coffee     Social Determinants of Health     Financial Resource Strain: High Risk   • Difficulty of Paying Living Expenses: Hard   Food Insecurity: Not on file   Transportation Needs: Unmet Transportation Needs   • Lack of Transportation (Medical):  Yes   • Lack of Transportation (Non-Medical): Yes   Physical Activity: Not on file   Stress: Not on file   Social Connections: Not on file   Intimate Partner Violence: Not on file   Housing Stability: Not on file      Medications and Allergies:     Current Outpatient Medications   Medication Sig Dispense Refill   • clonazePAM (KlonoPIN) 1 mg tablet 1 tab in AM, 2 tabs in PM 30 tablet 0   • divalproex sodium (DEPAKOTE ER) 500 mg 24 hr tablet Take 1 tab bid, 2 tabs at noon 60 tablet 0   • fenofibrate 160 MG tablet Take 1 tablet (160 mg total) by mouth daily 90 tablet 1   • levothyroxine 25 mcg tablet Take 1 tab PO Mon to Friday 80 tablet 1   • levothyroxine 50 mcg tablet TAKE 1 TABLET BY MOUTH DURING WEEKEND 30 tablet 1   • risperiDONE (RisperDAL) 2 mg tablet Take 3 tablets (6 mg total) by mouth 2 (two) times a day 60 tablet    • vitamin B-12 (VITAMIN B-12) 500 mcg tablet Take 1 tablet (500 mcg total) by mouth daily 90 tablet 1   • acetaminophen (TYLENOL) 500 mg tablet Take 325 mg by mouth every 6 (six) hours as needed for mild pain       • benztropine (COGENTIN) 1 mg tablet Take 1 mg by mouth every 12 (twelve) hours     • docusate sodium (COLACE) 100 mg capsule Take 1 capsule by mouth 2 (two) times a day 10 capsule 0   • methocarbamol (ROBAXIN) 500 mg tablet Take 1 tablet (500 mg total) by mouth 2 (two) times a day as needed for muscle spasms 60 tablet 1   • Multiple Vitamin tablet Take by mouth     • sertraline (ZOLOFT) 50 mg tablet Take 50 mg by mouth every morning       No current facility-administered medications for this visit  Allergies   Allergen Reactions   • Sulfa Antibiotics Anaphylaxis   • Bee Venom    • Coriandrum Sativum    • Gabapentin Vomiting   • Mercury    • Parsley Seed - Food Allergy    • Pentothal [Thiopental]      Other reaction(s): Anaphylaxis   • Soy Allergy - Food Allergy GI Intolerance   • Soybean Oil - Food Allergy Vomiting   • Allegra [Fexofenadine] Hives and Rash   • Aspirin Rash     Category: Allergy;    • Ciprofloxacin Rash     Reaction Date: 10Feb2012;  Annotation - [id number]: swelling   • Erythromycin Hives and Rash     Reaction Date: 10Feb2012;    • Motrin [Ibuprofen] Rash     Reaction Date: 10Feb2012; Annotation - [id number]: rash, wheeze   • Penicillins Hives and Rash     Reaction Date: 10Feb2012; Annotation - [id number]: rash      Immunizations:     Immunization History   Administered Date(s) Administered   • COVID-19 J&J (Al) vaccine 0 5 mL 05/17/2021   • INFLUENZA 11/08/2017      Health Maintenance:         Topic Date Due   • Hepatitis C Screening  Never done   • HIV Screening  Never done         Topic Date Due   • COVID-19 Vaccine (2 - Booster for Al series) 07/12/2021      Medicare Screening Tests and Risk Assessments:     Michell Hebert is here for his Subsequent Wellness visit  Last Medicare Wellness visit information reviewed, patient interviewed and updates made to the record today  Health Risk Assessment:   Patient rates overall health as good  Patient feels that their physical health rating is same  Patient is satisfied with their life  Eyesight was rated as same  Hearing was rated as same  Patient feels that their emotional and mental health rating is same  Patients states they are sometimes angry  Patient states they are sometimes unusually tired/fatigued  Pain experienced in the last 7 days has been none  Patient states that he has experienced weight loss or gain in last 6 months  Fall Risk Screening: In the past year, patient has experienced: no history of falling in past year      Home Safety:  Patient does not have trouble with stairs inside or outside of their home  Patient has working smoke alarms and has working carbon monoxide detector  Home safety hazards include: none  Nutrition:   Current diet is Regular  Medications:   Patient is currently taking over-the-counter supplements  OTC medications include: see medication list  Patient is able to manage medications       Activities of Daily Living (ADLs)/Instrumental Activities of Daily Living (IADLs):   Walk and transfer into and out of bed and chair?: Yes  Dress and groom yourself?: Yes    Bathe or shower yourself?: Yes Feed yourself? Yes  Do your laundry/housekeeping?: No  Manage your money, pay your bills and track your expenses?: No  Make your own meals?: No    Do your own shopping?: No    Previous Hospitalizations:   Any hospitalizations or ED visits within the last 12 months?: No      Advance Care Planning:   Living will: No    Durable POA for healthcare: No    End of Life Decisions reviewed with patient: No      Cognitive Screening:   Provider or family/friend/caregiver concerned regarding cognition?: No    PREVENTIVE SCREENINGS      Cardiovascular Screening:    General: History Lipid Disorder and Screening Current      Diabetes Screening:     General: Screening Current      Colorectal Cancer Screening:     General: Screening Not Indicated      Prostate Cancer Screening:    General: Screening Not Indicated      Osteoporosis Screening:    General: Screening Not Indicated      Abdominal Aortic Aneurysm (AAA) Screening:        General: Screening Not Indicated      Lung Cancer Screening:     General: Screening Not Indicated      Hepatitis C Screening:    General: Patient Declines    Screening, Brief Intervention, and Referral to Treatment (SBIRT)    Screening  Typical number of drinks in a day: 0  Typical number of drinks in a week: 0  Interpretation: Low risk drinking behavior  Single Item Drug Screening:  How often have you used an illegal drug (including marijuana) or a prescription medication for non-medical reasons in the past year? never    Single Item Drug Screen Score: 0  Interpretation: Negative screen for possible drug use disorder    Other Counseling Topics:   Regular weightbearing exercise       No exam data present     Physical Exam:     /90   Pulse (!) 107   Temp (!) 96 5 °F (35 8 °C)   Ht 6' 4" (1 93 m)   Wt (!) 170 kg (374 lb)   SpO2 97%   BMI 45 52 kg/m²     Physical Exam     Bud Langley MD

## 2023-02-03 DIAGNOSIS — F31.60 BIPOLAR AFFECTIVE DISORDER, CURRENT EPISODE MIXED, CURRENT EPISODE SEVERITY UNSPECIFIED (HCC): Chronic | ICD-10-CM

## 2023-02-03 RX ORDER — CLONAZEPAM 1 MG/1
TABLET ORAL
Qty: 30 TABLET | Refills: 0 | OUTPATIENT
Start: 2023-02-03

## 2023-02-23 ENCOUNTER — TELEPHONE (OUTPATIENT)
Dept: INTERNAL MEDICINE CLINIC | Facility: CLINIC | Age: 33
End: 2023-02-23

## 2023-02-23 NOTE — TELEPHONE ENCOUNTER
Patient called in, wants doctor to know that his sxs are most likely due to poor decisions  Missed 3 days of zoloft- looked this up and it can cause tachycardia and muscle stiffness/pain  Also slept on the couch, instead of the bed

## 2023-02-23 NOTE — TELEPHONE ENCOUNTER
Madiha called  Temp is 97 9  He didn't take any meds  His pain is a 7 zach a scale of 1-10  He is very weak and achey  The back of his neck hurts  Slightly tachycardic  Ranging from  pulse and O2@ has been between 95 and 97  bp was 107/87 with a phone miguel ángel

## 2023-02-23 NOTE — TELEPHONE ENCOUNTER
Pt has chills, headache, cough, and pain in his chest   He feels a bump on the right side of his neck  Sharp pain at the base of his neck which is worse today  When he drinks hot fluids his throat feels like its burning  Symptoms started yesterday  Negative COVID test today

## 2023-03-09 ENCOUNTER — TELEPHONE (OUTPATIENT)
Dept: PSYCHIATRY | Facility: CLINIC | Age: 33
End: 2023-03-09

## 2023-03-09 NOTE — TELEPHONE ENCOUNTER
Returned pt voice message regarding a np appointment- Mak Rabago left vm to return call to office at earliest convenience

## 2023-03-10 ENCOUNTER — TELEPHONE (OUTPATIENT)
Dept: BEHAVIORAL/MENTAL HEALTH CLINIC | Facility: CLINIC | Age: 33
End: 2023-03-10

## 2023-03-10 NOTE — TELEPHONE ENCOUNTER
Patient has been added to the non-referral wait list  Confirmed insurance, needs of service, and location preferences

## 2023-06-13 ENCOUNTER — TELEPHONE (OUTPATIENT)
Dept: INTERNAL MEDICINE CLINIC | Facility: CLINIC | Age: 33
End: 2023-06-13

## 2023-06-13 NOTE — TELEPHONE ENCOUNTER
Ana Andino wants Dr Jody Matta to know that pt  Will be switching pcp's and to thank her for refilling his meds yesterday

## 2023-10-03 ENCOUNTER — RA CDI HCC (OUTPATIENT)
Dept: OTHER | Facility: HOSPITAL | Age: 33
End: 2023-10-03

## 2023-10-13 ENCOUNTER — TELEPHONE (OUTPATIENT)
Dept: NEUROLOGY | Facility: CLINIC | Age: 33
End: 2023-10-13

## 2023-10-13 ENCOUNTER — TELEPHONE (OUTPATIENT)
Age: 33
End: 2023-10-13

## 2023-10-13 ENCOUNTER — HOSPITAL ENCOUNTER (EMERGENCY)
Facility: HOSPITAL | Age: 33
Discharge: HOME | End: 2023-10-13
Attending: EMERGENCY MEDICINE
Payer: MEDICARE

## 2023-10-13 VITALS
SYSTOLIC BLOOD PRESSURE: 132 MMHG | TEMPERATURE: 97.6 F | OXYGEN SATURATION: 98 % | DIASTOLIC BLOOD PRESSURE: 83 MMHG | RESPIRATION RATE: 18 BRPM | HEART RATE: 110 BPM

## 2023-10-13 DIAGNOSIS — R51.9 HEADACHE: Primary | ICD-10-CM

## 2023-10-13 DIAGNOSIS — G44.89 OTHER HEADACHE SYNDROME: Primary | ICD-10-CM

## 2023-10-13 PROCEDURE — 99283 EMERGENCY DEPT VISIT LOW MDM: CPT

## 2023-10-13 PROCEDURE — 96374 THER/PROPH/DIAG INJ IV PUSH: CPT

## 2023-10-13 PROCEDURE — 99284 EMERGENCY DEPT VISIT MOD MDM: CPT | Performed by: EMERGENCY MEDICINE

## 2023-10-13 PROCEDURE — 96375 TX/PRO/DX INJ NEW DRUG ADDON: CPT

## 2023-10-13 PROCEDURE — 96361 HYDRATE IV INFUSION ADD-ON: CPT

## 2023-10-13 RX ORDER — DIPHENHYDRAMINE HYDROCHLORIDE 50 MG/ML
25 INJECTION INTRAMUSCULAR; INTRAVENOUS ONCE
Status: COMPLETED | OUTPATIENT
Start: 2023-10-13 | End: 2023-10-13

## 2023-10-13 RX ORDER — METOCLOPRAMIDE HYDROCHLORIDE 5 MG/ML
10 INJECTION INTRAMUSCULAR; INTRAVENOUS ONCE
Status: COMPLETED | OUTPATIENT
Start: 2023-10-13 | End: 2023-10-13

## 2023-10-13 RX ADMIN — SODIUM CHLORIDE 1000 ML: 0.9 INJECTION, SOLUTION INTRAVENOUS at 07:44

## 2023-10-13 RX ADMIN — METOCLOPRAMIDE 10 MG: 5 INJECTION, SOLUTION INTRAMUSCULAR; INTRAVENOUS at 07:52

## 2023-10-13 RX ADMIN — DIPHENHYDRAMINE HYDROCHLORIDE 25 MG: 50 INJECTION, SOLUTION INTRAMUSCULAR; INTRAVENOUS at 07:46

## 2023-10-13 NOTE — TELEPHONE ENCOUNTER
Pt was seen in ER, has f/u with Viola Jolly on 10/16, but it was recommended to have a referral to go see a Neurologist. Mom would like to make that appt as soon as possible.

## 2023-10-13 NOTE — TELEPHONE ENCOUNTER
Patient's mother called in hopes to schedule a new patient appointment for patient. Patient at the moment was sleeping so was not able to triage. Mother will have patient call back to do proper triage intake.

## 2023-10-13 NOTE — ED PROVIDER NOTES
History  Chief Complaint   Patient presents with    Headache     Brought in by EMS, pt reports HA started approx 5am, took tylenol 0530     This is a 81-year-old male who presents to the emergency department complaining of a headache. The patient states at 530 this morning he woke up and noticed he had a headache. The patient states that headache was bad but he took Tylenol and now the headache is much better. He denies fevers or chills. He did feel nauseous. The patient states he felt extremely weak as well. The patient states he has had multiple headaches similar to this in the past.         Prior to Admission Medications   Prescriptions Last Dose Informant Patient Reported? Taking?    Multiple Vitamin tablet  Mother Yes No   Sig: Take by mouth   acetaminophen (TYLENOL) 500 mg tablet  Mother Yes No   Sig: Take 325 mg by mouth every 6 (six) hours as needed for mild pain     benztropine (COGENTIN) 1 mg tablet   Yes No   Sig: Take 1 mg by mouth every 12 (twelve) hours   clonazePAM (KlonoPIN) 1 mg tablet   No No   Si tab in AM, 2 tabs in PM   divalproex sodium (DEPAKOTE ER) 500 mg 24 hr tablet   No No   Sig: Take 1 tab bid, 2 tabs at noon   docusate sodium (COLACE) 100 mg capsule  Mother No No   Sig: Take 1 capsule by mouth 2 (two) times a day   fenofibrate 160 MG tablet   No No   Sig: TAKE 1 TABLET BY MOUTH EVERY DAY   levothyroxine 25 mcg tablet   No No   Sig: TAKE 1 TABLET BY MOUTH MONDAY THRU FRIDAY   levothyroxine 50 mcg tablet   No No   Sig: TAKE 1 TABLET BY MOUTH DURING WEEKEND   methocarbamol (ROBAXIN) 500 mg tablet   No No   Sig: Take 1 tablet (500 mg total) by mouth 2 (two) times a day as needed for muscle spasms   risperiDONE (RisperDAL) 2 mg tablet   No No   Sig: Take 3 tablets (6 mg total) by mouth 2 (two) times a day   sertraline (ZOLOFT) 50 mg tablet   Yes No   Sig: Take 50 mg by mouth every morning   vitamin B-12 (VITAMIN B-12) 500 mcg tablet   No No   Sig: Take 1 tablet (500 mcg total) by mouth daily      Facility-Administered Medications: None       Past Medical History:   Diagnosis Date    Anxiety 2012    Autism     Bipolar 1 disorder ([address])     Depression 2008    Disease of thyroid gland     Headache(784.0) six months ago    Hyperlipidemia     Lumbar disc disease     Psychiatric disorder     Compulsive Disorder    Sleep apnea     Urinary incontinence        Past Surgical History:   Procedure Laterality Date    BACK SURGERY  11/2017    Lower.  Last assessed: 1/18/18    COLONOSCOPY      Fiberoptic    INCISION AND DRAINAGE POSTERIOR SPINE N/A 11/1/2017    Procedure: INCISION AND DRAINAGE (I&D) SPINE;  Surgeon: Denice Whitney MD;  [facility];  Service: Neurosurgery    DE [facility] INCL W/DCMPRSN NRV ROOT 1 INTRSPC LUMBR Bilateral 10/17/2017    Procedure: LEFT L3/4 AND RIGHT L4/5 MICRODISCECTOMIES, HEMILAMINECTOMIES; POSSIBLE EPIDURAL STEROID INJECTIONS;  Surgeon: Denice Whitney MD;  [facility];  Service: Neurosurgery    WISDOM TOOTH EXTRACTION      WOUND DEBRIDEMENT N/A 11/3/2017    Procedure: DEBRIDEMENT WOUND [facility] OUT), wound vac placement back;  Surgeon: Sherlyn Calero DO;  [facility];  Service: General    WOUND DEBRIDEMENT N/A 11/6/2017    Procedure: Zabrina Fuller ([address];  Surgeon: James Vines MD;  [facility];  Service: General       Family History   Problem Relation Age of Onset    Heart disease Father         alive 62    Substance Abuse Father         booze drugs    Heart attack Father     Mental illness Father         psychotic rage    Heart disease Paternal Grandmother     Hypertension Mother     Substance Abuse Mother         smoking    Mental illness Mother         multiple diagnoses    ADD / ADHD Mother     Anxiety disorder Mother     Schizoaffective Disorder  Mother     Stroke Maternal Grandmother         CVA    Heart disease Maternal Grandmother         dead 2008    Heart failure Maternal Grandmother     Self-Injury Maternal Grandmother         cured    Heart disease Paternal Grandfather         dead 2021    Substance Abuse Paternal Grandfather         booze drugs smoking    Colonic polyp Family     Colonic polyp Family     Cancer Family     Heart failure Maternal Grandfather     Heart disease Maternal Grandfather         alive 80    Drug abuse Neg Hx     Alcohol abuse Neg Hx     Colon cancer Neg Hx     Depression Neg Hx      I have reviewed and agree with the history as documented. E-Cigarette/Vaping     E-Cigarette/Vaping Substances     Social History     Tobacco Use    Smoking status: Never    Smokeless tobacco: Never   Substance Use Topics    Alcohol use: No    Drug use: No       Review of Systems   All other systems reviewed and are negative.       Physical Exam  Physical Exam  Constitutional:  Vital signs reviewed, patient appears nontoxic, no acute distress  Eyes: Pupils equal round reactive to light and accommodation, extraocular muscles intact  HEENT: trachea midline, no JVD, moist mucous membranes  Respiratory: lung sounds clear throughout, no rhonchi, no rales  Cardiovascular: Tachycardic rate, regular rhythm, no murmurs or rubs  Abdomen: soft, nontender, nondistended, no rebound or guarding  Back: no CVA tenderness, normal inspection  Extremities: no edema, pulses equal in all 4 extremities  Neuro: awake, alert, oriented, no focal weakness  Skin: warm, dry, intact, no rashes noted    Vital Signs  ED Triage Vitals   Temperature Pulse Respirations Blood Pressure SpO2   10/13/23 0715 10/13/23 0715 10/13/23 0715 10/13/23 0715 10/13/23 0715   97.6 °F (36.4 °C) (!) 110 18 132/83 98 %      Temp Source Heart Rate Source Patient Position - Orthostatic VS BP Location FiO2 (%)   10/13/23 0715 -- -- -- --   Oral          Pain Score       10/13/23 0725       10 - Worst Possible Pain           Vitals:    10/13/23 0715   BP: 132/83   Pulse: (!) 110         Visual Acuity      ED Medications  Medications   sodium chloride 0.9 % bolus 1,000 mL (1,000 mL Intravenous New Bag 10/13/23 0744)   metoclopramide (REGLAN) injection 10 mg (10 mg Intravenous Given 10/13/23 0752)   diphenhydrAMINE (BENADRYL) injection 25 mg (25 mg Intravenous Given 10/13/23 0746)       Diagnostic Studies  Results Reviewed       None                   No orders to display              Procedures  Procedures         ED Course  ED Course as of 10/13/23 0849   [facility] Oct 13, 2023   3551 The patient was reevaluated. He was found sleeping. Upon waking up the patient, he states he has no pain. The patient was given follow-up with neurology and was discharged with follow-up to his primary care as well. Medical Decision Making  This is a 73-year-old male who presents to the emergency department complaining of a headache. I considered migraine, stress headache, headache. These and other diagnoses were considered. Risk  Prescription drug management. Disposition  Final diagnoses:   Headache     Time reflects when diagnosis was documented in both MDM as applicable and the Disposition within this note       Time User Action Codes Description Comment    10/13/2023  8:31 AM Millie Dumas Add [R51.9] Headache           ED Disposition       ED Disposition   Discharge    Condition   Stable    Date/Time   Fri Oct 13, 2023  8:31 AM    Comment   Hasmukh Bradley discharge to home/self care.                    Follow-up Information       Follow up With Specialties Details Why Contact Info Additional Information    Edson Ochoa MD Internal Medicine In 2 days  [address] [facility]  [phone] [address] [facility] Emergency Medicine  If symptoms worsen [address] [place] [address] [phone] 8221 [facility] Emergency Department, [address], [address] [facility] Neurology Schedule an appointment as soon as possible for a visit in 2 days  [address] [facility] [phone]  [address] [facility] [address] [facility], [address] [place] [address], [phone]            Patient's Medications   Discharge Prescriptions    No medications on file       No discharge procedures on file.     PDMP Review         Value Time User    PDMP Reviewed  Yes 2/3/2023  5:22 PM Ubaldo Montiel MD            ED Provider  Electronically Signed by             Stephanie Lugo DO  10/13/23 6241 PRINCIPAL DISCHARGE DIAGNOSIS  Diagnosis: Altered mental status, unspecified altered mental status type  Assessment and Plan of Treatment: You were admitted to the hospital with altered mental status. This was found to be secondary to hepatic encephalopathy. You were then found to have an Upper Gi Bleed which required intensive care monitoring and was likely the cause of your decomensated cirrhosis, leading to hepatic difficulties causing altered mental status. You were treated and a drain was placed for ascites and fluid build-up in your stomach. Your altered mental status began to resolve after treatment in the intensive care unit and you were transferred to the medicine floors. During that time, you had some difficulty swallowing so our speech therapists came to evaluate your ability to swallow. Initially you were deemed unable to swallow effectively but a repeat study showed that you could eat a dysphagia 1 soft honey-thickened diet. Your altered mental status began to resolve and you were medically cleared for rehab.  Please follow-up with your primary care provider within 1-2 weeks of discharge for further lab-work, monitoring, and management of your chronic health conditions.   Please follow-up with the hepatologist, Dr. Arceo, at the appointment on 6/22 at 12 PM for further management of your liver problems.      SECONDARY DISCHARGE DIAGNOSES  Diagnosis: Decompensated hepatic cirrhosis  Assessment and Plan of Treatment: You were admitted to the hospital for altered mental status, likely as a result of hepatic encephalopathy from liver dysfunction due to an upper GI bleed. You were stabilized in the intensive care unit and seen by our hepatologists whom recommended for you to start medications to remove fluids and help with your hepatic encephalopathy. Please take the medications as prescribed and follow-up with your hepatologist, Dr. Arceo, for further management of the hepatic cirrhosis. 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    Diagnosis: Acute renal failure superimposed on chronic kidney disease, unspecified CKD stage, unspecified acute renal failure type  Assessment and Plan of Treatment: You were found to have kidney injury during your [id number], likely as a result of the upper GI bleed and the other conditions in the hospital. Your creatinine began to improve and your urine output started to improve during your latter half of the hospital stay. Please follow-up with your PCP within 1-2 weeks of discharge for further management and labs on your kidney.

## 2023-10-13 NOTE — TELEPHONE ENCOUNTER
Please call mother. Neurology referral sent. He cancelled his annual physical again, please reschedule with me or Dee Murray.

## 2023-10-13 NOTE — TELEPHONE ENCOUNTER
Mother thanks doctor for the referral. Patient will schedule physical after he has seen the neurologist with the Internal Medicine group in [facility] as it is more convenient for him.

## 2023-10-16 DIAGNOSIS — E03.9 ACQUIRED HYPOTHYROIDISM: Chronic | ICD-10-CM

## 2023-10-16 DIAGNOSIS — E78.49 OTHER HYPERLIPIDEMIA: ICD-10-CM

## 2023-10-16 RX ORDER — FENOFIBRATE 160 MG/1
TABLET ORAL
Qty: 90 TABLET | Refills: 0 | Status: SHIPPED | OUTPATIENT
Start: 2023-10-16

## 2023-10-16 RX ORDER — LEVOTHYROXINE SODIUM 0.03 MG/1
TABLET ORAL
Qty: 60 TABLET | Refills: 2 | Status: SHIPPED | OUTPATIENT
Start: 2023-10-16

## 2023-11-29 ENCOUNTER — TELEPHONE (OUTPATIENT)
Dept: INTERNAL MEDICINE CLINIC | Facility: CLINIC | Age: 33
End: 2023-11-29

## 2023-11-29 NOTE — TELEPHONE ENCOUNTER
P/ Dr Betina Sesay - called pt LDM on VM. His previous PCP was giving him the following medications:  Risperidon, Clonazepam, and Depakote. Dr Betina Sesay does not prescribe theses types of medications. I LM asking if these are amongst the meds he will need, if so he may want to stay with his current PCP.

## 2024-01-25 ENCOUNTER — TELEPHONE (OUTPATIENT)
Age: 34
End: 2024-01-25

## 2024-01-25 NOTE — TELEPHONE ENCOUNTER
Patient called wanting to discuss an issue he's been having with his foot. He is wanting to schedule a virtual appointment for today. However, I'm unsure if it would be appropriate for a virtual visit, or if the doctor would rather look at it in-person. Please call patient back to discuss.

## 2024-02-19 ENCOUNTER — RA CDI HCC (OUTPATIENT)
Dept: OTHER | Facility: HOSPITAL | Age: 34
End: 2024-02-19

## 2024-03-25 DIAGNOSIS — E78.49 OTHER HYPERLIPIDEMIA: ICD-10-CM

## 2024-03-25 RX ORDER — FENOFIBRATE 160 MG/1
TABLET ORAL
Qty: 90 TABLET | Refills: 0 | OUTPATIENT
Start: 2024-03-25

## 2024-03-25 NOTE — TELEPHONE ENCOUNTER
Refill must be reviewed and completed by the office or provider. The refill is unable to be approved by the medication management team.    Patient last seen 11/9/22  Last bw 11/7/22

## 2024-05-16 ENCOUNTER — APPOINTMENT (OUTPATIENT)
Dept: LAB | Facility: HOSPITAL | Age: 34
End: 2024-05-16
Payer: MEDICARE

## 2024-05-16 DIAGNOSIS — F25.0 SCHIZOAFFECTIVE DISORDER, BIPOLAR TYPE (HCC): ICD-10-CM

## 2024-05-16 DIAGNOSIS — F31.9 DEPRESSED BIPOLAR I DISORDER (HCC): ICD-10-CM

## 2024-05-16 DIAGNOSIS — Z51.81 ENCOUNTER FOR THERAPEUTIC DRUG MONITORING: ICD-10-CM

## 2024-05-16 DIAGNOSIS — F41.8 CASTRATION COMPLEX: ICD-10-CM

## 2024-05-16 LAB
ALBUMIN SERPL BCP-MCNC: 4.3 G/DL (ref 3.5–5)
ALP SERPL-CCNC: 43 U/L (ref 34–104)
ALT SERPL W P-5'-P-CCNC: 31 U/L (ref 7–52)
ANION GAP SERPL CALCULATED.3IONS-SCNC: 11 MMOL/L (ref 4–13)
AST SERPL W P-5'-P-CCNC: 19 U/L (ref 13–39)
BASOPHILS # BLD AUTO: 0.09 THOUSANDS/ÂΜL (ref 0–0.1)
BASOPHILS NFR BLD AUTO: 1 % (ref 0–1)
BILIRUB SERPL-MCNC: 0.31 MG/DL (ref 0.2–1)
BUN SERPL-MCNC: 14 MG/DL (ref 5–25)
CALCIUM SERPL-MCNC: 10.1 MG/DL (ref 8.4–10.2)
CHLORIDE SERPL-SCNC: 101 MMOL/L (ref 96–108)
CHOLEST SERPL-MCNC: 207 MG/DL
CO2 SERPL-SCNC: 28 MMOL/L (ref 21–32)
CREAT SERPL-MCNC: 0.77 MG/DL (ref 0.6–1.3)
EOSINOPHIL # BLD AUTO: 0.22 THOUSAND/ÂΜL (ref 0–0.61)
EOSINOPHIL NFR BLD AUTO: 3 % (ref 0–6)
ERYTHROCYTE [DISTWIDTH] IN BLOOD BY AUTOMATED COUNT: 13.2 % (ref 11.6–15.1)
GFR SERPL CREATININE-BSD FRML MDRD: 120 ML/MIN/1.73SQ M
GLUCOSE P FAST SERPL-MCNC: 177 MG/DL (ref 65–99)
HCT VFR BLD AUTO: 41.7 % (ref 36.5–49.3)
HDLC SERPL-MCNC: 35 MG/DL
HGB BLD-MCNC: 13.9 G/DL (ref 12–17)
IMM GRANULOCYTES # BLD AUTO: 0.06 THOUSAND/UL (ref 0–0.2)
IMM GRANULOCYTES NFR BLD AUTO: 1 % (ref 0–2)
LYMPHOCYTES # BLD AUTO: 2.56 THOUSANDS/ÂΜL (ref 0.6–4.47)
LYMPHOCYTES NFR BLD AUTO: 37 % (ref 14–44)
MCH RBC QN AUTO: 27.1 PG (ref 26.8–34.3)
MCHC RBC AUTO-ENTMCNC: 33.3 G/DL (ref 31.4–37.4)
MCV RBC AUTO: 81 FL (ref 82–98)
MONOCYTES # BLD AUTO: 0.37 THOUSAND/ÂΜL (ref 0.17–1.22)
MONOCYTES NFR BLD AUTO: 5 % (ref 4–12)
NEUTROPHILS # BLD AUTO: 3.67 THOUSANDS/ÂΜL (ref 1.85–7.62)
NEUTS SEG NFR BLD AUTO: 53 % (ref 43–75)
NONHDLC SERPL-MCNC: 172 MG/DL
NRBC BLD AUTO-RTO: 0 /100 WBCS
PLATELET # BLD AUTO: 381 THOUSANDS/UL (ref 149–390)
PMV BLD AUTO: 9.8 FL (ref 8.9–12.7)
POTASSIUM SERPL-SCNC: 4 MMOL/L (ref 3.5–5.3)
PROT SERPL-MCNC: 7.3 G/DL (ref 6.4–8.4)
RBC # BLD AUTO: 5.13 MILLION/UL (ref 3.88–5.62)
SODIUM SERPL-SCNC: 140 MMOL/L (ref 135–147)
TRIGL SERPL-MCNC: 452 MG/DL
TSH SERPL DL<=0.05 MIU/L-ACNC: 1.48 UIU/ML (ref 0.45–4.5)
WBC # BLD AUTO: 6.97 THOUSAND/UL (ref 4.31–10.16)

## 2024-05-16 PROCEDURE — 80061 LIPID PANEL: CPT

## 2024-05-16 PROCEDURE — 80053 COMPREHEN METABOLIC PANEL: CPT

## 2024-05-16 PROCEDURE — 84443 ASSAY THYROID STIM HORMONE: CPT

## 2024-05-16 PROCEDURE — 85025 COMPLETE CBC W/AUTO DIFF WBC: CPT

## 2024-05-16 PROCEDURE — 36415 COLL VENOUS BLD VENIPUNCTURE: CPT

## 2024-07-08 ENCOUNTER — TELEPHONE (OUTPATIENT)
Dept: PSYCHIATRY | Facility: CLINIC | Age: 34
End: 2024-07-08

## 2024-07-08 NOTE — TELEPHONE ENCOUNTER
"Behavioral Health Outpatient Intake Questions    Referred By   : SELF    Please advise interviewee that they need to answer all questions truthfully to allow for best care, and any misrepresentations of information may affect their ability to be seen at this clinic   => Was this discussed? Yes     If Minor Child (under age 18)    Who is/are the legal guardian(s) of the child?     Is there a custody agreement?      If \"YES\"- Custody orders must be obtained prior to scheduling the first appointment  In addition, Consent to Treatment must be signed by all legal guardians prior to scheduling the first appointment    If \"NO\"- Consent to Treatment must be signed by all legal guardians prior to scheduling the first appointment    Behavioral Health Outpatient Intake History -     Presenting Problem (in patient's own words): High Functioning Autism, Bipolar, ODD,     Are there any communication barriers for this patient?     Yes                                               If yes, please describe barriers: autism/communication disorder  If there is a unique situation, please refer to Serafin Parra/Kaila Gonzales for final determination.    Are you taking any psychiatric medications? Yes     If \"YES\" -What are they Zoloft, Depakote ER, Risperdal, Cogentin     If \"YES\" -Who prescribes?   [facility]    Has the Patient previously received outpatient Talk Therapy or Medication Management from [facility]        If \"YES\"- When, Where and with Whom?         If \"NO\" -Has Patient received these services elsewhere?       If \"YES\" -When, Where, and with Whom?  Since age of 8   Currently being seen at [facility]    Has the Patient abused alcohol or other substances in the last 6 months ? No  No concerns of substance abuse are reported.     If \"YES\" -What substance, How much, How often?     If illegal substance: Refer to [facility] (for QUYEN) or [facility] Offices.   If Alcohol in excess of 10 drinks per " "week:  Refer to [facility] (for QUYEN) or [facility] Offices    Legal History-     Is this treatment court ordered? No   If \"yes \"send to :  Talk Therapy : Send to Serafin Parra/Kaila Gonzales for final determination   Med Management: Send to Dr Paniagua for final determination     Has the Patient been convicted of a felony?  No   If \"Yes\" send to -When, What?  Talk Therapy : Send to Serafin Gonzales for final determination   Med Management: Send to Dr Paniagua for final determination     ACCEPTED as a patient Yes  If \"Yes\" Appointment Date:   Shima: 9/23 at 11     Referred Elsewhere? No  If “Yes” - (Where? Ex: [facility], [facility], [facility], [facility], etc.)       Name of Insurance Co:[email or web address]  Darren  Insurance ID#[id number] / [id number]   Insurance Phone #  If ins is primary or secondary?  If patient is a minor, parents information such as Name, D.O.B of guarantor.  "

## 2024-07-08 NOTE — TELEPHONE ENCOUNTER
Spoke w. Patient whom provided verbal consent to speak with mother on their behalf. Was able to schedule patient at [facility] for [facility]. Mother made aware patient will remain on list for therapy at this time.     Talk therapy

## 2024-07-08 NOTE — TELEPHONE ENCOUNTER
Contacted patient off of NON-REFERRAL wait list to verify needs of services in attempts to update list with patient preferences. LVM for patient to contact intake dept  in regards to wait list     PATIENT REMOVED FROM LIST DUE TO UNABLE TO CONTACT PATIENT AFTER 2ND ATTEMPT

## 2024-07-09 NOTE — TELEPHONE ENCOUNTER
mother contacted intake dept in regards to patient appt with provider requesting sooner appt due to patient current psychiatrist through other facility no longer accepting insurance and stated they would not be able to manage medication any longer. Writer suggested mother speak with patient pcp to see if medication can be managed for the intermediate time until appt or sooner becomes available and patient will be wait listed for sooner appt at this time.

## 2024-08-01 NOTE — PROGRESS NOTES
Progress Note - Infectious Disease   Glo Bradley 23 y o  male MRN: [id number]  Unit/Bed#: [facility] 919-01 Encounter: [id number]    Assessment and plan     Gram negative sepsis likely 2/2 deep surgical wound infection sp washout and multiple would vac replacements, likely involving the bone   -Pt had a fever 101 8 overnight   -Blood cultures negative for 72 hours   -Continue Ancef for 6 weeks of antibiotics 17  -Wound is now closed   -Persistent fever, I would recommend CXR, Lipase, B/L duplex, CT abdomen and pelvis   -Pt will need Weekly CBC with Diff and BMP while on the antibiotic     Antibiotics   Antibiotic day#8  Ancef day#6   Will need till 17    Subjective/Objective     Subjective: Pt was seen and examined this morning  He reports that he feels excellent today  He wants to get out of bed and sit in a chair  He states that he is finally used to the food and ate all of his food this morning  HR:  [] 91  Resp:  [14-20] 18  BP: (117-163)/() 128/76  SpO2:  [91 %-99 %] 92 %  Temp (24hrs), Av 9 °F (37 2 °C), Min:97 2 °F (36 2 °C), Max:101 8 °F (38 8 °C)  Current: Temperature: 98 9 °F (37 2 °C)    Physical Exam   Constitutional: He is oriented to person, place, and time  He appears well-developed and well-nourished  No distress  HENT:   Head: Normocephalic and atraumatic  Eyes: Conjunctivae are normal  Right eye exhibits no discharge  Left eye exhibits no discharge  Neck: Neck supple  No JVD present  Cardiovascular: Normal rate and regular rhythm  No murmur heard  Pulmonary/Chest: Breath sounds normal  No respiratory distress  He has no wheezes  Abdominal: Soft  He exhibits no distension  There is no tenderness  There is no rebound and no guarding  Musculoskeletal:   Moving all 4 extremities spontaneously    Neurological: He is alert and oriented to person, place, and time  No cranial nerve deficit  Skin: Skin is warm and dry  He is not diaphoretic  Invasive Devices     Peripherally Inserted Central Catheter Line            PICC Line 85/22/80 Right Basilic less than 1 day          Peripheral Intravenous Line            Peripheral IV 11/04/17 Left Hand 2 days          Drain            External Urinary Catheter Small 3 days    Closed/Suction Drain Midline Back 15 Fr  1 day                Lab, Imaging and other studies: I have personally reviewed pertinent reports  1-2 cups/cans per day

## 2024-08-26 ENCOUNTER — TELEPHONE (OUTPATIENT)
Dept: PSYCHIATRY | Facility: CLINIC | Age: 34
End: 2024-08-26

## 2024-08-26 NOTE — TELEPHONE ENCOUNTER
Called and left message for patient to inform 10/21/2024 was cancelled due to provider being out of the office. Requested return call to reschedule. Please schedule upon return call. Thank you.

## 2024-08-29 DIAGNOSIS — E03.9 ACQUIRED HYPOTHYROIDISM: Chronic | ICD-10-CM

## 2024-08-29 RX ORDER — LEVOTHYROXINE SODIUM 25 UG/1
TABLET ORAL
Qty: 60 TABLET | Refills: 2 | OUTPATIENT
Start: 2024-08-29

## 2024-09-15 NOTE — PSYCH
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         smoking    Mental illness Mother         multiple diagnoses    ADD / ADHD Mother     Anxiety disorder Mother     Schizoaffective Disorder  Mother     Heart disease Father         alive 57    Substance Abuse Father         booze drugs    Heart attack Father     Mental illness Father         psychotic rage    Depression Maternal Grandmother     Anxiety disorder Maternal Grandmother     Stroke Maternal Grandmother         CVA    Heart disease Maternal Grandmother         dead 2008    Heart failure Maternal Grandmother     Self-Injury Maternal Grandmother         cured    Heart failure Maternal Grandfather     Heart disease Maternal Grandfather         alive 81    Heart disease Paternal Grandmother     Heart disease Paternal Grandfather         dead 2021    Substance Abuse Paternal Grandfather         booze drugs smoking    Colonic polyp Family     Colonic polyp Family     Cancer Family     Drug abuse Neg Hx     Alcohol abuse Neg Hx     Colon cancer Neg Hx        Substance Use History:    Social History     Substance and Sexual Activity   Drug Use No       Social History:    Social History     Socioeconomic History    Marital status: Single     Spouse name: Not on file    Number of children: Not on file    Years of education: HS or GED    Highest education level: Not on file "   Occupational History    Occupation: Disabled   Tobacco Use    Smoking status: Never    Smokeless tobacco: Never   Vaping Use    Vaping status: Never Used   Substance and Sexual Activity    Alcohol use: No    Drug use: No    Sexual activity: Never   Other Topics Concern    Not on file   Social History Narrative    Drinks coffee        Home: lives with mom Madiha Bradley    No children            Education:    Pt had delays in gross motor skills, fine motor skills, speech, and social skills which were diagnosed at 6y/o but no CP and no Dx of ASD at that time.  He was not diagnosed with ASD until 17y/o.   He was in special ed throughout schooling.    Graduated HS     No college     Social Determinants of Health     Financial Resource Strain: High Risk (11/9/2022)    Overall Financial Resource Strain (CARDIA)     Difficulty of Paying Living Expenses: Hard   Food Insecurity: Not on file   Transportation Needs: Unknown (2/22/2024)    PRAPARE - Transportation     Lack of Transportation (Medical): Not on file     Lack of Transportation (Non-Medical): No   Physical Activity: Not on file   Stress: Not on file   Social Connections: Not on file   Intimate Partner Violence: Not on file   Housing Stability: Not on file     Past Medical History:    History of Seizures: no  History of Head injury with loss of consciousness: no    Past Medical History:   Diagnosis Date    ADHD (attention deficit hyperactivity disorder), combined type 04/28/2015    Anxiety 2012    Autism     Bipolar 1 disorder (HCC)     Depression 2008    Disease of thyroid gland     Headache(784.0) six months ago    Hyperlipidemia     Lumbar disc disease     Psychiatric disorder     Compulsive Disorder    Sleep apnea     Urinary incontinence      Past Surgical History:   Procedure Laterality Date    BACK SURGERY  11/2017    Lower. Last assessed: 1/18/18    COLONOSCOPY      Fiberoptic    INCISION AND DRAINAGE POSTERIOR SPINE N/A 11/1/2017    Procedure: INCISION  AND DRAINAGE (I&D) SPINE;  Surgeon: Lalito Lovell MD;  [facility];  Service: Neurosurgery    DC LAMNOTMY INCL W/DCMPRSN NRV ROOT 1 INTRSPC LUMBR Bilateral 10/17/2017    Procedure: LEFT L3/4 AND RIGHT L4/5 MICRODISCECTOMIES, HEMILAMINECTOMIES; POSSIBLE EPIDURAL STEROID INJECTIONS;  Surgeon: Lalito Lovell MD;  [facility];  Service: Neurosurgery    WISDOM TOOTH EXTRACTION      WOUND DEBRIDEMENT N/A 11/3/2017    Procedure: DEBRIDEMENT WOUND (WASH OUT), wound vac placement back;  Surgeon: Alexis Zaman DO;  [facility];  Service: General    WOUND DEBRIDEMENT N/A 11/6/2017    Procedure: DEBRIDEMENT WOUND (WASH OUT),CLOSURE OF WOUMD;  Surgeon: Kavon Vargas MD;  [facility];  Service: General     Allergies:    Allergies   Allergen Reactions    Sulfa Antibiotics Anaphylaxis    Bee Venom     Coriandrum Sativum     Gabapentin Vomiting    Mercury     Parsley Seed - Food Allergy     Pentothal [Thiopental]      Other reaction(s): Anaphylaxis    Soy Allergy - Food Allergy GI Intolerance    Soybean Oil - Food Allergy Vomiting    Allegra [Fexofenadine] Hives and Rash    Aspirin Rash     Category: Allergy;     Ciprofloxacin Rash     Reaction Date: 10Feb2012; Annotation - [id number]: swelling    Erythromycin Hives and Rash     Reaction Date: 10Feb2012;     Latex Rash    Motrin [Ibuprofen] Rash     Reaction Date: 10Feb2012; Annotation - [id number]: rash, wheeze    Penicillins Hives and Rash     Reaction Date: 10Feb2012; Annotation - [id number]: rash     History Review:    The following portions of the patient's history were reviewed and updated as appropriate: allergies, current medications, past family history, past medical history, past social history, past surgical history, and problem list.    OBJECTIVE:      Mental Status Evaluation:    Appearance Dressed in T shirt and pants with holes in one pant leg.  Mom also has holes in her clothes and reports problems with their washer .  He moves his  legs toward and away from each other repeatedly, rocking forward and back and left to right, and sometimes in a circular motion.     Behavior pleasant, cooperative, calm   Speech normal rate and volume, fluent, clear   Mood anxious, with irritability and anger outbursts   Affect blunted   Thought Processes organized, goal directed, concrete   Associations [place], otherwise intact   Thought Content no overt delusions   Perceptual Disturbances: no auditory hallucinations, no visual hallucinations, does not appear responding to internal stimuli   Abnormal Thoughts  Risk Potential Suicidal ideation - None  Homicidal ideation - None  Potential for aggression - No   Orientation oriented to person, place, situation, day of week, date, month of year, and year   Memory short term memory grossly intact   Consciousness alert and awake   Attention Span attention span and concentration are age appropriate   Intellect Below Avg to Avg, but not formally tested   Insight partial   Judgement Fair to good   Muscle Strength and  Gait normal gait and normal balance   Language no difficulty naming common objects, no difficulty repeating a phrase   Fund of Knowledge adequate knowledge of current events  adequate fund of knowledge regarding past history  adequate fund of knowledge regarding vocabulary    Pt able to name the president of the [place]   Pain none   Pain Scale N/A       Laboratory Results: I have personally reviewed all pertinent laboratory/tests results.    Most Recent Labs:   Lab Results   Component Value Date    WBC 6.97 05/16/2024    RBC 5.13 05/16/2024    HGB 13.9 05/16/2024    HCT 41.7 05/16/2024     05/16/2024    RDW 13.2 05/16/2024    TOTANEUTABS 2.63 01/10/2018    NEUTROABS 3.67 05/16/2024    SODIUM 140 05/16/2024    K 4.0 05/16/2024     05/16/2024    CO2 28 05/16/2024    BUN 14 05/16/2024    CREATININE 0.77 05/16/2024    GLUC 91 07/20/2021    GLUF 177 (H) 05/16/2024    CALCIUM 10.1 05/16/2024    AST 19  05/16/2024    ALT 31 05/16/2024    ALKPHOS 43 05/16/2024    TP 7.3 05/16/2024    ALB 4.3 05/16/2024    TBILI 0.31 05/16/2024    CHOLESTEROL 207 (H) 05/16/2024    HDL 35 (L) 05/16/2024    TRIG 452 (H) 05/16/2024    LDLCALC  05/16/2024      Comment:      Calculated LDL invalid, triglycerides >400 mg/dl  This screening LDL is a calculated result.   It does not have the accuracy of the Direct Measured LDL in the monitoring of patients with hyperlipidemia and/or statin therapy.   Direct Measure LDL (EEN213) must be ordered separately in these patients.    NONHDLC 172 05/16/2024    VALPROICTOT 77 11/17/2017    ZTB9LIOSFIXZ 1.483 05/16/2024    FREET4 1.01 11/01/2021    HGBA1C 6.1 (H) 11/07/2022     11/07/2022     COVID19:   Lab Results   Component Value Date    SARSCOV2 Negative 04/14/2022     Vitamin B12   Lab Results   Component Value Date    SGTHJYYX95 421 11/07/2022     Magnesium   Lab Results   Component Value Date    MG 2.2 11/29/2017     Phosphorus   Lab Results   Component Value Date    PHOS 5.8 (H) 11/29/2017     Urinalysis   Lab Results   Component Value Date    COLORU Yellow 11/17/2017    CLARITYU Clear 11/17/2017    SPECGRAV 1.010 11/17/2017    PHUR 7.0 11/17/2017    LEUKOCYTESUR Negative 11/17/2017    NITRITE Negative 11/17/2017    PROTEIN UA Negative 11/17/2017    GLUCOSEU Negative 11/17/2017    KETONESU Negative 11/17/2017    UROBILINOGEN 0.2 11/17/2017    BILIRUBINUR Negative 11/17/2017    BLOODU Negative 11/17/2017     EKG   Lab Results   Component Value Date    VENTRATE 104 11/21/2017    ATRIALRATE 104 11/21/2017    PRINT 168 11/21/2017    QRSDINT 86 11/21/2017    QTINT 318 11/21/2017    QTCINT 418 11/21/2017    PAXIS 52 11/21/2017    QRSAXIS 65 11/21/2017    TWAVEAXIS 71 11/21/2017 11/29/2017 Head CT non-contrast done for visual changes:  No acute intracranial abnormality    Assessment/Plan:     Diagnoses and all orders for this visit:    Bipolar I disorder, most recent episode depressed, in  full remission (HCC)  -     Hepatic function panel; Future  -     CBC and differential; Future  -     Valproic acid level, total; Future  -     divalproex sodium (Depakote) 125 mg DR tablet; Take 1 tablet (125 mg total) by mouth every evening Take with the 500mg dose of Depakote ER for a total of 625mg q evening  -     benztropine (COGENTIN) 1 mg tablet; Take 1 tablet (1 mg total) by mouth 2 (two) times a day  -     divalproex sodium (DEPAKOTE ER) 500 mg 24 hr tablet; Take 1 tab po qAM, 2 tabs po q12 noon and 1 tab q evening  -     risperiDONE (RisperDAL) 2 mg tablet; Take 3 tablets (6 mg total) by mouth 2 (two) times a day  -     sertraline (ZOLOFT) 50 mg tablet; Take 1 tablet (50 mg total) by mouth every morning    Autism    Anxiety  -     CBC and differential; Future  -     sertraline (ZOLOFT) 50 mg tablet; Take 1 tablet (50 mg total) by mouth every morning    Chronic post-traumatic stress disorder (PTSD)  -     sertraline (ZOLOFT) 50 mg tablet; Take 1 tablet (50 mg total) by mouth every morning    Panic attacks  -     CBC and differential; Future  -     sertraline (ZOLOFT) 50 mg tablet; Take 1 tablet (50 mg total) by mouth every morning    Encounter for long-term (current) use of high-risk medication  -     Hepatic function panel; Future  -     CBC and differential; Future  -     Valproic acid level, total; Future    Other orders  -     Discontinue: benztropine (COGENTIN) 1 mg tablet; Take 1 mg by mouth 2 (two) times a day  -     Discontinue: divalproex sodium (DEPAKOTE ER) 500 mg 24 hr tablet; TAKE 4 TABLETS BY MOUTH EVERY DAY AT NIGHT          Risks/Benefits/Precautions:      Risks, Benefits And Possible Side Effects Of Medications:    Risks, benefits, and possible side effects of medications explained to Hasmukh and he verbalizes understanding and agreement for treatment.    Controlled Medication Discussion:     Hasmukh has been filling controlled prescriptions on time as prescribed according to [place]  Prescription Drug Monitoring Program  Discussed with Hasmukh the risks of sedation, respiratory depression, impairment of ability to drive and potential for abuse and addiction related to treatment with benzodiazepine medications. He understands risk of treatment with benzodiazepine medications, agrees to not drive if feels impaired and agrees to take medications as prescribed    Charline Ronquillo PA-C    Visit Time    Visit Start Time: 11:40AM  Visit Stop Time: 2:52PM  Total Visit Duration:  As above minutes

## 2024-09-23 ENCOUNTER — OFFICE VISIT (OUTPATIENT)
Dept: PSYCHIATRY | Facility: CLINIC | Age: 34
End: 2024-09-23
Payer: COMMERCIAL

## 2024-09-23 VITALS — HEIGHT: 76 IN | BODY MASS INDEX: 37.45 KG/M2 | WEIGHT: 307.5 LBS

## 2024-09-23 DIAGNOSIS — Z79.899 ENCOUNTER FOR LONG-TERM (CURRENT) USE OF HIGH-RISK MEDICATION: ICD-10-CM

## 2024-09-23 DIAGNOSIS — F84.0 AUTISM: Chronic | ICD-10-CM

## 2024-09-23 DIAGNOSIS — F41.9 ANXIETY: ICD-10-CM

## 2024-09-23 DIAGNOSIS — F43.12 CHRONIC POST-TRAUMATIC STRESS DISORDER (PTSD): ICD-10-CM

## 2024-09-23 DIAGNOSIS — F31.76 BIPOLAR I DISORDER, MOST RECENT EPISODE DEPRESSED, IN FULL REMISSION (HCC): Primary | ICD-10-CM

## 2024-09-23 DIAGNOSIS — F41.0 PANIC ATTACKS: ICD-10-CM

## 2024-09-23 PROCEDURE — 90792 PSYCH DIAG EVAL W/MED SRVCS: CPT | Performed by: PHYSICIAN ASSISTANT

## 2024-09-23 RX ORDER — BENZTROPINE MESYLATE 1 MG/1
1 TABLET ORAL 2 TIMES DAILY
COMMUNITY
Start: 2024-08-11 | End: 2024-09-23 | Stop reason: SDUPTHER

## 2024-09-23 RX ORDER — DIVALPROEX SODIUM 500 MG/1
TABLET, FILM COATED, EXTENDED RELEASE ORAL
Qty: 120 TABLET | Refills: 1 | Status: SHIPPED | OUTPATIENT
Start: 2024-09-23

## 2024-09-23 RX ORDER — DIVALPROEX SODIUM 500 MG/1
TABLET, FILM COATED, EXTENDED RELEASE ORAL
COMMUNITY
Start: 2024-08-30 | End: 2024-09-23 | Stop reason: SDUPTHER

## 2024-09-23 RX ORDER — DIVALPROEX SODIUM 125 MG/1
125 TABLET, DELAYED RELEASE ORAL EVERY EVENING
Qty: 30 TABLET | Refills: 1 | Status: SHIPPED | OUTPATIENT
Start: 2024-09-23

## 2024-09-23 RX ORDER — RISPERIDONE 2 MG/1
6 TABLET ORAL 2 TIMES DAILY
Qty: 180 TABLET | Refills: 1 | Status: SHIPPED | OUTPATIENT
Start: 2024-09-23

## 2024-09-23 RX ORDER — BENZTROPINE MESYLATE 1 MG/1
1 TABLET ORAL 2 TIMES DAILY
Qty: 60 TABLET | Refills: 1 | Status: SHIPPED | OUTPATIENT
Start: 2024-09-23

## 2024-09-23 NOTE — ASSESSMENT & PLAN NOTE
Orders:    Hepatic function panel; Future    CBC and differential; Future    Valproic acid level, total; Future

## 2024-09-23 NOTE — ASSESSMENT & PLAN NOTE
Orders:    sertraline (ZOLOFT) 50 mg tablet; Take 1 tablet (50 mg total) by mouth every morning

## 2024-09-23 NOTE — ASSESSMENT & PLAN NOTE
Orders:    CBC and differential; Future    sertraline (ZOLOFT) 50 mg tablet; Take 1 tablet (50 mg total) by mouth every morning
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
## 2024-09-23 NOTE — ASSESSMENT & PLAN NOTE
Orders:    Hepatic function panel; Future    CBC and differential; Future    Valproic acid level, total; Future    divalproex sodium (Depakote) 125 mg DR tablet; Take 1 tablet (125 mg total) by mouth every evening Take with the 500mg dose of Depakote ER for a total of 625mg q evening    benztropine (COGENTIN) 1 mg tablet; Take 1 tablet (1 mg total) by mouth 2 (two) times a day    divalproex sodium (DEPAKOTE ER) 500 mg 24 hr tablet; Take 1 tab po qAM, 2 tabs po q12 noon and 1 tab q evening    risperiDONE (RisperDAL) 2 mg tablet; Take 3 tablets (6 mg total) by mouth 2 (two) times a day    sertraline (ZOLOFT) 50 mg tablet; Take 1 tablet (50 mg total) by mouth every morning

## 2024-09-24 ENCOUNTER — TELEPHONE (OUTPATIENT)
Age: 34
End: 2024-09-24

## 2024-09-25 NOTE — TELEPHONE ENCOUNTER
PA for risperiDONE 2MG tablets SUBMITTED     via    [x]CMM-KEY: [id number]  []Surescriorangutrans-Case ID #   []Faxed to plan   []Other website   []Phone call Case ID #     Office notes sent, clinical questions answered. Awaiting determination    Turnaround time for your insurance to make a decision on your Prior Authorization can take 7-21 business days.

## 2024-10-02 NOTE — TELEPHONE ENCOUNTER
Patient called the RX Refill Line. Message is being forwarded to the PA team    [facility] called in regards to the Risperidone PA.  The request exceeds quantity limits.  They need a verbal justification on the quantity request.  Please call [phone]

## 2024-10-02 NOTE — TELEPHONE ENCOUNTER
Called [facility] [phone], rep had additional clinical questions regarding  risperiDONE 2MG tablets, needed justification for 3 tablets (6 mg total) by mouth 2 (two) times a day   Provided info, rep stated he will forward info to the PA clinical team for review. Office will receive decision via fax.

## 2024-10-03 NOTE — TELEPHONE ENCOUNTER
PA for risperiDONE 2MG tablets APPROVED     Date(s) approved 10/3/2024 to 12/31/2024  Qty of 600 tablets for 100 days.       Case #    Patient advised by          []MyChart Message  []Phone call   []LMOM  [x]L/M to call office as no active Communication consent on file  []Unable to leave detailed message as VM not approved on Communication consent       Pharmacy advised by    [x]Faxed approval letter to [id number] [id number]   []Phone call    Approval letter scanned into Media Yes

## 2024-10-17 DIAGNOSIS — F43.12 CHRONIC POST-TRAUMATIC STRESS DISORDER (PTSD): ICD-10-CM

## 2024-10-17 DIAGNOSIS — F31.76 BIPOLAR I DISORDER, MOST RECENT EPISODE DEPRESSED, IN FULL REMISSION (HCC): ICD-10-CM

## 2024-10-17 DIAGNOSIS — F41.9 ANXIETY: ICD-10-CM

## 2024-10-17 DIAGNOSIS — F41.0 PANIC ATTACKS: ICD-10-CM

## 2024-10-17 RX ORDER — BENZTROPINE MESYLATE 1 MG/1
1 TABLET ORAL 2 TIMES DAILY
Qty: 180 TABLET | Refills: 0 | Status: SHIPPED | OUTPATIENT
Start: 2024-10-17

## 2024-10-17 RX ORDER — DIVALPROEX SODIUM 125 MG/1
TABLET, DELAYED RELEASE ORAL
Qty: 90 TABLET | Refills: 0 | Status: SHIPPED | OUTPATIENT
Start: 2024-10-17
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
## 2024-11-12 ENCOUNTER — TELEPHONE (OUTPATIENT)
Age: 34
End: 2024-11-12

## 2024-11-12 NOTE — TELEPHONE ENCOUNTER
Hasmukh called and requested that his appt be changed to Virtual on 11/13 at 9:30 am with Charline Ronquillo. Writer called office regarding procedure for this. Office stated he can have a Virtual if it is good with the Provider. Writer informed pt that if provider is in office he can do Virtual with the [employer].    Hasmukh had a bad experience with his transportation home last time. He went through his insurance for the ride. Hasmukh can not drive and can not find anyone to bring him. Writer informed that a message will be sent to find out if we can do Virtual and let him know regarding his appt 11/13. Office is also aware and said if need be will contact pt in the morning. Writer made Hasmukh aware of [employer] and he is calling IT to try to get on his portal.

## 2024-11-12 NOTE — TELEPHONE ENCOUNTER
Spoke with PT to let him know we switched his appt tomorrow to virtual. He was very appreciative to writer and provider for understanding.

## 2024-11-13 ENCOUNTER — TELEPHONE (OUTPATIENT)
Age: 34
End: 2024-11-13

## 2024-11-13 ENCOUNTER — TELEMEDICINE (OUTPATIENT)
Dept: PSYCHIATRY | Facility: CLINIC | Age: 34
End: 2024-11-13
Payer: COMMERCIAL

## 2024-11-13 DIAGNOSIS — F41.9 ANXIETY: ICD-10-CM

## 2024-11-13 DIAGNOSIS — F41.0 PANIC ATTACKS: ICD-10-CM

## 2024-11-13 DIAGNOSIS — F43.12 CHRONIC POST-TRAUMATIC STRESS DISORDER (PTSD): ICD-10-CM

## 2024-11-13 DIAGNOSIS — F31.76 BIPOLAR I DISORDER, MOST RECENT EPISODE DEPRESSED, IN FULL REMISSION (HCC): Primary | ICD-10-CM

## 2024-11-13 PROCEDURE — 99214 OFFICE O/P EST MOD 30 MIN: CPT | Performed by: PHYSICIAN ASSISTANT

## 2024-11-13 NOTE — TELEPHONE ENCOUNTER
Pt called regarding appt 11/13 @ 9:30 virtual and did not see provider on. Provider was running late and pt was notified by writer. Pt will still wait online for appt.

## 2024-11-18 ENCOUNTER — TELEPHONE (OUTPATIENT)
Dept: PSYCHIATRY | Facility: CLINIC | Age: 34
End: 2024-11-18

## 2024-11-18 NOTE — TELEPHONE ENCOUNTER
Called and LVM to pt and informed that pt have an appt for EDEN Ronquillo on  11/20/2024@ 9 am virtual.

## 2024-11-19 ENCOUNTER — TELEPHONE (OUTPATIENT)
Dept: PSYCHIATRY | Facility: CLINIC | Age: 34
End: 2024-11-19

## 2024-11-19 NOTE — TELEPHONE ENCOUNTER
Left voicemail informing patient and/or parent/guardian of the Psych Encounter form needing to be signed as a requirement from the insurance company for billing purposes. Patient can access form via [employer] and sign electronically.     Please make patient aware this form must be signed for each visit as a requirement to continue future visits with provider.    Virtual Encounter form 11/13/24

## 2024-11-20 DIAGNOSIS — F31.76 BIPOLAR I DISORDER, MOST RECENT EPISODE DEPRESSED, IN FULL REMISSION (HCC): ICD-10-CM

## 2024-11-20 RX ORDER — DIVALPROEX SODIUM 500 MG/1
TABLET, FILM COATED, EXTENDED RELEASE ORAL
Qty: 360 TABLET | Refills: 0 | Status: SHIPPED | OUTPATIENT
Start: 2024-11-20

## 2024-12-03 ENCOUNTER — TELEPHONE (OUTPATIENT)
Dept: PSYCHIATRY | Facility: CLINIC | Age: 34
End: 2024-12-03

## 2024-12-03 NOTE — TELEPHONE ENCOUNTER
Patient returned call and said to please give him a few more days to sign this form because he is busy at the moment.

## 2024-12-09 NOTE — TELEPHONE ENCOUNTER
As of 12/9, the form is not sign. Called & LVM requesting that patient sign the form via [employer].

## 2024-12-19 ENCOUNTER — TELEPHONE (OUTPATIENT)
Dept: PSYCHIATRY | Facility: CLINIC | Age: 34
End: 2024-12-19

## 2024-12-19 NOTE — TELEPHONE ENCOUNTER
LVM requesting that form be signed via [employer].    Non compliance letter placed in outgoing mail.

## 2025-01-21 ENCOUNTER — TELEPHONE (OUTPATIENT)
Dept: PSYCHIATRY | Facility: CLINIC | Age: 35
End: 2025-01-21

## 2025-01-21 NOTE — TELEPHONE ENCOUNTER
Called and left message for patient to return a call to [phone] and schedule a follow up with provider Charline Ronquillo. Please schedule upon return call. Thank you.

## 2025-03-04 DIAGNOSIS — F41.0 PANIC ATTACKS: ICD-10-CM

## 2025-03-04 DIAGNOSIS — F31.76 BIPOLAR I DISORDER, MOST RECENT EPISODE DEPRESSED, IN FULL REMISSION (HCC): ICD-10-CM

## 2025-03-04 DIAGNOSIS — F41.9 ANXIETY: ICD-10-CM

## 2025-03-04 DIAGNOSIS — F43.12 CHRONIC POST-TRAUMATIC STRESS DISORDER (PTSD): ICD-10-CM

## 2025-03-05 NOTE — TELEPHONE ENCOUNTER
Called and LVM for patient looking to schedule f/u with provider following med refill request.    Advised patient to call back in to be scheduled with provider's earliest available opening.

## 2025-03-05 NOTE — TELEPHONE ENCOUNTER
Forwarding to primary provider for review upon return. VM left in January requested pt contact office to schedule follow up.

## 2025-03-06 RX ORDER — RISPERIDONE 2 MG/1
6 TABLET ORAL 2 TIMES DAILY
Qty: 180 TABLET | Refills: 0 | Status: SHIPPED | OUTPATIENT
Start: 2025-03-06

## 2025-03-06 RX ORDER — DIVALPROEX SODIUM 125 MG/1
TABLET, DELAYED RELEASE ORAL
Qty: 90 TABLET | Refills: 0 | OUTPATIENT
Start: 2025-03-06

## 2025-03-06 RX ORDER — BENZTROPINE MESYLATE 1 MG/1
1 TABLET ORAL 2 TIMES DAILY
Qty: 180 TABLET | Refills: 0 | Status: SHIPPED | OUTPATIENT
Start: 2025-03-06

## 2025-03-06 RX ORDER — DIVALPROEX SODIUM 500 MG/1
TABLET, FILM COATED, EXTENDED RELEASE ORAL
Qty: 360 TABLET | Refills: 0 | Status: SHIPPED | OUTPATIENT
Start: 2025-03-06

## 2025-03-06 NOTE — TELEPHONE ENCOUNTER
Renewal request came in for his medications, EMR and PDMP were reviewed,  he has not actually been on Clonazepam since before last visit.  Per PDMP he last filled the BZD 5/2024 which was confirmed by [facility] pharmacist Kristine today when I called her.  She does not notice any other Rxs in the system or any that were transferred.  Pt needs a f/u appt, but is in the process of trying to arrange this.  It is most appropriate at this time to renew the following to his designated pharmacy:  Divalproex ER 500mg (1) tab po qAM (2) tabs q 12 noon and (1) tab qhs # 360  Risperidone 2mg (3) tabs po bid # 540  Clonazepam 1mg (1) tab po qAM and (2) tabs qhs # 90 R1  Sertraline 50mg (1) tab po qd # 90   Benztropine 1mg (1) tab po bid # 180

## 2025-03-09 NOTE — PROGRESS NOTES
Albuterol 2 puffs 3 times a day for the next week then taper using as needed  Cut back or quit vaping  Tylenol as needed  Follow-up with family doctor as needed  If your symptoms worsen go to the emergency department as we discussed   Progress Note - Infectious Disease   Collin Bradley 23 y o  male MRN: [id number]  Unit/Bed#: [facility] 919-01 Encounter: [id number]    Assessment and plan     Gram negative sepsis likely 2/2 surgical wound infection sp washout day#1, sp wound vac day#1   -Pt is febrile this morning with T max 101 1, he is still tachycardiac, no   -Blood cultures from  2/2 positive for gram negative rods, 1/2 for Proteus,  wound culture also showed gram negative rods Proteus, tissue culture from OR prelim 1+ poly, 2+ RBC and no bacteria so far, surveillance cultures are pending, urine culture is pending   -Pt has received 3 doses of Cefepime so far (day#3)   -Vanco was discontinued because of G negative rods   -Will follow cultures and sensitivities, and vitals  -Wound vac replacement per gen surgery shakeel am, NPO after midnight    -If pt is persistently febrile then would consider CT abdomen and pelvis and CXR for completeness     Subjective/Objective     Subjective: Pt was seen and examined this morning  Pt is angry this morning reporting that he is not getting any sleep, he is hungry and has not eaten and that he wants to home today  He states that he does not want to stay, he refused to participate in any type of physical exam, refusing to let me take a look at his back or do any ROM testing for his legs  He reports that he feels fine and wants to eat and go home  HR:  [118-132] 118  Resp:  [16-27] 18  BP: (118-143)/(65-93) 140/93  SpO2:  [93 %-98 %] 96 %  Temp (24hrs), Av 5 °F (37 5 °C), Min:98 8 °F (37 1 °C), Max:101 1 °F (38 4 °C)  Current: Temperature: (!) 101 1 °F (38 4 °C)    Physical Exam   Constitutional: He appears well-developed and well-nourished  No distress  HENT:   Head: Normocephalic and atraumatic  Eyes: Conjunctivae are normal  Right eye exhibits no discharge  Left eye exhibits no discharge  Neck: Neck supple  No JVD present  Cardiovascular: Normal rate and regular rhythm      Pulmonary/Chest: Breath sounds normal  No respiratory distress  He has no wheezes  Abdominal: Soft  He exhibits no distension  There is no tenderness  There is no rebound and no guarding  Musculoskeletal: He exhibits no edema  Neurological: He is alert  No cranial nerve deficit  Pt did not want to participate in physical exam this morning  He did not want to try to move his leg and asked me to not to touch his feet or legs, he refused to do any ROM testing but he was able to wiggle his toes    Skin: Skin is warm and dry  No rash noted  He is not diaphoretic  No erythema  Psychiatric: His affect is angry  He is agitated  Invasive Devices     Peripheral Intravenous Line            Peripheral IV 10/31/17 Right Arm 1 day    Peripheral IV 11/01/17 Right Antecubital less than 1 day          Drain            Negative Pressure Wound Therapy (V A C ) Back Lower less than 1 day                Lab, Imaging and other studies: I have personally reviewed pertinent reports

## 2025-03-10 ENCOUNTER — TELEPHONE (OUTPATIENT)
Age: 35
End: 2025-03-10

## 2025-03-10 NOTE — TELEPHONE ENCOUNTER
Patient called and gave his mom permission to speak with [facility]OP regarding concerns. Mom came on the phone and requested for pt. To see another doctor, as pt. Is not satisfied with current provider. Please call back to schedule with new provider.

## 2025-03-11 NOTE — TELEPHONE ENCOUNTER
Received call from POD with parent/guardian regarding CASI request   Patient sees EDEN Ronquillo at [facility].    Patient is requesting a CASI from provider due to him no longer comfortable with her. Writer gathered reason and reviewed CASI process. Support services will reach out to patient/patient mom once request is approved. Patient mom verbalized understanding.

## 2025-03-20 NOTE — TELEPHONE ENCOUNTER
correction confirmed    Patients mother called in requesting update on CASI.     Writer contacted SS who shared they are still waiting on respond from admin.     SS staff will send admin another msg.     Writer relayed msg to patients mother.

## 2025-03-24 ENCOUNTER — TELEPHONE (OUTPATIENT)
Dept: PSYCHIATRY | Facility: CLINIC | Age: 35
End: 2025-03-24

## 2025-03-24 NOTE — TELEPHONE ENCOUNTER
1st outreach attempt. Called and left message for parent/guardian to return a call to [phone] regarding CASI appt for Medication Management . Please transfer call to Psych Support Services upon return call.

## 2025-03-25 ENCOUNTER — TELEPHONE (OUTPATIENT)
Dept: PSYCHIATRY | Facility: CLINIC | Age: 35
End: 2025-03-25

## 2025-03-25 NOTE — TELEPHONE ENCOUNTER
Medication: clonazePAM (KlonoPIN) 1 mg tablet    Dose/Frequency: 1 tab in AM, 2 tabs in PM    Quantity: 30 tablet    Pharmacy: [facility]/pharmacy #[id number], [place]   Phone: [phone]    Office:   [] PCP/Provider -   [x] Speciality/Provider - Psychiatry    Does the patient have enough for 3 days?   [] Yes   [x] No - Send as HP to POD

## 2025-03-25 NOTE — TELEPHONE ENCOUNTER
Spoke with parent/guardian and scheduled Medication Management  CASI for 7/9/25 at 10:30am with Dr. Joce Gannon due to patient request.    Scheduled virtual f/u for 10/7/25 at 11:30am.

## 2025-03-26 NOTE — TELEPHONE ENCOUNTER
Called [facility] pharmacy and was informed that they have not filled a script for Clonazepam since May, 2024 that was written by Jose Villar MD.    Will refer to Charline Ronquillo for review.

## 2025-03-27 NOTE — TELEPHONE ENCOUNTER
Left generic message on VM informing Nontas that in order to have refill request reviewed, he will have to come in for an appointment.

## 2025-03-28 NOTE — TELEPHONE ENCOUNTER
Pts mother called in to get the pt rescheduled for an earlier CASI appt. Writer warm transferred the patient to the support services line for assistance.

## 2025-03-28 NOTE — TELEPHONE ENCOUNTER
Received call from POD with Mother regarding CASI and medication refill. Relayed message from provider of the last time this medication was filled and who the provider was that filled it. Provided options to schedule with current provider, schedule sooner CASI in [facility]. Or call provider who last filled medication. Explained that to re-prescribe a medication patient would need to be seen. Mother informed the PCP office that filled the medication does not take patients insurance and they changed offices. Mother will call and explain situation and asked to keep CASI in July. She will call if there are further issues and writer advised to speak with clinical regarding medication. Mother thanked writer for assistance.

## 2025-03-28 NOTE — TELEPHONE ENCOUNTER
Patients mother returned call.     Writer relayed clinical staff msg.     Upon research, patient must be scheduled to a sooner appt for medication prescription/ refill Klonopin due to last time medication was filled was 5/2024 by another provider not current CASI provider, not previous MM provider either.     Writer attempted to call patient's mother, LM.     Upon call back, relay msg and get patient scheduled for a sooner CASI appt.

## 2025-04-03 ENCOUNTER — HOSPITAL ENCOUNTER (EMERGENCY)
Facility: HOSPITAL | Age: 35
End: 2025-04-04
Attending: EMERGENCY MEDICINE
Payer: COMMERCIAL

## 2025-04-03 DIAGNOSIS — F31.76 BIPOLAR I DISORDER, MOST RECENT EPISODE DEPRESSED, IN FULL REMISSION (HCC): Primary | ICD-10-CM

## 2025-04-03 DIAGNOSIS — R45.850 HOMICIDAL IDEATIONS: ICD-10-CM

## 2025-04-03 DIAGNOSIS — Z00.8 MEDICAL CLEARANCE FOR PSYCHIATRIC ADMISSION: ICD-10-CM

## 2025-04-03 LAB
ALBUMIN SERPL BCG-MCNC: 4.6 G/DL (ref 3.5–5)
ALP SERPL-CCNC: 50 U/L (ref 34–104)
ALT SERPL W P-5'-P-CCNC: 31 U/L (ref 7–52)
AMPHETAMINES SERPL QL SCN: NEGATIVE
ANION GAP SERPL CALCULATED.3IONS-SCNC: 11 MMOL/L (ref 4–13)
AST SERPL W P-5'-P-CCNC: 24 U/L (ref 13–39)
BARBITURATES UR QL: NEGATIVE
BASOPHILS # BLD AUTO: 0.07 THOUSANDS/ÂΜL (ref 0–0.1)
BASOPHILS NFR BLD AUTO: 1 % (ref 0–1)
BENZODIAZ UR QL: NEGATIVE
BILIRUB SERPL-MCNC: 0.42 MG/DL (ref 0.2–1)
BILIRUB UR QL STRIP: ABNORMAL
BUN SERPL-MCNC: 15 MG/DL (ref 5–25)
CALCIUM SERPL-MCNC: 10.1 MG/DL (ref 8.4–10.2)
CHLORIDE SERPL-SCNC: 101 MMOL/L (ref 96–108)
CLARITY UR: CLEAR
CO2 SERPL-SCNC: 25 MMOL/L (ref 21–32)
COCAINE UR QL: NEGATIVE
COLOR UR: YELLOW
CREAT SERPL-MCNC: 0.94 MG/DL (ref 0.6–1.3)
EOSINOPHIL # BLD AUTO: 0.07 THOUSAND/ÂΜL (ref 0–0.61)
EOSINOPHIL NFR BLD AUTO: 1 % (ref 0–6)
ERYTHROCYTE [DISTWIDTH] IN BLOOD BY AUTOMATED COUNT: 13 % (ref 11.6–15.1)
ETHANOL EXG-MCNC: 0 MG/DL
FENTANYL UR QL SCN: NEGATIVE
GFR SERPL CREATININE-BSD FRML MDRD: 106 ML/MIN/1.73SQ M
GLUCOSE SERPL-MCNC: 103 MG/DL (ref 65–140)
GLUCOSE UR STRIP-MCNC: NEGATIVE MG/DL
HCT VFR BLD AUTO: 40.8 % (ref 36.5–49.3)
HGB BLD-MCNC: 13.7 G/DL (ref 12–17)
HGB UR QL STRIP.AUTO: NEGATIVE
HYDROCODONE UR QL SCN: NEGATIVE
IMM GRANULOCYTES # BLD AUTO: 0.04 THOUSAND/UL (ref 0–0.2)
IMM GRANULOCYTES NFR BLD AUTO: 0 % (ref 0–2)
KETONES UR STRIP-MCNC: ABNORMAL MG/DL
LEUKOCYTE ESTERASE UR QL STRIP: NEGATIVE
LYMPHOCYTES # BLD AUTO: 2.39 THOUSANDS/ÂΜL (ref 0.6–4.47)
LYMPHOCYTES NFR BLD AUTO: 25 % (ref 14–44)
MCH RBC QN AUTO: 26.8 PG (ref 26.8–34.3)
MCHC RBC AUTO-ENTMCNC: 33.6 G/DL (ref 31.4–37.4)
MCV RBC AUTO: 80 FL (ref 82–98)
METHADONE UR QL: NEGATIVE
MONOCYTES # BLD AUTO: 0.67 THOUSAND/ÂΜL (ref 0.17–1.22)
MONOCYTES NFR BLD AUTO: 7 % (ref 4–12)
NEUTROPHILS # BLD AUTO: 6.45 THOUSANDS/ÂΜL (ref 1.85–7.62)
NEUTS SEG NFR BLD AUTO: 66 % (ref 43–75)
NITRITE UR QL STRIP: NEGATIVE
NRBC BLD AUTO-RTO: 0 /100 WBCS
OPIATES UR QL SCN: NEGATIVE
OXYCODONE+OXYMORPHONE UR QL SCN: NEGATIVE
PCP UR QL: NEGATIVE
PH UR STRIP.AUTO: 6 [PH]
PLATELET # BLD AUTO: 394 THOUSANDS/UL (ref 149–390)
PMV BLD AUTO: 9.4 FL (ref 8.9–12.7)
POTASSIUM SERPL-SCNC: 3.7 MMOL/L (ref 3.5–5.3)
PROT SERPL-MCNC: 7.4 G/DL (ref 6.4–8.4)
PROT UR STRIP-MCNC: NEGATIVE MG/DL
RBC # BLD AUTO: 5.12 MILLION/UL (ref 3.88–5.62)
SODIUM SERPL-SCNC: 137 MMOL/L (ref 135–147)
SP GR UR STRIP.AUTO: 1.02 (ref 1–1.03)
THC UR QL: NEGATIVE
TSH SERPL DL<=0.05 MIU/L-ACNC: 2.55 UIU/ML (ref 0.45–4.5)
UROBILINOGEN UR QL STRIP.AUTO: 0.2 E.U./DL
VALPROATE SERPL-MCNC: 26 UG/ML (ref 50–125)
WBC # BLD AUTO: 9.69 THOUSAND/UL (ref 4.31–10.16)

## 2025-04-03 PROCEDURE — 81003 URINALYSIS AUTO W/O SCOPE: CPT | Performed by: PHYSICIAN ASSISTANT

## 2025-04-03 PROCEDURE — 84443 ASSAY THYROID STIM HORMONE: CPT | Performed by: PHYSICIAN ASSISTANT

## 2025-04-03 PROCEDURE — 80307 DRUG TEST PRSMV CHEM ANLYZR: CPT

## 2025-04-03 PROCEDURE — 36415 COLL VENOUS BLD VENIPUNCTURE: CPT | Performed by: PHYSICIAN ASSISTANT

## 2025-04-03 PROCEDURE — 85025 COMPLETE CBC W/AUTO DIFF WBC: CPT | Performed by: PHYSICIAN ASSISTANT

## 2025-04-03 PROCEDURE — 82075 ASSAY OF BREATH ETHANOL: CPT

## 2025-04-03 PROCEDURE — 99285 EMERGENCY DEPT VISIT HI MDM: CPT | Performed by: PHYSICIAN ASSISTANT

## 2025-04-03 PROCEDURE — 99283 EMERGENCY DEPT VISIT LOW MDM: CPT

## 2025-04-03 PROCEDURE — 80164 ASSAY DIPROPYLACETIC ACD TOT: CPT | Performed by: PHYSICIAN ASSISTANT

## 2025-04-03 PROCEDURE — 80053 COMPREHEN METABOLIC PANEL: CPT | Performed by: PHYSICIAN ASSISTANT

## 2025-04-03 RX ORDER — LEVOTHYROXINE SODIUM 25 UG/1
25 TABLET ORAL
Status: CANCELLED | OUTPATIENT
Start: 2025-04-04

## 2025-04-03 RX ORDER — BENZTROPINE MESYLATE 1 MG/1
1 TABLET ORAL 2 TIMES DAILY
Status: CANCELLED | OUTPATIENT
Start: 2025-04-03

## 2025-04-03 RX ORDER — DIVALPROEX SODIUM 125 MG/1
125 CAPSULE, COATED PELLETS ORAL ONCE
Status: DISCONTINUED | OUTPATIENT
Start: 2025-04-03 | End: 2025-04-03

## 2025-04-03 RX ORDER — RISPERIDONE 1 MG/1
6 TABLET ORAL 2 TIMES DAILY
Status: CANCELLED | OUTPATIENT
Start: 2025-04-03

## 2025-04-03 RX ORDER — DIVALPROEX SODIUM 125 MG/1
CAPSULE, COATED PELLETS ORAL
Status: COMPLETED
Start: 2025-04-03 | End: 2025-04-03

## 2025-04-03 RX ORDER — DIVALPROEX SODIUM 500 MG/1
1000 TABLET, FILM COATED, EXTENDED RELEASE ORAL DAILY
Status: DISCONTINUED | OUTPATIENT
Start: 2025-04-04 | End: 2025-04-04 | Stop reason: HOSPADM

## 2025-04-03 RX ORDER — DIVALPROEX SODIUM 500 MG/1
500 TABLET, FILM COATED, EXTENDED RELEASE ORAL 2 TIMES DAILY
Status: DISCONTINUED | OUTPATIENT
Start: 2025-04-03 | End: 2025-04-04 | Stop reason: HOSPADM

## 2025-04-03 RX ORDER — DIVALPROEX SODIUM 125 MG/1
125 CAPSULE, COATED PELLETS ORAL EVERY EVENING
Status: DISCONTINUED | OUTPATIENT
Start: 2025-04-03 | End: 2025-04-04 | Stop reason: HOSPADM

## 2025-04-03 RX ORDER — DIVALPROEX SODIUM 125 MG/1
125 TABLET, DELAYED RELEASE ORAL EVERY EVENING
Status: DISCONTINUED | OUTPATIENT
Start: 2025-04-03 | End: 2025-04-03

## 2025-04-03 RX ORDER — DIVALPROEX SODIUM 250 MG/1
TABLET, DELAYED RELEASE ORAL
Status: DISCONTINUED
Start: 2025-04-03 | End: 2025-04-03 | Stop reason: WASHOUT

## 2025-04-03 RX ADMIN — DIVALPROEX SODIUM 125 MG: 125 CAPSULE, COATED PELLETS ORAL at 21:06

## 2025-04-03 RX ADMIN — DIVALPROEX SODIUM 500 MG: 500 TABLET, EXTENDED RELEASE ORAL at 20:49
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
## 2025-04-03 NOTE — ED NOTES
Shira at [facility] is going to contact the patient's mother to file a 302. Per Shira, patient's mother was granted an emergency PFA las night and a temporary order this morning.  PFA hearing scheduled for 4/16/25.  have been made aware that the patient is in the ER.

## 2025-04-03 NOTE — ED NOTES
Took over patient from previous nurse. Patient is awake and alert, denies any needs at this time. Continual observation remains in place (virtual monitoring).      Charline Ovalles RN  04/03/25 1865

## 2025-04-03 NOTE — ED NOTES
Faxed 302, and 201 to [facility].    Pt signed 201, and original is on the paper chart at the nurse's desk, copy in CW office.
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
## 2025-04-03 NOTE — ED PROVIDER NOTES
"Time reflects when diagnosis was documented in both MDM as applicable and the Disposition within this note       Time User Action Codes Description Comment    4/3/2025 12:20 PM JuanitoChasity delaney Add [F31.76] Bipolar I disorder, most recent episode depressed, in full remission ([facility])     4/3/2025  7:48 PM Finn Rivera [R45.850] Homicidal ideations           ED Disposition       ED Disposition   Transfer to Behavioral Health Condition   --    Date/Time   Thu Apr 3, 2025  1:53 PM    Comment   Hasmukh Bradley should be transferred out to a behavioral health facility and has been medically cleared.               Assessment & Plan       Medical Decision Making  34-year-old male presenting to the emergency department today for a psychiatric evaluation.  Patient notes medication noncompliance.  He denies any suicidal or homicidal ideations to me without per mother he has been making homicidal threats to her at home when he does not get his way.  His vitals are stable.  He is afebrile.  He is not suicidal or homicidal on my examination.  Mother petitioned 302 for the patient.  His valproic levels are lower than they should be.  Negative rapid urine drug screen.  No urinary tract infection on urinalysis.  CMP is reassuring.  CBC is reassuring.  Negative alcohol breath test.  Patient signed a 201 with crisis per my discussion with Davis Andujar, crisis worker.  He is pending bed placement.  Patient signed out to Dr. Hearn at the end of my shift.  The patient and/or patient's proxy verify their understanding and agree to the plan at this time.  All questions answered to the patient and/or their proxy's satisfaction.  All labs reviewed and utilized in the medical decision making process (if labs were ordered).  Portions of the record may have been created with voice recognition software.  Occasional wrong word or \"sound a like\" substitutions may have occurred due to the inherent limitations of voice " "recognition software.  Read the chart carefully and recognize, using context, where substitutions have occurred.    I reviewed prior notes.    Problems Addressed:  Bipolar I disorder, most recent episode depressed, in full remission (HCC): chronic illness or injury with exacerbation, progression, or side effects of treatment  Homicidal ideations: undiagnosed new problem with uncertain prognosis    Amount and/or Complexity of Data Reviewed  External Data Reviewed: notes.  Labs: ordered. Decision-making details documented in ED Course.  Discussion of management or test interpretation with external provider(s): Davis Andujar - Crisis Worker  Dr. Nadiya Keyes  Prescription drug management.  Decision regarding hospitalization.        ED Course as of 04/03/25 1950   Thu Apr 03, 2025   1347 The patient is medically cleared for evaluation by crisis worker and placement into a psychiatric facility.   1739 Patient signed a 201. Pending bed placement.       Medications   divalproex sodium (DEPAKOTE ER) 24 hr tablet 500 mg (has no administration in time range)   divalproex sodium (DEPAKOTE ER) 24 hr tablet 1,000 mg (has no administration in time range)   divalproex sodium (DEPAKOTE) DR tablet 125 mg (has no administration in time range)       ED Risk Strat Scores                                                History of Present Illness       Chief Complaint   Patient presents with    Psychiatric Evaluation     Pt sent by mother for evaluation, pt reports being unable to \"see meds\" so has been noncompliant, wants to sign 201;       Past Medical History:   Diagnosis Date    ADHD (attention deficit hyperactivity disorder), combined type 04/28/2015    Anxiety 2012    Autism     Bipolar 1 disorder (HCC)     Depression 2008    Disease of thyroid gland     Headache(784.0) six months ago    Hyperlipidemia     Lumbar disc disease     Psychiatric disorder     Compulsive Disorder    Sleep apnea     Urinary incontinence       Past " Surgical History:   Procedure Laterality Date    BACK SURGERY  11/2017    Lower. Last assessed: 1/18/18    COLONOSCOPY      Fiberoptic    INCISION AND DRAINAGE POSTERIOR SPINE N/A 11/1/2017    Procedure: INCISION AND DRAINAGE (I&D) SPINE;  Surgeon: Lalito Lovell MD;  Location: [facility];  Service: Neurosurgery    IA LAMNOTMY INCL W/DCMPRSN NRV ROOT 1 INTRSPC LUMBR Bilateral 10/17/2017    Procedure: LEFT L3/4 AND RIGHT L4/5 MICRODISCECTOMIES, HEMILAMINECTOMIES; POSSIBLE EPIDURAL STEROID INJECTIONS;  Surgeon: Lalito Lovell MD;  Location: [facility];  Service: Neurosurgery    WISDOM TOOTH EXTRACTION      WOUND DEBRIDEMENT N/A 11/3/2017    Procedure: DEBRIDEMENT WOUND (WASH OUT), wound vac placement back;  Surgeon: Alexis Zaman DO;  Location: [facility];  Service: General    WOUND DEBRIDEMENT N/A 11/6/2017    Procedure: DEBRIDEMENT WOUND (WASH OUT),CLOSURE OF WOUMD;  Surgeon: Kavon Vargas MD;  Location: [facility];  Service: General      Family History   Problem Relation Age of Onset    Hypertension Mother     Substance Abuse Mother         smoking    Mental illness Mother         multiple diagnoses    ADD / ADHD Mother     Anxiety disorder Mother     Schizoaffective Disorder  Mother     Heart disease Father         alive 57    Substance Abuse Father         booze drugs    Heart attack Father     Mental illness Father         psychotic rage    Depression Maternal Grandmother     Anxiety disorder Maternal Grandmother     Stroke Maternal Grandmother         CVA    Heart disease Maternal Grandmother         dead 2008    Heart failure Maternal Grandmother     Self-Injury Maternal Grandmother         cured    Heart failure Maternal Grandfather     Heart disease Maternal Grandfather         alive 81    Heart disease Paternal Grandmother     Heart disease Paternal Grandfather         dead 2021    Substance Abuse Paternal Grandfather         booze drugs smoking    Colonic polyp Family     Colonic polyp Family     Cancer  Family     Drug abuse Neg Hx     Alcohol abuse Neg Hx     Colon cancer Neg Hx       Social History     Tobacco Use    Smoking status: Never    Smokeless tobacco: Never   Vaping Use    Vaping status: Never Used   Substance Use Topics    Alcohol use: No    Drug use: No      E-Cigarette/Vaping    E-Cigarette Use Never User       E-Cigarette/Vaping Substances      I have reviewed and agree with the history as documented.     34-year-old male with past medical history significant for autism spectrum disorder, bipolar 1 disorder, and compulsive disorder presenting to the emergency department today for a psychiatric evaluation.  The patient notes that he is here because he has been intermittently forgetting dose of his Depakote at home.  The patient notes that his room is messy and he can only intermittently find his Depakote.  He is unable to tell me the dosing of his Depakote though he does tell me that he misses approximately 1-2 doses per day.  He denies any associated suicidal or homicidal ideations.  However, the patient allegedly has been making homicidal statements to his mother.  She has recently filed a PFA against him which the police served him with here.  Per the patient's mother, the patient has been threatening to harm her has access to knives at home.  The patient denies any drug use or recent alcohol use.  The patient denies other complaints at this time.      History provided by:  Patient   used: No    Psychiatric Evaluation  Presenting symptoms: homicidal ideas    Presenting symptoms: no depression, no disorganized speech, no disorganized thought process, no hallucinations, no suicidal thoughts, no suicidal threats and no suicide attempt    Degree of incapacity (severity):  Moderate  Onset quality:  Gradual  Duration: undetermined timeline.  Context: not alcohol use and not drug abuse    Relieved by:  Nothing  Worsened by:  Nothing  Ineffective treatments:  None tried  Associated  symptoms: poor judgment    Associated symptoms: no abdominal pain, no anhedonia, no appetite change, no chest pain, not distractible, no euphoric mood, no fatigue, no feelings of worthlessness, no headaches, no hypersomnia, no hyperventilation and no irritability        Review of Systems   Constitutional:  Negative for appetite change, chills, diaphoresis, fatigue, fever and irritability.   Eyes:  Negative for visual disturbance.   Respiratory:  Negative for cough, chest tightness, shortness of breath and wheezing.    Cardiovascular:  Negative for chest pain, palpitations and leg swelling.   Gastrointestinal:  Negative for abdominal pain, constipation, diarrhea, nausea and vomiting.   Skin:  Negative for rash and wound.   Neurological:  Negative for dizziness, seizures, syncope, weakness, light-headedness, numbness and headaches.   Psychiatric/Behavioral:  Positive for behavioral problems and homicidal ideas. Negative for hallucinations and suicidal ideas.            Objective       ED Triage Vitals [04/03/25 1223]   Temperature Pulse Blood Pressure Respirations SpO2 Patient Position - Orthostatic VS   98.4 °F (36.9 °C) (!) 114 (!) 171/108 18 98 % Sitting      Temp src Heart Rate Source BP Location FiO2 (%) Pain Score    -- Monitor Left arm -- --      Vitals      Date and Time Temp Pulse SpO2 Resp BP Pain Score FACES Pain Rating User   04/03/25 1913 -- 99 99 % 18 167/90 -- -- DS   04/03/25 1223 98.4 °F (36.9 °C) 114 98 % 18 171/108 -- -- KD            Physical Exam  Vitals and nursing note reviewed.   Constitutional:       General: He is not in acute distress.     Appearance: Normal appearance. He is obese. He is not ill-appearing, toxic-appearing or diaphoretic.   HENT:      Head: Normocephalic and atraumatic.      Nose: Nose normal. No congestion or rhinorrhea.      Mouth/Throat:      Mouth: Mucous membranes are moist.      Pharynx: No oropharyngeal exudate or posterior oropharyngeal erythema.   Eyes:       General: No scleral icterus.        Right eye: No discharge.         Left eye: No discharge.      Conjunctiva/sclera: Conjunctivae normal.   Cardiovascular:      Rate and Rhythm: Normal rate and regular rhythm.      Pulses: Normal pulses.      Heart sounds: Normal heart sounds. No murmur heard.     No friction rub. No gallop.   Pulmonary:      Effort: Pulmonary effort is normal. No respiratory distress.      Breath sounds: Normal breath sounds. No stridor. No wheezing, rhonchi or rales.   Chest:      Chest wall: No tenderness.   Musculoskeletal:         General: Normal range of motion.      Cervical back: Normal range of motion. No rigidity.      Right lower leg: No edema.      Left lower leg: No edema.   Skin:     General: Skin is warm and dry.      Capillary Refill: Capillary refill takes less than 2 seconds.      Coloration: Skin is not jaundiced or pale.   Neurological:      General: No focal deficit present.      Mental Status: He is alert and oriented to person, place, and time. Mental status is at baseline.   Psychiatric:         Mood and Affect: Mood normal.         Behavior: Behavior normal.      Comments: Patient denies active SI or HI         Results Reviewed       Procedure Component Value Units Date/Time    Rapid drug screen, urine [[id number]]  (Normal) Collected: 04/03/25 1601    Lab Status: Final result Specimen: Urine, Clean Catch Updated: 04/03/25 1630     Amph/Meth UR Negative     Barbiturate Ur Negative     Benzodiazepine Urine Negative     Cocaine Urine Negative     Methadone Urine Negative     Opiate Urine Negative     PCP Ur Negative     THC Urine Negative     Oxycodone Urine Negative     Fentanyl Urine Negative     HYDROCODONE URINE Negative    Narrative:      FOR MEDICAL PURPOSES ONLY.   IF CONFIRMATION NEEDED PLEASE CONTACT THE LAB WITHIN 5 DAYS.    Drug Screen Cutoff Levels:  AMPHETAMINE/METHAMPHETAMINES  1000 ng/mL  BARBITURATES     200 ng/mL  BENZODIAZEPINES     200  ng/mL  COCAINE      300 ng/mL  METHADONE      300 ng/mL  OPIATES      300 ng/mL  PHENCYCLIDINE     25 ng/mL  THC       50 ng/mL  OXYCODONE      100 ng/mL  FENTANYL      5 ng/mL  HYDROCODONE     300 ng/mL    UA w Reflex to Microscopic w Reflex to Culture [[id number]]  (Abnormal) Collected: 04/03/25 1601    Lab Status: Final result Specimen: Urine, Clean Catch Updated: 04/03/25 1607     Color, UA Yellow     Clarity, UA Clear     Specific Gravity, UA 1.025     pH, UA 6.0     Leukocytes, UA Negative     Nitrite, UA Negative     Protein, UA Negative mg/dl      Glucose, UA Negative mg/dl      Ketones, UA Trace mg/dl      Urobilinogen, UA 0.2 E.U./dl      Bilirubin, UA 1+     Occult Blood, UA Negative    TSH, 3rd generation with Free T4 reflex [[id number]]  (Normal) Collected: 04/03/25 1255    Lab Status: Final result Specimen: Blood from Arm, Right Updated: 04/03/25 1329     TSH 3RD GENERATON 2.554 uIU/mL     Valproic acid level, total [[id number]]  (Abnormal) Collected: 04/03/25 1255    Lab Status: Final result Specimen: Blood from Arm, Right Updated: 04/03/25 1316     Valproic Acid, Total 26 ug/mL     Comprehensive metabolic panel [[id number]] Collected: 04/03/25 1255    Lab Status: Final result Specimen: Blood from Arm, Right Updated: 04/03/25 1316     Sodium 137 mmol/L      Potassium 3.7 mmol/L      Chloride 101 mmol/L      CO2 25 mmol/L      ANION GAP 11 mmol/L      BUN 15 mg/dL      Creatinine 0.94 mg/dL      Glucose 103 mg/dL      Calcium 10.1 mg/dL      AST 24 U/L      ALT 31 U/L      Alkaline Phosphatase 50 U/L      Total Protein 7.4 g/dL      Albumin 4.6 g/dL      Total Bilirubin 0.42 mg/dL      eGFR 106 ml/min/1.73sq m     Narrative:      National Kidney Disease Foundation guidelines for Chronic Kidney Disease (CKD):     Stage 1 with normal or high GFR (GFR > 90 mL/min/1.73 square meters)    Stage 2 Mild CKD (GFR = 60-89 mL/min/1.73 square meters)    Stage 3A Moderate CKD (GFR = 45-59 mL/min/1.73 square meters)     Stage 3B Moderate CKD (GFR = 30-44 mL/min/1.73 square meters)    Stage 4 Severe CKD (GFR = 15-29 mL/min/1.73 square meters)    Stage 5 End Stage CKD (GFR <15 mL/min/1.73 square meters)  Note: GFR calculation is accurate only with a steady state creatinine    CBC and differential [[id number]]  (Abnormal) Collected: 04/03/25 1255    Lab Status: Final result Specimen: Blood from Arm, Right Updated: 04/03/25 1259     WBC 9.69 Thousand/uL      RBC 5.12 Million/uL      Hemoglobin 13.7 g/dL      Hematocrit 40.8 %      MCV 80 fL      MCH 26.8 pg      MCHC 33.6 g/dL      RDW 13.0 %      MPV 9.4 fL      Platelets 394 Thousands/uL      nRBC 0 /100 WBCs      Segmented % 66 %      Immature Grans % 0 %      Lymphocytes % 25 %      Monocytes % 7 %      Eosinophils Relative 1 %      Basophils Relative 1 %      Absolute Neutrophils 6.45 Thousands/µL      Absolute Immature Grans 0.04 Thousand/uL      Absolute Lymphocytes 2.39 Thousands/µL      Absolute Monocytes 0.67 Thousand/µL      Eosinophils Absolute 0.07 Thousand/µL      Basophils Absolute 0.07 Thousands/µL     POCT alcohol breath test [[id number]]  (Normal) Resulted: 04/03/25 1226    Lab Status: Final result Updated: 04/03/25 1226     EXTBreath Alcohol 0.000            No orders to display       Procedures    ED Medication and Procedure Management   Prior to Admission Medications   Prescriptions Last Dose Informant Patient Reported? Taking?   Multiple Vitamin tablet  Mother Yes No   Sig: Take by mouth   acetaminophen (TYLENOL) 500 mg tablet  Mother Yes No   Sig: Take 325 mg by mouth every 6 (six) hours as needed for mild pain     benztropine (COGENTIN) 1 mg tablet   No Yes   Sig: TAKE 1 TABLET BY MOUTH TWICE A DAY   divalproex sodium (DEPAKOTE ER) 500 mg 24 hr tablet   No Yes   Sig: TAKE 1 TABLET BY MOUTH EVERY MORNING TAKE 2 TABLETS BY MOUTH AT NOON AND 1 TAB EVERY EVENING   divalproex sodium (DEPAKOTE) 125 mg DR tablet   No Yes   Sig: TAKE 1 TABLET (125 MG TOTAL) BY MOUTH EVERY  EVENING TAKE WITH THE 500MG DOSE OF DEPAKOTE ER FOR A TOTAL OF 625MG EVERY EVENING   fenofibrate 160 MG tablet   No Yes   Sig: TAKE 1 TABLET BY MOUTH EVERY DAY   levothyroxine 25 mcg tablet   No Yes   Sig: TAKE 1 TABLET BY MOUTH MONDAY THRU FRIDAY   methocarbamol (ROBAXIN) 500 mg tablet   No No   Sig: Take 1 tablet (500 mg total) by mouth 2 (two) times a day as needed for muscle spasms   risperiDONE (RisperDAL) 2 mg tablet   No Yes   Sig: Take 3 tablets (6 mg total) by mouth 2 (two) times a day   sertraline (ZOLOFT) 50 mg tablet   No Yes   Sig: TAKE 1 TABLET BY MOUTH EVERY MORNING   vitamin B-12 (VITAMIN B-12) 500 mcg tablet   No No   Sig: Take 1 tablet (500 mcg total) by mouth daily      Facility-Administered Medications: None     Patient's Medications   Discharge Prescriptions    No medications on file     No discharge procedures on file.  ED SEPSIS DOCUMENTATION   Time reflects when diagnosis was documented in both MDM as applicable and the Disposition within this note       Time User Action Codes Description Comment    4/3/2025 12:20 PM Chasity Solomon [F31.76] Bipolar I disorder, most recent episode depressed, in full remission (HCC)     4/3/2025  7:48 PM Finn Rivera [R45.850] Homicidal ideations                  Finn Rivera PA-C  04/03/25 1950

## 2025-04-04 ENCOUNTER — HOSPITAL ENCOUNTER (INPATIENT)
Facility: HOSPITAL | Age: 35
LOS: 13 days | Discharge: HOME | DRG: 885 | End: 2025-04-17
Attending: PSYCHIATRY & NEUROLOGY | Admitting: PSYCHIATRY & NEUROLOGY
Payer: COMMERCIAL

## 2025-04-04 VITALS
SYSTOLIC BLOOD PRESSURE: 144 MMHG | DIASTOLIC BLOOD PRESSURE: 67 MMHG | OXYGEN SATURATION: 99 % | TEMPERATURE: 97.6 F | RESPIRATION RATE: 15 BRPM | HEART RATE: 78 BPM

## 2025-04-04 DIAGNOSIS — Z00.8 MEDICAL CLEARANCE FOR PSYCHIATRIC ADMISSION: ICD-10-CM

## 2025-04-04 DIAGNOSIS — B35.1 ONYCHOMYCOSIS: ICD-10-CM

## 2025-04-04 DIAGNOSIS — F31.60 BIPOLAR AFFECTIVE DISORDER, CURRENT EPISODE MIXED, CURRENT EPISODE SEVERITY UNSPECIFIED (HCC): Primary | ICD-10-CM

## 2025-04-04 DIAGNOSIS — E53.8 VITAMIN B12 DEFICIENCY: ICD-10-CM

## 2025-04-04 DIAGNOSIS — E55.9 VITAMIN D DEFICIENCY: ICD-10-CM

## 2025-04-04 DIAGNOSIS — F41.9 ANXIETY: ICD-10-CM

## 2025-04-04 DIAGNOSIS — T14.8XXA BLISTER: ICD-10-CM

## 2025-04-04 DIAGNOSIS — E78.2 MIXED HYPERLIPIDEMIA: ICD-10-CM

## 2025-04-04 RX ORDER — HYDROXYZINE HYDROCHLORIDE 25 MG/1
25 TABLET, FILM COATED ORAL
Status: DISCONTINUED | OUTPATIENT
Start: 2025-04-04 | End: 2025-04-17 | Stop reason: HOSPADM

## 2025-04-04 RX ORDER — HYDROXYZINE HYDROCHLORIDE 25 MG/1
25 TABLET, FILM COATED ORAL
Status: CANCELLED | OUTPATIENT
Start: 2025-04-04

## 2025-04-04 RX ORDER — ACETAMINOPHEN 325 MG/1
650 TABLET ORAL EVERY 6 HOURS PRN
Status: DISCONTINUED | OUTPATIENT
Start: 2025-04-04 | End: 2025-04-17 | Stop reason: HOSPADM

## 2025-04-04 RX ORDER — AMOXICILLIN 250 MG
1 CAPSULE ORAL DAILY PRN
Status: DISCONTINUED | OUTPATIENT
Start: 2025-04-04 | End: 2025-04-17 | Stop reason: HOSPADM

## 2025-04-04 RX ORDER — BENZTROPINE MESYLATE 1 MG/ML
1 INJECTION, SOLUTION INTRAMUSCULAR; INTRAVENOUS
Status: CANCELLED | OUTPATIENT
Start: 2025-04-04

## 2025-04-04 RX ORDER — LORAZEPAM 2 MG/ML
2 INJECTION INTRAMUSCULAR EVERY 6 HOURS PRN
Status: DISCONTINUED | OUTPATIENT
Start: 2025-04-04 | End: 2025-04-17 | Stop reason: HOSPADM

## 2025-04-04 RX ORDER — BENZTROPINE MESYLATE 1 MG/1
1 TABLET ORAL
Status: DISCONTINUED | OUTPATIENT
Start: 2025-04-04 | End: 2025-04-17 | Stop reason: HOSPADM

## 2025-04-04 RX ORDER — HYDROXYZINE HYDROCHLORIDE 50 MG/1
50 TABLET, FILM COATED ORAL
Status: DISCONTINUED | OUTPATIENT
Start: 2025-04-04 | End: 2025-04-17 | Stop reason: HOSPADM

## 2025-04-04 RX ORDER — BENZTROPINE MESYLATE 1 MG/ML
0.5 INJECTION, SOLUTION INTRAMUSCULAR; INTRAVENOUS
Status: CANCELLED | OUTPATIENT
Start: 2025-04-04

## 2025-04-04 RX ORDER — RISPERIDONE 0.5 MG/1
0.5 TABLET ORAL
Status: DISCONTINUED | OUTPATIENT
Start: 2025-04-04 | End: 2025-04-17 | Stop reason: HOSPADM

## 2025-04-04 RX ORDER — ACETAMINOPHEN 325 MG/1
650 TABLET ORAL EVERY 4 HOURS PRN
Status: CANCELLED | OUTPATIENT
Start: 2025-04-04

## 2025-04-04 RX ORDER — RISPERIDONE 1 MG/1
1 TABLET ORAL
Status: CANCELLED | OUTPATIENT
Start: 2025-04-04

## 2025-04-04 RX ORDER — BENZTROPINE MESYLATE 1 MG/ML
1 INJECTION, SOLUTION INTRAMUSCULAR; INTRAVENOUS
Status: DISCONTINUED | OUTPATIENT
Start: 2025-04-04 | End: 2025-04-17 | Stop reason: HOSPADM

## 2025-04-04 RX ORDER — MAGNESIUM HYDROXIDE/ALUMINUM HYDROXICE/SIMETHICONE 120; 1200; 1200 MG/30ML; MG/30ML; MG/30ML
30 SUSPENSION ORAL EVERY 4 HOURS PRN
Status: CANCELLED | OUTPATIENT
Start: 2025-04-04

## 2025-04-04 RX ORDER — RISPERIDONE 0.25 MG/1
0.5 TABLET ORAL
Status: CANCELLED | OUTPATIENT
Start: 2025-04-04

## 2025-04-04 RX ORDER — DIVALPROEX SODIUM 500 MG/1
1500 TABLET, FILM COATED, EXTENDED RELEASE ORAL
Status: DISCONTINUED | OUTPATIENT
Start: 2025-04-04 | End: 2025-04-06

## 2025-04-04 RX ORDER — RISPERIDONE 1 MG/1
1 TABLET ORAL
Status: DISCONTINUED | OUTPATIENT
Start: 2025-04-04 | End: 2025-04-17 | Stop reason: HOSPADM

## 2025-04-04 RX ORDER — RISPERIDONE 2 MG/1
2 TABLET ORAL
Status: DISCONTINUED | OUTPATIENT
Start: 2025-04-04 | End: 2025-04-17 | Stop reason: HOSPADM

## 2025-04-04 RX ORDER — AMOXICILLIN 250 MG
1 CAPSULE ORAL DAILY PRN
Status: CANCELLED | OUTPATIENT
Start: 2025-04-04

## 2025-04-04 RX ORDER — DIPHENHYDRAMINE HYDROCHLORIDE 50 MG/ML
50 INJECTION, SOLUTION INTRAMUSCULAR; INTRAVENOUS EVERY 6 HOURS PRN
Status: DISCONTINUED | OUTPATIENT
Start: 2025-04-04 | End: 2025-04-17 | Stop reason: HOSPADM

## 2025-04-04 RX ORDER — POLYETHYLENE GLYCOL 3350 17 G/17G
17 POWDER, FOR SOLUTION ORAL DAILY PRN
Status: DISCONTINUED | OUTPATIENT
Start: 2025-04-04 | End: 2025-04-17 | Stop reason: HOSPADM

## 2025-04-04 RX ORDER — DIPHENHYDRAMINE HYDROCHLORIDE 50 MG/ML
50 INJECTION, SOLUTION INTRAMUSCULAR; INTRAVENOUS EVERY 6 HOURS PRN
Status: CANCELLED | OUTPATIENT
Start: 2025-04-04

## 2025-04-04 RX ORDER — BENZTROPINE MESYLATE 1 MG/1
1 TABLET ORAL
Status: CANCELLED | OUTPATIENT
Start: 2025-04-04

## 2025-04-04 RX ORDER — PROPRANOLOL HYDROCHLORIDE 10 MG/1
10 TABLET ORAL EVERY 8 HOURS PRN
Status: DISCONTINUED | OUTPATIENT
Start: 2025-04-04 | End: 2025-04-17 | Stop reason: HOSPADM

## 2025-04-04 RX ORDER — BENZTROPINE MESYLATE 1 MG/ML
0.5 INJECTION, SOLUTION INTRAMUSCULAR; INTRAVENOUS
Status: DISCONTINUED | OUTPATIENT
Start: 2025-04-04 | End: 2025-04-17 | Stop reason: HOSPADM

## 2025-04-04 RX ORDER — LORAZEPAM 2 MG/ML
1 INJECTION INTRAMUSCULAR
Status: DISCONTINUED | OUTPATIENT
Start: 2025-04-04 | End: 2025-04-17 | Stop reason: HOSPADM

## 2025-04-04 RX ORDER — POLYETHYLENE GLYCOL 3350 17 G/17G
17 POWDER, FOR SOLUTION ORAL DAILY PRN
Status: CANCELLED | OUTPATIENT
Start: 2025-04-04

## 2025-04-04 RX ORDER — PROPRANOLOL HCL 20 MG
10 TABLET ORAL EVERY 8 HOURS PRN
Status: CANCELLED | OUTPATIENT
Start: 2025-04-04

## 2025-04-04 RX ORDER — ACETAMINOPHEN 325 MG/1
975 TABLET ORAL EVERY 6 HOURS PRN
Status: CANCELLED | OUTPATIENT
Start: 2025-04-04

## 2025-04-04 RX ORDER — LORAZEPAM 2 MG/ML
2 INJECTION INTRAMUSCULAR
Status: DISCONTINUED | OUTPATIENT
Start: 2025-04-04 | End: 2025-04-17 | Stop reason: HOSPADM

## 2025-04-04 RX ORDER — LORAZEPAM 2 MG/ML
2 INJECTION INTRAMUSCULAR EVERY 6 HOURS PRN
Status: CANCELLED | OUTPATIENT
Start: 2025-04-04

## 2025-04-04 RX ORDER — MAGNESIUM HYDROXIDE/ALUMINUM HYDROXICE/SIMETHICONE 120; 1200; 1200 MG/30ML; MG/30ML; MG/30ML
30 SUSPENSION ORAL EVERY 4 HOURS PRN
Status: DISCONTINUED | OUTPATIENT
Start: 2025-04-04 | End: 2025-04-17 | Stop reason: HOSPADM

## 2025-04-04 RX ORDER — HYDROXYZINE HYDROCHLORIDE 25 MG/1
100 TABLET, FILM COATED ORAL
Status: CANCELLED | OUTPATIENT
Start: 2025-04-04

## 2025-04-04 RX ORDER — HYDROXYZINE HYDROCHLORIDE 50 MG/1
100 TABLET, FILM COATED ORAL
Status: DISCONTINUED | OUTPATIENT
Start: 2025-04-04 | End: 2025-04-17 | Stop reason: HOSPADM

## 2025-04-04 RX ORDER — HYDROXYZINE HYDROCHLORIDE 25 MG/1
50 TABLET, FILM COATED ORAL
Status: CANCELLED | OUTPATIENT
Start: 2025-04-04

## 2025-04-04 RX ORDER — HALOPERIDOL 5 MG/ML
5 INJECTION INTRAMUSCULAR
Status: DISCONTINUED | OUTPATIENT
Start: 2025-04-04 | End: 2025-04-17 | Stop reason: HOSPADM

## 2025-04-04 RX ORDER — ACETAMINOPHEN 325 MG/1
650 TABLET ORAL EVERY 4 HOURS PRN
Status: DISCONTINUED | OUTPATIENT
Start: 2025-04-04 | End: 2025-04-17 | Stop reason: HOSPADM

## 2025-04-04 RX ORDER — ACETAMINOPHEN 325 MG/1
975 TABLET ORAL EVERY 6 HOURS PRN
Status: DISCONTINUED | OUTPATIENT
Start: 2025-04-04 | End: 2025-04-17 | Stop reason: HOSPADM

## 2025-04-04 RX ORDER — TRAZODONE HYDROCHLORIDE 50 MG/1
50 TABLET ORAL
Status: DISCONTINUED | OUTPATIENT
Start: 2025-04-04 | End: 2025-04-17 | Stop reason: HOSPADM

## 2025-04-04 RX ORDER — HALOPERIDOL 5 MG/ML
5 INJECTION INTRAMUSCULAR
Status: CANCELLED | OUTPATIENT
Start: 2025-04-04

## 2025-04-04 RX ORDER — ACETAMINOPHEN 325 MG/1
650 TABLET ORAL EVERY 6 HOURS PRN
Status: CANCELLED | OUTPATIENT
Start: 2025-04-04

## 2025-04-04 RX ORDER — LORAZEPAM 2 MG/ML
1 INJECTION INTRAMUSCULAR
Status: CANCELLED | OUTPATIENT
Start: 2025-04-04

## 2025-04-04 RX ORDER — TRAZODONE HYDROCHLORIDE 50 MG/1
50 TABLET ORAL
Status: CANCELLED | OUTPATIENT
Start: 2025-04-04

## 2025-04-04 RX ORDER — HALOPERIDOL 5 MG/ML
2.5 INJECTION INTRAMUSCULAR
Status: CANCELLED | OUTPATIENT
Start: 2025-04-04

## 2025-04-04 RX ORDER — RISPERIDONE 1 MG/1
2 TABLET ORAL
Status: CANCELLED | OUTPATIENT
Start: 2025-04-04

## 2025-04-04 RX ORDER — LORAZEPAM 2 MG/ML
2 INJECTION INTRAMUSCULAR
Status: CANCELLED | OUTPATIENT
Start: 2025-04-04

## 2025-04-04 RX ORDER — HALOPERIDOL 5 MG/ML
2.5 INJECTION INTRAMUSCULAR
Status: DISCONTINUED | OUTPATIENT
Start: 2025-04-04 | End: 2025-04-17 | Stop reason: HOSPADM

## 2025-04-04 RX ORDER — DIVALPROEX SODIUM 500 MG/1
1500 TABLET, FILM COATED, EXTENDED RELEASE ORAL
Status: CANCELLED | OUTPATIENT
Start: 2025-04-04

## 2025-04-04 RX ADMIN — Medication 3 MG: at 21:49

## 2025-04-04 RX ADMIN — HYDROXYZINE HYDROCHLORIDE 100 MG: 50 TABLET, FILM COATED ORAL at 18:52

## 2025-04-04 RX ADMIN — DIVALPROEX SODIUM 1000 MG: 500 TABLET, EXTENDED RELEASE ORAL at 12:23

## 2025-04-04 RX ADMIN — DIVALPROEX SODIUM 1500 MG: 500 TABLET, EXTENDED RELEASE ORAL at 21:49

## 2025-04-04 RX ADMIN — DIVALPROEX SODIUM 500 MG: 500 TABLET, EXTENDED RELEASE ORAL at 09:25

## 2025-04-04 NOTE — ED NOTES
"Reviewed chart. Bed search ongoing.    CIS followed up with the following IP facilities which were sent clinical yesterday evening:    [facility] - per admissions, patient denied due to acuity.     [facility] - Denied due to acuity per admissions    [facility] - per Alicia, patient denied due to level aggression and acuity.     [facility] - per Grecia, \"he was denied, too acute\".     Previously denied at [facility],    Intake has the referral.  Network bed availability to be determined.  CIS to follow up.   "

## 2025-04-04 NOTE — NURSING NOTE
Complained of anxiety due to recent admission. Encouraged coping skills but it is still persisting. [place] Anxiety Scale score 26. Received PRN atarax 100 mg PO for severe anxiety at 1852

## 2025-04-04 NOTE — ED NOTES
Patient is accepted at [facility]  Patient is accepted by Dr. Alison De La O, [facility].     Transportation is arranged with [facility].         Nurse report is to be called to [phone]  prior to patient transfer.

## 2025-04-04 NOTE — NURSING NOTE
"Patient is a 34 year old [facility] commitment status. When asked why are you here patient states \"I am here to prove that there is nothing wrong with me. I am hoping I can be released safely so that I can go back to living my life peacefully\".     Per ED report patient prefers to be called \"Hasmukh\" or \"Rich\" but not \"Rich\". Patient brought in to hospital on 302 but agreed to sign 201 in the ED. Patient brought to ED yesterday after threatening mom (who patient lives with) who filed a PFA against patient. Patient then texted their Grandma that they were \"Not okay\". 911 called and patient brought to ED via EMS. Denies SI, HI, and hallucinations. Patient did have hand knives on them. Hx of hypothyroidism, elevated lipids, bipolar, PTSD, and schizophrenia.     Patient cooperative with the admission process, alert and oriented x 4. Numerous allergies to foods/medications listed in chart, allergy band applied. Reports some depression and \"nervousness\". Encouraged coping skills and made aware of PRN medications. Denies SI, HI, and hallucinations of any kind. Expresses remorse for whatever happened with their mother. Expresses negative thoughts towards someone in their neighborhood. When asked about the knives they had on them patient expresses they were for self defense and hunting. 3 suicide attempts throughout life, CSSRS- lifetime moderate, current low.     Denies smoking or any drug or alcohol use. Reports weighing 380 pounds last fall and currently weighing 342 pounds, denies decreased appetite or trying to lose weight. Patient unsure if they can return home, expresses they have a girlfriend they could possibly stay with. Acknowledges they have difficulty controlling anger at times due to med noncompliance.    Unit rules and expectations discussed with patient. Changed to paper scrubs due to new admit and small size of hospital clothing. Given tour of unit. Q15 minute checks " initiated. Staff availability reinforced.

## 2025-04-04 NOTE — NURSING NOTE
"Voicemail left with patient's preferred [facility] pharmacy ([facility]) to verify medications, however, patient reports last taking the following medications when asked    1) Levothyroxine- 25 mg daily during the week. 50 mg on the weekend. Last took Thursday  2) Fenofibrate- Thursday  3)Risperidal- Friday morning  4) zoloft- Thursday evening  5) Multivitamin- yesterday  6) cogentin - Thursday or Friday  7) depakote 125 ER and 500 mg ER- Got in the ER    Patient reports medication non compliance prior to coming to the hospital and \"missing doses\" because they did not give them their medication. Reports taking medications themself however.    PTA medications in progress until able to verify with [facility].     On call psych provider (Dr. Leonard) notified via EPIC secure chat at this time.  "

## 2025-04-04 NOTE — LETTER
ID # [id number]    [facility] # [id number]    UR Phone # [phone]    DISCHARGE SUMMARY

## 2025-04-04 NOTE — ED NOTES
"Patient's mother showed up to the hospital.  CIS was notified by nursing.  Met with mother in the waiting room.  She was here to \"visit\" her son.  Advised that due to the PFA we are not legally allowed.  Explained that the patient is safe -  that he will be transferred for treatment.  She said that is all she needed to hear and willingly left the department.  Updated nursing and EDMD.   "

## 2025-04-04 NOTE — ED NOTES
Faxed Clinical for Admissions review to:    [facility], [facility], [facility], [facility], and [facility].    Bed search continues.  Let message for insurance company to call back from [facility], to complete clinical for insurance pre-cert.

## 2025-04-04 NOTE — PLAN OF CARE
Problem: Ineffective Coping  Goal: Cooperates with admission process  Description: Interventions: - Complete admission process  Outcome: Completed  Goal: Identifies ineffective coping skills  Outcome: Progressing  Goal: Identifies healthy coping skills  Outcome: Progressing  Goal: Demonstrates healthy coping skills  Outcome: Progressing  Goal: Participates in unit activities  Description: Interventions:- Provide therapeutic environment - Provide required programming - Redirect inappropriate behaviors   Outcome: Progressing  Goal: Patient/Family participate in treatment and DC plans  Description: Interventions:- Provide therapeutic environment  Outcome: Progressing  Goal: Patient/Family verbalizes awareness of resources  Outcome: Progressing  Goal: Understands least restrictive measures  Description: Interventions:- Utilize least restrictive behavior  Outcome: Progressing  Goal: Free from restraint events  Description: - Utilize least restrictive measures - Provide behavioral interventions - Redirect inappropriate behaviors   Outcome: Progressing     Problem: Anxiety  Goal: Anxiety is at manageable level  Description: Interventions:- Assess and monitor patient's anxiety level. - Monitor for signs and symptoms (heart palpitations, chest pain, shortness of breath, headaches, nausea, feeling jumpy, restlessness, irritable, apprehensive). - Collaborate with interdisciplinary team and initiate plan and interventions as ordered.- [place] patient to unit/surroundings- Explain treatment plan- Encourage participation in care- Encourage verbalization of concerns/fears- Identify coping mechanisms- Assist in developing anxiety-reducing skills- Administer/offer alternative therapies- Limit or eliminate stimulants  Outcome: Progressing     Problem: Depression  Goal: Treatment Goal: Demonstrate behavioral control of depressive symptoms, verbalize feelings of improved mood/affect, and adopt new coping skills prior to  discharge  Outcome: Progressing     Problem: Risk for Violence/Aggression Toward Others  Goal: Treatment Goal: Refrain from acts of violence/aggression during length of stay, and demonstrate improved impulse control at the time of discharge  Outcome: Progressing  Goal: Refrain from harming others  Outcome: Progressing  Goal: Control angry outbursts  Description: Interventions:- Monitor patient closely, per order- Ensure early verbal de-escalation- Monitor prn medication needs- Set reasonable/therapeutic limits, outline behavioral expectations, and consequences - Provide a non-threatening milieu, utilizing the least restrictive interventions   Outcome: Progressing

## 2025-04-04 NOTE — ED NOTES
CW spoke with:    [facility] - Has beds    [facility] - Has beds    [facility] - Has beds    [facility] - Has beds    All of the above facilities have available male beds tonight and requested pt's chart to be faxed to them for review.    CW informed Bryson MORA of the above who will fax pt's chart to these facilities for possible acceptance.    RHONDA Mena, CIS ll  04/03/25 22:42

## 2025-04-04 NOTE — EMTALA/ACUTE CARE TRANSFER
[facility] EMERGENCY DEPARTMENT  [address] [place] [id number]  Dept: [phone]      EMTALA TRANSFER CONSENT    NAME Hasmukh LOMELI 1990                              MRN [id number]    I have been informed of my rights regarding examination, treatment, and transfer   by Dr. Michael Felder MD    Benefits: Specialized equipment and/or services available at the receiving facility (Include comment)________________________ (Inpatient behavioral health)    Risks: Potential for delay in receiving treatment, Potential deterioration of medical condition, Increased discomfort during transfer, Possible worsening of condition or death during transfer      Consent for Transfer:  I acknowledge that my medical condition has been evaluated and explained to me by the emergency department physician or other qualified medical person and/or my attending physician, who has recommended that I be transferred to the service of  Accepting Physician: Dr. Rosas at Accepting Facility Name, City & State : [address] The above potential benefits of such transfer, the potential risks associated with such transfer, and the probable risks of not being transferred have been explained to me, and I fully understand them.  The doctor has explained that, in my case, the benefits of transfer outweigh the risks.  I agree to be transferred.    I authorize the performance of emergency medical procedures and treatments upon me in both transit and upon arrival at the receiving facility.  Additionally, I authorize the release of any and all medical records to the receiving facility and request they be transported with me, if possible.  I understand that the safest mode of transportation during a medical emergency is an ambulance and that the Hospital advocates the use of this mode of transport. Risks of traveling to the receiving facility by car, including absence  of medical control, life sustaining equipment, such as oxygen, and medical personnel has been explained to me and I fully understand them.    (ARLET CORRECT BOX BELOW)  [  ]  I consent to the stated transfer and to be transported by ambulance/helicopter.  [  ]  I consent to the stated transfer, but refuse transportation by ambulance and accept full responsibility for my transportation by car.  I understand the risks of non-ambulance transfers and I exonerate the Hospital and its staff from any deterioration in my condition that results from this refusal.    X___________________________________________    DATE  25  TIME________  Signature of patient or legally responsible individual signing on patient behalf           RELATIONSHIP TO PATIENT_________________________          Provider Certification    NAME Hasmukh Bradley                                        [facility] 1990                              MRN [id number]    A medical screening exam was performed on the above named patient.  Based on the examination:    Condition Necessitating Transfer The primary encounter diagnosis was Bipolar I disorder, most recent episode depressed, in full remission (HCC). Diagnoses of Homicidal ideations and Medical clearance for psychiatric admission were also pertinent to this visit.    Patient Condition: The patient has been stabilized such that within reasonable medical probability, no material deterioration of the patient condition or the condition of the unborn child(rufino) is likely to result from the transfer    Reason for Transfer: Level of Care needed not available at this facility    Transfer Requirements: Facility [address]   Space available and qualified personnel available for treatment as acknowledged by    Agreed to accept transfer and to provide appropriate medical treatment as acknowledged by       Dr. Rosas  Appropriate medical records of the examination and treatment of the patient are provided  at the time of transfer   STAFF INITIAL WHEN COMPLETED _______  Transfer will be performed by qualified personnel from    and appropriate transfer equipment as required, including the use of necessary and appropriate life support measures.    Provider Certification: I have examined the patient and explained the following risks and benefits of being transferred/refusing transfer to the patient/family:  General risk, such as traffic hazards, adverse weather conditions, rough terrain or turbulence, possible failure of equipment (including vehicle or aircraft), or consequences of actions of persons outside the control of the transport personnel, Unanticipated needs of medical equipment and personnel during transport, Risk of worsening condition, The possibility of a transport vehicle being unavailable      Based on these reasonable risks and benefits to the patient and/or the unborn child(rufino), and based upon the information available at the time of the patient’s examination, I certify that the medical benefits reasonably to be expected from the provision of appropriate medical treatments at another medical facility outweigh the increasing risks, if any, to the individual’s medical condition, and in the case of labor to the unborn child, from effecting the transfer.    X____________________________________________ DATE 04/04/25        TIME_______      ORIGINAL - SEND TO MEDICAL RECORDS   COPY - SEND WITH PATIENT DURING TRANSFER

## 2025-04-04 NOTE — ED NOTES
Insurance Authorization for admission:   Phone call placed to [facility].  Phone number: [phone].     Spoke to Ruben Hernández.     5 days approved.  Level of care: TOSIN .  Review on 4/8/25   Authorization # [id number]       Checked PROMISe - Primary Medicare A/B  - [facility]. Secondary Medicaid - COB with [employer].    Insurance Authorization for Transportation:    Not necessary with [facility]

## 2025-04-04 NOTE — ED NOTES
Met with patient to update.  Patient under virtual monitoring.  Seated on [facility].  The patient just got off the phone.  States he was trying to reach his grandmother but she did not answer.  The patient agreeable to treatment at [facility].  Patient asked for the specific time of transport and location of [facility].  Upon being provided that information he agreed and signed EMTALA.  Patient had questions about his belongings. Specifically, if he could have his cards on the U.  Patient was advised to talk with the team on the unit about that due to policy.  Patient did not have any further questions.  Patient requested that the phone be left at bedside so he could use it.  Patient  prefers to be called Hasmukh or Dario but not Fitz or Roberto.  Patient is currently calm and cooperative.

## 2025-04-05 PROBLEM — Z00.8 MEDICAL CLEARANCE FOR PSYCHIATRIC ADMISSION: Status: ACTIVE | Noted: 2025-04-05

## 2025-04-05 LAB
25(OH)D3 SERPL-MCNC: 18.6 NG/ML (ref 30–100)
ALBUMIN SERPL BCG-MCNC: 4.4 G/DL (ref 3.5–5)
ALP SERPL-CCNC: 51 U/L (ref 34–104)
ALT SERPL W P-5'-P-CCNC: 28 U/L (ref 7–52)
ANION GAP SERPL CALCULATED.3IONS-SCNC: 10 MMOL/L (ref 4–13)
AST SERPL W P-5'-P-CCNC: 20 U/L (ref 13–39)
BACTERIA UR QL AUTO: ABNORMAL /HPF
BASOPHILS # BLD AUTO: 0.1 THOUSANDS/ÂΜL (ref 0–0.1)
BASOPHILS NFR BLD AUTO: 2 % (ref 0–1)
BILIRUB SERPL-MCNC: 0.57 MG/DL (ref 0.2–1)
BILIRUB UR QL STRIP: NEGATIVE
BUN SERPL-MCNC: 14 MG/DL (ref 5–25)
CALCIUM SERPL-MCNC: 10 MG/DL (ref 8.4–10.2)
CHLORIDE SERPL-SCNC: 101 MMOL/L (ref 96–108)
CHOLEST SERPL-MCNC: 214 MG/DL (ref ?–200)
CLARITY UR: CLEAR
CO2 SERPL-SCNC: 29 MMOL/L (ref 21–32)
COLOR UR: ABNORMAL
CREAT SERPL-MCNC: 0.73 MG/DL (ref 0.6–1.3)
EOSINOPHIL # BLD AUTO: 0.16 THOUSAND/ÂΜL (ref 0–0.61)
EOSINOPHIL NFR BLD AUTO: 3 % (ref 0–6)
ERYTHROCYTE [DISTWIDTH] IN BLOOD BY AUTOMATED COUNT: 13.1 % (ref 11.6–15.1)
EST. AVERAGE GLUCOSE BLD GHB EST-MCNC: 128 MG/DL
FOLATE SERPL-MCNC: >22.3 NG/ML
GFR SERPL CREATININE-BSD FRML MDRD: 121 ML/MIN/1.73SQ M
GLUCOSE P FAST SERPL-MCNC: 101 MG/DL (ref 65–99)
GLUCOSE SERPL-MCNC: 101 MG/DL (ref 65–140)
GLUCOSE UR STRIP-MCNC: NEGATIVE MG/DL
HBA1C MFR BLD: 6.1 %
HCT VFR BLD AUTO: 43.8 % (ref 36.5–49.3)
HDLC SERPL-MCNC: 36 MG/DL
HGB BLD-MCNC: 14.5 G/DL (ref 12–17)
HGB UR QL STRIP.AUTO: NEGATIVE
IMM GRANULOCYTES # BLD AUTO: 0.03 THOUSAND/UL (ref 0–0.2)
IMM GRANULOCYTES NFR BLD AUTO: 1 % (ref 0–2)
KETONES UR STRIP-MCNC: NEGATIVE MG/DL
LDLC SERPL CALC-MCNC: 119 MG/DL (ref 0–100)
LEUKOCYTE ESTERASE UR QL STRIP: 25
LYMPHOCYTES # BLD AUTO: 2.59 THOUSANDS/ÂΜL (ref 0.6–4.47)
LYMPHOCYTES NFR BLD AUTO: 40 % (ref 14–44)
MCH RBC QN AUTO: 27.1 PG (ref 26.8–34.3)
MCHC RBC AUTO-ENTMCNC: 33.1 G/DL (ref 31.4–37.4)
MCV RBC AUTO: 82 FL (ref 82–98)
MONOCYTES # BLD AUTO: 0.4 THOUSAND/ÂΜL (ref 0.17–1.22)
MONOCYTES NFR BLD AUTO: 6 % (ref 4–12)
MUCOUS THREADS UR QL AUTO: ABNORMAL
NEUTROPHILS # BLD AUTO: 3.16 THOUSANDS/ÂΜL (ref 1.85–7.62)
NEUTS SEG NFR BLD AUTO: 48 % (ref 43–75)
NITRITE UR QL STRIP: NEGATIVE
NON-SQ EPI CELLS URNS QL MICRO: ABNORMAL /HPF
NONHDLC SERPL-MCNC: 178 MG/DL
NRBC BLD AUTO-RTO: 0 /100 WBCS
PH UR STRIP.AUTO: 6 [PH]
PLATELET # BLD AUTO: 357 THOUSANDS/UL (ref 149–390)
PMV BLD AUTO: 9.3 FL (ref 8.9–12.7)
POTASSIUM SERPL-SCNC: 3.8 MMOL/L (ref 3.5–5.3)
PROT SERPL-MCNC: 7.3 G/DL (ref 6.4–8.4)
PROT UR STRIP-MCNC: ABNORMAL MG/DL
RBC # BLD AUTO: 5.36 MILLION/UL (ref 3.88–5.62)
RBC #/AREA URNS AUTO: ABNORMAL /HPF
SODIUM SERPL-SCNC: 140 MMOL/L (ref 135–147)
SP GR UR STRIP.AUTO: 1.02 (ref 1–1.04)
TRIGL SERPL-MCNC: 294 MG/DL (ref ?–150)
UROBILINOGEN UA: NEGATIVE MG/DL
VIT B12 SERPL-MCNC: 265 PG/ML (ref 180–914)
WBC # BLD AUTO: 6.44 THOUSAND/UL (ref 4.31–10.16)
WBC #/AREA URNS AUTO: ABNORMAL /HPF

## 2025-04-05 PROCEDURE — 82306 VITAMIN D 25 HYDROXY: CPT

## 2025-04-05 PROCEDURE — 81001 URINALYSIS AUTO W/SCOPE: CPT

## 2025-04-05 PROCEDURE — 93005 ELECTROCARDIOGRAM TRACING: CPT

## 2025-04-05 PROCEDURE — 80053 COMPREHEN METABOLIC PANEL: CPT

## 2025-04-05 PROCEDURE — 82746 ASSAY OF FOLIC ACID SERUM: CPT

## 2025-04-05 PROCEDURE — 82607 VITAMIN B-12: CPT

## 2025-04-05 PROCEDURE — 86780 TREPONEMA PALLIDUM: CPT

## 2025-04-05 PROCEDURE — 83036 HEMOGLOBIN GLYCOSYLATED A1C: CPT

## 2025-04-05 PROCEDURE — 99223 1ST HOSP IP/OBS HIGH 75: CPT | Performed by: PSYCHIATRY & NEUROLOGY

## 2025-04-05 PROCEDURE — 80061 LIPID PANEL: CPT

## 2025-04-05 PROCEDURE — 85025 COMPLETE CBC W/AUTO DIFF WBC: CPT

## 2025-04-05 PROCEDURE — 99222 1ST HOSP IP/OBS MODERATE 55: CPT | Performed by: NURSE PRACTITIONER

## 2025-04-05 RX ORDER — FENOFIBRATE 145 MG/1
145 TABLET, FILM COATED ORAL DAILY
Status: DISCONTINUED | OUTPATIENT
Start: 2025-04-05 | End: 2025-04-17 | Stop reason: HOSPADM

## 2025-04-05 RX ORDER — LEVOTHYROXINE SODIUM 25 UG/1
25 TABLET ORAL
Status: DISCONTINUED | OUTPATIENT
Start: 2025-04-05 | End: 2025-04-17 | Stop reason: HOSPADM

## 2025-04-05 RX ADMIN — Medication 3 MG: at 21:05

## 2025-04-05 RX ADMIN — FENOFIBRATE 145 MG: 145 TABLET, FILM COATED ORAL at 08:42

## 2025-04-05 RX ADMIN — SERTRALINE HYDROCHLORIDE 50 MG: 50 TABLET ORAL at 08:42

## 2025-04-05 RX ADMIN — DIVALPROEX SODIUM 1500 MG: 500 TABLET, EXTENDED RELEASE ORAL at 21:05

## 2025-04-05 RX ADMIN — LEVOTHYROXINE SODIUM 25 MCG: 0.03 TABLET ORAL at 06:10

## 2025-04-05 NOTE — NURSING NOTE
Pt remains behaviorally controlled. Visible and social. + meals good appetite. Attends groups.Denies unmet needs. Needs reminders to keep conversations appropriate on the unit. Is redirectable and apologetic. Q15 safety checks maintained.

## 2025-04-05 NOTE — ASSESSMENT & PLAN NOTE
Admission labs: CBC, CMP, TSH acceptable  Folate, B12, Vitamin D 25 hydroxy labs pending  Vitals stable   UA with trace ketones   UDS negative    EKG not available   Patient is medically cleared for admission to [facility] and treatment of underlying psychiatric illness based on available results  Please contact [facility] with any questions or concerns

## 2025-04-05 NOTE — CMS CERTIFICATION NOTE
Certification: Based upon physical, mental and social evaluations, I certify that inpatient psychiatric services continue to be medically necessary for this patient for a duration of greater than 2 midnights for the treatment of  Bipolar disorder (HCC) Available alternative community resources still do not meet the patient's mental health care needs. I further attest that an established written individualized plan of care has been updated and is outlined in the patient's medical records.    Jordan Christopher Holter, DO

## 2025-04-05 NOTE — TREATMENT PLAN
TREATMENT PLAN REVIEW - Behavioral Health Hasmukh Bradley 34 y.o. 1990 male MRN: [id number]    [facility] 3B BEHAVIORAL [facility] Room / Bed: [facility] 346-02 Encounter: [id number]          Admit Date/Time:  4/4/2025  4:05 PM    Treatment Team:   MD Delilah Bates MD Karissa Marie Kormandy, CRNP Adja Torres Jayleen Rodriguez-Mercado Jaidah Smith Brooke Rickert Stephanie McNally, RN    Diagnosis: Principal Problem:    Bipolar disorder (HCC)  Active Problems:    Hypothyroidism    Autism    ADHD (attention deficit hyperactivity disorder), combined type    Mixed hyperlipidemia    Chronic post-traumatic stress disorder (PTSD)    Medical clearance for psychiatric admission      Patient Strengths/Assets: good support system, motivation for treatment/growth, negotiates basic needs    Patient Barriers/Limitations: chronic mental illness, difficulty adapting, family conflict, patient is on an involuntary commitment, patient is unwilling to work on problems, poor insight    Short Term Goals: decrease in level of agitation, improvement in insight, mood stabilization, increase in group attendance, increase in socialization with peers on the unit, acceptance of need for psychiatric treatment, acceptance of psychiatric medications    Long Term Goals: stabilization of mood, improved insight, acceptance of need for psychiatric medications, acceptance of need for psychiatric treatment, acceptance of need for psychiatric follow up after discharge, acceptance of psychiatric diagnosis, appropriate interaction with peers, appropriate interaction with family, stable living arrangements upon discharge, establishment of family support upon discharge    Progress Towards Goals: starting psychiatric medications as prescribed, continue psychiatric medications as prescribed    Recommended Treatment: medication management, patient medication education, group therapy, milieu  therapy, continued Behavioral Health psychiatric evaluation/assessment process    Treatment Frequency: daily medication monitoring, group and milieu therapy daily, monitoring through interdisciplinary rounds, monitoring through weekly patient care conferences    Expected Discharge Date:  TBD    Discharge Plan: referrals as indicated, return to previous living arrangement    Treatment Plan Created/Updated By: Jordan Christopher Holter, DO

## 2025-04-05 NOTE — NURSING NOTE
Patient is malodorous - given showering supplies and reportedly showered but he is still malodorous.

## 2025-04-05 NOTE — PLAN OF CARE
Problem: Ineffective Coping  Goal: Identifies ineffective coping skills  Outcome: Progressing  Goal: Identifies healthy coping skills  Outcome: Progressing  Goal: Demonstrates healthy coping skills  Outcome: Progressing  Goal: Participates in unit activities  Description: Interventions:- Provide therapeutic environment - Provide required programming - Redirect inappropriate behaviors   Outcome: Progressing  Goal: Patient/Family participate in treatment and DC plans  Description: Interventions:- Provide therapeutic environment  Outcome: Progressing  Goal: Patient/Family verbalizes awareness of resources  Outcome: Progressing  Goal: Understands least restrictive measures  Description: Interventions:- Utilize least restrictive behavior  Outcome: Progressing  Goal: Free from restraint events  Description: - Utilize least restrictive measures - Provide behavioral interventions - Redirect inappropriate behaviors   Outcome: Progressing     Problem: Depression  Goal: Treatment Goal: Demonstrate behavioral control of depressive symptoms, verbalize feelings of improved mood/affect, and adopt new coping skills prior to discharge  Outcome: Progressing     Problem: Anxiety  Goal: Anxiety is at manageable level  Description: Interventions:- Assess and monitor patient's anxiety level. - Monitor for signs and symptoms (heart palpitations, chest pain, shortness of breath, headaches, nausea, feeling jumpy, restlessness, irritable, apprehensive). - Collaborate with interdisciplinary team and initiate plan and interventions as ordered.- [place] patient to unit/surroundings- Explain treatment plan- Encourage participation in care- Encourage verbalization of concerns/fears- Identify coping mechanisms- Assist in developing anxiety-reducing skills- Administer/offer alternative therapies- Limit or eliminate stimulants  Outcome: Progressing     Problem: Risk for Violence/Aggression Toward Others  Goal: Treatment Goal: Refrain from acts of  violence/aggression during length of stay, and demonstrate improved impulse control at the time of discharge  Outcome: Progressing  Goal: Refrain from harming others  Outcome: Progressing  Goal: Control angry outbursts  Description: Interventions:- Monitor patient closely, per order- Ensure early verbal de-escalation- Monitor prn medication needs- Set reasonable/therapeutic limits, outline behavioral expectations, and consequences - Provide a non-threatening milieu, utilizing the least restrictive interventions   Outcome: Progressing
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
## 2025-04-05 NOTE — NURSING NOTE
Patient is no longer c/o anxiety - he is ambulating around the milieu and is following along with the unit routine. He knows he will soon have snack followed by evening group.

## 2025-04-05 NOTE — NURSING NOTE
"Patient is concerned he won't be able to sleep tonight - he has asked if he will be permitted to sit in the patient lounge in front of the nurses station \"I won't make a noise, I won't cause any problems.\" He was reassured he can sit in the lounge if he is unable to sleep but going to bed and getting proper rest was promoted. He was cooperative with scheduled HS medications - Depakote and Melatonin.  He denies S.I.H.I.A/H V/H   "

## 2025-04-05 NOTE — CONSULTS
"Consultation - Hospitalist   Name: Hasmukh Bradley 34 y.o. male I MRN: [id number]  Unit/Bed#: [facility] 346-02 I Date of Admission: 4/4/2025   Date of Service: 4/5/2025 I Hospital Day: 1   Inpatient consult for Medical Clearance for  patient  Consult performed by: JAYRO Murphy  Consult ordered by: Bernardino Musa MD        Physician Requesting Evaluation: Eric Rosas MD   Reason for Evaluation / Principal Problem: Medical clearance to [facility]     Assessment & Plan  Medical clearance for psychiatric admission  Admission labs: CBC, CMP, TSH acceptable  Folate, B12, Vitamin D 25 hydroxy labs pending  Vitals stable   UA with trace ketones   UDS negative    EKG not available   Patient is medically cleared for admission to [facility] and treatment of underlying psychiatric illness based on available results  Please contact [facility] with any questions or concerns  Mixed hyperlipidemia  Lipid panel: triglycerides 294, HDL 36,    Continue fenofibrate  Hypothyroidism  Continue Synthroid   Autism  Supportive care  Bipolar disorder (HCC)  Admitted to [facility]  Management per primary service   Please contact the SecureChat role,\" \", with any questions/concerns. psychiatry medical provider       Collaboration of Care: Were Recommendations Directly Discussed with Primary Treatment Team? - Yes     History of Present Illness   Hasmukh Bradley is a 34 y.o. male who is originally admitted to the psychiatry service due to homicidal ideation. We are consulted for medical clearance for admission to Behavioral Health Unit and treatment of underlying psychiatric illness.  Per chart review, patient was threatening his mother. He was aggressive towards mother. He sent threatening text messages to mother. Mother filled an emergency PFA which was granted. Mother filed a 302. Patient was seen by Crisis. He signed a 201 and was admitted to [facility]. Currently, he is calm and cooperative. He offers no complaints.     Review " of Systems   Constitutional:  Negative for chills and fever.   HENT:  Negative for ear pain and sore throat.    Eyes:  Negative for pain and visual disturbance.   Respiratory:  Negative for cough and shortness of breath.    Cardiovascular:  Negative for chest pain and palpitations.   Gastrointestinal:  Negative for abdominal pain and vomiting.   Genitourinary:  Negative for dysuria and hematuria.   Musculoskeletal:  Negative for arthralgias and back pain.   Skin:  Negative for color change and rash.   Neurological:  Negative for seizures and syncope.   All other systems reviewed and are negative.    Historical Information   Past Medical History:   Diagnosis Date    ADHD (attention deficit hyperactivity disorder), combined type 04/28/2015    Anxiety 2012    Autism     Bipolar 1 disorder (HCC)     Depression 2008    Disease of thyroid gland     Headache(784.0) six months ago    Hyperlipidemia     Lumbar disc disease     Psychiatric disorder     Compulsive Disorder    Sleep apnea     Urinary incontinence      Past Surgical History:   Procedure Laterality Date    BACK SURGERY  11/2017    Lower. Last assessed: 1/18/18    COLONOSCOPY      Fiberoptic    INCISION AND DRAINAGE POSTERIOR SPINE N/A 11/1/2017    Procedure: INCISION AND DRAINAGE (I&D) SPINE;  Surgeon: Lalito Lovell MD;  Location: [facility] MAIN [facility];  Service: Neurosurgery    NC LAMNOTMY INCL W/DCMPRSN NRV ROOT 1 INTRSPC LUMBR Bilateral 10/17/2017    Procedure: LEFT L3/4 AND RIGHT L4/5 MICRODISCECTOMIES, HEMILAMINECTOMIES; POSSIBLE EPIDURAL STEROID INJECTIONS;  Surgeon: Lalito Lovell MD;  Location: [facility];  Service: Neurosurgery    WISDOM TOOTH EXTRACTION      WOUND DEBRIDEMENT N/A 11/3/2017    Procedure: DEBRIDEMENT WOUND (WASH OUT), wound vac placement back;  Surgeon: Alexis Zaman DO;  Location: [facility];  Service: General    WOUND DEBRIDEMENT N/A 11/6/2017    Procedure: DEBRIDEMENT WOUND (WASH OUT),CLOSURE OF WOUMD;  Surgeon: Kavon Vargas MD;  Location: [facility]  MAIN OR;  Service: General     Social History     Tobacco Use    Smoking status: Never    Smokeless tobacco: Never   Vaping Use    Vaping status: Never Used   Substance and Sexual Activity    Alcohol use: No    Drug use: No    Sexual activity: Never     E-Cigarette/Vaping    E-Cigarette Use Never User      E-Cigarette/Vaping Substances    Nicotine No     THC No     CBD No     Flavoring No     Other No     Unknown No      Family history non-contributory  Marital Status: Single    Meds/Allergies   I have reviewed home medications using recent Epic encounter.  Prior to Admission medications    Medication Sig Start Date End Date Taking? Authorizing Provider   benztropine (COGENTIN) 1 mg tablet TAKE 1 TABLET BY MOUTH TWICE A DAY 3/6/25  Yes Charline Ronquillo PA-C   divalproex sodium (DEPAKOTE ER) 500 mg 24 hr tablet TAKE 1 TABLET BY MOUTH EVERY MORNING TAKE 2 TABLETS BY MOUTH AT NOON AND 1 TAB EVERY EVENING 3/6/25  Yes Charline Ronquillo PA-C   divalproex sodium (DEPAKOTE) 125 mg DR tablet TAKE 1 TABLET (125 MG TOTAL) BY MOUTH EVERY EVENING TAKE WITH THE 500MG DOSE OF DEPAKOTE ER FOR A TOTAL OF 625MG EVERY EVENING 1/20/25  Yes Charline Ronquillo PA-C   fenofibrate 160 MG tablet TAKE 1 TABLET BY MOUTH EVERY DAY 10/16/23  Yes JAYRO Roque   levothyroxine 25 mcg tablet TAKE 1 TABLET BY MOUTH MONDAY THRU FRIDAY 10/16/23  Yes Kim Lopez MD   Multiple Vitamin tablet Take by mouth   Yes Historical Provider, MD   risperiDONE (RisperDAL) 2 mg tablet Take 3 tablets (6 mg total) by mouth 2 (two) times a day 3/6/25  Yes Charline Ronquillo PA-C   sertraline (ZOLOFT) 50 mg tablet TAKE 1 TABLET BY MOUTH EVERY MORNING 3/6/25  Yes Charline Ronquillo PA-C   acetaminophen (TYLENOL) 500 mg tablet Take 325 mg by mouth every 6 (six) hours as needed for mild pain    Patient not taking: Reported on 4/4/2025    Historical Provider, MD   methocarbamol (ROBAXIN) 500 mg tablet Take 1 tablet (500 mg total) by mouth 2 (two)  "times a day as needed for muscle spasms  Patient not taking: Reported on 4/4/2025 1/27/22   Kim Lopez MD   vitamin B-12 (VITAMIN B-12) 500 mcg tablet Take 1 tablet (500 mcg total) by mouth daily  Patient not taking: Reported on 4/4/2025 11/9/22   Kim Lopez MD     Allergies   Allergen Reactions    Kale - Food Allergy Anaphylaxis    Sulfa Antibiotics Anaphylaxis    Bee Venom     Coriandrum Sativum     Gabapentin Vomiting    Mercury     Parsley Seed - Food Allergy     Pentothal [Thiopental]      Other reaction(s): Anaphylaxis    Soy Allergy - Food Allergy GI Intolerance    Soybean Oil - Food Allergy Vomiting    Allegra [Fexofenadine] Hives and Rash    Aspirin Rash     Category: Allergy;     Ciprofloxacin Rash     Reaction Date: 10Feb2012; Annotation - [id number]: swelling    Erythromycin Hives and Rash     Reaction Date: 10Feb2012;     Latex Rash    Motrin [Ibuprofen] Rash     Reaction Date: 10Feb2012; Annotation - [id number]: rash, wheeze    Penicillins Hives and Rash     Reaction Date: 10Feb2012; Annotation - [id number]: rash       Objective :  Temp:  [97.6 °F (36.4 °C)-97.8 °F (36.6 °C)] 97.8 °F (36.6 °C)  HR:  [] 100  BP: (127-144)/(67-70) 127/70  Resp:  [15-16] 16  SpO2:  [99 %] 99 %  O2 Device: None (Room air)    Height: 6' 5\" (195.6 cm) (04/04/25 1607)  Weight - Scale: 59.9 kg (132 lb) (04/04/25 1607)  Physical Exam  Constitutional:       General: He is not in acute distress.     Appearance: He is obese. He is not toxic-appearing or diaphoretic.   HENT:      Head: Normocephalic.      Mouth/Throat:      Mouth: Mucous membranes are moist.   Cardiovascular:      Rate and Rhythm: Normal rate.   Pulmonary:      Effort: Pulmonary effort is normal. No respiratory distress.   Abdominal:      General: Abdomen is flat.   Musculoskeletal:         General: Normal range of motion.      Cervical back: Normal range of motion.      Right lower leg: No edema.      Left lower leg: No edema.   Skin:     " General: Skin is warm and dry.   Neurological:      Mental Status: He is alert and oriented to person, place, and time.   Psychiatric:         Behavior: Behavior normal.           Lab Results: I have reviewed the following results:  Results from last 7 days   Lab Units 04/03/25  1255   WBC Thousand/uL 9.69   HEMOGLOBIN g/dL 13.7   HEMATOCRIT % 40.8   PLATELETS Thousands/uL 394*   SEGS PCT % 66   LYMPHO PCT % 25   MONO PCT % 7   EOS PCT % 1     Results from last 7 days   Lab Units 04/03/25  1255   SODIUM mmol/L 137   POTASSIUM mmol/L 3.7   CHLORIDE mmol/L 101   CO2 mmol/L 25   BUN mg/dL 15   CREATININE mg/dL 0.94   ANION GAP mmol/L 11   CALCIUM mg/dL 10.1   ALBUMIN g/dL 4.6   TOTAL BILIRUBIN mg/dL 0.42   ALK PHOS U/L 50   ALT U/L 31   AST U/L 24   GLUCOSE RANDOM mg/dL 103             Lab Results   Component Value Date/Time    HGBA1C 6.1 (H) 11/07/2022 08:41 AM    HGBA1C 5.7 (H) 11/01/2021 07:25 AM    HGBA1C 6.1 (H) 04/13/2021 11:21 AM    HGBA1C 6.3 (H) 04/24/2019 10:11 AM    HGBA1C 5.5 05/20/2016 10:41 AM    HGBA1C 5.3 06/02/2015 11:39 AM           Imaging Results Review: No pertinent imaging studies reviewed.  Other Study Results Review: No additional pertinent studies reviewed.    Administrative Statements   I have spent a total time of   minutes in caring for this patient on the day of the visit/encounter including Diagnostic results, Importance of tx compliance, Risk factor reductions, Impressions, Counseling / Coordination of care, Documenting in the medical record, Reviewing/placing orders in the medical record (including tests, medications, and/or procedures), Obtaining or reviewing history  , and Communicating with other healthcare professionals .  ** Please Note: This note has been constructed using a voice recognition system. **

## 2025-04-05 NOTE — ASSESSMENT & PLAN NOTE
----- Message from Eugenia Cortes MA sent at 2/8/2018  2:07 PM EST -----  PATIENT STATES HE IS HAVING SPASMS, CRAMPS AND MUSCLE ACHES AND IS WORRIED THAT THE PANTOPRAZOLE WOULD MAKE THEM WORSE. HE HAS NOT YET STARTED THE MEDICATION. HE STATED TO CALL HIS WIFE LUIS FERNANDO [phone] WHO IS ON HIS RELEASE. THANKS!      Called and discussed with wife. Discussed side effects of pantoprazole. Advised his symptoms should not worsen with Pantoprazole. He will go ahead and try the Pantoprazole and stop the Zantac. He or his wife will call back if any problems with Pantoprazole. He has appointment for follow up.   Continue Synthroid

## 2025-04-05 NOTE — ASSESSMENT & PLAN NOTE
Continue previously prescribed psychotropic medications including:    Depakote 1500 mg nightly to confer mood stability. Anticipate upward titration, seeing as previous level was subtherapeutic at 26 on 04/03.    Zoloft 50 mg daily to address dysphoric mood including depression, depressive signs/symptoms and anxiety.   Consider introduction of low dose second-generation antipsychotic to confer further mood stability, although will defer per primary team.  No associated orders from this encounter found during lookback period of 72 hours.

## 2025-04-06 LAB
ATRIAL RATE: 77 BPM
P AXIS: 57 DEGREES
PR INTERVAL: 198 MS
QRS AXIS: 56 DEGREES
QRSD INTERVAL: 100 MS
QT INTERVAL: 384 MS
QTC INTERVAL: 435 MS
T WAVE AXIS: 61 DEGREES
TREPONEMA PALLIDUM IGG+IGM AB [PRESENCE] IN SERUM OR PLASMA BY IMMUNOASSAY: NORMAL
VALPROATE SERPL-MCNC: 29 UG/ML (ref 50–125)
VENTRICULAR RATE: 77 BPM

## 2025-04-06 PROCEDURE — 80164 ASSAY DIPROPYLACETIC ACD TOT: CPT

## 2025-04-06 PROCEDURE — 99232 SBSQ HOSP IP/OBS MODERATE 35: CPT | Performed by: PSYCHIATRY & NEUROLOGY

## 2025-04-06 PROCEDURE — 93010 ELECTROCARDIOGRAM REPORT: CPT | Performed by: INTERNAL MEDICINE

## 2025-04-06 RX ORDER — ATORVASTATIN CALCIUM 10 MG/1
10 TABLET, FILM COATED ORAL
Status: DISCONTINUED | OUTPATIENT
Start: 2025-04-06 | End: 2025-04-17 | Stop reason: HOSPADM

## 2025-04-06 RX ORDER — DIVALPROEX SODIUM 500 MG/1
2000 TABLET, FILM COATED, EXTENDED RELEASE ORAL
Status: DISCONTINUED | OUTPATIENT
Start: 2025-04-06 | End: 2025-04-17 | Stop reason: HOSPADM

## 2025-04-06 RX ADMIN — LEVOTHYROXINE SODIUM 25 MCG: 0.03 TABLET ORAL at 06:35

## 2025-04-06 RX ADMIN — Medication 2000 UNITS: at 11:57

## 2025-04-06 RX ADMIN — ATORVASTATIN CALCIUM 10 MG: 10 TABLET, FILM COATED ORAL at 16:27

## 2025-04-06 RX ADMIN — HYDROXYZINE HYDROCHLORIDE 50 MG: 50 TABLET, FILM COATED ORAL at 13:38

## 2025-04-06 RX ADMIN — Medication 3 MG: at 21:07

## 2025-04-06 RX ADMIN — FENOFIBRATE 145 MG: 145 TABLET, FILM COATED ORAL at 08:34

## 2025-04-06 RX ADMIN — DIVALPROEX SODIUM 2000 MG: 500 TABLET, EXTENDED RELEASE ORAL at 21:07

## 2025-04-06 RX ADMIN — CYANOCOBALAMIN TAB 1000 MCG 1000 MCG: 1000 TAB at 11:57

## 2025-04-06 RX ADMIN — SERTRALINE HYDROCHLORIDE 50 MG: 50 TABLET ORAL at 08:34
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
## 2025-04-06 NOTE — ASSESSMENT & PLAN NOTE
Continue previously prescribed psychotropic medications including:    Increase Depakote 1500 mg to 2000 mng nightly to confer mood stability; previous level was subtherapeutic at 26 on 04/03.    Zoloft 50 mg daily to address dysphoric mood including depression, depressive signs/symptoms and anxiety.   Consider introduction of low dose second-generation antipsychotic to confer further mood stability, although will defer per primary team.  No associated orders from this encounter found during lookback period of 72 hours.

## 2025-04-06 NOTE — NURSING NOTE
"On reassessment PRN Atarax effective. Pt states, \"I feel a lot better thank you.\" Pt appears more relaxed, and was able to attend/participate in group.   "

## 2025-04-06 NOTE — NURSING NOTE
"Pt complains of \"feeling like I have a high heart rate\". Pt appears anxious and rocking in chair. Endorses feeling anxious, but unable to identify trigger. Vital signs WNL. Pt agreeable to PRN medication. 50 mg Atrax administered for a mathew of 18. Will monitor for effectiveness.   "

## 2025-04-06 NOTE — NURSING NOTE
Pt signed 72 hour notice on 4/6/25 at 1300. Pt explained his rights and protocols for voluntary vs. Involuntary status.

## 2025-04-06 NOTE — PLAN OF CARE
Problem: Ineffective Coping  Goal: Identifies ineffective coping skills  Outcome: Progressing  Goal: Identifies healthy coping skills  Outcome: Progressing  Goal: Demonstrates healthy coping skills  Outcome: Progressing  Goal: Participates in unit activities  Description: Interventions:- Provide therapeutic environment - Provide required programming - Redirect inappropriate behaviors   Outcome: Progressing  Goal: Patient/Family participate in treatment and DC plans  Description: Interventions:- Provide therapeutic environment  Outcome: Progressing  Goal: Patient/Family verbalizes awareness of resources  Outcome: Progressing  Goal: Understands least restrictive measures  Description: Interventions:- Utilize least restrictive behavior  Outcome: Progressing  Goal: Free from restraint events  Description: - Utilize least restrictive measures - Provide behavioral interventions - Redirect inappropriate behaviors   Outcome: Progressing     Problem: Depression  Goal: Treatment Goal: Demonstrate behavioral control of depressive symptoms, verbalize feelings of improved mood/affect, and adopt new coping skills prior to discharge  Outcome: Progressing     Problem: Anxiety  Goal: Anxiety is at manageable level  Description: Interventions:- Assess and monitor patient's anxiety level. - Monitor for signs and symptoms (heart palpitations, chest pain, shortness of breath, headaches, nausea, feeling jumpy, restlessness, irritable, apprehensive). - Collaborate with interdisciplinary team and initiate plan and interventions as ordered.- Stewart patient to unit/surroundings- Explain treatment plan- Encourage participation in care- Encourage verbalization of concerns/fears- Identify coping mechanisms- Assist in developing anxiety-reducing skills- Administer/offer alternative therapies- Limit or eliminate stimulants  Outcome: Progressing     Problem: Risk for Violence/Aggression Toward Others  Goal: Treatment Goal: Refrain from acts of  violence/aggression during length of stay, and demonstrate improved impulse control at the time of discharge  Outcome: Progressing  Goal: Refrain from harming others  Outcome: Progressing  Goal: Control angry outbursts  Description: Interventions:- Monitor patient closely, per order- Ensure early verbal de-escalation- Monitor prn medication needs- Set reasonable/therapeutic limits, outline behavioral expectations, and consequences - Provide a non-threatening milieu, utilizing the least restrictive interventions   Outcome: Progressing

## 2025-04-06 NOTE — ASSESSMENT & PLAN NOTE
No associated orders from this encounter found during lookback period of 72 hours.   Complex Repair And Graft Additional Text (Will Appearing After The Standard Complex Repair Text): The complex repair was not sufficient to completely close the primary defect. The remaining additional defect was repaired with the graft mentioned below.
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
## 2025-04-06 NOTE — NURSING NOTE
PTA medications verified with patient's [facility] pharmacy and align to what was discussed with patient.    1) Cogentin 1 mg 2x daily - last picked up 3/6/25  2) depakote 500 mg (2 tabs at noon and evening)- picked up 3/12/25  3) depakote 125 mg (every evening)- filled 90 day supply 1/20/25  4)fenofibrate 160 mg not active since 2023  5) levothyroxine 25 mg- picked up 6/10/24 and working on filling a new one  6) Risperidal- take 3 tabs 2x daily for 6 mg- picked up 3/12/25  7) zoloft 50 mg- picked up 1/20/25    Dr. Okeefe and Holter made aware PTA meds creconciled at this time via EP[employer] secure chat.

## 2025-04-06 NOTE — NURSING NOTE
Pt overall calm and cooperative. Polite and pleasant on approach. Takes medications as prescribed. Compliant w/ mouth checks. Pt can be childlike Visible and social on the unit. Attends groups. Remains hyper-verbal at times. Limited insight into social cues. Needs redirection at times from inappropriate conversations, and reminders to be mindful of peers.  Redirects well, but does seem to have shifts in mood after redirection. On assessment denies SI/HI. Denies psychosis. Endorses fluctuating anxiety throughout shift. Coping skills encouraged and discussed. Pt denies unmet needs. Remains behaviorally controlled. Q15 safety checks maintained.

## 2025-04-07 PROCEDURE — GZHZZZZ GROUP PSYCHOTHERAPY: ICD-10-PCS | Performed by: PSYCHIATRY & NEUROLOGY

## 2025-04-07 PROCEDURE — 99232 SBSQ HOSP IP/OBS MODERATE 35: CPT | Performed by: PSYCHIATRY & NEUROLOGY

## 2025-04-07 RX ADMIN — ATORVASTATIN CALCIUM 10 MG: 10 TABLET, FILM COATED ORAL at 17:07

## 2025-04-07 RX ADMIN — Medication 3 MG: at 21:59

## 2025-04-07 RX ADMIN — SERTRALINE HYDROCHLORIDE 50 MG: 50 TABLET ORAL at 08:21

## 2025-04-07 RX ADMIN — FENOFIBRATE 145 MG: 145 TABLET, FILM COATED ORAL at 08:21

## 2025-04-07 RX ADMIN — DIVALPROEX SODIUM 2000 MG: 500 TABLET, EXTENDED RELEASE ORAL at 21:59

## 2025-04-07 RX ADMIN — CYANOCOBALAMIN TAB 1000 MCG 1000 MCG: 1000 TAB at 08:21

## 2025-04-07 RX ADMIN — Medication 2000 UNITS: at 08:21

## 2025-04-07 RX ADMIN — LEVOTHYROXINE SODIUM 25 MCG: 0.03 TABLET ORAL at 06:55

## 2025-04-07 NOTE — NURSING NOTE
"Pt visible on the unit. Social with select peers and attends groups activities. Calm and cooperative on approach. Reports feeling, \"calm as a cucumber.\" Denies SI/HI/AH/VH, depression, and anxiety. States, \"I only see aura but that's normal for me.\" Medication and meal compliant. Rescinded 72 hour notice @1012. Verbalizes needs.   "

## 2025-04-07 NOTE — PROGRESS NOTES
Pastoral Care Progress Note          Chaplaincy Interventions Utilized:   Empowerment: Encouraged focus on present, Encouraged self-care, Facilitated group experience, Normalized experience of patient/family, and Provided chaplaincy education    Exploration: Explored hope, Explored emotional needs & resources, and Explored spiritual needs & resources    Relationship Building: Cultivated a relationship of care and support, Listened empathically, and Hospitality    The pt participated in spirituality group facilitated by the  today. The pt contributed well to conversation.
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
## 2025-04-07 NOTE — PROGRESS NOTES
04/07/25 1620   Team Meeting   Meeting Type Tx Team Meeting   Team Members Present   Team Members Present Physician;;Nurse   Physician Team Member Alison   Nursing Team Member Jeff   Care Management Team Member Julianna   Patient/Family Present   Patient Present Yes   Patient's Family Present No     Tx plan was reviewed and discussed with Pt. Pt was encouraged to attend groups. Medication was discussed with Pt. Pt signed tx plan.

## 2025-04-07 NOTE — CONSULTS
Consult Note- Podiatry   Hasmukh Bradley 34 y.o. male MRN: [id number]  Unit/Bed#: [facility] 346-02 Encounter: [id number]    Assessment & Plan     Assessment:  1. Blister right foot  2. Hyperhidrosis  3. Pain toes b/l     Plan:  - -pt eval and managed    - Number and complexity of problems addressed:  1 undiagnosed new problem with uncertain prognosis   as shown    - Amount/complexity of data reviewed and analyzed:     Category 1: prior patient notes were analyzed today before evaluating and managing patient. All PMH were discussed with pt today.       - Risk of complications: moderate risk of morbidity from additional testing or treatment involved with this patient, which includes but not limited to:    - discussed anatomy, condition, treatment plan and options. They were instructed on proper foot care. The patient was seen today for greater than total of  45-59 minutes   . This is total time spent today involving both face-to-face time and non face-to-face time. This time spent includes  reviewing their past medical history  , performing a medically appropriate examination and evaluation of the patient, counseling and educating the patient,  documenting all findings in EMR, and independently interpreting results and communicating results with  patient     and discussing their condition and treatment options, risks, and potential complications. I have discussed the findings of this examination with the patient. The discussion included a complete verbal explanation of the examination results, diagnosis and planned treatment(s). A schedule for future care needs was explained. The patient has verbalized the understanding of these instructions at this time. If any questions should arise after returning home I have encouraged the patient to feel free to call the office.    - d/w pt that discomfort is secondary to the toe rubbing in the shoe. Also due to sweating feet. This may cause friction leading to a blister.  - All  questions and concerns addressed.    - Podiatry signing off, thank you for the consult.     History of Present Illness     HPI:  Hasmukh Bradley is a 34 y.o. male who presents with a blister on the right great toe.He states the has had this blister for a few weeks. Patient states pain is 1/10 in shoe gear. Pain with pressure.    Inpatient consult to Podiatry  Consult performed by: Lashawn Narayan DPM  Consult ordered by: JAYRO Murphy        Review of Systems   Constitutional: Negative.    HENT: Negative.    Eyes: Negative.    Respiratory: Negative.    Cardiovascular: Negative.    Gastrointestinal: Negative.    Musculoskeletal: Negative   Skin: elongated thickened toenails  Neurological: Negative      Historical Information   Past Medical History:   Diagnosis Date    ADHD (attention deficit hyperactivity disorder), combined type 04/28/2015    Anxiety 2012    Autism     Bipolar 1 disorder (HCC)     Depression 2008    Disease of thyroid gland     Headache(784.0) six months ago    Hyperlipidemia     Lumbar disc disease     Psychiatric disorder     Compulsive Disorder    Sleep apnea     Urinary incontinence      Past Surgical History:   Procedure Laterality Date    BACK SURGERY  11/2017    Lower. Last assessed: 1/18/18    COLONOSCOPY      Fiberoptic    INCISION AND DRAINAGE POSTERIOR SPINE N/A 11/1/2017    Procedure: INCISION AND DRAINAGE (I&D) SPINE;  Surgeon: Lalito Lovell MD;  [facility]: [facility];  Service: Neurosurgery    UT LAMNOTMY INCL W/DCMPRSN NRV ROOT 1 INTRSPC LUMBR Bilateral 10/17/2017    Procedure: LEFT L3/4 AND RIGHT L4/5 MICRODISCECTOMIES, HEMILAMINECTOMIES; POSSIBLE EPIDURAL STEROID INJECTIONS;  Surgeon: Lalito Lovell MD;  [facility];  Service: Neurosurgery    WISDOM TOOTH EXTRACTION      WOUND DEBRIDEMENT N/A 11/3/2017    Procedure: DEBRIDEMENT WOUND (WASH OUT), wound vac placement back;  Surgeon: Alexis Zaman DO;  [facility];  Service: General    WOUND  DEBRIDEMENT N/A 11/6/2017    Procedure: DEBRIDEMENT WOUND (WASH OUT),CLOSURE OF WOUMD;  Surgeon: Kavon Vargas MD;  [facility];  Service: General     Social History   Social History     Substance and Sexual Activity   Alcohol Use No     Social History     Substance and Sexual Activity   Drug Use No     Social History     Tobacco Use   Smoking Status Never   Smokeless Tobacco Never     Family History:   Family History   Problem Relation Age of Onset    Hypertension Mother     Substance Abuse Mother         smoking    Mental illness Mother         multiple diagnoses    ADD / ADHD Mother     Anxiety disorder Mother     Schizoaffective Disorder  Mother     Heart disease Father         alive 57    Substance Abuse Father         booze drugs    Heart attack Father     Mental illness Father         psychotic rage    Depression Maternal Grandmother     Anxiety disorder Maternal Grandmother     Stroke Maternal Grandmother         CVA    Heart disease Maternal Grandmother         dead 2008    Heart failure Maternal Grandmother     Self-Injury Maternal Grandmother         cured    Heart failure Maternal Grandfather     Heart disease Maternal Grandfather         alive 81    Heart disease Paternal Grandmother     Heart disease Paternal Grandfather         dead 2021    Substance Abuse Paternal Grandfather         booze drugs smoking    Colonic polyp Family     Colonic polyp Family     Cancer Family     Drug abuse Neg Hx     Alcohol abuse Neg Hx     Colon cancer Neg Hx        Meds/Allergies     Medications Prior to Admission:     benztropine (COGENTIN) 1 mg tablet    divalproex sodium (DEPAKOTE ER) 500 mg 24 hr tablet    divalproex sodium (DEPAKOTE) 125 mg DR tablet    fenofibrate 160 MG tablet    levothyroxine 25 mcg tablet    Multiple Vitamin tablet    risperiDONE (RisperDAL) 2 mg tablet    sertraline (ZOLOFT) 50 mg tablet    acetaminophen (TYLENOL) 500 mg tablet    methocarbamol (ROBAXIN) 500 mg tablet    vitamin B-12  "(VITAMIN B-12) 500 mcg tablet  Allergies   Allergen Reactions    Kale - Food Allergy Anaphylaxis    Sulfa Antibiotics Anaphylaxis    Bee Venom     Coriandrum Sativum     Gabapentin Vomiting    Mercury     Parsley Seed - Food Allergy     Pentothal [Thiopental]      Other reaction(s): Anaphylaxis    Soy Allergy - Food Allergy GI Intolerance    Soybean Oil - Food Allergy Vomiting    Allegra [Fexofenadine] Hives and Rash    Aspirin Rash     Category: Allergy;     Ciprofloxacin Rash     Reaction Date: 10Feb2012; Annotation - [id number]: swelling    Erythromycin Hives and Rash     Reaction Date: 10Feb2012;     Latex Rash    Motrin [Ibuprofen] Rash     Reaction Date: 10Feb2012; Annotation - [id number]: rash, wheeze    Penicillins Hives and Rash     Reaction Date: 10Feb2012; Annotation - [id number]: rash       Objective   First Vitals:   Blood Pressure: 127/70 (04/04/25 1607)  Pulse: 100 (04/04/25 1607)  Temperature: 97.8 °F (36.6 °C) (04/04/25 1607)  Temp Source: Temporal (04/04/25 1607)  Respirations: 16 (04/04/25 1607)  Height: 6' 5\" (195.6 cm) (04/04/25 1607)  Weight - Scale: 59.9 kg (132 lb) (04/04/25 1607)  SpO2: 99 % (04/04/25 1607)    Current Vitals:   Blood Pressure: 133/75 (04/07/25 0752)  Pulse: 87 (04/07/25 0752)  Temperature: 97.9 °F (36.6 °C) (04/07/25 0752)  Temp Source: Temporal (04/07/25 0752)  Respirations: 18 (04/07/25 0752)  Height: 6' 5\" (195.6 cm) (04/04/25 1607)  Weight - Scale: (!) 156 kg (343 lb 9.6 oz) (04/06/25 0725)  SpO2: 97 % (04/07/25 0752)    /75 (BP Location: Left arm)   Pulse 87   Temp 97.9 °F (36.6 °C) (Temporal)   Resp 18   Ht 6' 5\" (1.956 m)   Wt (!) 156 kg (343 lb 9.6 oz)   SpO2 97%   BMI 40.75 kg/m²     General Appearance:    Alert, cooperative, no distress   Head:    Normocephalic, without obvious abnormality, atraumatic   Eyes:    PERRL, conjunctiva/corneas clear, EOM's intact            Nose:   Moist mucous membranes, no drainage or sinus tenderness   Throat:   No " tenderness, no exudates   Neck:   Supple, symmetrical, trachea midline, no JVD   Back:     Symmetric, no CVA tenderness   Lungs:     Clear to auscultation bilaterally, respirations unlabored   Chest wall:    No tenderness or deformity   Heart:    Regular rate and rhythm, S1 and S2 normal, no murmur, rub   or gallop   Abdomen:     Soft, non-tender, bowel sounds active all four quadrants,     no masses, no organomegaly     Extremities:   MMT is 5/5 to all compartments of the LE, +0/4 edema B/L, Digital ROM is intact,    Pulses:   R DP is +2/4, R PT is +2/4, L DP is +2/4, L PT is +2/4, CFT< 3sec to all digits. Thin/shiny skin noted to the B/L LE, pigmentary changes to B/L LE. Absent digital hair growth b/l. Nail thickening b/l.    Skin:   Thick and discolored toenail of the right great toe.Remnant skin noted from blister that is resolved. No open Lesions. No SOI. No drainage. Hyperhydrosis noted to the skin.       Neurologic:   CNII-XII intact. Normal strength, sensation and reflexes       Throughout. Gross sensation is intact. Protective sensation is intact.       Lab Results:   Admission on 04/04/2025   Component Date Value    Color, UA 04/05/2025 Ginger (A)     Clarity, UA 04/05/2025 Clear     Specific Gravity, UA 04/05/2025 1.020     pH, UA 04/05/2025 6.0     Leukocytes, UA 04/05/2025 25.0 (A)     Nitrite, UA 04/05/2025 Negative     Protein, UA 04/05/2025 30 (1+) (A)     Glucose, UA 04/05/2025 Negative     Ketones, UA 04/05/2025 Negative     Bilirubin, UA 04/05/2025 Negative     Occult Blood, UA 04/05/2025 Negative     UROBILINOGEN UA 04/05/2025 Negative     WBC 04/05/2025 6.44     RBC 04/05/2025 5.36     Hemoglobin 04/05/2025 14.5     Hematocrit 04/05/2025 43.8     MCV 04/05/2025 82     MCH 04/05/2025 27.1     MCHC 04/05/2025 33.1     RDW 04/05/2025 13.1     MPV 04/05/2025 9.3     Platelets 04/05/2025 357     nRBC 04/05/2025 0     Segmented % 04/05/2025 48     Immature Grans % 04/05/2025 1     Lymphocytes %  04/05/2025 40     Monocytes % 04/05/2025 6     Eosinophils Relative 04/05/2025 3     Basophils Relative 04/05/2025 2 (H)     Absolute Neutrophils 04/05/2025 3.16     Absolute Immature Grans 04/05/2025 0.03     Absolute Lymphocytes 04/05/2025 2.59     Absolute Monocytes 04/05/2025 0.40     Eosinophils Absolute 04/05/2025 0.16     Basophils Absolute 04/05/2025 0.10     Sodium 04/05/2025 140     Potassium 04/05/2025 3.8     Chloride 04/05/2025 101     CO2 04/05/2025 29     ANION GAP 04/05/2025 10     BUN 04/05/2025 14     Creatinine 04/05/2025 0.73     Glucose 04/05/2025 101     Glucose, Fasting 04/05/2025 101 (H)     Calcium 04/05/2025 10.0     AST 04/05/2025 20     ALT 04/05/2025 28     Alkaline Phosphatase 04/05/2025 51     Total Protein 04/05/2025 7.3     Albumin 04/05/2025 4.4     Total Bilirubin 04/05/2025 0.57     eGFR 04/05/2025 121     Vitamin B-12 04/05/2025 265     Folate 04/05/2025 >22.3     Vit D, 25-Hydroxy 04/05/2025 18.6 (L)     Cholesterol 04/05/2025 214 (H)     Triglycerides 04/05/2025 294 (H)     HDL, Direct 04/05/2025 36 (L)     LDL Calculated 04/05/2025 119 (H)     Non-HDL-Chol (CHOL-HDL) 04/05/2025 178     Hemoglobin A1C 04/05/2025 6.1 (H)     EAG 04/05/2025 128     Treponema Pallidium Tota* 04/05/2025 Non-reactive     RBC, UA 04/05/2025 None Seen     WBC, UA 04/05/2025 4-10 (A)     Epithelial Cells 04/05/2025 Occasional     Bacteria, UA 04/05/2025 Occasional     MUCUS THREADS 04/05/2025 Occasional (A)     Ventricular Rate 04/05/2025 77     Atrial Rate 04/05/2025 77     PA Interval 04/05/2025 198     QRSD Interval 04/05/2025 100     QT Interval 04/05/2025 384     QTC Interval 04/05/2025 435     P Axis 04/05/2025 57     QRS [place] 04/05/2025 56     T Wave Axis 04/05/2025 61     Valproic Acid, Total 04/06/2025 29 (L)      Imaging: I have personally reviewed pertinent films in PACS  EKG, Pathology, and Other Studies: I have personally reviewed pertinent reports.      Code Status: Level 1 - Full  Code  Advance Directive and Living Will:      Power of :    POLST:
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
## 2025-04-07 NOTE — PLAN OF CARE
Problem: Ineffective Coping  Goal: Identifies ineffective coping skills  Outcome: Progressing  Goal: Participates in unit activities  Description: Interventions:- Provide therapeutic environment - Provide required programming - Redirect inappropriate behaviors   Outcome: Progressing  Goal: Patient/Family participate in treatment and DC plans  Description: Interventions:- Provide therapeutic environment  Outcome: Progressing  Goal: Understands least restrictive measures  Description: Interventions:- Utilize least restrictive behavior  Outcome: Progressing  Goal: Free from restraint events  Description: - Utilize least restrictive measures - Provide behavioral interventions - Redirect inappropriate behaviors   Outcome: Progressing     Problem: Depression  Goal: Treatment Goal: Demonstrate behavioral control of depressive symptoms, verbalize feelings of improved mood/affect, and adopt new coping skills prior to discharge  Outcome: Progressing     Problem: Anxiety  Goal: Anxiety is at manageable level  Description: Interventions:- Assess and monitor patient's anxiety level. - Monitor for signs and symptoms (heart palpitations, chest pain, shortness of breath, headaches, nausea, feeling jumpy, restlessness, irritable, apprehensive). - Collaborate with interdisciplinary team and initiate plan and interventions as ordered.- [place] patient to unit/surroundings- Explain treatment plan- Encourage participation in care- Encourage verbalization of concerns/fears- Identify coping mechanisms- Assist in developing anxiety-reducing skills- Administer/offer alternative therapies- Limit or eliminate stimulants  Outcome: Progressing     Problem: Risk for Violence/Aggression Toward Others  Goal: Treatment Goal: Refrain from acts of violence/aggression during length of stay, and demonstrate improved impulse control at the time of discharge  Outcome: Progressing  Goal: Refrain from harming others  Outcome: Progressing  Goal: Control  angry outbursts  Description: Interventions:- Monitor patient closely, per order- Ensure early verbal de-escalation- Monitor prn medication needs- Set reasonable/therapeutic limits, outline behavioral expectations, and consequences - Provide a non-threatening milieu, utilizing the least restrictive interventions   Outcome: Progressing     Problem: Ineffective Coping  Goal: Identifies healthy coping skills  Outcome: Not Progressing  Goal: Demonstrates healthy coping skills  Outcome: Not Progressing  Goal: Patient/Family verbalizes awareness of resources  Outcome: Not Progressing
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
## 2025-04-07 NOTE — ASSESSMENT & PLAN NOTE
No psychopharmacologic changes necessary at this time; will continue to assess for further optimization.  Continue with current scheduled medications:  Depakote ER 2000 nightly for mood stability  Trough level to be drawn on 4/9 at 2000  Prior level was subtherapeutic at 26 on 4/3  Zoloft 50 mg daily for mood and anxiety  Consider introduction of low-dose second generation antipsychotic to confirm mood stability  Continue with current PRN medications.  Labs/Procedures: per unit protocol  Continue with group therapy, milieu therapy and occupational therapy.  Continue frequent safety checks and vitals per unit protocol.  Continue with SLIM medical management as indicated  Continue coordinating with case management regarding disposition    No associated orders from this encounter found during lookback period of 72 hours.

## 2025-04-08 PROCEDURE — 99232 SBSQ HOSP IP/OBS MODERATE 35: CPT | Performed by: PSYCHIATRY & NEUROLOGY

## 2025-04-08 RX ADMIN — FENOFIBRATE 145 MG: 145 TABLET, FILM COATED ORAL at 08:06

## 2025-04-08 RX ADMIN — Medication 2000 UNITS: at 08:06

## 2025-04-08 RX ADMIN — Medication 3 MG: at 21:19

## 2025-04-08 RX ADMIN — SERTRALINE HYDROCHLORIDE 50 MG: 50 TABLET ORAL at 08:06

## 2025-04-08 RX ADMIN — LEVOTHYROXINE SODIUM 25 MCG: 0.03 TABLET ORAL at 05:57

## 2025-04-08 RX ADMIN — ATORVASTATIN CALCIUM 10 MG: 10 TABLET, FILM COATED ORAL at 17:12

## 2025-04-08 RX ADMIN — CYANOCOBALAMIN TAB 1000 MCG 1000 MCG: 1000 TAB at 08:06

## 2025-04-08 RX ADMIN — DIVALPROEX SODIUM 2000 MG: 500 TABLET, EXTENDED RELEASE ORAL at 21:19

## 2025-04-08 NOTE — PROGRESS NOTES
25 [id number]   Team Meeting   Meeting Type Daily Rounds   Team Members Present   Team Members Present Physician;Nurse;   Physician Team Member Alison   [facility] Julianna   Patient/Family Present   Patient Present No   Patient's Family Present No     Pt rescinded his 72-hour notice. Pt is focused on his cat and if the cat is okay. Pt is medication and meal compliant. Pt denies SI/HI/AVH. Pt is social with select peers. Pt's discharge is pending  when PFA order is .

## 2025-04-08 NOTE — PROGRESS NOTES
04/08/25 1100   Activity/Group Checklist   Group Cognitive therapy   Attendance Attended   Attendance Duration (min) 46-60   Interactions Interacted appropriately   Affect/Mood Appropriate   Goals Achieved Identified feelings;Identified triggers;Able to listen to others;Able to engage in interactions;Able to reflect/comment on own behavior;Able to self-disclose;Able to recieve feedback;Able to give feedback to another     Pt making statements including the belief he is only 25% human which was concluded through genetic testing. Pt believes he cannot be killed in ways humans can be killed.    Pt also believes he is friends with a number of famous individuals on facebook including Brynn Morales, Jeremiah Ni and Manjit Chapman. Pt believes them to be who they say they are and reports have in depth discussions with Manjit Chapman.

## 2025-04-08 NOTE — NURSING NOTE
"Pt visible in milieu. Calm and cooperative on approach. Denies SI/HI/AH/VH stated, \"no just the aura.\" Delusional and grandiose in conversation. Stating \"I'm stronger than a human 30 fold, that's why I was undefeated in wrestling in high school.\" Endorses anxiety due to not being able to contact mom or girlfriend and is worried about his mom and cat having enough money for food and bills while he's here. Reported, \"I just want to live a peaceful life and not have to hurt people, because I know that doesn't get the job done.\" Medication and meal compliant. 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 Makes needs known.   "

## 2025-04-08 NOTE — NURSING NOTE
Patient is visible on the milieu and seen in wrap up group. Pleasant and compliant upon approach. Endorses some anxiety due to uncertainty about mother and cat's condition but declined prn's at this time. Patient is compliant with all scheduled medication and denies all psych symptoms at this time. Q 15 min rounding maintained for safety.
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
## 2025-04-09 LAB
ANION GAP SERPL CALCULATED.3IONS-SCNC: 11 MMOL/L (ref 4–13)
BASOPHILS # BLD AUTO: 0.06 THOUSANDS/ÂΜL (ref 0–0.1)
BASOPHILS NFR BLD AUTO: 1 % (ref 0–1)
BUN SERPL-MCNC: 11 MG/DL (ref 5–25)
CALCIUM SERPL-MCNC: 9.8 MG/DL (ref 8.4–10.2)
CHLORIDE SERPL-SCNC: 100 MMOL/L (ref 96–108)
CO2 SERPL-SCNC: 29 MMOL/L (ref 21–32)
CREAT SERPL-MCNC: 0.8 MG/DL (ref 0.6–1.3)
EOSINOPHIL # BLD AUTO: 0.16 THOUSAND/ÂΜL (ref 0–0.61)
EOSINOPHIL NFR BLD AUTO: 3 % (ref 0–6)
ERYTHROCYTE [DISTWIDTH] IN BLOOD BY AUTOMATED COUNT: 12.7 % (ref 11.6–15.1)
GFR SERPL CREATININE-BSD FRML MDRD: 117 ML/MIN/1.73SQ M
GLUCOSE SERPL-MCNC: 162 MG/DL (ref 65–140)
GLUCOSE SERPL-MCNC: 95 MG/DL (ref 65–140)
HCT VFR BLD AUTO: 42.1 % (ref 36.5–49.3)
HGB BLD-MCNC: 13.8 G/DL (ref 12–17)
IMM GRANULOCYTES # BLD AUTO: 0.04 THOUSAND/UL (ref 0–0.2)
IMM GRANULOCYTES NFR BLD AUTO: 1 % (ref 0–2)
LYMPHOCYTES # BLD AUTO: 2.1 THOUSANDS/ÂΜL (ref 0.6–4.47)
LYMPHOCYTES NFR BLD AUTO: 37 % (ref 14–44)
MCH RBC QN AUTO: 26.8 PG (ref 26.8–34.3)
MCHC RBC AUTO-ENTMCNC: 32.8 G/DL (ref 31.4–37.4)
MCV RBC AUTO: 82 FL (ref 82–98)
MONOCYTES # BLD AUTO: 0.29 THOUSAND/ÂΜL (ref 0.17–1.22)
MONOCYTES NFR BLD AUTO: 5 % (ref 4–12)
NEUTROPHILS # BLD AUTO: 3.05 THOUSANDS/ÂΜL (ref 1.85–7.62)
NEUTS SEG NFR BLD AUTO: 53 % (ref 43–75)
NRBC BLD AUTO-RTO: 0 /100 WBCS
PLATELET # BLD AUTO: 348 THOUSANDS/UL (ref 149–390)
PMV BLD AUTO: 9.7 FL (ref 8.9–12.7)
POTASSIUM SERPL-SCNC: 4.2 MMOL/L (ref 3.5–5.3)
RBC # BLD AUTO: 5.15 MILLION/UL (ref 3.88–5.62)
SODIUM SERPL-SCNC: 140 MMOL/L (ref 135–147)
VALPROATE SERPL-MCNC: 77 UG/ML (ref 50–125)
WBC # BLD AUTO: 5.7 THOUSAND/UL (ref 4.31–10.16)

## 2025-04-09 PROCEDURE — 82948 REAGENT STRIP/BLOOD GLUCOSE: CPT

## 2025-04-09 PROCEDURE — 80048 BASIC METABOLIC PNL TOTAL CA: CPT

## 2025-04-09 PROCEDURE — 85025 COMPLETE CBC W/AUTO DIFF WBC: CPT

## 2025-04-09 PROCEDURE — 99232 SBSQ HOSP IP/OBS MODERATE 35: CPT | Performed by: PSYCHIATRY & NEUROLOGY

## 2025-04-09 PROCEDURE — 80164 ASSAY DIPROPYLACETIC ACD TOT: CPT

## 2025-04-09 RX ORDER — RISPERIDONE 2 MG/1
2 TABLET ORAL 2 TIMES DAILY
Status: DISCONTINUED | OUTPATIENT
Start: 2025-04-09 | End: 2025-04-11

## 2025-04-09 RX ORDER — RISPERIDONE 1 MG/1
1 TABLET ORAL 2 TIMES DAILY
Status: DISCONTINUED | OUTPATIENT
Start: 2025-04-09 | End: 2025-04-09

## 2025-04-09 RX ADMIN — Medication 3 MG: at 23:21

## 2025-04-09 RX ADMIN — FENOFIBRATE 145 MG: 145 TABLET, FILM COATED ORAL at 08:28

## 2025-04-09 RX ADMIN — LEVOTHYROXINE SODIUM 25 MCG: 0.03 TABLET ORAL at 05:13

## 2025-04-09 RX ADMIN — Medication 2000 UNITS: at 08:28

## 2025-04-09 RX ADMIN — DIVALPROEX SODIUM 2000 MG: 500 TABLET, EXTENDED RELEASE ORAL at 23:21

## 2025-04-09 RX ADMIN — RISPERIDONE 1 MG: 1 TABLET, FILM COATED ORAL at 12:56

## 2025-04-09 RX ADMIN — CYANOCOBALAMIN TAB 1000 MCG 1000 MCG: 1000 TAB at 08:28

## 2025-04-09 RX ADMIN — SERTRALINE HYDROCHLORIDE 50 MG: 50 TABLET ORAL at 08:28

## 2025-04-09 NOTE — ASSESSMENT & PLAN NOTE
No psychopharmacologic changes necessary at this time; will continue to assess for further optimization.  Continue with current scheduled medications:  Patient received Risperdal 1 mg approximately noon.  We will increase to Risperdal 2 mg from tonight.  Patient will continue Risperdal 2 mg twice daily from tomorrow.  Depakote ER 2000 nightly for mood stability  Trough level to be drawn on 4/9 at 2000  Prior level was subtherapeutic at 26 on 4/3  Zoloft 50 mg daily for mood and anxiety  Consider introduction of low-dose second generation antipsychotic to confirm mood stability  Continue with current PRN medications.  Labs/Procedures: per unit protocol  Continue with group therapy, milieu therapy and occupational therapy.  Continue frequent safety checks and vitals per unit protocol.  Continue with SLIM medical management as indicated  Continue coordinating with case management regarding disposition    No associated orders from this encounter found during lookback period of 72 hours.

## 2025-04-09 NOTE — PLAN OF CARE
Pt attends groups regularly and participates. Pt appears to have delusional thought content, stating he is 25% human proven by genetic testing, believes he outgrew autism and believes he is friends with famous people whom he talks with on facebook.

## 2025-04-09 NOTE — NURSING NOTE
"Pt denies SI/HI/AH/VH. Present in dayroom and milieu. Social with peers. Medication and meal compliant. Pt appears bright and is animated during conversation. Pt attends groups. Pt later appeared anxious due to a \" peers rambling\". Compliant with new medication order and Lunch. No further concerns as of present. Plan of care ongoing.   "

## 2025-04-09 NOTE — PROGRESS NOTES
25 0833   Team Meeting   Meeting Type Daily Rounds   Team Members Present   Team Members Present Physician;Nurse;   Physician Team Member Alison   [facility]   Care Management Team Member Julianna   Patient/Family Present   Patient Present No   Patient's Family Present No     Pt is medication and meal compliant. Pt denies SI/HI/AVH. Pt is calm and cooperative. Pt is social with select peers and staff. Pt's discharge is pending 25 when PFA is  with Mother.

## 2025-04-09 NOTE — NURSING NOTE
Pt social; with peers on unit entire late evening. No behavioral issues noted or reported. Pt denies SI/HI/AVH on assessment. Pt making inappropriate sexual comments to this writer about liking women with short hair and would like to find one as a mate. Pt redirected. Pt compliant with unit routines and care. Safety checks ongoing.

## 2025-04-09 NOTE — PLAN OF CARE
Problem: Ineffective Coping  Goal: Identifies ineffective coping skills  Outcome: Progressing  Goal: Patient/Family participate in treatment and DC plans  Description: Interventions:- Provide therapeutic environment  Outcome: Progressing  Goal: Patient/Family verbalizes awareness of resources  Outcome: Progressing  Goal: Understands least restrictive measures  Description: Interventions:- Utilize least restrictive behavior  Outcome: Progressing  Goal: Free from restraint events  Description: - Utilize least restrictive measures - Provide behavioral interventions - Redirect inappropriate behaviors   Outcome: Progressing     Problem: Depression  Goal: Treatment Goal: Demonstrate behavioral control of depressive symptoms, verbalize feelings of improved mood/affect, and adopt new coping skills prior to discharge  Outcome: Progressing     Problem: Anxiety  Goal: Anxiety is at manageable level  Description: Interventions:- Assess and monitor patient's anxiety level. - Monitor for signs and symptoms (heart palpitations, chest pain, shortness of breath, headaches, nausea, feeling jumpy, restlessness, irritable, apprehensive). - Collaborate with interdisciplinary team and initiate plan and interventions as ordered.- [place] patient to unit/surroundings- Explain treatment plan- Encourage participation in care- Encourage verbalization of concerns/fears- Identify coping mechanisms- Assist in developing anxiety-reducing skills- Administer/offer alternative therapies- Limit or eliminate stimulants  Outcome: Progressing     Problem: Risk for Violence/Aggression Toward Others  Goal: Treatment Goal: Refrain from acts of violence/aggression during length of stay, and demonstrate improved impulse control at the time of discharge  Outcome: Progressing  Goal: Refrain from harming others  Outcome: Progressing  Goal: Control angry outbursts  Description: Interventions:- Monitor patient closely, per order- Ensure early verbal  de-escalation- Monitor prn medication needs- Set reasonable/therapeutic limits, outline behavioral expectations, and consequences - Provide a non-threatening milieu, utilizing the least restrictive interventions   Outcome: Progressing     Problem: Ineffective Coping  Goal: Identifies healthy coping skills  Outcome: Not Progressing  Goal: Demonstrates healthy coping skills  Outcome: Not Progressing
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
## 2025-04-10 LAB
BACTERIA UR QL AUTO: NORMAL /HPF
BILIRUB UR QL STRIP: NEGATIVE
CLARITY UR: CLEAR
COLOR UR: ABNORMAL
GLUCOSE UR STRIP-MCNC: NEGATIVE MG/DL
HGB UR QL STRIP.AUTO: NEGATIVE
KETONES UR STRIP-MCNC: NEGATIVE MG/DL
LEUKOCYTE ESTERASE UR QL STRIP: 25
NITRITE UR QL STRIP: NEGATIVE
NON-SQ EPI CELLS URNS QL MICRO: NORMAL /HPF
PH UR STRIP.AUTO: 8 [PH]
PROT UR STRIP-MCNC: ABNORMAL MG/DL
RBC #/AREA URNS AUTO: NORMAL /HPF
SP GR UR STRIP.AUTO: 1.01 (ref 1–1.04)
UROBILINOGEN UA: NEGATIVE MG/DL
WBC #/AREA URNS AUTO: NORMAL /HPF

## 2025-04-10 PROCEDURE — 99232 SBSQ HOSP IP/OBS MODERATE 35: CPT | Performed by: PSYCHIATRY & NEUROLOGY

## 2025-04-10 PROCEDURE — 81001 URINALYSIS AUTO W/SCOPE: CPT

## 2025-04-10 PROCEDURE — 81003 URINALYSIS AUTO W/O SCOPE: CPT

## 2025-04-10 RX ADMIN — Medication 2000 UNITS: at 08:24

## 2025-04-10 RX ADMIN — SERTRALINE HYDROCHLORIDE 50 MG: 50 TABLET ORAL at 08:24

## 2025-04-10 RX ADMIN — RISPERIDONE 2 MG: 2 TABLET, FILM COATED ORAL at 08:24

## 2025-04-10 RX ADMIN — DIVALPROEX SODIUM 2000 MG: 500 TABLET, EXTENDED RELEASE ORAL at 21:14

## 2025-04-10 RX ADMIN — TRAZODONE HYDROCHLORIDE 50 MG: 50 TABLET ORAL at 21:14

## 2025-04-10 RX ADMIN — LEVOTHYROXINE SODIUM 25 MCG: 0.03 TABLET ORAL at 06:23

## 2025-04-10 RX ADMIN — RISPERIDONE 2 MG: 2 TABLET, FILM COATED ORAL at 17:20

## 2025-04-10 RX ADMIN — Medication 3 MG: at 21:14

## 2025-04-10 RX ADMIN — ATORVASTATIN CALCIUM 10 MG: 10 TABLET, FILM COATED ORAL at 17:20

## 2025-04-10 RX ADMIN — CYANOCOBALAMIN TAB 1000 MCG 1000 MCG: 1000 TAB at 08:24

## 2025-04-10 RX ADMIN — FENOFIBRATE 145 MG: 145 TABLET, FILM COATED ORAL at 08:24

## 2025-04-10 NOTE — PROGRESS NOTES
04/10/25 0832   Team Meeting   Meeting Type Daily Rounds   Team Members Present   Team Members Present Physician;;Nurse   Physician Team Member Alison   Nursing Team Member [facility]   Care Management Team Member Julianna   Patient/Family Present   Patient Present No   Patient's Family Present No     Pt is social with peers and compliant with medications. Pt urinated in his bed throughout the night. Staff was unable to get labs from Pt initially due to dehydration. Pt denies SI/HI/AVH. Pt's discharge is pending 4/16 when PFA expires.
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
## 2025-04-10 NOTE — NURSING NOTE
Pt remains awake and took HS meds without difficulty. Pt states he is not tried as he slept during the day. Calm and conversing with group of pts at tables in front of NS. Safety checks ongoing.

## 2025-04-10 NOTE — PLAN OF CARE
Problem: Ineffective Coping  Goal: Identifies ineffective coping skills  Outcome: Not Progressing  Goal: Identifies healthy coping skills  Outcome: Not Progressing  Goal: Demonstrates healthy coping skills  Outcome: Not Progressing  Goal: Participates in unit activities  Description: Interventions:- Provide therapeutic environment - Provide required programming - Redirect inappropriate behaviors   Outcome: Progressing  Goal: Patient/Family participate in treatment and DC plans  Description: Interventions:- Provide therapeutic environment  Outcome: Progressing  Goal: Patient/Family verbalizes awareness of resources  Outcome: Not Progressing  Goal: Understands least restrictive measures  Description: Interventions:- Utilize least restrictive behavior  Outcome: Progressing  Goal: Free from restraint events  Description: - Utilize least restrictive measures - Provide behavioral interventions - Redirect inappropriate behaviors   Outcome: Progressing     Problem: Depression  Goal: Treatment Goal: Demonstrate behavioral control of depressive symptoms, verbalize feelings of improved mood/affect, and adopt new coping skills prior to discharge  Outcome: Progressing     Problem: Anxiety  Goal: Anxiety is at manageable level  Description: Interventions:- Assess and monitor patient's anxiety level. - Monitor for signs and symptoms (heart palpitations, chest pain, shortness of breath, headaches, nausea, feeling jumpy, restlessness, irritable, apprehensive). - Collaborate with interdisciplinary team and initiate plan and interventions as ordered.- [place] patient to unit/surroundings- Explain treatment plan- Encourage participation in care- Encourage verbalization of concerns/fears- Identify coping mechanisms- Assist in developing anxiety-reducing skills- Administer/offer alternative therapies- Limit or eliminate stimulants  Outcome: Progressing     Problem: Risk for Violence/Aggression Toward Others  Goal: Treatment Goal:  Refrain from acts of violence/aggression during length of stay, and demonstrate improved impulse control at the time of discharge  Outcome: Progressing  Goal: Refrain from harming others  Outcome: Progressing  Goal: Control angry outbursts  Description: Interventions:- Monitor patient closely, per order- Ensure early verbal de-escalation- Monitor prn medication needs- Set reasonable/therapeutic limits, outline behavioral expectations, and consequences - Provide a non-threatening milieu, utilizing the least restrictive interventions   Outcome: Progressing

## 2025-04-10 NOTE — NURSING NOTE
Pt awakened for 2000 valp level and found to be incontinent of   large amount of urine, mattress saturated and had to be removed from room. Per MHTs pt has been sleeping about 4 hours solid with loud snoring. MHTs assisting pt to clean up and get labs and vitals.    Pt dehydrated, getting snack and fluids and will try labs again. Unsuccessful on several attempts. Pt states he feels fine, he was just tired from being up late last night. Denies SI/HI/AVH. Waiting in snack line and will check blood sugar as precaution prior to pt eating. Pt compliant with care. Safety checks ongoing.

## 2025-04-10 NOTE — ASSESSMENT & PLAN NOTE
No psychopharmacologic changes necessary at this time; will continue to assess for further optimization.  Continue with current scheduled medications:  Continue Risperdal 2 mg BID for mood stabilization and psychotic symptoms  Depakote ER 2000 nightly for mood stability  Therapeutic Depakote level 77 on 4/9/25  Prior level was subtherapeutic at 26 on 4/3  Zoloft 50 mg daily for mood and anxiety  Continue with current PRN medications.  Labs/Procedures: per unit protocol  Continue with group therapy, milieu therapy and occupational therapy.  Continue frequent safety checks and vitals per unit protocol.  Continue with SLIM medical management as indicated  Continue coordinating with case management regarding disposition    No associated orders from this encounter found during lookback period of 72 hours.

## 2025-04-10 NOTE — NURSING NOTE
Pt denies SI/HI/AH/VH. Present in dayroom and milieu. Social with peers. Medication and meal compliant. Pt is calm and pleasant. Pt later observed sleeping while sitting in Pt lounge. Compliant with Lunch. No further concerns as of present. Plan of care ongoing.

## 2025-04-11 PROCEDURE — 99232 SBSQ HOSP IP/OBS MODERATE 35: CPT | Performed by: PSYCHIATRY & NEUROLOGY

## 2025-04-11 RX ORDER — RISPERIDONE 2 MG/1
2 TABLET ORAL DAILY
Status: DISCONTINUED | OUTPATIENT
Start: 2025-04-12 | End: 2025-04-14

## 2025-04-11 RX ADMIN — Medication 3 MG: at 21:34

## 2025-04-11 RX ADMIN — CYANOCOBALAMIN TAB 1000 MCG 1000 MCG: 1000 TAB at 08:12

## 2025-04-11 RX ADMIN — ATORVASTATIN CALCIUM 10 MG: 10 TABLET, FILM COATED ORAL at 16:45

## 2025-04-11 RX ADMIN — Medication 2000 UNITS: at 08:12

## 2025-04-11 RX ADMIN — RISPERIDONE 3 MG: 1 TABLET, FILM COATED ORAL at 17:20

## 2025-04-11 RX ADMIN — RISPERIDONE 2 MG: 2 TABLET, FILM COATED ORAL at 08:12

## 2025-04-11 RX ADMIN — DIVALPROEX SODIUM 2000 MG: 500 TABLET, EXTENDED RELEASE ORAL at 21:34

## 2025-04-11 RX ADMIN — FENOFIBRATE 145 MG: 145 TABLET, FILM COATED ORAL at 08:12

## 2025-04-11 RX ADMIN — SERTRALINE HYDROCHLORIDE 50 MG: 50 TABLET ORAL at 08:12

## 2025-04-11 RX ADMIN — LEVOTHYROXINE SODIUM 25 MCG: 0.03 TABLET ORAL at 06:20

## 2025-04-11 NOTE — ASSESSMENT & PLAN NOTE
No psychopharmacologic changes necessary at this time; will continue to assess for further optimization.  Continue with current scheduled medications:  From tonight (4/11), patient to begin Risperdal 3 mg qHS for mood stabilization and psychotic symptoms; continue Risperdal 2 mg daily morning.  Depakote ER 2000 nightly for mood stability  Therapeutic Depakote level 77 on 4/9/25  Prior level was subtherapeutic at 26 on 4/3  Zoloft 50 mg daily for mood and anxiety  Continue with current PRN medications.  Labs/Procedures: per unit protocol  Continue with group therapy, milieu therapy and occupational therapy.  Continue frequent safety checks and vitals per unit protocol.  Continue with SLIM medical management as indicated  Continue coordinating with case management regarding disposition    No associated orders from this encounter found during lookback period of 72 hours.

## 2025-04-11 NOTE — NURSING NOTE
"Patient is calm and cooperative, visible and social with peers and staff. Remains in behavioral control.Denies SI/HI/AVH. Patient stated \"I only see spirits when ronaldo in a place that is hunted and this place is not hunted\". Patient is medication and meal compliant. Q 15 min safety checks maintained.   "
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
## 2025-04-11 NOTE — NURSING NOTE
Pt withdrawn to room in bed napping entire late evening. Easily awakened at HS for assessment and med pass. Denies all psych symptoms and is scant in conversation. Denies pain and anxiety. Offered and accepted trazodone 50mg PO PRN at 2114 to promote sleep at night. Denies unmet needs. Safety checks ongoing.    2214 reassessment- pt resting quietly in bed.

## 2025-04-11 NOTE — PROGRESS NOTES
25 1048   Team Meeting   Meeting Type Daily Rounds   Team Members Present   Team Members Present Physician;Nurse;   Physician Team Member Alison   [facility]   Care Management Team Member Julianna   Patient/Family Present   Patient Present No   Patient's Family Present No     Pt is seclusive to his room in the evening. Pt was given trazodone to sleep. Pt is medication and meal compliant. Pt denies SI/HI/AVH. Pt's discharge is pending  pending going home after PFA is .

## 2025-04-12 PROCEDURE — 99232 SBSQ HOSP IP/OBS MODERATE 35: CPT | Performed by: PSYCHIATRY & NEUROLOGY

## 2025-04-12 RX ADMIN — LEVOTHYROXINE SODIUM 25 MCG: 0.03 TABLET ORAL at 06:43

## 2025-04-12 RX ADMIN — DIVALPROEX SODIUM 2000 MG: 500 TABLET, EXTENDED RELEASE ORAL at 21:20

## 2025-04-12 RX ADMIN — RISPERIDONE 2 MG: 2 TABLET, FILM COATED ORAL at 08:22

## 2025-04-12 RX ADMIN — ATORVASTATIN CALCIUM 10 MG: 10 TABLET, FILM COATED ORAL at 17:51

## 2025-04-12 RX ADMIN — Medication 2000 UNITS: at 08:23

## 2025-04-12 RX ADMIN — CYANOCOBALAMIN TAB 1000 MCG 1000 MCG: 1000 TAB at 08:23

## 2025-04-12 RX ADMIN — FENOFIBRATE 145 MG: 145 TABLET, FILM COATED ORAL at 08:23

## 2025-04-12 RX ADMIN — RISPERIDONE 3 MG: 1 TABLET, FILM COATED ORAL at 17:51

## 2025-04-12 RX ADMIN — SERTRALINE HYDROCHLORIDE 50 MG: 50 TABLET ORAL at 08:23

## 2025-04-12 RX ADMIN — Medication 3 MG: at 21:20

## 2025-04-12 NOTE — NURSING NOTE
Patient is in the milieu and social with peers. Patient observed responding to self at times, but patient denies SI/HI. Patient participates in unit activities. Patient is compliant with scheduled medications. Q15 observation maintained.

## 2025-04-12 NOTE — PLAN OF CARE
Problem: Ineffective Coping  Goal: Identifies ineffective coping skills  Outcome: Progressing  Goal: Identifies healthy coping skills  Outcome: Progressing  Goal: Demonstrates healthy coping skills  Outcome: Progressing  Goal: Participates in unit activities  Description: Interventions:- Provide therapeutic environment - Provide required programming - Redirect inappropriate behaviors   Outcome: Progressing  Goal: Patient/Family participate in treatment and DC plans  Description: Interventions:- Provide therapeutic environment  Outcome: Progressing  Goal: Patient/Family verbalizes awareness of resources  Outcome: Progressing  Goal: Understands least restrictive measures  Description: Interventions:- Utilize least restrictive behavior  Outcome: Progressing  Goal: Free from restraint events  Description: - Utilize least restrictive measures - Provide behavioral interventions - Redirect inappropriate behaviors   Outcome: Progressing     Problem: Depression  Goal: Treatment Goal: Demonstrate behavioral control of depressive symptoms, verbalize feelings of improved mood/affect, and adopt new coping skills prior to discharge  Outcome: Progressing     Problem: Anxiety  Goal: Anxiety is at manageable level  Description: Interventions:- Assess and monitor patient's anxiety level. - Monitor for signs and symptoms (heart palpitations, chest pain, shortness of breath, headaches, nausea, feeling jumpy, restlessness, irritable, apprehensive). - Collaborate with interdisciplinary team and initiate plan and interventions as ordered.- [place] patient to unit/surroundings- Explain treatment plan- Encourage participation in care- Encourage verbalization of concerns/fears- Identify coping mechanisms- Assist in developing anxiety-reducing skills- Administer/offer alternative therapies- Limit or eliminate stimulants  Outcome: Progressing     Problem: Risk for Violence/Aggression Toward Others  Goal: Treatment Goal: Refrain from acts of  violence/aggression during length of stay, and demonstrate improved impulse control at the time of discharge  Outcome: Progressing  Goal: Refrain from harming others  Outcome: Progressing  Goal: Control angry outbursts  Description: Interventions:- Monitor patient closely, per order- Ensure early verbal de-escalation- Monitor prn medication needs- Set reasonable/therapeutic limits, outline behavioral expectations, and consequences - Provide a non-threatening milieu, utilizing the least restrictive interventions   Outcome: Progressing

## 2025-04-12 NOTE — NURSING NOTE
Pt is calm, cooperative, isolates to self but is seen in the milieu for needs, meals and medications. Pt denies SI, HI, HA, VH and pain at this time. Pt goes to most groups, states he is doing his ADLs and is med/ meal complaint. Pt brightens with approach and is polite. Pt denies any unmet needs and remains on q15 min checks for safety.

## 2025-04-12 NOTE — PROGRESS NOTES
Psychiatric Progress Note - Department of Behavioral Health   Hasmukh Bradley 34 y.o. male MRN: [id number]  Unit/Bed#: [facility] 346-02 Encounter: [id number]    ASSESSMENT & PLAN     Assessment & Plan  Bipolar disorder (HCC)  No psychopharmacologic changes necessary at this time; will continue to assess for further optimization.  Continue with current scheduled medications:  From tonight (4/11), patient to begin Risperdal 3 mg qHS for mood stabilization and psychotic symptoms; continue Risperdal 2 mg daily morning.  Depakote ER 2000 nightly for mood stability  Therapeutic Depakote level 77 on 4/9/25  Prior level was subtherapeutic at 26 on 4/3  Zoloft 50 mg daily for mood and anxiety  Continue with current PRN medications.  Labs/Procedures: per unit protocol  Continue with group therapy, milieu therapy and occupational therapy.  Continue frequent safety checks and vitals per unit protocol.  Continue with SLIM medical management as indicated  Continue coordinating with case management regarding disposition    No associated orders from this encounter found during lookback period of 72 hours.  Hypothyroidism  Continue medical management  No associated orders from this encounter found during lookback period of 72 hours.  Autism  See above  No associated orders from this encounter found during lookback period of 72 hours.  Mixed hyperlipidemia  Continue medical management  No associated orders from this encounter found during lookback period of 72 hours.  Medical clearance for psychiatric admission    No associated orders from this encounter found during lookback period of 72 hours.    ADHD (attention deficit hyperactivity disorder), combined type    No associated orders from this encounter found during lookback period of 72 hours.  Chronic post-traumatic stress disorder (PTSD)    No associated orders from this encounter found during lookback period of 72 hours.    Treatment Recommendations/Precautions:  Continue to  promote patient participation in therapeutic milieu.  Continue medical management per medicine.  Continue previously prescribed psychotropic medication regimen; see below.  Continue behavioral health checks q.15 minutes.   Continue vitals per behavioral health unit protocol.  Discharge date per primary team; 201 commitment status.    SUBJECTIVE     Patient evaluated this a.m. for continuity of care. Patient was discussed at length with nursing and treatment team. Per nursing, patient remains calm, cooperative, intermittently interactive in the milieu, adherent to his medications without any acute adverse effects. No acute distress is noted throughout evaluation. Hasmukh Bradley denies suicidal/homicidal ideation in addition to thoughts of self-injury, receptive to crisis planning provided by this writer, contacting for safety in the inpatient setting, admitting to an ability to appropriately confide in staff including this writer.  Patient denies any/all psychiatric complaints/concerns although appears somewhat superficial on approach, aware of anticipated upward titration of Risperdal to further confer mood stability    PSYCHIATRIC REVIEW OF SYSTEMS     Behavior over the last 24 hours: Improving  Sleep: Adequate  Appetite: Adequate  Medication side effects: None    REVIEW OF SYSTEMS   Review of systems: No complaints    OBJECTIVE     Vital Signs in Past 24 Hours:  Temp:  [96.8 °F (36 °C)] 96.8 °F (36 °C)  HR:  [85] 85  BP: (124)/(80) 124/80  Resp:  [17] 17  SpO2:  [99 %] 99 %  O2 Device: None (Room air)    Intake/Output in Past 24 hours:  No intake/output data recorded.  No intake/output data recorded.        Laboratory Results:  I have personally reviewed all pertinent laboratory/tests results.  Most Recent Labs:   Lab Results   Component Value Date    WBC 5.70 04/09/2025    RBC 5.15 04/09/2025    HGB 13.8 04/09/2025    HCT 42.1 04/09/2025     04/09/2025    RDW 12.7 04/09/2025    TOTANEUTABS 2.63  01/10/2018    NEUTROABS 3.05 04/09/2025    SODIUM 140 04/09/2025    K 4.2 04/09/2025     04/09/2025    CO2 29 04/09/2025    BUN 11 04/09/2025    CREATININE 0.80 04/09/2025    GLUC 162 (H) 04/09/2025    GLUF 101 (H) 04/05/2025    CALCIUM 9.8 04/09/2025    AST 20 04/05/2025    ALT 28 04/05/2025    ALKPHOS 51 04/05/2025    TP 7.3 04/05/2025    ALB 4.4 04/05/2025    TBILI 0.57 04/05/2025    CHOLESTEROL 214 (H) 04/05/2025    HDL 36 (L) 04/05/2025    TRIG 294 (H) 04/05/2025    LDLCALC 119 (H) 04/05/2025    NONHDLC 178 04/05/2025    VALPROICTOT 77 04/09/2025    [id number] 2.554 04/03/2025    FREET4 1.01 11/01/2021    HGBA1C 6.1 (H) 04/05/2025     04/05/2025       Behavioral Health Medications: all current active meds have been reviewed and current meds:   Current Facility-Administered Medications:     acetaminophen (TYLENOL) tablet 650 mg, Q6H PRN    acetaminophen (TYLENOL) tablet 650 mg, Q4H PRN    acetaminophen (TYLENOL) tablet 975 mg, Q6H PRN    aluminum-magnesium hydroxide-simethicone (MAALOX) oral suspension 30 mL, Q4H PRN    atorvastatin (LIPITOR) tablet 10 mg, Daily With Dinner    haloperidol lactate (HALDOL) injection 2.5 mg, Q4H PRN Max 6/day **AND** LORazepam (ATIVAN) injection 1 mg, Q4H PRN Max 6/day **AND** benztropine (COGENTIN) injection 0.5 mg, Q4H PRN Max 6/day    haloperidol lactate (HALDOL) injection 5 mg, Q4H PRN Max 4/day **AND** LORazepam (ATIVAN) injection 2 mg, Q4H PRN Max 4/day **AND** benztropine (COGENTIN) injection 1 mg, Q4H PRN Max 4/day    benztropine (COGENTIN) injection 1 mg, Q4H PRN Max 6/day    benztropine (COGENTIN) tablet 1 mg, Q4H PRN Max 6/day    Cholecalciferol (VITAMIN D3) tablet 2,000 Units, Daily    cyanocobalamin (VITAMIN B-12) tablet 1,000 mcg, Daily    hydrOXYzine HCL (ATARAX) tablet 50 mg, Q6H PRN Max 4/day **OR** diphenhydrAMINE (BENADRYL) injection 50 mg, Q6H PRN    divalproex sodium (DEPAKOTE ER) 24 hr tablet 2,000 mg, HS    fenofibrate (TRICOR) tablet 145 mg,  Daily    hydrOXYzine HCL (ATARAX) tablet 100 mg, Q6H PRN Max 4/day **OR** LORazepam (ATIVAN) injection 2 mg, Q6H PRN    hydrOXYzine HCL (ATARAX) tablet 25 mg, Q6H PRN Max 4/day    levothyroxine tablet 25 mcg, Early Morning    melatonin tablet 3 mg, HS    polyethylene glycol (MIRALAX) packet 17 g, Daily PRN    propranolol (INDERAL) tablet 10 mg, Q8H PRN    risperiDONE (RisperDAL) tablet 0.5 mg, Q4H PRN Max 6/day    risperiDONE (RisperDAL) tablet 1 mg, Q4H PRN Max 3/day    risperiDONE (RisperDAL) tablet 2 mg, Q4H PRN Max 3/day    risperiDONE (RisperDAL) tablet 2 mg, Daily    risperiDONE (RisperDAL) tablet 3 mg, HS    senna-docusate sodium (SENOKOT S) 8.6-50 mg per tablet 1 tablet, Daily PRN    sertraline (ZOLOFT) tablet 50 mg, Daily    traZODone (DESYREL) tablet 50 mg, HS PRN.    Risks, benefits and possible side effects of Medications:   Risks, benefits, and possible side effects of medications explained to patient and patient verbalizes understanding and agreement for treatment.    Mental Status Evaluation:  Appearance:  age appropriate, casually dressed, and somewhat disheveled   Behavior:  psychomotor retardation   Speech:  soft and scant   Mood:  euthymic   Affect:  mood-incongruent and restricted   Language sparse   Thought Process:  concrete   Thought Content:  No overt obsessions or delusions   Perceptual Disturbances: None although appears internally preoccupied and distracted at times   Risk Potential: Suicidal Ideations none, Homicidal Ideations none, and Potential for Aggression No   Sensorium:  person, place, and time/date   Cognition:  recent and remote memory grossly intact   Consciousness:  alert and awake    Attention: attention span appeared shorter than expected for age   Insight:  limited   Judgment: limited   Intellect Not assessed   Gait/Station: normal gait/station and normal balance   Motor Activity: no abnormal movements     Memory: Short and long term memory fair     Progress Toward Goals:  Unchanged, as evidenced by their participation (or lack thereof) in individual, social and therapeutic milieu in addition to adherence to their medication regimen.    Recommended Treatment:   See above for assessment and plan.    Inpatient Psychiatric Certification: Based upon physical, mental and social evaluations, I certify that inpatient psychiatric services are medically necessary for this patient for a duration of greater than 2 midnights for the treatment of Bipolar disorder (HCC) including psychotropic medication management, participation in the therapeutic milieu and referrals as indicated. Available alternative community resources do not meet the patient's mental health care needs. I further attest that an established written individualized plan of care has been implemented and is outlined in the patient's medical records.    Counseling/Coordination of Care    I have expended greater than 15 minutes in which more than 50% of this time was expended in counseling/coordination of patient care relating to diagnostic results, prognosis, potential risks and benefits of management options, instructions for appropriate management, patient and/or collateral education, importance of adherence to management and/or risk factor reductions. Patient's rights, confidentiality, exceptions to confidentiality, use of electronic medical record including appropriate staff access to medical record regarding behavioral health services and consent to treatment were reviewed.    Note Share:     This note was not shared with the patient due to reasonable likelihood of causing patient harm     This note has been constructed using a voice recognition system. There may be translation, syntax,  or grammatical errors. If you have any questions, please contact the dictating provider.    Jordan Christopher Holter, DO 04/12/25

## 2025-04-13 PROCEDURE — 99232 SBSQ HOSP IP/OBS MODERATE 35: CPT | Performed by: PSYCHIATRY & NEUROLOGY

## 2025-04-13 RX ADMIN — CYANOCOBALAMIN TAB 1000 MCG 1000 MCG: 1000 TAB at 08:29

## 2025-04-13 RX ADMIN — DIVALPROEX SODIUM 2000 MG: 500 TABLET, EXTENDED RELEASE ORAL at 21:34

## 2025-04-13 RX ADMIN — Medication 3 MG: at 21:34

## 2025-04-13 RX ADMIN — ATORVASTATIN CALCIUM 10 MG: 10 TABLET, FILM COATED ORAL at 17:44

## 2025-04-13 RX ADMIN — RISPERIDONE 2 MG: 2 TABLET, FILM COATED ORAL at 08:29

## 2025-04-13 RX ADMIN — SERTRALINE HYDROCHLORIDE 50 MG: 50 TABLET ORAL at 08:29

## 2025-04-13 RX ADMIN — RISPERIDONE 3 MG: 1 TABLET, FILM COATED ORAL at 17:44

## 2025-04-13 RX ADMIN — Medication 2000 UNITS: at 08:29

## 2025-04-13 RX ADMIN — LEVOTHYROXINE SODIUM 25 MCG: 0.03 TABLET ORAL at 06:45

## 2025-04-13 RX ADMIN — FENOFIBRATE 145 MG: 145 TABLET, FILM COATED ORAL at 08:29

## 2025-04-13 NOTE — PROGRESS NOTES
Psychiatric Progress Note - Department of Behavioral Health   Hasmukh Bradley 34 y.o. male MRN: [id number]  Unit/Bed#: [facility] 346-02 Encounter: [id number]    ASSESSMENT & PLAN     Assessment & Plan  Bipolar disorder (HCC)  No psychopharmacologic changes necessary at this time; will continue to assess for further optimization.  Continue with current scheduled medications:  Risperdal 3 mg qHS for mood stabilization and psychotic symptoms; continue Risperdal 2 mg daily morning.  Depakote ER 2000 nightly for mood stability  Therapeutic Depakote level 77 on 4/9/25  Prior level was subtherapeutic at 26 on 4/3  Zoloft 50 mg daily for mood and anxiety  Continue with current PRN medications.  Labs/Procedures: per unit protocol  Continue with group therapy, milieu therapy and occupational therapy.  Continue frequent safety checks and vitals per unit protocol.  Continue with SLIM medical management as indicated  Continue coordinating with case management regarding disposition    No associated orders from this encounter found during lookback period of 72 hours.  Hypothyroidism  Continue medical management  No associated orders from this encounter found during lookback period of 72 hours.  Autism  See above  No associated orders from this encounter found during lookback period of 72 hours.  Mixed hyperlipidemia  Continue medical management  No associated orders from this encounter found during lookback period of 72 hours.  Medical clearance for psychiatric admission    No associated orders from this encounter found during lookback period of 72 hours.    ADHD (attention deficit hyperactivity disorder), combined type    No associated orders from this encounter found during lookback period of 72 hours.  Chronic post-traumatic stress disorder (PTSD)    No associated orders from this encounter found during lookback period of 72 hours.    Treatment Recommendations/Precautions:  Continue to promote patient participation in  therapeutic milieu.  Continue medical management per medicine.  Continue previously prescribed psychotropic medication regimen; see below.  Continue behavioral health checks q.15 minutes.   Continue vitals per behavioral health unit protocol.  Discharge date per primary team; 201 commitment status.    SUBJECTIVE     Patient evaluated this a.m. for continuity of care. Patient was discussed at length with nursing and treatment team. Per nursing, patient remains calm and cooperative, present and appropriately interactive in the milieu, adherent to his medications without any acute adverse effects. No acute distress is noted throughout evaluation. Hasmukh Bradley denies suicidal/homicidal ideation in addition to thoughts of self-injury, receptive to crisis planning provided by this writer, contacting for safety in the inpatient setting, admitting to an ability to appropriately confide in staff including this writer.  Patient denies any/all psychiatric complaints/concerns despite appearing somewhat superficial throughout evaluation, anticipating upcoming discharge disposition    PSYCHIATRIC REVIEW OF SYSTEMS     Behavior over the last 24 hours: Unchanged  Sleep: Adequate  Appetite: Adequate  Medication side effects: None    REVIEW OF SYSTEMS   Review of systems: No complaints    OBJECTIVE     Vital Signs in Past 24 Hours:  Temp:  [97 °F (36.1 °C)-97.9 °F (36.6 °C)] 97.9 °F (36.6 °C)  HR:  [] 101  BP: (120-121)/(61-69) 121/69  Resp:  [16] 16  SpO2:  [98 %] 98 %  O2 Device: None (Room air)    Intake/Output in Past 24 hours:  No intake/output data recorded.  No intake/output data recorded.        Laboratory Results:  I have personally reviewed all pertinent laboratory/tests results.  Most Recent Labs:   Lab Results   Component Value Date    WBC 5.70 04/09/2025    RBC 5.15 04/09/2025    HGB 13.8 04/09/2025    HCT 42.1 04/09/2025     04/09/2025    RDW 12.7 04/09/2025    TOTANEUTABS 2.63 01/10/2018     NEUTROABS 3.05 04/09/2025    SODIUM 140 04/09/2025    K 4.2 04/09/2025     04/09/2025    CO2 29 04/09/2025    BUN 11 04/09/2025    CREATININE 0.80 04/09/2025    GLUC 162 (H) 04/09/2025    GLUF 101 (H) 04/05/2025    CALCIUM 9.8 04/09/2025    AST 20 04/05/2025    ALT 28 04/05/2025    ALKPHOS 51 04/05/2025    TP 7.3 04/05/2025    ALB 4.4 04/05/2025    TBILI 0.57 04/05/2025    CHOLESTEROL 214 (H) 04/05/2025    HDL 36 (L) 04/05/2025    TRIG 294 (H) 04/05/2025    LDLCALC 119 (H) 04/05/2025    NONHDLC 178 04/05/2025    VALPROICTOT 77 04/09/2025    [id number] 2.554 04/03/2025    FREET4 1.01 11/01/2021    HGBA1C 6.1 (H) 04/05/2025     04/05/2025       Behavioral Health Medications: all current active meds have been reviewed and current meds:   Current Facility-Administered Medications:     acetaminophen (TYLENOL) tablet 650 mg, Q6H PRN    acetaminophen (TYLENOL) tablet 650 mg, Q4H PRN    acetaminophen (TYLENOL) tablet 975 mg, Q6H PRN    aluminum-magnesium hydroxide-simethicone (MAALOX) oral suspension 30 mL, Q4H PRN    atorvastatin (LIPITOR) tablet 10 mg, Daily With Dinner    haloperidol lactate (HALDOL) injection 2.5 mg, Q4H PRN Max 6/day **AND** LORazepam (ATIVAN) injection 1 mg, Q4H PRN Max 6/day **AND** benztropine (COGENTIN) injection 0.5 mg, Q4H PRN Max 6/day    haloperidol lactate (HALDOL) injection 5 mg, Q4H PRN Max 4/day **AND** LORazepam (ATIVAN) injection 2 mg, Q4H PRN Max 4/day **AND** benztropine (COGENTIN) injection 1 mg, Q4H PRN Max 4/day    benztropine (COGENTIN) injection 1 mg, Q4H PRN Max 6/day    benztropine (COGENTIN) tablet 1 mg, Q4H PRN Max 6/day    Cholecalciferol (VITAMIN D3) tablet 2,000 Units, Daily    cyanocobalamin (VITAMIN B-12) tablet 1,000 mcg, Daily    hydrOXYzine HCL (ATARAX) tablet 50 mg, Q6H PRN Max 4/day **OR** diphenhydrAMINE (BENADRYL) injection 50 mg, Q6H PRN    divalproex sodium (DEPAKOTE ER) 24 hr tablet 2,000 mg, HS    fenofibrate (TRICOR) tablet 145 mg, Daily     hydrOXYzine HCL (ATARAX) tablet 100 mg, Q6H PRN Max 4/day **OR** LORazepam (ATIVAN) injection 2 mg, Q6H PRN    hydrOXYzine HCL (ATARAX) tablet 25 mg, Q6H PRN Max 4/day    levothyroxine tablet 25 mcg, Early Morning    melatonin tablet 3 mg, HS    polyethylene glycol (MIRALAX) packet 17 g, Daily PRN    propranolol (INDERAL) tablet 10 mg, Q8H PRN    risperiDONE (RisperDAL) tablet 0.5 mg, Q4H PRN Max 6/day    risperiDONE (RisperDAL) tablet 1 mg, Q4H PRN Max 3/day    risperiDONE (RisperDAL) tablet 2 mg, Q4H PRN Max 3/day    risperiDONE (RisperDAL) tablet 2 mg, Daily    risperiDONE (RisperDAL) tablet 3 mg, HS    senna-docusate sodium (SENOKOT S) 8.6-50 mg per tablet 1 tablet, Daily PRN    sertraline (ZOLOFT) tablet 50 mg, Daily    traZODone (DESYREL) tablet 50 mg, HS PRN.    Risks, benefits and possible side effects of Medications:   Risks, benefits, and possible side effects of medications explained to patient and patient verbalizes understanding and agreement for treatment.    Mental Status Evaluation:  Appearance:  age appropriate, casually dressed, and somewhat disheveled   Behavior:  psychomotor retardation   Speech:  soft and scant   Mood:  euthymic   Affect:  constricted   Language naming objects and repeating phrases   Thought Process:  goal directed   Thought Content:  No overt obsessions or delusions   Perceptual Disturbances: Remains somewhat internally preoccupied and distracted   Risk Potential: Suicidal Ideations none, Homicidal Ideations none, and Potential for Aggression No   Sensorium:  person, place, and time/date   Cognition:  recent and remote memory grossly intact   Consciousness:  alert and awake    Attention: attention span appeared shorter than expected for age   Insight:  limited   Judgment: limited   Intellect Not assessed   Gait/Station: normal gait/station and normal balance   Motor Activity: no abnormal movements     Memory: Short and long term memory fair     Progress Toward Goals: Unchanged,  as evidenced by their participation (or lack thereof) in individual, social and therapeutic milieu in addition to adherence to their medication regimen.    Recommended Treatment:   See above for assessment and plan.    Inpatient Psychiatric Certification: Based upon physical, mental and social evaluations, I certify that inpatient psychiatric services are medically necessary for this patient for a duration of greater than 2 midnights for the treatment of Bipolar disorder (HCC) including psychotropic medication management, participation in the therapeutic milieu and referrals as indicated. Available alternative community resources do not meet the patient's mental health care needs. I further attest that an established written individualized plan of care has been implemented and is outlined in the patient's medical records.    Counseling/Coordination of Care    I have expended greater than 15 minutes in which more than 50% of this time was expended in counseling/coordination of patient care relating to diagnostic results, prognosis, potential risks and benefits of management options, instructions for appropriate management, patient and/or collateral education, importance of adherence to management and/or risk factor reductions. Patient's rights, confidentiality, exceptions to confidentiality, use of electronic medical record including appropriate staff access to medical record regarding behavioral health services and consent to treatment were reviewed.    Note Share:     This note was not shared with the patient due to reasonable likelihood of causing patient harm     This note has been constructed using a voice recognition system. There may be translation, syntax,  or grammatical errors. If you have any questions, please contact the dictating provider.    Jordan Christopher Holter, DO 04/13/25

## 2025-04-13 NOTE — ASSESSMENT & PLAN NOTE
No psychopharmacologic changes necessary at this time; will continue to assess for further optimization.  Continue with current scheduled medications:  Risperdal 3 mg qHS for mood stabilization and psychotic symptoms; continue Risperdal 2 mg daily morning.  Depakote ER 2000 nightly for mood stability  Therapeutic Depakote level 77 on 4/9/25  Prior level was subtherapeutic at 26 on 4/3  Zoloft 50 mg daily for mood and anxiety  Continue with current PRN medications.  Labs/Procedures: per unit protocol  Continue with group therapy, milieu therapy and occupational therapy.  Continue frequent safety checks and vitals per unit protocol.  Continue with SLIM medical management as indicated  Continue coordinating with case management regarding disposition    No associated orders from this encounter found during lookback period of 72 hours.

## 2025-04-13 NOTE — NURSING NOTE
Patient is in the milieu and social with peers. Patient behaviors were appropriate. Patient denies SI/HI/AVH at this time. Patient is compliant with scheduled medications. Q15 observation maintained.

## 2025-04-13 NOTE — NURSING NOTE
Pt is calm, cooperative, isolating to self but is seen in the milieu for needs and meals. Pt denies SI, HI, AH, VH and pain at this time. Pt brightens with approach. Pt denies any unmet needs and remains on q15 min checks for safety.

## 2025-04-13 NOTE — PLAN OF CARE
Problem: Ineffective Coping  Goal: Identifies ineffective coping skills  Outcome: Progressing  Goal: Identifies healthy coping skills  Outcome: Progressing  Goal: Demonstrates healthy coping skills  Outcome: Progressing  Goal: Participates in unit activities  Description: Interventions:- Provide therapeutic environment - Provide required programming - Redirect inappropriate behaviors   Outcome: Progressing  Goal: Patient/Family participate in treatment and DC plans  Description: Interventions:- Provide therapeutic environment  Outcome: Progressing  Goal: Patient/Family verbalizes awareness of resources  Outcome: Progressing  Goal: Understands least restrictive measures  Description: Interventions:- Utilize least restrictive behavior  Outcome: Progressing  Goal: Free from restraint events  Description: - Utilize least restrictive measures - Provide behavioral interventions - Redirect inappropriate behaviors   Outcome: Progressing     Problem: Depression  Goal: Treatment Goal: Demonstrate behavioral control of depressive symptoms, verbalize feelings of improved mood/affect, and adopt new coping skills prior to discharge  Outcome: Progressing     Problem: Anxiety  Goal: Anxiety is at manageable level  Description: Interventions:- Assess and monitor patient's anxiety level. - Monitor for signs and symptoms (heart palpitations, chest pain, shortness of breath, headaches, nausea, feeling jumpy, restlessness, irritable, apprehensive). - Collaborate with interdisciplinary team and initiate plan and interventions as ordered.- [place] patient to unit/surroundings- Explain treatment plan- Encourage participation in care- Encourage verbalization of concerns/fears- Identify coping mechanisms- Assist in developing anxiety-reducing skills- Administer/offer alternative therapies- Limit or eliminate stimulants  Outcome: Progressing     Problem: Risk for Violence/Aggression Toward Others  Goal: Treatment Goal: Refrain from acts of  violence/aggression during length of stay, and demonstrate improved impulse control at the time of discharge  Outcome: Progressing  Goal: Refrain from harming others  Outcome: Progressing  Goal: Control angry outbursts  Description: Interventions:- Monitor patient closely, per order- Ensure early verbal de-escalation- Monitor prn medication needs- Set reasonable/therapeutic limits, outline behavioral expectations, and consequences - Provide a non-threatening milieu, utilizing the least restrictive interventions   Outcome: Progressing     Problem: DISCHARGE PLANNING - CARE MANAGEMENT  Goal: Discharge to post-acute care or home with appropriate resources  Description: INTERVENTIONS:- Conduct assessment to determine patient/family and health care team treatment goals, and need for post-acute services based on payer coverage, community resources, and patient preferences, and barriers to discharge- Address psychosocial, clinical, and financial barriers to discharge as identified in assessment in conjunction with the patient/family and health care team- Arrange appropriate level of post-acute services according to patient’s   needs and preference and payer coverage in collaboration with the physician and health care team- Communicate with and update the patient/family, physician, and health care team regarding progress on the discharge plan- Arrange appropriate transportation to post-acute venues  Outcome: Progressing

## 2025-04-14 PROCEDURE — 99232 SBSQ HOSP IP/OBS MODERATE 35: CPT | Performed by: PSYCHIATRY & NEUROLOGY

## 2025-04-14 RX ADMIN — FENOFIBRATE 145 MG: 145 TABLET, FILM COATED ORAL at 08:11

## 2025-04-14 RX ADMIN — CYANOCOBALAMIN TAB 1000 MCG 1000 MCG: 1000 TAB at 08:11

## 2025-04-14 RX ADMIN — ATORVASTATIN CALCIUM 10 MG: 10 TABLET, FILM COATED ORAL at 17:05

## 2025-04-14 RX ADMIN — RISPERIDONE 2 MG: 2 TABLET, FILM COATED ORAL at 08:12

## 2025-04-14 RX ADMIN — RISPERIDONE 3 MG: 1 TABLET, FILM COATED ORAL at 17:05

## 2025-04-14 RX ADMIN — LEVOTHYROXINE SODIUM 25 MCG: 0.03 TABLET ORAL at 05:37

## 2025-04-14 RX ADMIN — Medication 2000 UNITS: at 08:11

## 2025-04-14 RX ADMIN — DIVALPROEX SODIUM 2000 MG: 500 TABLET, EXTENDED RELEASE ORAL at 21:42

## 2025-04-14 RX ADMIN — Medication 3 MG: at 21:42

## 2025-04-14 RX ADMIN — SERTRALINE HYDROCHLORIDE 50 MG: 50 TABLET ORAL at 08:12

## 2025-04-14 NOTE — PLAN OF CARE
Pt attends some groups and participates. Pt has become more organized and less intrusive during groups. Pt less tangential and more appropriate in conversation.

## 2025-04-14 NOTE — PROGRESS NOTES
04/14/25 1649   Team Meeting   Meeting Type Daily Rounds   Team Members Present   Team Members Present Physician;Nurse;   Physician Team Member Alison   [facility] Julianna   Patient/Family Present   Patient Present No   Patient's Family Present No     Pt is calm and cooperative. Pt denies SI/HI/AVH. Pt is medication and meal compliant. Pt brightens upon approach. Pt's discharge is pending Thursday/ Friday.
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
## 2025-04-14 NOTE — NURSING NOTE
Pt is calm, cooperative, social with select peers. Pt is going to groups, doing his ADLs and is med/meal complaint. Pt denies SI, HI, AH, VH and pain at this time. Pt denies any unmet needs and remains on q15 min checks for safety.

## 2025-04-14 NOTE — PROGRESS NOTES
Pastoral Care Progress Note          Chaplaincy Interventions Utilized:   Empowerment: Encouraged focus on present, Encouraged self-care, Facilitated group experience, and Normalized experience of patient/family    Exploration: Explored hope, Explored emotional needs & resources, and Explored spiritual needs & resources    Relationship Building: Cultivated a relationship of care and support, Listened empathically, and Hospitality    The pt participated in spirituality group facilitated by the  today. The pt was active in discussion and contributed well. All of pt's additions were on topic and added to the group process.

## 2025-04-14 NOTE — NURSING NOTE
Patient is in the community and social with peers. Patient denies all psych symptoms at this time. Patient compliant with scheduled medications. Q15 observation maintained.

## 2025-04-14 NOTE — ASSESSMENT & PLAN NOTE
Will make the following medication changes:  Increase Risperdal to 3 mg twice daily for mood stabilization and psychotic symptoms. Patient will receive a total of 5 mg on 4/14, and 6 mg from 4/15  Continue with current scheduled medications:  Depakote ER 2000 nightly for mood stability  Therapeutic Depakote level 77 on 4/9/25  Prior level was subtherapeutic at 26 on 4/3  Zoloft 50 mg daily for mood and anxiety  Continue with current PRN medications.  Labs/Procedures: per unit protocol  Continue with group therapy, milieu therapy and occupational therapy.  Continue frequent safety checks and vitals per unit protocol.  Continue with SLIM medical management as indicated  Continue coordinating with case management regarding disposition    No associated orders from this encounter found during lookback period of 72 hours.

## 2025-04-14 NOTE — PLAN OF CARE
Problem: Ineffective Coping  Goal: Identifies ineffective coping skills  Outcome: Progressing  Goal: Identifies healthy coping skills  Outcome: Progressing  Goal: Demonstrates healthy coping skills  Outcome: Progressing  Goal: Participates in unit activities  Description: Interventions:- Provide therapeutic environment - Provide required programming - Redirect inappropriate behaviors   Outcome: Progressing  Goal: Patient/Family participate in treatment and DC plans  Description: Interventions:- Provide therapeutic environment  Outcome: Progressing  Goal: Patient/Family verbalizes awareness of resources  Outcome: Progressing  Goal: Understands least restrictive measures  Description: Interventions:- Utilize least restrictive behavior  Outcome: Progressing  Goal: Free from restraint events  Description: - Utilize least restrictive measures - Provide behavioral interventions - Redirect inappropriate behaviors   Outcome: Progressing     Problem: Depression  Goal: Treatment Goal: Demonstrate behavioral control of depressive symptoms, verbalize feelings of improved mood/affect, and adopt new coping skills prior to discharge  Outcome: Progressing     Problem: Anxiety  Goal: Anxiety is at manageable level  Description: Interventions:- Assess and monitor patient's anxiety level. - Monitor for signs and symptoms (heart palpitations, chest pain, shortness of breath, headaches, nausea, feeling jumpy, restlessness, irritable, apprehensive). - Collaborate with interdisciplinary team and initiate plan and interventions as ordered.- [place] patient to unit/surroundings- Explain treatment plan- Encourage participation in care- Encourage verbalization of concerns/fears- Identify coping mechanisms- Assist in developing anxiety-reducing skills- Administer/offer alternative therapies- Limit or eliminate stimulants  Outcome: Progressing     Problem: Risk for Violence/Aggression Toward Others  Goal: Treatment Goal: Refrain from acts of  violence/aggression during length of stay, and demonstrate improved impulse control at the time of discharge  Outcome: Progressing  Goal: Refrain from harming others  Outcome: Progressing  Goal: Control angry outbursts  Description: Interventions:- Monitor patient closely, per order- Ensure early verbal de-escalation- Monitor prn medication needs- Set reasonable/therapeutic limits, outline behavioral expectations, and consequences - Provide a non-threatening milieu, utilizing the least restrictive interventions   Outcome: Progressing     Problem: DISCHARGE PLANNING - CARE MANAGEMENT  Goal: Discharge to post-acute care or home with appropriate resources  Description: INTERVENTIONS:- Conduct assessment to determine patient/family and health care team treatment goals, and need for post-acute services based on payer coverage, community resources, and patient preferences, and barriers to discharge- Address psychosocial, clinical, and financial barriers to discharge as identified in assessment in conjunction with the patient/family and health care team- Arrange appropriate level of post-acute services according to patient’s   needs and preference and payer coverage in collaboration with the physician and health care team- Communicate with and update the patient/family, physician, and health care team regarding progress on the discharge plan- Arrange appropriate transportation to post-acute venues  Outcome: Progressing

## 2025-04-15 ENCOUNTER — TELEPHONE (OUTPATIENT)
Age: 35
End: 2025-04-15

## 2025-04-15 PROCEDURE — 99232 SBSQ HOSP IP/OBS MODERATE 35: CPT | Performed by: PSYCHIATRY & NEUROLOGY

## 2025-04-15 RX ADMIN — SERTRALINE HYDROCHLORIDE 50 MG: 50 TABLET ORAL at 08:28

## 2025-04-15 RX ADMIN — ATORVASTATIN CALCIUM 10 MG: 10 TABLET, FILM COATED ORAL at 17:21

## 2025-04-15 RX ADMIN — LEVOTHYROXINE SODIUM 25 MCG: 0.03 TABLET ORAL at 05:01

## 2025-04-15 RX ADMIN — DIVALPROEX SODIUM 2000 MG: 500 TABLET, EXTENDED RELEASE ORAL at 21:33

## 2025-04-15 RX ADMIN — CYANOCOBALAMIN TAB 1000 MCG 1000 MCG: 1000 TAB at 08:29

## 2025-04-15 RX ADMIN — FENOFIBRATE 145 MG: 145 TABLET, FILM COATED ORAL at 08:28

## 2025-04-15 RX ADMIN — RISPERIDONE 3 MG: 1 TABLET, FILM COATED ORAL at 17:21

## 2025-04-15 RX ADMIN — Medication 3 MG: at 21:33

## 2025-04-15 RX ADMIN — RISPERIDONE 3 MG: 1 TABLET, FILM COATED ORAL at 08:29

## 2025-04-15 RX ADMIN — Medication 2000 UNITS: at 08:28
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
## 2025-04-15 NOTE — NURSING NOTE
Pt visible playing cards with peers in dayroom. Denies symptoms, needs and concerns this late evening. Compliant with unit routines and care. Safety checks ongoing.

## 2025-04-15 NOTE — TELEPHONE ENCOUNTER
STAT referral received today from [facility] for Med Management and Talk Therapy services. Pt is currently established with MM services. Pt requested a CASI in March, appt with new MM provider is scheduled, 7/9/25 at 10:30 AM with Dr. Ronak Hobson in [place]. Writer scheduled TT services, 5/30/25 at 11:00 AM with Austyn Avila in [place]. Writer advised CM of appts via [employer].       Referral completed/closed

## 2025-04-15 NOTE — SOCIAL WORK
Cm spoke with Mother. Mother reported she wants Pt to return home. Mother reported that the PFA is up tomorrow and she does not plan on extending it. Mother reported her and her son have a great relationship and she wants Pt to come home. Mother asked some questions regarding Pt's condition. Mother reported she was able to  Pt's risperidone and another physical medication at his pharmacy. Mother reported she has not been able to speak with Pt due to the active PFA but intends on calling Pt tomorrow. Mother reported Pt is interested in going to a day program. Mother reported that Pt does not have a waiver for ASD.   Pt will need a Lyft ride home upon discharge and medications will be filled at  pharmacy.   Pt will follow up with either [facility] or [facility].

## 2025-04-15 NOTE — PLAN OF CARE
Problem: Ineffective Coping  Goal: Identifies ineffective coping skills  Outcome: Progressing  Goal: Identifies healthy coping skills  Outcome: Progressing  Goal: Demonstrates healthy coping skills  Outcome: Progressing  Goal: Patient/Family participate in treatment and DC plans  Description: Interventions:- Provide therapeutic environment  Outcome: Progressing  Goal: Patient/Family verbalizes awareness of resources  Outcome: Progressing  Goal: Understands least restrictive measures  Description: Interventions:- Utilize least restrictive behavior  Outcome: Progressing  Goal: Free from restraint events  Description: - Utilize least restrictive measures - Provide behavioral interventions - Redirect inappropriate behaviors   Outcome: Progressing     Problem: Depression  Goal: Treatment Goal: Demonstrate behavioral control of depressive symptoms, verbalize feelings of improved mood/affect, and adopt new coping skills prior to discharge  Outcome: Progressing     Problem: Anxiety  Goal: Anxiety is at manageable level  Description: Interventions:- Assess and monitor patient's anxiety level. - Monitor for signs and symptoms (heart palpitations, chest pain, shortness of breath, headaches, nausea, feeling jumpy, restlessness, irritable, apprehensive). - Collaborate with interdisciplinary team and initiate plan and interventions as ordered.- [place] patient to unit/surroundings- Explain treatment plan- Encourage participation in care- Encourage verbalization of concerns/fears- Identify coping mechanisms- Assist in developing anxiety-reducing skills- Administer/offer alternative therapies- Limit or eliminate stimulants  Outcome: Progressing     Problem: Risk for Violence/Aggression Toward Others  Goal: Treatment Goal: Refrain from acts of violence/aggression during length of stay, and demonstrate improved impulse control at the time of discharge  Outcome: Progressing  Goal: Refrain from harming others  Outcome:  Progressing  Goal: Control angry outbursts  Description: Interventions:- Monitor patient closely, per order- Ensure early verbal de-escalation- Monitor prn medication needs- Set reasonable/therapeutic limits, outline behavioral expectations, and consequences - Provide a non-threatening milieu, utilizing the least restrictive interventions   Outcome: Progressing     Problem: DISCHARGE PLANNING - CARE MANAGEMENT  Goal: Discharge to post-acute care or home with appropriate resources  Description: INTERVENTIONS:- Conduct assessment to determine patient/family and health care team treatment goals, and need for post-acute services based on payer coverage, community resources, and patient preferences, and barriers to discharge- Address psychosocial, clinical, and financial barriers to discharge as identified in assessment in conjunction with the patient/family and health care team- Arrange appropriate level of post-acute services according to patient’s   needs and preference and payer coverage in collaboration with the physician and health care team- Communicate with and update the patient/family, physician, and health care team regarding progress on the discharge plan- Arrange appropriate transportation to post-acute venues  Outcome: Progressing

## 2025-04-15 NOTE — NURSING NOTE
Pt denies SI/HI/AH/VH. Present in dayroom and milieu. Social with peers. Medication and meal compliant. Pt is calm and pleasant. Attends groups. Pt is malodorous. ADL's encouraged. Compliant with Lunch. No further concerns as of present. Plan of care ongoing.

## 2025-04-15 NOTE — ASSESSMENT & PLAN NOTE
Will make the following medication changes:  Continue with current scheduled medications:  Risperdal 3 mg twice daily for mood stabilization and psychotic symptoms  Depakote ER 2000 nightly for mood stability  Therapeutic Depakote level 77 on 4/9/25  Prior level was subtherapeutic at 26 on 4/3  Zoloft 50 mg daily for mood and anxiety  Continue with current PRN medications.  Labs/Procedures: per unit protocol  Continue with group therapy, milieu therapy and occupational therapy.  Continue frequent safety checks and vitals per unit protocol.  Continue with SLIM medical management as indicated  Continue coordinating with case management regarding disposition    No associated orders from this encounter found during lookback period of 72 hours.

## 2025-04-15 NOTE — PROGRESS NOTES
04/15/25 0828   Team Meeting   Meeting Type Daily Rounds   Team Members Present   Team Members Present Physician;Nurse;   Physician Team Member Alison   [facility] Member Julianna   Patient/Family Present   Patient Present No   Patient's Family Present No     Pt is visible on the unit. Pt denies SI/HI/AVH. Pt is medication and meal compliant. Pt's discharge is pending for Friday.

## 2025-04-16 PROBLEM — Z00.8 MEDICAL CLEARANCE FOR PSYCHIATRIC ADMISSION: Status: RESOLVED | Noted: 2025-04-05 | Resolved: 2025-04-16

## 2025-04-16 PROCEDURE — 99232 SBSQ HOSP IP/OBS MODERATE 35: CPT | Performed by: PSYCHIATRY & NEUROLOGY

## 2025-04-16 RX ORDER — DIVALPROEX SODIUM 500 MG/1
2000 TABLET, FILM COATED, EXTENDED RELEASE ORAL
Qty: 120 TABLET | Refills: 1 | Status: SHIPPED | OUTPATIENT
Start: 2025-04-16

## 2025-04-16 RX ORDER — ATORVASTATIN CALCIUM 10 MG/1
10 TABLET, FILM COATED ORAL
Qty: 30 TABLET | Refills: 1 | Status: SHIPPED | OUTPATIENT
Start: 2025-04-16

## 2025-04-16 RX ORDER — RISPERIDONE 3 MG/1
3 TABLET ORAL 2 TIMES DAILY
Qty: 60 TABLET | Refills: 1 | Status: SHIPPED | OUTPATIENT
Start: 2025-04-16

## 2025-04-16 RX ADMIN — RISPERIDONE 3 MG: 1 TABLET, FILM COATED ORAL at 09:17

## 2025-04-16 RX ADMIN — SERTRALINE HYDROCHLORIDE 50 MG: 50 TABLET ORAL at 09:17

## 2025-04-16 RX ADMIN — DIVALPROEX SODIUM 2000 MG: 500 TABLET, EXTENDED RELEASE ORAL at 22:47

## 2025-04-16 RX ADMIN — Medication 3 MG: at 22:47

## 2025-04-16 RX ADMIN — LEVOTHYROXINE SODIUM 25 MCG: 0.03 TABLET ORAL at 05:08

## 2025-04-16 RX ADMIN — ATORVASTATIN CALCIUM 10 MG: 10 TABLET, FILM COATED ORAL at 17:29

## 2025-04-16 RX ADMIN — RISPERIDONE 3 MG: 1 TABLET, FILM COATED ORAL at 17:29

## 2025-04-16 RX ADMIN — Medication 2000 UNITS: at 09:17

## 2025-04-16 RX ADMIN — FENOFIBRATE 145 MG: 145 TABLET, FILM COATED ORAL at 09:18

## 2025-04-16 RX ADMIN — CYANOCOBALAMIN TAB 1000 MCG 1000 MCG: 1000 TAB at 09:17

## 2025-04-16 NOTE — DISCHARGE INSTR - APPOINTMENTS
Behavioral Health Nurse Navigator, Apoorva or Anne-Marie will be calling you after your discharge, on the phone number that you provided.  They will be available as an additional support, if needed.   If you wish to speak with Apoorva, you may contact her at [phone].

## 2025-04-16 NOTE — ASSESSMENT & PLAN NOTE
No psychopharmacologic changes necessary at this time; will continue to assess for further optimization.  Continue with current scheduled medications:  Risperdal 3 mg twice daily for mood stabilization and psychotic symptoms  Depakote ER 2000 nightly for mood stability  Therapeutic Depakote level 77 on 4/9/25  Prior level was subtherapeutic at 26 on 4/3  Zoloft 50 mg daily for mood and anxiety  Continue with current PRN medications.  Labs/Procedures: per unit protocol  Continue with group therapy, milieu therapy and occupational therapy.  Continue frequent safety checks and vitals per unit protocol.  Continue with SLIM medical management as indicated  Continue coordinating with case management regarding disposition    No associated orders from this encounter found during lookback period of 72 hours.

## 2025-04-16 NOTE — NURSING NOTE
"Pt about unit and social with peers. Denies all symptoms and states he had \"a pretty good day.\" Offers no complaints or concerns. Was encouraged to shower. Compliant with unit routines and care. Safety checks ongoing.  "

## 2025-04-16 NOTE — NURSING NOTE
Patient is quiet but cooperative.  He is visible on the unit.  Attending groups.  Patient is malodorous even after showering last night.  Patient's mattress cleaned very well and bedding changed.  Patient's skin assessed.  No excoriation noted in abdominal or groin folds.  Medication and meal compliant.  Safety precautions maintained.

## 2025-04-16 NOTE — SOCIAL WORK
Cm called Mother. Mother reported that the case was heard and Mother is still waiting to hear back. Mother reported she will let Cm know ASAP when the PFA is officially dismissed.   Mother reported she is not able to pick Pt up and Pt will need transportation. Cm notified Mother of upcoming appointments. Mother understood. Mother reported pt will have No access to firearms at home.

## 2025-04-16 NOTE — SOCIAL WORK
SW is waiting for a call back from [facility] [phone].

## 2025-04-16 NOTE — SOCIAL WORK
P'ts Mother reported she called and requested to drop the PFA. The courts told Mother that they will notify her when the  approves the PFA getting dropped. Otherwise, Mother reported that Pt is able to return home.

## 2025-04-16 NOTE — SOCIAL WORK
Cm called [facility] for day program and left voicemail with call back information.

## 2025-04-16 NOTE — NURSING NOTE
Pt woke up around 04:30 and came to nurses station to ask for change of clothes. Pt was incontinent of urine. Pt was very malodorous. Pt given new set of hospital clothes and his clothes paced in the wash. Pt showered.

## 2025-04-16 NOTE — PLAN OF CARE
Pt attending groups regularly this week. Pt no longer intrusive or tangential. Pt does appear slightly sedated as he falls asleep in the group room in his chair regularly.

## 2025-04-16 NOTE — PLAN OF CARE
Problem: Ineffective Coping  Goal: Identifies ineffective coping skills  Outcome: Progressing  Goal: Identifies healthy coping skills  Outcome: Progressing  Goal: Demonstrates healthy coping skills  Outcome: Progressing  Goal: Participates in unit activities  Description: Interventions:- Provide therapeutic environment - Provide required programming - Redirect inappropriate behaviors   Outcome: Progressing  Goal: Patient/Family participate in treatment and DC plans  Description: Interventions:- Provide therapeutic environment  Outcome: Progressing  Goal: Patient/Family verbalizes awareness of resources  Outcome: Progressing  Goal: Understands least restrictive measures  Description: Interventions:- Utilize least restrictive behavior  Outcome: Progressing  Goal: Free from restraint events  Description: - Utilize least restrictive measures - Provide behavioral interventions - Redirect inappropriate behaviors   Outcome: Progressing     Problem: Depression  Goal: Treatment Goal: Demonstrate behavioral control of depressive symptoms, verbalize feelings of improved mood/affect, and adopt new coping skills prior to discharge  Outcome: Progressing     Problem: Anxiety  Goal: Anxiety is at manageable level  Description: Interventions:- Assess and monitor patient's anxiety level. - Monitor for signs and symptoms (heart palpitations, chest pain, shortness of breath, headaches, nausea, feeling jumpy, restlessness, irritable, apprehensive). - Collaborate with interdisciplinary team and initiate plan and interventions as ordered.- [place] patient to unit/surroundings- Explain treatment plan- Encourage participation in care- Encourage verbalization of concerns/fears- Identify coping mechanisms- Assist in developing anxiety-reducing skills- Administer/offer alternative therapies- Limit or eliminate stimulants  Outcome: Progressing     Problem: Risk for Violence/Aggression Toward Others  Goal: Treatment Goal: Refrain from acts of  violence/aggression during length of stay, and demonstrate improved impulse control at the time of discharge  Outcome: Progressing  Goal: Refrain from harming others  Outcome: Progressing  Goal: Control angry outbursts  Description: Interventions:- Monitor patient closely, per order- Ensure early verbal de-escalation- Monitor prn medication needs- Set reasonable/therapeutic limits, outline behavioral expectations, and consequences - Provide a non-threatening milieu, utilizing the least restrictive interventions   Outcome: Progressing     Problem: DISCHARGE PLANNING - CARE MANAGEMENT  Goal: Discharge to post-acute care or home with appropriate resources  Description: INTERVENTIONS:- Conduct assessment to determine patient/family and health care team treatment goals, and need for post-acute services based on payer coverage, community resources, and patient preferences, and barriers to discharge- Address psychosocial, clinical, and financial barriers to discharge as identified in assessment in conjunction with the patient/family and health care team- Arrange appropriate level of post-acute services according to patient’s   needs and preference and payer coverage in collaboration with the physician and health care team- Communicate with and update the patient/family, physician, and health care team regarding progress on the discharge plan- Arrange appropriate transportation to post-acute venues  Outcome: Progressing

## 2025-04-16 NOTE — TELEPHONE ENCOUNTER
CM contacted writer to inquire if a sooner appt could be scheduled for MM services in [place]. Pt scheduled, 5/22/25 at 3:30 PM with Charline Ronquillo in [place]. Previously scheduled appt on 7/9/25 is cancelled.     Cancelling an appointment:    Date/Time: 7/9/25/10:30 AM (NP appt with Dr. Hobson)                    10/7/25/11:30 AM (f/u)    Reason: sooner appt scheduled    Patient was rescheduled: YES [x] NO []  If yes, when was Patient reschedule for: 5/22/25    Patient requesting call back to reschedule: YES [] NO [x]

## 2025-04-16 NOTE — PROGRESS NOTES
04/16/25 1000   Team Meeting   Meeting Type Daily Rounds   Team Members Present   Team Members Present Physician;Nurse;   Physician Team Member Alison   Nursing Team Member Mariola   Care Management Team Member Julianna/Jose   Patient/Family Present   Patient Present No   Patient's Family Present No     Pt denying all symptoms. Pt was incontinence at night. He is med and meal compliant. He is due to be discharged tomorrow.

## 2025-04-16 NOTE — DISCHARGE INSTR - OTHER ORDERS
CRISIS INFORMATION  If you are experiencing a mental health emergency, you may call the [facility] Crisis Intervention Office 24 hours a day, 7 days per week at [phone].    In [facility], call [phone].    [facility] is a confidential 24/7 telephone support service manned by trained mental health consumers.  [facility] provides support, a listening ear and can provide information about available services.[facility] specializes in the concerns of mental health consumers, their families and friends.  However, we are also here for anyone who has a mental health concern, is confused about or just doesn't know anything about mental health or where to get information.  To reach [facility], call [phone].    HOW TO GET SUBSTANCE ABUSE HELP:  If you or someone you know has a drug or alcohol problem, there is help:  [facility]: [phone]  [facility]: [phone]  An assessment is the first step.   In addition to those listed there are other programs available in the area but assessment is best to determine an appropriate level of care.  If you DO NOT have Medical Assistance (MA) or Private Insurance, an assessment can be scheduled at one of these providers:  [facility]  [address] [place] [address]  [phone]   [facility]  [address] [place] [address]  [phone]   [address] [place] [address]  [phone]   [facility]  [address] [place] [address]  [phone]   [facility]  [address] [place] [address]  [phone]   [facility]  [address] [place] [address]  [phone]     If you HAVE Medical Assistance, an assessment can be scheduled at one of these providers:  [facility]  [address] [place] [address]  [phone]   [facility]  [address] [place] [address]  [phone]   [facility]  [address], [place] [address]  [phone]  [address] [place] [address], [address], [place] [address] [phone]   [address] [address]  [phone]   [address] [place] [address]  [phone]   [facility] ([facility])  [address] [place] [place] [address]  [phone]   [facility]  [address] [place] [address]  [phone]   [facility]  [address] [place] [address]  [phone]   [facility]  [address] [place] [address]  [phone]     If you HAVE Private Insurance, an assessment can be scheduled at one of these providers.  Please contact these Providers to determine if they are in your network plan:  [facility]  [address], [place] [address]  [phone]   [address] [address]  [phone]   [address] [place] [address]  [phone]   [facility] ([facility])  [address] [place] [address]  [phone]   [facility]  [address] [place] [address]  [phone]     From the [facility] website [place]guides[place]opioid-epidemic/#GetNaloxone    How do I get naloxone?  Family members and friends can access naloxone by:    Obtaining a prescription from their family doctor  Using the standing order issued by [facility] Rosalie Turner. A standing order is a prescription written for the general public, rather than specifically for an individual.  To use the standing order, print it and take it with you to the pharmacy or have the digital version on your phone. Download the standing order from the Department of [facility] (PDF).    If you are unable to print  it or use the digital version, the standing order is kept on file at many pharmacies. If a pharmacy does not have it on file, they may have the ability to look it up.    Naloxone prescriptions can be filled at most pharmacies. Although the medication might not be available for same-day pickup, it often can be ordered and available within a day or two.                                                                    [facility]                       [facility] is a unique social rehabilitation program. It is a place to come and relax and have some fun, meet new people and chat with friends, while having a snack and taking part in the various activities of the [facility] Center. Each month we invite guest speakers who provide education on various topics of interest. Together, we publish a bi-monthly newsletter that contains our monthly activity calendars. We support one another and share life experiences in recovery.                     We have a van run daily for those who are unable to get to and from the [facility] on their own. If you require transportation, please call before 3 PM on the day transportation is needed for . Come join us and have some fun. Some of the outings we offer are: bowling, miniature golf, going out to eat at different restaurants, and shopping at different thrift stores in the neighboring community. At the Center, we have holiday parties, Bingo, workshops and discussion groups on various topics    Friends of the [facility] are active participants in all aspects of the Center, from our monthly budget committee meetings to our newsletter and volunteer program.    All are welcome to share ideas, goals, and/or objectives that might be of interest to friends attending the   [facility] Center.    A Casual Week At The [facility] [place]:    Monday    Community Outings  Speakers    Tuesday    Community meetings and  Newsletter meetings on alternate weeks.    Wednesday    Cloudy Days    Thursday    [employer] and [employer]    Friday    Movie and Popcorn  Monthly Birthday Parties
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
## 2025-04-16 NOTE — BH TRANSITION RECORD
Contact Information: If you have any questions, concerns, pended studies, tests and/or procedures, or emergencies regarding your inpatient behavioral health visit. Please contact [facility] [phone] and ask to speak to a , nurse or physician. A contact is available 24 hours/ 7 days a week at this number.     Summary of Procedures Performed During your Stay:  Below is a list of major procedures performed during your hospital stay and a summary of results:  - Cardiac Procedures/Studies:  EKG (4/5/2025): NSR, ,     If studies are pending at discharge, follow up with your PCP and/or referring provider.

## 2025-04-17 VITALS
HEART RATE: 93 BPM | RESPIRATION RATE: 18 BRPM | OXYGEN SATURATION: 100 % | SYSTOLIC BLOOD PRESSURE: 115 MMHG | WEIGHT: 315 LBS | HEIGHT: 77 IN | BODY MASS INDEX: 37.19 KG/M2 | TEMPERATURE: 97.4 F | DIASTOLIC BLOOD PRESSURE: 73 MMHG

## 2025-04-17 PROCEDURE — 99239 HOSP IP/OBS DSCHRG MGMT >30: CPT | Performed by: PSYCHIATRY & NEUROLOGY

## 2025-04-17 RX ADMIN — SERTRALINE HYDROCHLORIDE 50 MG: 50 TABLET ORAL at 08:25

## 2025-04-17 RX ADMIN — LEVOTHYROXINE SODIUM 25 MCG: 0.03 TABLET ORAL at 05:33

## 2025-04-17 RX ADMIN — RISPERIDONE 3 MG: 1 TABLET, FILM COATED ORAL at 08:25

## 2025-04-17 RX ADMIN — Medication 2000 UNITS: at 08:26

## 2025-04-17 RX ADMIN — FENOFIBRATE 145 MG: 145 TABLET, FILM COATED ORAL at 08:26

## 2025-04-17 RX ADMIN — CYANOCOBALAMIN TAB 1000 MCG 1000 MCG: 1000 TAB at 08:26

## 2025-04-17 NOTE — NURSING NOTE
Pt awoke and completed all morning routines.  AVS printed and reviewed with Pt.  Pt verbalized understanding of discharge instructions and agreed to be compliant with their medication regimen. Pt discharged to home at 1215.  Pt was discharged with all of their belongings at time of discharge.

## 2025-04-17 NOTE — NURSING NOTE
Pt is calm, cooperative, social with peers and staff. Pt is going to groups, doing his ADLs and is med/meal complaint. Pt denies SI, HI, AH, VH and pain at this time. Pt denies any unmet needs and is excited for discharge. Pt denies any unmet needs and remains on q15 min checks for safety.
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
## 2025-04-17 NOTE — PLAN OF CARE
Problem: Ineffective Coping  Goal: Identifies ineffective coping skills  Outcome: Progressing  Goal: Identifies healthy coping skills  Outcome: Progressing  Goal: Demonstrates healthy coping skills  Outcome: Progressing  Goal: Participates in unit activities  Description: Interventions:- Provide therapeutic environment - Provide required programming - Redirect inappropriate behaviors   Outcome: Progressing  Goal: Patient/Family participate in treatment and DC plans  Description: Interventions:- Provide therapeutic environment  Outcome: Progressing  Goal: Patient/Family verbalizes awareness of resources  Outcome: Progressing  Goal: Understands least restrictive measures  Description: Interventions:- Utilize least restrictive behavior  Outcome: Progressing  Goal: Free from restraint events  Description: - Utilize least restrictive measures - Provide behavioral interventions - Redirect inappropriate behaviors   Outcome: Progressing     Problem: Depression  Goal: Treatment Goal: Demonstrate behavioral control of depressive symptoms, verbalize feelings of improved mood/affect, and adopt new coping skills prior to discharge  Outcome: Progressing     Problem: Anxiety  Goal: Anxiety is at manageable level  Description: Interventions:- Assess and monitor patient's anxiety level. - Monitor for signs and symptoms (heart palpitations, chest pain, shortness of breath, headaches, nausea, feeling jumpy, restlessness, irritable, apprehensive). - Collaborate with interdisciplinary team and initiate plan and interventions as ordered.- [place] patient to unit/surroundings- Explain treatment plan- Encourage participation in care- Encourage verbalization of concerns/fears- Identify coping mechanisms- Assist in developing anxiety-reducing skills- Administer/offer alternative therapies- Limit or eliminate stimulants  Outcome: Progressing     Problem: Risk for Violence/Aggression Toward Others  Goal: Treatment Goal: Refrain from acts of  violence/aggression during length of stay, and demonstrate improved impulse control at the time of discharge  Outcome: Progressing  Goal: Refrain from harming others  Outcome: Progressing  Goal: Control angry outbursts  Description: Interventions:- Monitor patient closely, per order- Ensure early verbal de-escalation- Monitor prn medication needs- Set reasonable/therapeutic limits, outline behavioral expectations, and consequences - Provide a non-threatening milieu, utilizing the least restrictive interventions   Outcome: Progressing

## 2025-04-17 NOTE — PLAN OF CARE
Problem: Ineffective Coping  Goal: Identifies ineffective coping skills  Outcome: Completed  Goal: Identifies healthy coping skills  Outcome: Completed  Goal: Demonstrates healthy coping skills  Outcome: Completed  Goal: Participates in unit activities  Description: Interventions:- Provide therapeutic environment - Provide required programming - Redirect inappropriate behaviors   Outcome: Completed  Goal: Patient/Family participate in treatment and DC plans  Description: Interventions:- Provide therapeutic environment  Outcome: Completed  Goal: Patient/Family verbalizes awareness of resources  Outcome: Completed  Goal: Understands least restrictive measures  Description: Interventions:- Utilize least restrictive behavior  Outcome: Completed  Goal: Free from restraint events  Description: - Utilize least restrictive measures - Provide behavioral interventions - Redirect inappropriate behaviors   Outcome: Completed     Problem: Depression  Goal: Treatment Goal: Demonstrate behavioral control of depressive symptoms, verbalize feelings of improved mood/affect, and adopt new coping skills prior to discharge  Outcome: Completed     Problem: Anxiety  Goal: Anxiety is at manageable level  Description: Interventions:- Assess and monitor patient's anxiety level. - Monitor for signs and symptoms (heart palpitations, chest pain, shortness of breath, headaches, nausea, feeling jumpy, restlessness, irritable, apprehensive). - Collaborate with interdisciplinary team and initiate plan and interventions as ordered.- [place] patient to unit/surroundings- Explain treatment plan- Encourage participation in care- Encourage verbalization of concerns/fears- Identify coping mechanisms- Assist in developing anxiety-reducing skills- Administer/offer alternative therapies- Limit or eliminate stimulants  Outcome: Completed     Problem: Risk for Violence/Aggression Toward Others  Goal: Treatment Goal: Refrain from acts of violence/aggression  during length of stay, and demonstrate improved impulse control at the time of discharge  Outcome: Completed  Goal: Refrain from harming others  Outcome: Completed  Goal: Control angry outbursts  Description: Interventions:- Monitor patient closely, per order- Ensure early verbal de-escalation- Monitor prn medication needs- Set reasonable/therapeutic limits, outline behavioral expectations, and consequences - Provide a non-threatening milieu, utilizing the least restrictive interventions   Outcome: Completed     Problem: DISCHARGE PLANNING - CARE MANAGEMENT  Goal: Discharge to post-acute care or home with appropriate resources  Description: INTERVENTIONS:- Conduct assessment to determine patient/family and health care team treatment goals, and need for post-acute services based on payer coverage, community resources, and patient preferences, and barriers to discharge- Address psychosocial, clinical, and financial barriers to discharge as identified in assessment in conjunction with the patient/family and health care team- Arrange appropriate level of post-acute services according to patient’s   needs and preference and payer coverage in collaboration with the physician and health care team- Communicate with and update the patient/family, physician, and health care team regarding progress on the discharge plan- Arrange appropriate transportation to post-acute venues  Outcome: Completed

## 2025-04-17 NOTE — ASSESSMENT & PLAN NOTE
No psychopharmacologic changes necessary at this time; will continue to assess for further optimization.  Continue with current scheduled medications:  Risperdal 3 mg twice daily for mood stabilization and psychotic symptoms  Depakote ER 2000 nightly for mood stability  Therapeutic Depakote level 77 on 4/9/25  Prior level was subtherapeutic at 26 on 4/3  Zoloft 50 mg daily for mood and anxiety  Continue with current PRN medications.  Labs/Procedures: per unit protocol  Continue with group therapy, milieu therapy and occupational therapy.  Continue frequent safety checks and vitals per unit protocol.  Continue with [facility] medical management as indicated  Continue coordinating with case management regarding disposition    Orders from past 72 hours:    divalproex sodium (DEPAKOTE ER) 500 mg 24 hr tablet; Take 4 tablets (2,000 mg total) by mouth daily at bedtime    risperiDONE (RisperDAL) 3 mg tablet; Take 1 tablet (3 mg total) by mouth 2 (two) times a day

## 2025-04-17 NOTE — CMS CERTIFICATION NOTE
Recertification: Based upon physical, mental and social evaluations, I certify that inpatient psychiatric services continue to be medically necessary for this patient for a duration of 2 midnights for the treatment of  Bipolar disorder (HCC) Available alternative community resources still do not meet the patient's mental health care needs. I further attest that an established written individualized plan of care has been updated and is outlined in the patient's medical records.    Dennis Brewer, DO

## 2025-04-17 NOTE — NURSING NOTE
Pt awake at this time and out quietly sitting in dayroom or talking with the few other peers that are awake.

## 2025-04-17 NOTE — SOCIAL WORK
Cm spoke with Pt's Mother and Mother reported that the court confirmed that the PFA was dismissed. Mother reported Pt is good to return home. CM notified Mother that Pt will be sent home in a Lyft ride around 12pm. Mother confirmed she will be home.

## 2025-04-17 NOTE — PROGRESS NOTES
04/17/25 0910   Activity/Group Checklist   Group Admission/Discharge  (relapse prevention plan)   Attendance Attended   Attendance Duration (min) 0-15   Interactions Interacted appropriately   Affect/Mood Appropriate   Goals Achieved Identified feelings;Identified triggers;Identified relapse prevention strategies;Identified medication adherence strategies;Discussed safety plan;Discussed coping strategies;Discussed discharge plans;Identified resources and support systems;Able to listen to others;Able to reflect/comment on own behavior;Able to self-disclose;Able to recieve feedback     Relapse prevention plan completed with pt. Pt pleasant and cooperative, completed plan with minimal prompting. PT looking forward to discharge.

## 2025-04-17 NOTE — SOCIAL WORK
Pt to D/C today. Pt denies SI/HI/AVH. Pt oriented x3. Pt to d/c to Home and [facility][ will  upon discharge. Pt to follow up with [facility] on 4/23/25 and [facility] on 5/30/25. Pt to follow up with [facility] referral. Scripts sent to  pharmacy and were provided in hand upon discharge.     Discharge Address:[address] [place] [id number]   Phone: [phone]

## 2025-04-17 NOTE — ASSESSMENT & PLAN NOTE
Continue medical management  Orders from past 72 hours:    atorvastatin (LIPITOR) 10 mg tablet; Take 1 tablet (10 mg total) by mouth daily with dinner

## 2025-04-17 NOTE — PROGRESS NOTES
Pastoral Care Progress Note          Chaplaincy Interventions Utilized:   Empowerment: Encouraged focus on present, Encouraged self-care, Facilitated group experience, and Normalized experience of patient/family    Exploration: Explored hope, Explored emotional needs & resources, and Explored spiritual needs & resources    Relationship Building: Cultivated a relationship of care and support, Listened empathically, and Hospitality    The pt participated in spirituality group facilitated by the  today. Pt spoke about his love for his family and his excitement and relief to be going home today.

## 2025-04-17 NOTE — PROGRESS NOTES
04/17/25 0833   Team Meeting   Meeting Type Daily Rounds   Team Members Present   Team Members Present Physician;;Nurse   Physician Team Member Alison   [facility] Julianna   Patient/Family Present   Patient Present No   Patient's Family Present No     Pt is scheduled for discharge today.

## 2025-04-17 NOTE — NURSING NOTE
Patient is calm, out watching tv in the evening, otherwise sleeps in bed after dinner. Denies depression, anxiety, SI/HI/AVH. No unmet needs currently.

## 2025-05-10 DIAGNOSIS — F31.76 BIPOLAR I DISORDER, MOST RECENT EPISODE DEPRESSED, IN FULL REMISSION (HCC): ICD-10-CM

## 2025-05-12 RX ORDER — RISPERIDONE 2 MG/1
6 TABLET ORAL 2 TIMES DAILY
Qty: 180 TABLET | Refills: 1 | OUTPATIENT
Start: 2025-05-12

## 2025-05-26 ENCOUNTER — TELEPHONE (OUTPATIENT)
Dept: OTHER | Facility: OTHER | Age: 35
End: 2025-05-26

## 2025-05-26 NOTE — TELEPHONE ENCOUNTER
Patient would like a call back with a reason why his appointments keep being rescheduled. Patient stated he keeps getting text messages about his appointment being rescheduled. Please follow up and advise. Thank you in advance.

## 2025-05-27 NOTE — TELEPHONE ENCOUNTER
LVM for PT to inform TT appt needs to be R/S as provider has PTO. The CASI appt was cxl and R/S as a HDC as PT was admitted on 4/16 and transferred to a new provider with sooner availability

## 2025-05-29 ENCOUNTER — TELEPHONE (OUTPATIENT)
Age: 35
End: 2025-05-29

## 2025-05-29 NOTE — TELEPHONE ENCOUNTER
Pt requesting to reschedule a HDC appt. Writer warm transferred pt to intake dept for further assistance.

## 2025-06-03 NOTE — TELEPHONE ENCOUNTER
Patient called in to have their New Patient Talk Therapy appointment on 6/4 @9am cancelled due to their insurance [employer] giving them the run around with transportation and not having the time to fight with them.    Maria is asking if the appointment on 6/16 @11 be made the new patient appointment because they should be able to have their insurance company give them a ride to that one.    Patient apologize to have to cancel tomorrow and really badly wants to keep the next upcoming one as the initial.    Writer stated therapist and the department to be able to adjust that will be made aware due to writer not being able to make the change to the appointment.    Patient will call their insurance company tomorrow and call the office to schedule follow ups as well.

## 2025-06-05 ENCOUNTER — TELEPHONE (OUTPATIENT)
Age: 35
End: 2025-06-05

## 2025-06-05 NOTE — TELEPHONE ENCOUNTER
Patient called in to reschedule his HDC appt. Writer was able to warm transfer to the intake line for assistance.

## 2025-06-05 NOTE — TELEPHONE ENCOUNTER
Patient r/s with preferred provider Dr. Bauman patient clarified medication will be refilled until appointment.       8/14 @ 2p Dr. Bauman

## 2025-06-07 DIAGNOSIS — F41.9 ANXIETY: ICD-10-CM

## 2025-06-07 DIAGNOSIS — F31.60 BIPOLAR AFFECTIVE DISORDER, CURRENT EPISODE MIXED, CURRENT EPISODE SEVERITY UNSPECIFIED (HCC): ICD-10-CM

## 2025-06-09 RX ORDER — RISPERIDONE 3 MG/1
3 TABLET ORAL 2 TIMES DAILY
Qty: 60 TABLET | Refills: 1 | Status: SHIPPED | OUTPATIENT
Start: 2025-06-09

## 2025-06-09 RX ORDER — DIVALPROEX SODIUM 500 MG/1
2000 TABLET, FILM COATED, EXTENDED RELEASE ORAL
Qty: 120 TABLET | Refills: 1 | Status: SHIPPED | OUTPATIENT
Start: 2025-06-09

## 2025-06-09 NOTE — TELEPHONE ENCOUNTER
"EMR reviewed and Hasmukh (\"NontasRelmika\") is s/p Hospital discharge and was to be CAS[facility] but multiple appts needed to be rescheduled.  He has a new Pt appt 8/14/2025 and requires medications to last until then.  He seems to require all psychiatric discharge medications and for this I e-scribed the following to his designated [facility]:  Depakote ER 500mg (4) tabs po qhs # 120 R1  Risperidone 3mg (1) tab po bid # 60 R1  Sertraline 50mg (1) tab po qd # 30 R1  "

## 2025-06-12 DIAGNOSIS — E78.2 MIXED HYPERLIPIDEMIA: ICD-10-CM

## 2025-06-12 DIAGNOSIS — E53.8 VITAMIN B12 DEFICIENCY: ICD-10-CM

## 2025-06-12 RX ORDER — ATORVASTATIN CALCIUM 10 MG/1
10 TABLET, FILM COATED ORAL
Qty: 30 TABLET | Refills: 2 | OUTPATIENT
Start: 2025-06-12

## 2025-06-12 NOTE — TELEPHONE ENCOUNTER
Reason for call:   [x] Refill   [] Prior Auth  [x] Other: Pt is scheduled with Dr. Nancy Padilla on 8/14/2025 and he is asking provider to take over these meds.    Office:   [] PCP/Provider -   [x] Specialty/Provider - Darshana Reyes MD     Medication: atorvastatin 10 mg / 1 tab daily / 30 tabs                      (VITAMIN B-12) 1000 MCG / 1 tab daily / 30 tabs    Pharmacy:   [facility]pharm[facility] #[id number] [address] [facility]   Does the patient have enough for 3 days?   [x] Yes   [] No - Send as HP to POD    Mail Away Pharmacy   Does the patient have enough for 10 days?   [] Yes   [] No - Send as HP to POD

## 2025-06-12 NOTE — TELEPHONE ENCOUNTER
Left a VM on preferred number:  medication refills requested are managed by PCP and they should contact that office.    Please refuse to complete the message.

## 2025-06-16 ENCOUNTER — OFFICE VISIT (OUTPATIENT)
Dept: BEHAVIORAL/MENTAL HEALTH CLINIC | Facility: CLINIC | Age: 35
End: 2025-06-16
Payer: COMMERCIAL

## 2025-06-16 DIAGNOSIS — F41.9 ANXIETY: ICD-10-CM

## 2025-06-16 DIAGNOSIS — F84.0 AUTISM: Primary | Chronic | ICD-10-CM

## 2025-06-16 DIAGNOSIS — F43.12 CHRONIC POST-TRAUMATIC STRESS DISORDER (PTSD): ICD-10-CM

## 2025-06-16 DIAGNOSIS — F31.0 BIPOLAR AFFECTIVE DISORDER, CURRENT EPISODE HYPOMANIC (HCC): ICD-10-CM

## 2025-06-16 DIAGNOSIS — F40.240 CLAUSTROPHOBIA: ICD-10-CM

## 2025-06-16 DIAGNOSIS — F90.2 ADHD (ATTENTION DEFICIT HYPERACTIVITY DISORDER), COMBINED TYPE: ICD-10-CM

## 2025-06-16 DIAGNOSIS — F41.0 PANIC ATTACKS: ICD-10-CM

## 2025-06-16 PROCEDURE — 90791 PSYCH DIAGNOSTIC EVALUATION: CPT | Performed by: COUNSELOR
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
## 2025-07-07 DIAGNOSIS — F41.9 ANXIETY: ICD-10-CM

## 2025-07-10 ENCOUNTER — TELEPHONE (OUTPATIENT)
Dept: OTHER | Facility: OTHER | Age: 35
End: 2025-07-10

## 2025-07-10 NOTE — TELEPHONE ENCOUNTER
Patient is calling regarding cancelling an appointment.    Date/Time: 7/10/2025 / 1:00 pm    Patient was rescheduled: YES [] NO [x]    Patient requesting call back to reschedule: YES [x] NO []

## 2025-08-07 DIAGNOSIS — F31.60 BIPOLAR AFFECTIVE DISORDER, CURRENT EPISODE MIXED, CURRENT EPISODE SEVERITY UNSPECIFIED (HCC): ICD-10-CM

## 2025-08-12 ENCOUNTER — TELEPHONE (OUTPATIENT)
Dept: OTHER | Facility: OTHER | Age: 35
End: 2025-08-12

## 2025-08-12 ENCOUNTER — TELEPHONE (OUTPATIENT)
Age: 35
End: 2025-08-12

## 2025-08-13 ENCOUNTER — TELEPHONE (OUTPATIENT)
Dept: PSYCHIATRY | Facility: CLINIC | Age: 35
End: 2025-08-13

## 2025-08-14 ENCOUNTER — OFFICE VISIT (OUTPATIENT)
Dept: PSYCHIATRY | Facility: CLINIC | Age: 35
End: 2025-08-14

## 2025-08-14 ENCOUNTER — TELEPHONE (OUTPATIENT)
Dept: PSYCHIATRY | Facility: CLINIC | Age: 35
End: 2025-08-14

## 2025-08-14 DIAGNOSIS — F43.12 CHRONIC POST-TRAUMATIC STRESS DISORDER (PTSD): Primary | ICD-10-CM

## 2025-08-14 DIAGNOSIS — F90.2 ADHD (ATTENTION DEFICIT HYPERACTIVITY DISORDER), COMBINED TYPE: ICD-10-CM

## 2025-08-14 PROCEDURE — NOSHOW: Performed by: PSYCHIATRY & NEUROLOGY

## 2025-08-19 ENCOUNTER — TELEPHONE (OUTPATIENT)
Age: 35
End: 2025-08-19

## 2025-08-19 RX ORDER — RISPERIDONE 3 MG/1
3 TABLET ORAL 2 TIMES DAILY
Qty: 180 TABLET | Refills: 1 | OUTPATIENT
Start: 2025-08-19

## (undated) DEVICE — JP CHAN DRN SIL HUBLESS 15FR W/TRO: Brand: CARDINAL HEALTH

## (undated) DEVICE — SURGICEL 2 X 3

## (undated) DEVICE — NEURO PATTIES 1/2 X 1 1/2

## (undated) DEVICE — VAC DRESSING SENSATRAC SMALL

## (undated) DEVICE — 3M™ IOBAN™ 2 ANTIMICROBIAL INCISE DRAPE 6650EZ: Brand: IOBAN™ 2

## (undated) DEVICE — SUT ETHILON 2-0 FSLX 30 IN 1674H

## (undated) DEVICE — UNIVERSAL SPINE,KIT: Brand: CARDINAL HEALTH

## (undated) DEVICE — 1840 FOAM BLOCK NEEDLE COUNTER: Brand: DEVON

## (undated) DEVICE — DRAPE MICROSCOPE OPMI PENTERO

## (undated) DEVICE — DRESSING MEPILEX AG BORDER 4 X 8 IN

## (undated) DEVICE — TIBURON SPLIT SHEET: Brand: CONVERTORS

## (undated) DEVICE — GLOVE SRG BIOGEL 7.5

## (undated) DEVICE — CHLORAPREP HI-LITE 26ML ORANGE

## (undated) DEVICE — PROXIMATE PLUS MD MULTI-DIRECTIONAL RELEASE SKIN STAPLERS CONTAINS 35 STAINLESS STEEL STAPLES APPROXIMATE CLOSED DIMENSIONS: 6.9MM X 3.9MM WIDE: Brand: PROXIMATE

## (undated) DEVICE — GLOVE INDICATOR PI UNDERGLOVE SZ 8 BLUE

## (undated) DEVICE — SPONGE PVP SCRUB WING STERILE

## (undated) DEVICE — CULTURE TUBE AEROBIC

## (undated) DEVICE — DRAPE SURGIKIT SADDLE BAG

## (undated) DEVICE — INTENDED FOR TISSUE SEPARATION, AND OTHER PROCEDURES THAT REQUIRE A SHARP SURGICAL BLADE TO PUNCTURE OR CUT.: Brand: BARD-PARKER SAFETY BLADES SIZE 10, STERILE

## (undated) DEVICE — SPONGE LAP 18 X 18 IN STRL RFD

## (undated) DEVICE — 3M™ V.A.C.® GRANUFOAM™ DRESSING KIT, M8275052, MEDIUM: Brand: 3M™ V.A.C.® GRANUFOAM™

## (undated) DEVICE — DRAPE SHEET THREE QUARTER

## (undated) DEVICE — NEEDLE SPINAL18G X 3.5 IN QUINCKE

## (undated) DEVICE — ABSORBABLE WOUND CLOSURE DEVICE: Brand: V-LOC 180

## (undated) DEVICE — TOOL 14MH30 LEGEND 14CM 3MM: Brand: MIDAS REX ™

## (undated) DEVICE — SPONGE STICK WITH PVP-I: Brand: KENDALL

## (undated) DEVICE — SPONGE LAP 18 X 18 IN

## (undated) DEVICE — REM POLYHESIVE ADULT PATIENT RETURN ELECTRODE: Brand: VALLEYLAB

## (undated) DEVICE — SUT VICRYL 2-0 SH 27 IN UNDYED J417H

## (undated) DEVICE — GLOVE INDICATOR PI UNDERGLOVE SZ 7.5 BLUE

## (undated) DEVICE — GLOVE SRG BIOGEL 6.5

## (undated) DEVICE — DISPOSABLE EQUIPMENT COVER: Brand: SMALL TOWEL DRAPE

## (undated) DEVICE — LIGHT HANDLE COVER CAMERA DISP

## (undated) DEVICE — ANTIBACTERIAL VIOLET BRAIDED (POLYGLACTIN 910), SYNTHETIC ABSORBABLE SUTURE: Brand: COATED VICRYL

## (undated) DEVICE — 3M™ TEGADERM™ TRANSPARENT FILM DRESSING FRAME STYLE, 1628, 6 IN X 8 IN (15 CM X 20 CM), 10/CT 8CT/CASE: Brand: 3M™ TEGADERM™

## (undated) DEVICE — CYSTO TUBING SINGLE IRRIGATION

## (undated) DEVICE — JACKSON-PRATT 100CC BULB RESERVOIR: Brand: CARDINAL HEALTH

## (undated) DEVICE — GLOVE INDICATOR PI UNDERGLOVE SZ 7 BLUE

## (undated) DEVICE — SUT VICRYL 0 CT-1 27 IN J260H

## (undated) DEVICE — ADHESIVE SKN CLSR HISTOACRYL FLEX 0.5ML LF

## (undated) DEVICE — BULB SYRINGE,IRRIGATION WITH PROTECTIVE CAP: Brand: DOVER

## (undated) DEVICE — CULTURE TUBE ANAEROBIC

## (undated) DEVICE — PLUMEPEN PRO 10FT

## (undated) DEVICE — DRAPE SHEET X-LG

## (undated) DEVICE — BLADE ELECTRODE: Brand: EDGE

## (undated) DEVICE — VAC CANISTER 500ML

## (undated) DEVICE — SNAP KOVER: Brand: UNBRANDED

## (undated) DEVICE — SUT VICRYL 2-0 CT-1 27 IN J259H

## (undated) DEVICE — INTENDED FOR TISSUE SEPARATION, AND OTHER PROCEDURES THAT REQUIRE A SHARP SURGICAL BLADE TO PUNCTURE OR CUT.: Brand: BARD-PARKER ® CARBON RIB-BACK BLADES

## (undated) DEVICE — TELFA ADHESIVE ISLAND DRESSING: Brand: TELFA

## (undated) DEVICE — FLOSEAL MATRIX IS INDICATED IN SURGICAL PROCEDURES (OTHER THAN IN OPHTHALMIC) AS AN ADJUNCT TO HEMOSTASIS WHEN CONTROL OF BLEEDING BY LIGATURE OR CONVENTIONAL PROCEDURES IS INEFFECTIVE OR IMPRACTICAL.: Brand: FLOSEAL HEMOSTATIC MATRIX

## (undated) DEVICE — INSULATED BLADE ELECTRODE: Brand: EDGE

## (undated) DEVICE — GAUZE SPONGES,16 PLY: Brand: CURITY

## (undated) DEVICE — SPECIMEN CONTAINER STERILE PEEL PACK

## (undated) DEVICE — ELECTRODE BLADE MOD E-Z CLEAN 4IN -0014AM

## (undated) DEVICE — STRL UNIVERSAL MINOR GENERAL: Brand: CARDINAL HEALTH

## (undated) DEVICE — 3000CC GUARDIAN II: Brand: GUARDIAN

## (undated) DEVICE — DRAPE EQUIPMENT RF WAND

## (undated) DEVICE — STRL UNIVERSAL OUTPATIENT PACK: Brand: CARDINAL HEALTH